# Patient Record
Sex: FEMALE | Race: WHITE | Employment: OTHER | URBAN - NONMETROPOLITAN AREA
[De-identification: names, ages, dates, MRNs, and addresses within clinical notes are randomized per-mention and may not be internally consistent; named-entity substitution may affect disease eponyms.]

---

## 2017-02-15 ENCOUNTER — OFFICE VISIT (OUTPATIENT)
Dept: CARDIOLOGY | Age: 77
End: 2017-02-15

## 2017-02-15 VITALS
WEIGHT: 173.25 LBS | HEART RATE: 80 BPM | DIASTOLIC BLOOD PRESSURE: 78 MMHG | HEIGHT: 61 IN | SYSTOLIC BLOOD PRESSURE: 150 MMHG | BODY MASS INDEX: 32.71 KG/M2

## 2017-02-15 DIAGNOSIS — R07.2 PRECORDIAL PAIN: ICD-10-CM

## 2017-02-15 DIAGNOSIS — E78.5 DYSLIPIDEMIA: ICD-10-CM

## 2017-02-15 DIAGNOSIS — Z87.891 HISTORY OF TOBACCO ABUSE: ICD-10-CM

## 2017-02-15 DIAGNOSIS — R06.02 SOB (SHORTNESS OF BREATH): ICD-10-CM

## 2017-02-15 DIAGNOSIS — E66.09 NON MORBID OBESITY DUE TO EXCESS CALORIES: ICD-10-CM

## 2017-02-15 PROCEDURE — 1090F PRES/ABSN URINE INCON ASSESS: CPT | Performed by: INTERNAL MEDICINE

## 2017-02-15 PROCEDURE — 99204 OFFICE O/P NEW MOD 45 MIN: CPT | Performed by: INTERNAL MEDICINE

## 2017-02-15 PROCEDURE — G8484 FLU IMMUNIZE NO ADMIN: HCPCS | Performed by: INTERNAL MEDICINE

## 2017-02-15 PROCEDURE — 4040F PNEUMOC VAC/ADMIN/RCVD: CPT | Performed by: INTERNAL MEDICINE

## 2017-02-15 PROCEDURE — G8427 DOCREV CUR MEDS BY ELIG CLIN: HCPCS | Performed by: INTERNAL MEDICINE

## 2017-02-15 PROCEDURE — 1123F ACP DISCUSS/DSCN MKR DOCD: CPT | Performed by: INTERNAL MEDICINE

## 2017-02-15 PROCEDURE — G8400 PT W/DXA NO RESULTS DOC: HCPCS | Performed by: INTERNAL MEDICINE

## 2017-02-15 PROCEDURE — G8419 CALC BMI OUT NRM PARAM NOF/U: HCPCS | Performed by: INTERNAL MEDICINE

## 2017-02-15 PROCEDURE — 1036F TOBACCO NON-USER: CPT | Performed by: INTERNAL MEDICINE

## 2017-02-15 RX ORDER — TRIAMCINOLONE ACETONIDE 1 MG/G
CREAM TOPICAL 2 TIMES DAILY
COMMUNITY
End: 2017-02-21

## 2017-02-15 RX ORDER — LISINOPRIL 20 MG/1
20 TABLET ORAL DAILY
COMMUNITY
End: 2021-05-05

## 2017-02-15 RX ORDER — PREDNISONE 20 MG/1
20 TABLET ORAL DAILY
COMMUNITY
End: 2017-02-21 | Stop reason: ALTCHOICE

## 2017-02-15 ASSESSMENT — ENCOUNTER SYMPTOMS
GASTROINTESTINAL NEGATIVE: 1
EYES NEGATIVE: 1
CHEST TIGHTNESS: 1
SHORTNESS OF BREATH: 1

## 2018-06-19 ENCOUNTER — HOSPITAL ENCOUNTER (OUTPATIENT)
Age: 78
Discharge: HOME | End: 2018-06-19
Payer: MEDICARE

## 2018-06-19 ENCOUNTER — HOSPITAL ENCOUNTER (OUTPATIENT)
Dept: GENERAL RADIOLOGY | Age: 78
Discharge: HOME | End: 2018-06-19
Payer: MEDICARE

## 2018-06-19 DIAGNOSIS — R55 SYNCOPE, UNSPECIFIED SYNCOPE TYPE: ICD-10-CM

## 2018-06-19 DIAGNOSIS — R06.02 SOB (SHORTNESS OF BREATH): ICD-10-CM

## 2018-06-19 LAB
ALBUMIN SERPL-MCNC: 4.2 G/DL (ref 3.5–5.1)
ALP BLD-CCNC: 92 U/L (ref 38–126)
ALT SERPL-CCNC: 27 U/L (ref 11–66)
ANION GAP SERPL CALCULATED.3IONS-SCNC: 15 MEQ/L (ref 8–16)
AST SERPL-CCNC: 30 U/L (ref 5–40)
BASOPHILS # BLD: 0.5 %
BASOPHILS ABSOLUTE: 0 THOU/MM3 (ref 0–0.1)
BILIRUB SERPL-MCNC: 0.5 MG/DL (ref 0.3–1.2)
BUN BLDV-MCNC: 17 MG/DL (ref 7–22)
CALCIUM SERPL-MCNC: 10.2 MG/DL (ref 8.5–10.5)
CHLORIDE BLD-SCNC: 102 MEQ/L (ref 98–111)
CO2: 23 MEQ/L (ref 23–33)
CREAT SERPL-MCNC: 0.7 MG/DL (ref 0.4–1.2)
EKG ATRIAL RATE: 83 BPM
EKG P AXIS: 39 DEGREES
EKG P-R INTERVAL: 146 MS
EKG Q-T INTERVAL: 406 MS
EKG QRS DURATION: 70 MS
EKG QTC CALCULATION (BAZETT): 477 MS
EKG R AXIS: 25 DEGREES
EKG T AXIS: 8 DEGREES
EKG VENTRICULAR RATE: 83 BPM
EOSINOPHIL # BLD: 1.4 %
EOSINOPHILS ABSOLUTE: 0.1 THOU/MM3 (ref 0–0.4)
GFR SERPL CREATININE-BSD FRML MDRD: 81 ML/MIN/1.73M2
GLUCOSE BLD-MCNC: 96 MG/DL (ref 70–108)
HCT VFR BLD CALC: 42.9 % (ref 37–47)
HEMOGLOBIN: 14.1 GM/DL (ref 12–16)
LYMPHOCYTES # BLD: 20.1 %
LYMPHOCYTES ABSOLUTE: 1.6 THOU/MM3 (ref 1–4.8)
MCH RBC QN AUTO: 29.5 PG (ref 27–31)
MCHC RBC AUTO-ENTMCNC: 33 GM/DL (ref 33–37)
MCV RBC AUTO: 89.5 FL (ref 81–99)
MONOCYTES # BLD: 5.8 %
MONOCYTES ABSOLUTE: 0.5 THOU/MM3 (ref 0.4–1.3)
NUCLEATED RED BLOOD CELLS: 0 /100 WBC
PDW BLD-RTO: 14.3 % (ref 11.5–14.5)
PLATELET # BLD: 355 THOU/MM3 (ref 130–400)
PMV BLD AUTO: 8.4 FL (ref 7.4–10.4)
POTASSIUM SERPL-SCNC: 4.4 MEQ/L (ref 3.5–5.2)
RBC # BLD: 4.79 MILL/MM3 (ref 4.2–5.4)
SEG NEUTROPHILS: 72.2 %
SEGMENTED NEUTROPHILS ABSOLUTE COUNT: 5.8 THOU/MM3 (ref 1.8–7.7)
SODIUM BLD-SCNC: 140 MEQ/L (ref 135–145)
TOTAL PROTEIN: 8.2 G/DL (ref 6.1–8)
TROPONIN T: < 0.01 NG/ML
WBC # BLD: 8.1 THOU/MM3 (ref 4.8–10.8)

## 2018-06-19 PROCEDURE — 93010 ELECTROCARDIOGRAM REPORT: CPT | Performed by: INTERNAL MEDICINE

## 2018-06-19 PROCEDURE — 80053 COMPREHEN METABOLIC PANEL: CPT

## 2018-06-19 PROCEDURE — 71046 X-RAY EXAM CHEST 2 VIEWS: CPT

## 2018-06-19 PROCEDURE — 36415 COLL VENOUS BLD VENIPUNCTURE: CPT

## 2018-06-19 PROCEDURE — 93005 ELECTROCARDIOGRAM TRACING: CPT | Performed by: NURSE PRACTITIONER

## 2018-06-19 PROCEDURE — 84484 ASSAY OF TROPONIN QUANT: CPT

## 2018-06-19 PROCEDURE — 85025 COMPLETE CBC W/AUTO DIFF WBC: CPT

## 2018-06-20 ENCOUNTER — HOSPITAL ENCOUNTER (EMERGENCY)
Age: 78
Discharge: HOME | End: 2018-06-20
Attending: FAMILY MEDICINE
Payer: MEDICARE

## 2018-06-20 ENCOUNTER — APPOINTMENT (OUTPATIENT)
Dept: GENERAL RADIOLOGY | Age: 78
End: 2018-06-20
Payer: MEDICARE

## 2018-06-20 ENCOUNTER — APPOINTMENT (OUTPATIENT)
Dept: CT IMAGING | Age: 78
End: 2018-06-20
Payer: MEDICARE

## 2018-06-20 VITALS
OXYGEN SATURATION: 95 % | BODY MASS INDEX: 31.28 KG/M2 | RESPIRATION RATE: 22 BRPM | HEIGHT: 62 IN | TEMPERATURE: 98.3 F | WEIGHT: 170 LBS | SYSTOLIC BLOOD PRESSURE: 133 MMHG | HEART RATE: 80 BPM | DIASTOLIC BLOOD PRESSURE: 73 MMHG

## 2018-06-20 DIAGNOSIS — R42 DIZZINESS: Primary | ICD-10-CM

## 2018-06-20 DIAGNOSIS — J44.1 COPD WITH ACUTE EXACERBATION (HCC): ICD-10-CM

## 2018-06-20 LAB
ALBUMIN SERPL-MCNC: 4.1 G/DL (ref 3.5–5.1)
ALP BLD-CCNC: 87 U/L (ref 38–126)
ALT SERPL-CCNC: 25 U/L (ref 11–66)
ANION GAP SERPL CALCULATED.3IONS-SCNC: 15 MEQ/L (ref 8–16)
APTT: 32.2 SECONDS (ref 22–38)
AST SERPL-CCNC: 25 U/L (ref 5–40)
BASOPHILS # BLD: 0.7 %
BASOPHILS ABSOLUTE: 0 THOU/MM3 (ref 0–0.1)
BILIRUB SERPL-MCNC: 0.4 MG/DL (ref 0.3–1.2)
BILIRUBIN URINE: NEGATIVE
BLOOD, URINE: NEGATIVE
BUN BLDV-MCNC: 23 MG/DL (ref 7–22)
CALCIUM SERPL-MCNC: 10 MG/DL (ref 8.5–10.5)
CHARACTER, URINE: CLEAR
CHLORIDE BLD-SCNC: 103 MEQ/L (ref 98–111)
CO2: 22 MEQ/L (ref 23–33)
COLOR: YELLOW
CREAT SERPL-MCNC: 0.9 MG/DL (ref 0.4–1.2)
EKG ATRIAL RATE: 71 BPM
EKG P AXIS: 39 DEGREES
EKG P-R INTERVAL: 136 MS
EKG Q-T INTERVAL: 400 MS
EKG QRS DURATION: 84 MS
EKG QTC CALCULATION (BAZETT): 434 MS
EKG R AXIS: 26 DEGREES
EKG T AXIS: 27 DEGREES
EKG VENTRICULAR RATE: 71 BPM
EOSINOPHIL # BLD: 2.7 %
EOSINOPHILS ABSOLUTE: 0.2 THOU/MM3 (ref 0–0.4)
GFR SERPL CREATININE-BSD FRML MDRD: 61 ML/MIN/1.73M2
GLUCOSE BLD-MCNC: 88 MG/DL (ref 70–108)
GLUCOSE URINE: NEGATIVE MG/DL
HCT VFR BLD CALC: 41.5 % (ref 37–47)
HEMOGLOBIN: 13.9 GM/DL (ref 12–16)
INR BLD: 1 (ref 0.85–1.13)
KETONES, URINE: NEGATIVE
LACTIC ACID: 1.3 MMOL/L (ref 0.5–2.2)
LEUKOCYTE ESTERASE, URINE: NEGATIVE
LIPASE: 40.2 U/L (ref 5.6–51.3)
LYMPHOCYTES # BLD: 34.4 %
LYMPHOCYTES ABSOLUTE: 2.3 THOU/MM3 (ref 1–4.8)
MCH RBC QN AUTO: 30 PG (ref 27–31)
MCHC RBC AUTO-ENTMCNC: 33.5 GM/DL (ref 33–37)
MCV RBC AUTO: 89.6 FL (ref 81–99)
MONOCYTES # BLD: 9.4 %
MONOCYTES ABSOLUTE: 0.6 THOU/MM3 (ref 0.4–1.3)
NITRITE, URINE: NEGATIVE
NUCLEATED RED BLOOD CELLS: 0 /100 WBC
OSMOLALITY CALCULATION: 282.5 MOSMOL/KG (ref 275–300)
PDW BLD-RTO: 14 % (ref 11.5–14.5)
PH UA: 6
PLATELET # BLD: 355 THOU/MM3 (ref 130–400)
PMV BLD AUTO: 8.2 FL (ref 7.4–10.4)
POTASSIUM REFLEX MAGNESIUM: 4 MEQ/L (ref 3.5–5.2)
PRO-BNP: 174.5 PG/ML (ref 0–1800)
PROTEIN UA: NEGATIVE
RBC # BLD: 4.63 MILL/MM3 (ref 4.2–5.4)
SEG NEUTROPHILS: 52.8 %
SEGMENTED NEUTROPHILS ABSOLUTE COUNT: 3.6 THOU/MM3 (ref 1.8–7.7)
SODIUM BLD-SCNC: 140 MEQ/L (ref 135–145)
SPECIFIC GRAVITY, URINE: 1.01 (ref 1–1.03)
TOTAL PROTEIN: 7.8 G/DL (ref 6.1–8)
TROPONIN T: < 0.01 NG/ML
UROBILINOGEN, URINE: 0.2 EU/DL
WBC # BLD: 6.8 THOU/MM3 (ref 4.8–10.8)

## 2018-06-20 PROCEDURE — 83690 ASSAY OF LIPASE: CPT

## 2018-06-20 PROCEDURE — 83880 ASSAY OF NATRIURETIC PEPTIDE: CPT

## 2018-06-20 PROCEDURE — 93005 ELECTROCARDIOGRAM TRACING: CPT | Performed by: FAMILY MEDICINE

## 2018-06-20 PROCEDURE — 96365 THER/PROPH/DIAG IV INF INIT: CPT

## 2018-06-20 PROCEDURE — 96375 TX/PRO/DX INJ NEW DRUG ADDON: CPT

## 2018-06-20 PROCEDURE — 70450 CT HEAD/BRAIN W/O DYE: CPT

## 2018-06-20 PROCEDURE — 36415 COLL VENOUS BLD VENIPUNCTURE: CPT

## 2018-06-20 PROCEDURE — 94640 AIRWAY INHALATION TREATMENT: CPT

## 2018-06-20 PROCEDURE — 84484 ASSAY OF TROPONIN QUANT: CPT

## 2018-06-20 PROCEDURE — 6370000000 HC RX 637 (ALT 250 FOR IP): Performed by: FAMILY MEDICINE

## 2018-06-20 PROCEDURE — 71046 X-RAY EXAM CHEST 2 VIEWS: CPT

## 2018-06-20 PROCEDURE — 6360000002 HC RX W HCPCS: Performed by: FAMILY MEDICINE

## 2018-06-20 PROCEDURE — 93010 ELECTROCARDIOGRAM REPORT: CPT | Performed by: INTERNAL MEDICINE

## 2018-06-20 PROCEDURE — 85610 PROTHROMBIN TIME: CPT

## 2018-06-20 PROCEDURE — 80053 COMPREHEN METABOLIC PANEL: CPT

## 2018-06-20 PROCEDURE — 85025 COMPLETE CBC W/AUTO DIFF WBC: CPT

## 2018-06-20 PROCEDURE — 83605 ASSAY OF LACTIC ACID: CPT

## 2018-06-20 PROCEDURE — 2580000003 HC RX 258: Performed by: FAMILY MEDICINE

## 2018-06-20 PROCEDURE — 87040 BLOOD CULTURE FOR BACTERIA: CPT

## 2018-06-20 PROCEDURE — 96367 TX/PROPH/DG ADDL SEQ IV INF: CPT

## 2018-06-20 PROCEDURE — 99285 EMERGENCY DEPT VISIT HI MDM: CPT

## 2018-06-20 PROCEDURE — 85730 THROMBOPLASTIN TIME PARTIAL: CPT

## 2018-06-20 PROCEDURE — 81003 URINALYSIS AUTO W/O SCOPE: CPT

## 2018-06-20 RX ORDER — AZITHROMYCIN 250 MG/1
500 TABLET, FILM COATED ORAL DAILY
Status: ON HOLD | COMMUNITY
End: 2020-09-18

## 2018-06-20 RX ORDER — PREDNISONE 10 MG/1
TABLET ORAL
Qty: 20 TABLET | Refills: 0 | Status: SHIPPED | OUTPATIENT
Start: 2018-06-20 | End: 2018-06-30

## 2018-06-20 RX ORDER — MECLIZINE HYDROCHLORIDE 25 MG/1
25 TABLET ORAL 3 TIMES DAILY PRN
Qty: 30 TABLET | Refills: 0 | Status: SHIPPED | OUTPATIENT
Start: 2018-06-20 | End: 2018-06-30

## 2018-06-20 RX ORDER — 0.9 % SODIUM CHLORIDE 0.9 %
30 INTRAVENOUS SOLUTION INTRAVENOUS ONCE
Status: COMPLETED | OUTPATIENT
Start: 2018-06-20 | End: 2018-06-20

## 2018-06-20 RX ORDER — IPRATROPIUM BROMIDE AND ALBUTEROL SULFATE 2.5; .5 MG/3ML; MG/3ML
2 SOLUTION RESPIRATORY (INHALATION) ONCE
Status: COMPLETED | OUTPATIENT
Start: 2018-06-20 | End: 2018-06-20

## 2018-06-20 RX ORDER — METHYLPREDNISOLONE SODIUM SUCCINATE 125 MG/2ML
125 INJECTION, POWDER, LYOPHILIZED, FOR SOLUTION INTRAMUSCULAR; INTRAVENOUS ONCE
Status: COMPLETED | OUTPATIENT
Start: 2018-06-20 | End: 2018-06-20

## 2018-06-20 RX ADMIN — IPRATROPIUM BROMIDE AND ALBUTEROL SULFATE 2 AMPULE: .5; 3 SOLUTION RESPIRATORY (INHALATION) at 18:59

## 2018-06-20 RX ADMIN — SODIUM CHLORIDE 2313 ML: 9 INJECTION, SOLUTION INTRAVENOUS at 19:46

## 2018-06-20 RX ADMIN — METHYLPREDNISOLONE SODIUM SUCCINATE 125 MG: 125 INJECTION, POWDER, FOR SOLUTION INTRAMUSCULAR; INTRAVENOUS at 19:47

## 2018-06-20 RX ADMIN — CEFTRIAXONE SODIUM 1 G: 1 INJECTION, POWDER, FOR SOLUTION INTRAMUSCULAR; INTRAVENOUS at 19:59

## 2018-06-20 RX ADMIN — AZITHROMYCIN MONOHYDRATE 500 MG: 500 INJECTION, POWDER, LYOPHILIZED, FOR SOLUTION INTRAVENOUS at 20:37

## 2018-06-20 ASSESSMENT — ENCOUNTER SYMPTOMS
ABDOMINAL PAIN: 0
SHORTNESS OF BREATH: 0
CHEST TIGHTNESS: 0
GASTROINTESTINAL NEGATIVE: 1
FACIAL SWELLING: 0
SINUS PAIN: 0
SINUS PRESSURE: 0
ALLERGIC/IMMUNOLOGIC NEGATIVE: 1
EYES NEGATIVE: 1
SORE THROAT: 0
VOMITING: 0
WHEEZING: 1
CONSTIPATION: 0
VOICE CHANGE: 0
NAUSEA: 0
COUGH: 1
RHINORRHEA: 0
TROUBLE SWALLOWING: 0

## 2018-06-21 ENCOUNTER — NURSE TRIAGE (OUTPATIENT)
Dept: ADMINISTRATIVE | Age: 78
End: 2018-06-21

## 2018-06-26 LAB
BLOOD CULTURE, ROUTINE: NORMAL
BLOOD CULTURE, ROUTINE: NORMAL

## 2019-11-06 ENCOUNTER — HOSPITAL ENCOUNTER (OUTPATIENT)
Age: 79
Setting detail: SPECIMEN
Discharge: HOME | End: 2019-11-06
Payer: MEDICARE

## 2019-11-08 LAB
CULTURE: ABNORMAL
DIRECT EXAM: ABNORMAL
DIRECT EXAM: ABNORMAL
Lab: ABNORMAL
SPECIMEN DESCRIPTION: ABNORMAL

## 2020-07-27 ENCOUNTER — HOSPITAL ENCOUNTER (OUTPATIENT)
Age: 80
Setting detail: SPECIMEN
Discharge: HOME | End: 2020-07-27
Payer: MEDICARE

## 2020-07-27 LAB
ABSOLUTE EOS #: 0.37 K/UL (ref 0–0.44)
ABSOLUTE IMMATURE GRANULOCYTE: 0.03 K/UL (ref 0–0.3)
ABSOLUTE LYMPH #: 1.74 K/UL (ref 1.1–3.7)
ABSOLUTE MONO #: 0.64 K/UL (ref 0.1–1.2)
ALBUMIN SERPL-MCNC: 3.9 G/DL (ref 3.5–5.2)
ALBUMIN/GLOBULIN RATIO: 1 (ref 1–2.5)
ALP BLD-CCNC: 84 U/L (ref 35–104)
ALT SERPL-CCNC: 10 U/L (ref 5–33)
ANION GAP SERPL CALCULATED.3IONS-SCNC: 16 MMOL/L (ref 9–17)
AST SERPL-CCNC: 17 U/L
BASOPHILS # BLD: 1 % (ref 0–2)
BASOPHILS ABSOLUTE: 0.06 K/UL (ref 0–0.2)
BILIRUB SERPL-MCNC: 0.46 MG/DL (ref 0.3–1.2)
BUN BLDV-MCNC: 26 MG/DL (ref 8–23)
BUN/CREAT BLD: ABNORMAL (ref 9–20)
C-PEPTIDE: 10.3 NG/ML (ref 1.1–4.4)
CALCIUM SERPL-MCNC: 8.9 MG/DL (ref 8.6–10.4)
CHLORIDE BLD-SCNC: 104 MMOL/L (ref 98–107)
CO2: 19 MMOL/L (ref 20–31)
CREAT SERPL-MCNC: 1.31 MG/DL (ref 0.5–0.9)
DIFFERENTIAL TYPE: ABNORMAL
EOSINOPHILS RELATIVE PERCENT: 5 % (ref 1–4)
GFR AFRICAN AMERICAN: 47 ML/MIN
GFR NON-AFRICAN AMERICAN: 39 ML/MIN
GFR SERPL CREATININE-BSD FRML MDRD: ABNORMAL ML/MIN/{1.73_M2}
GFR SERPL CREATININE-BSD FRML MDRD: ABNORMAL ML/MIN/{1.73_M2}
GLUCOSE BLD-MCNC: 125 MG/DL (ref 70–99)
HCT VFR BLD CALC: 40.9 % (ref 36.3–47.1)
HEMOGLOBIN: 12.5 G/DL (ref 11.9–15.1)
IMMATURE GRANULOCYTES: 0 %
LYMPHOCYTES # BLD: 22 % (ref 24–43)
MCH RBC QN AUTO: 28.5 PG (ref 25.2–33.5)
MCHC RBC AUTO-ENTMCNC: 30.6 G/DL (ref 28.4–34.8)
MCV RBC AUTO: 93.2 FL (ref 82.6–102.9)
MONOCYTES # BLD: 8 % (ref 3–12)
NRBC AUTOMATED: 0 PER 100 WBC
PDW BLD-RTO: 14.8 % (ref 11.8–14.4)
PLATELET # BLD: 403 K/UL (ref 138–453)
PLATELET ESTIMATE: ABNORMAL
PMV BLD AUTO: 9.9 FL (ref 8.1–13.5)
POTASSIUM SERPL-SCNC: 5.2 MMOL/L (ref 3.7–5.3)
RBC # BLD: 4.39 M/UL (ref 3.95–5.11)
RBC # BLD: ABNORMAL 10*6/UL
SEDIMENTATION RATE, ERYTHROCYTE: 33 MM (ref 0–30)
SEG NEUTROPHILS: 64 % (ref 36–65)
SEGMENTED NEUTROPHILS ABSOLUTE COUNT: 4.94 K/UL (ref 1.5–8.1)
SODIUM BLD-SCNC: 139 MMOL/L (ref 135–144)
TOTAL PROTEIN: 7.7 G/DL (ref 6.4–8.3)
WBC # BLD: 7.8 K/UL (ref 3.5–11.3)
WBC # BLD: ABNORMAL 10*3/UL

## 2020-08-30 ENCOUNTER — APPOINTMENT (OUTPATIENT)
Dept: CT IMAGING | Age: 80
End: 2020-08-30
Payer: MEDICARE

## 2020-08-30 ENCOUNTER — HOSPITAL ENCOUNTER (EMERGENCY)
Age: 80
Discharge: HOME | End: 2020-08-31
Attending: EMERGENCY MEDICINE
Payer: MEDICARE

## 2020-08-30 LAB
ALBUMIN SERPL-MCNC: 4.1 G/DL (ref 3.5–5.1)
ALP BLD-CCNC: 92 U/L (ref 38–126)
ALT SERPL-CCNC: 11 U/L (ref 11–66)
ANION GAP SERPL CALCULATED.3IONS-SCNC: 12 MEQ/L (ref 8–16)
APTT: 35.6 SECONDS (ref 22–38)
AST SERPL-CCNC: 16 U/L (ref 5–40)
BASOPHILS # BLD: 0.8 %
BASOPHILS ABSOLUTE: 0.1 THOU/MM3 (ref 0–0.1)
BILIRUB SERPL-MCNC: 0.5 MG/DL (ref 0.3–1.2)
BUN BLDV-MCNC: 22 MG/DL (ref 7–22)
CALCIUM SERPL-MCNC: 9.4 MG/DL (ref 8.5–10.5)
CHLORIDE BLD-SCNC: 107 MEQ/L (ref 98–111)
CO2: 19 MEQ/L (ref 23–33)
CREAT SERPL-MCNC: 1.2 MG/DL (ref 0.4–1.2)
EOSINOPHIL # BLD: 5.5 %
EOSINOPHILS ABSOLUTE: 0.3 THOU/MM3 (ref 0–0.4)
ERYTHROCYTE [DISTWIDTH] IN BLOOD BY AUTOMATED COUNT: 15.4 % (ref 11.5–14.5)
ERYTHROCYTE [DISTWIDTH] IN BLOOD BY AUTOMATED COUNT: 52.7 FL (ref 35–45)
GFR SERPL CREATININE-BSD FRML MDRD: 43 ML/MIN/1.73M2
GLUCOSE BLD-MCNC: 91 MG/DL (ref 70–108)
HCT VFR BLD CALC: 38.6 % (ref 37–47)
HEMOGLOBIN: 12.2 GM/DL (ref 12–16)
IMMATURE GRANS (ABS): 0.02 THOU/MM3 (ref 0–0.07)
IMMATURE GRANULOCYTES: 0.3 %
INR BLD: 1.02 (ref 0.85–1.13)
LIPASE: 25.5 U/L (ref 5.6–51.3)
LYMPHOCYTES # BLD: 26.5 %
LYMPHOCYTES ABSOLUTE: 1.7 THOU/MM3 (ref 1–4.8)
MCH RBC QN AUTO: 29.5 PG (ref 26–33)
MCHC RBC AUTO-ENTMCNC: 31.6 GM/DL (ref 32.2–35.5)
MCV RBC AUTO: 93.5 FL (ref 81–99)
MONOCYTES # BLD: 8.5 %
MONOCYTES ABSOLUTE: 0.5 THOU/MM3 (ref 0.4–1.3)
NUCLEATED RED BLOOD CELLS: 0 /100 WBC
OSMOLALITY CALCULATION: 278.6 MOSMOL/KG (ref 275–300)
PLATELET # BLD: 354 THOU/MM3 (ref 130–400)
PMV BLD AUTO: 9.4 FL (ref 9.4–12.4)
POTASSIUM REFLEX MAGNESIUM: 4.5 MEQ/L (ref 3.5–5.2)
RBC # BLD: 4.13 MILL/MM3 (ref 4.2–5.4)
SEG NEUTROPHILS: 58.4 %
SEGMENTED NEUTROPHILS ABSOLUTE COUNT: 3.7 THOU/MM3 (ref 1.8–7.7)
SODIUM BLD-SCNC: 138 MEQ/L (ref 135–145)
TOTAL PROTEIN: 7.8 G/DL (ref 6.1–8)
WBC # BLD: 6.3 THOU/MM3 (ref 4.8–10.8)

## 2020-08-30 PROCEDURE — 85730 THROMBOPLASTIN TIME PARTIAL: CPT

## 2020-08-30 PROCEDURE — 86850 RBC ANTIBODY SCREEN: CPT

## 2020-08-30 PROCEDURE — 85025 COMPLETE CBC W/AUTO DIFF WBC: CPT

## 2020-08-30 PROCEDURE — 36415 COLL VENOUS BLD VENIPUNCTURE: CPT

## 2020-08-30 PROCEDURE — 74177 CT ABD & PELVIS W/CONTRAST: CPT

## 2020-08-30 PROCEDURE — 80053 COMPREHEN METABOLIC PANEL: CPT

## 2020-08-30 PROCEDURE — 85610 PROTHROMBIN TIME: CPT

## 2020-08-30 PROCEDURE — 99284 EMERGENCY DEPT VISIT MOD MDM: CPT

## 2020-08-30 PROCEDURE — 86900 BLOOD TYPING SEROLOGIC ABO: CPT

## 2020-08-30 PROCEDURE — 86901 BLOOD TYPING SEROLOGIC RH(D): CPT

## 2020-08-30 PROCEDURE — 83690 ASSAY OF LIPASE: CPT

## 2020-08-30 PROCEDURE — 2500000003 HC RX 250 WO HCPCS: Performed by: EMERGENCY MEDICINE

## 2020-08-30 RX ORDER — TRANEXAMIC ACID 100 MG/ML
1000 INJECTION, SOLUTION INTRAVENOUS ONCE
Status: COMPLETED | OUTPATIENT
Start: 2020-08-30 | End: 2020-08-30

## 2020-08-30 RX ADMIN — TRANEXAMIC ACID 1000 MG: 100 INJECTION, SOLUTION INTRAVENOUS at 23:15

## 2020-08-30 ASSESSMENT — ENCOUNTER SYMPTOMS
VOMITING: 0
DIARRHEA: 1
RHINORRHEA: 0
SORE THROAT: 0
RESPIRATORY NEGATIVE: 1
ABDOMINAL DISTENTION: 1
NAUSEA: 0
ALLERGIC/IMMUNOLOGIC NEGATIVE: 1
EYES NEGATIVE: 1
CONSTIPATION: 0
SHORTNESS OF BREATH: 0
ABDOMINAL PAIN: 0
COUGH: 0
BLOOD IN STOOL: 0

## 2020-08-31 VITALS
DIASTOLIC BLOOD PRESSURE: 92 MMHG | TEMPERATURE: 97.6 F | SYSTOLIC BLOOD PRESSURE: 162 MMHG | HEART RATE: 85 BPM | RESPIRATION RATE: 18 BRPM | BODY MASS INDEX: 31.47 KG/M2 | WEIGHT: 171 LBS | HEIGHT: 62 IN | OXYGEN SATURATION: 95 %

## 2020-08-31 LAB
ABO: NORMAL
ANTIBODY SCREEN: NORMAL
RH FACTOR: NORMAL

## 2020-08-31 PROCEDURE — 6360000004 HC RX CONTRAST MEDICATION: Performed by: EMERGENCY MEDICINE

## 2020-08-31 PROCEDURE — 74177 CT ABD & PELVIS W/CONTRAST: CPT

## 2020-08-31 RX ADMIN — IOPAMIDOL 80 ML: 755 INJECTION, SOLUTION INTRAVENOUS at 00:04

## 2020-08-31 NOTE — ED NOTES
ED nurse-to-nurse bedside report    No chief complaint on file. LOC: alert and orientated to name, place, date  Vital signs   Vitals:    08/30/20 2022 08/30/20 2253   BP: (!) 144/92 137/85   Pulse: 77 74   Resp: 17 17   Temp: 97.6 °F (36.4 °C)    TempSrc: Oral    SpO2: 97% 98%   Weight: 171 lb (77.6 kg)    Height: 5' 2\" (1.575 m)       Pain:  0  Pain Interventions: none  Pain Goal: 0  Oxygen: NA    Current needs required none   Telemetry: NA  LDAs:   Peripheral IV 08/30/20 Left Forearm (Active)   Site Assessment Clean;Dry; Intact 08/30/20 2253   Line Status Normal saline locked 08/30/20 2253   Dressing Status Clean;Dry; Intact 08/30/20 2253     Continuous Infusions:   Mobility: Independent  [place] Fall Risk Score: Fall Risk 10/13/2014   2 or more falls in past year? no   Fall with injury in past year? no     Fall Interventions: call light in reach  Report given to:  Tato Garcia RN  08/30/20 0399

## 2020-08-31 NOTE — ED NOTES
Presents to ER with complaints of belly button bleeding. Pt states she has been gaining weight the last months. States when she got up this morning she noticed her pants were wet and realized it was blood. Pt denies taking blood thinners. States she has never had anything like this.      Melissa Wallace RN  08/30/20 2029

## 2020-08-31 NOTE — ED PROVIDER NOTES
**This is a Medical/PA/APRN Student Note and is charted for educational purposes. The non-physician staff attested note is not to be used for billing purposes, chart documentation or to guide patient care. Please see the supervising physician/PA/APRN modifications/attestation for treatment plan/chart documentation/suggestions. This note has been reviewed and feedback has been provided to the student. **      MEDICAL STUDENT NOTE    No chief complaint on file. History obtained from the patient and daughter. GAYE Wilder is a [de-identified] y.o. female, COPD, HTN, arthritis c/o bleeding belly button. Per pt and her daughter, Meliza Kapoor has noticed umbilical mass because it has been oozing/bleeding recently and is currently bleeding in the room; otherwise whe is unable to see it past her pannus. Her daughter notes that her belly has been getting steadily larger over the past year or so since her mother retired from [employer]. She reports a loose bowel movement this morning; hasn't passed gas since; she reports daily bowel movements. She denies n/v, blood in stool, urinary sx. She has not been having fevers or chills. She has no other complaints at this time. She has no history of abd surgery. Review of Systems   Constitutional: Positive for unexpected weight change. Negative for chills and fever. HENT: Negative. Eyes: Negative. Respiratory: Negative. Cardiovascular: Negative. Gastrointestinal: Positive for abdominal distention and diarrhea. Negative for abdominal pain, blood in stool, constipation, nausea and vomiting. Endocrine: Negative. Genitourinary: Negative. Musculoskeletal: Negative. Skin: Negative. Allergic/Immunologic: Negative. Neurological: Negative. Hematological: Negative. Psychiatric/Behavioral: Negative.             Past Medical History:   Diagnosis Date    Arthritis     Cancer of eye ([facility] [address]     Right    Dyslipidemia 2/15/2017    Fatigue     History of tobacco abuse 2/15/2017    Hyperlipidemia 10/13/2014    Hypertension     Non morbid obesity due to excess calories 2/15/2017    Pneumonia     Precordial pain 2/15/2017    Smoker     SOB (shortness of breath) 2/15/2017    Tobacco abuse           Past Surgical History:   Procedure Laterality Date    EYE SURGERY      Right eye cancer    HEMORRHOID SURGERY         No current facility-administered medications for this encounter. Current Outpatient Medications:     azithromycin (ZITHROMAX) 250 MG tablet, Take 500 mg by mouth daily, Disp: , Rfl:     albuterol-ipratropium (COMBIVENT RESPIMAT)  MCG/ACT AERS inhaler, Inhale 1 puff into the lungs every 6 hours, Disp: 1 Inhaler, Rfl: 0    lisinopril (PRINIVIL;ZESTRIL) 20 MG tablet, Take 20 mg by mouth daily, Disp: , Rfl:     albuterol sulfate HFA (PROVENTIL HFA) 108 (90 BASE) MCG/ACT inhaler, Inhale 2 puffs into the lungs every 6 hours as needed for Wheezing, Disp: 1 Inhaler, Rfl: 0    butalbital-acetaminophen-caffeine (FIORICET) -40 MG per tablet, Take 1 tablet by mouth every 4 hours as needed for Headaches, Disp: 8 tablet, Rfl: 0    meloxicam (MOBIC) 15 MG tablet, TAKE 1 TABLET BY MOUTH EVERY DAY AS NEEDED FOR PAIN, Disp: 30 tablet, Rfl: 0    atorvastatin (LIPITOR) 20 MG tablet, TAKE 1 TABLET BY MOUTH ONE TIME A DAY , Disp: 30 tablet, Rfl: 4    amLODIPine (NORVASC) 5 MG tablet, TAKE 1 TABLET BY MOUTH DAILY, Disp: 30 tablet, Rfl: 5    Social History     Social History Narrative    Not on file     Social History     Tobacco Use    Smoking status: Former Smoker     Packs/day: 1.00     Last attempt to quit: 2012     Years since quittin.6    Smokeless tobacco: Never Used    Tobacco comment: tryin to cut down   Substance Use Topics    Alcohol use: No     Comment: quit    Drug use: No        Allergies   Allergen Reactions    Aspirin Other (See Comments)     Just plain aspirin.        Family History   Problem Relation Age of Onset  Cancer Mother     Cancer Father     Cancer Brother     Heart Failure Sister     Cancer Daughter            Previous records reviewed: no relevant history in chart. Vitals:    08/30/20 2253   BP: 137/85   Pulse: 74   Resp: 17   Temp:    SpO2: 98%     Vital signs and nursing assessment reviewed and abnormal from htn. Pulsoximetry is normal per my interpretation. Physical Exam  Constitutional:       General: She is not in acute distress. Appearance: Normal appearance. She is obese. HENT:      Head: Normocephalic and atraumatic. Mouth/Throat:      Mouth: Mucous membranes are moist.      Pharynx: Oropharynx is clear. Eyes:      General: No scleral icterus. Extraocular Movements: Extraocular movements intact. Conjunctiva/sclera: Conjunctivae normal.      Pupils: Pupils are equal, round, and reactive to light. Neck:      Musculoskeletal: Normal range of motion. Cardiovascular:      Rate and Rhythm: Normal rate and regular rhythm. Pulses: Normal pulses. Heart sounds: Normal heart sounds. No murmur. No gallop. Comments: Distant heart sounds, increased AP diameter of chest wall  Pulmonary:      Effort: Pulmonary effort is normal. No respiratory distress. Breath sounds: Wheezing (bilaterally in all lung fields) present. No rales. Abdominal:      General: There is distension. Tenderness: There is no abdominal tenderness. There is no guarding or rebound. Hernia: A hernia (~3cm in diameter umbilical hernia; superficial vessels are bleeding from punctate lesions of skin breakdown. Hernia is mostly reducible but will not remain in the abdominal cavity without pressure. Hernia is nontender, no warmth, no erythema) is present. Musculoskeletal:      Right lower leg: No edema. Left lower leg: No edema. Skin:     General: Skin is warm and dry. Capillary Refill: Capillary refill takes 2 to 3 seconds. Coloration: Skin is not jaundiced. Neurological:      General: No focal deficit present. Mental Status: She is alert and oriented to person, place, and time. Mental status is at baseline. Psychiatric:         Mood and Affect: Mood normal.         Thought Content: Thought content normal.             MDM  Initial Assessment:   Zeferino Rosas is a [de-identified] y/o F with hx COPD and no hx of abdominal surgeries presenting w/ superficial, low volume bleeding from a temporarily reducible umbilical hernia without signs of incarceration; no erythema, non-tender, not hot, pt is non-toxic. Pt also with likely intertrigo of inferior abdominal wall. Ddx:  Reducible Umbilical hernia, may or may not contain bowel   -C/f incarceration, strangulation highly unlikely at this point  Intertrigo (strep vs candida)     Plan: Plan for basic labs with CT IV contrast for evaluation of hernia. Educated pt regarding keeping the hernia sac dry and clean in order to prevent infection and further skin breakdown. Will attempt reduction prior to discharge and offer abd binder. Will offer topical antifungals for pannus intertrigo. Case and management plan discussed with Dr. Scout Nelson. Labs Reviewed   CBC WITH AUTO DIFFERENTIAL   COMPREHENSIVE METABOLIC PANEL W/ REFLEX TO MG FOR LOW K   LIPASE   PROTIME-INR   APTT   URINE RT REFLEX TO CULTURE   TYPE AND SCREEN       Medications - No data to display    CT ABDOMEN PELVIS W IV CONTRAST Additional Contrast? None    (Results Pending)       Final diagnoses:   None       New Prescriptions    No medications on file           Condition: condition: good    Disposition: Discharge to home    Electronically signed by Ryan Patiño on 8/30/2020 at 10:53 PM       **This is a Medical/PA/APRN Student Note and is charted for educational purposes. The non-physician staff attested note is not to be used for billing purposes, chart documentation or to guide patient care.  Please see the supervising physician/PA/APRN modifications/attestation for treatment plan/chart documentation/suggestions. This note has been reviewed and feedback has been provided to the student.  Elijah Monreal [facility]  09/09/20 1038

## 2020-08-31 NOTE — ED NOTES
Pt medicated per MAR. Pt resting on cot with daughter at bedside. Respirations easy and non-labored. No distress noted. Call light in reach.      Jose Britt RN  08/30/20 3990

## 2020-08-31 NOTE — ED PROVIDER NOTES
[facility]  EMERGENCY DEPARTMENT ENCOUNTER          CHIEF COMPLAINT     No chief complaint on file. Nurses Notes reviewed and I agree except as noted in the HPI. HISTORY OF PRESENT ILLNESS    Tal Christian is a [de-identified] y.o. female who presents to the emergency department for bleeding from her bellybutton. Patient has had some bleeding yesterday. Family applied cold compresses and this was able to stop bleeding. This began again today therefore they came to the emergency department. Patient states she has no abdominal pain. She has some ongoing chronic diarrhea and had a bowel movement yesterday. No fever, chills, nausea, vomiting. No black or bloody stools. No bleeding from the gums or frequent nosebleeds. She does have a history of a chronic umbilical hernia which she states other than the superficial bleeding is at its baseline. She also incidentally reports that over the last year she has gained weight especially in the abdominal area. She denies any use of alcohol. No other complaints or concerns. REVIEW OF SYSTEMS     Review of Systems   Constitutional: Negative for diaphoresis and fever. HENT: Negative for congestion, rhinorrhea and sore throat. Eyes: Negative for visual disturbance. Respiratory: Negative for cough and shortness of breath. Cardiovascular: Negative for chest pain, palpitations and leg swelling. Gastrointestinal: Positive for abdominal distention and diarrhea. Negative for abdominal pain, blood in stool, constipation, nausea and vomiting. Both of which are chronic   Endocrine: Negative for polyuria. Genitourinary: Negative for difficulty urinating and dysuria. Musculoskeletal: Negative for joint swelling. Skin: Negative for rash. Neurological: Negative for seizures, syncope, facial asymmetry, speech difficulty, weakness and headaches. Hematological: Negative for adenopathy. Does not bruise/bleed easily.    Psychiatric/Behavioral: Negative for confusion. All other systems reviewed and are negative. PAST MEDICAL HISTORY    has a past medical history of Arthritis, Cancer of eye ([address] Dyslipidemia, Fatigue, History of tobacco abuse, Hyperlipidemia, Hypertension, Non morbid obesity due to excess calories, Pneumonia, Precordial pain, Smoker, SOB (shortness of breath), and Tobacco abuse. SURGICAL HISTORY      has a past surgical history that includes eye surgery and Hemorrhoid surgery. CURRENT MEDICATIONS       Discharge Medication List as of 2020  1:08 AM      CONTINUE these medications which have NOT CHANGED    Details   azithromycin (ZITHROMAX) 250 MG tablet Take 500 mg by mouth dailyHistorical Med      albuterol-ipratropium (COMBIVENT RESPIMAT)  MCG/ACT AERS inhaler Inhale 1 puff into the lungs every 6 hours, Disp-1 Inhaler, R-0Print      lisinopril (PRINIVIL;ZESTRIL) 20 MG tablet Take 20 mg by mouth daily      albuterol sulfate HFA (PROVENTIL HFA) 108 (90 BASE) MCG/ACT inhaler Inhale 2 puffs into the lungs every 6 hours as needed for Wheezing, Disp-1 Inhaler, R-0      butalbital-acetaminophen-caffeine (FIORICET) -40 MG per tablet Take 1 tablet by mouth every 4 hours as needed for Headaches, Disp-8 tablet, R-0      meloxicam (MOBIC) 15 MG tablet TAKE 1 TABLET BY MOUTH EVERY DAY AS NEEDED FOR PAIN, Disp-30 tablet, R-0      atorvastatin (LIPITOR) 20 MG tablet TAKE 1 TABLET BY MOUTH ONE TIME A DAY , Disp-30 tablet, R-4      amLODIPine (NORVASC) 5 MG tablet TAKE 1 TABLET BY MOUTH DAILY, Disp-30 tablet, R-5             ALLERGIES     is allergic to aspirin. FAMILY HISTORY     She indicated that her mother is . She indicated that her father is . She indicated that the status of her sister is unknown. She indicated that her brother is .  She indicated that the status of her daughter is unknown.   family history includes Cancer in her brother, daughter, father, and mother; Heart Failure in her sister. SOCIAL HISTORY      reports that she quit smoking about 8 years ago. She smoked 1.00 pack per day. She has never used smokeless tobacco. She reports that she does not drink alcohol or use drugs. PHYSICAL EXAM     INITIAL VITALS:  height is 5' 2\" (1.575 m) and weight is 171 lb (77.6 kg). Her oral temperature is 97.6 °F (36.4 °C). Her blood pressure is 162/92 (abnormal) and her pulse is 85. Her respiration is 18 and oxygen saturation is 95%. CONSTITUTIONAL: [Awake, alert, non toxic, well developed, well nourished, no acute distress]  HEAD: [Normocephalic, atraumatic]  EYES: [Pupils equal, round & reactive to light, extraocular movements intact, no nystagmus, clear conjunctiva, non-icteric sclera]  ENT: [External ear canal clear without evidence of cerumen impaction or foreign body, TM's clear without erythema or bulging. Nares patent without drainage, septum appears midline. Moist mucus membranes, oropharynx clear without exudate, erythema, or mass. Uvula midline]  NECK: [Nontender and supple. No meningismus, no appreciated lymphadenopathy. Intact full range of motion. C-spine midline without vertebral tenderness. Trachea midline.]  CHEST: [Inspection normal, no lesions, equal rise. No crepitus or tenderness upon palpation.]  CARDIOVASCULAR: [Regular rate, rhythm, normal S1 and S2. No appreciated murmurs, rubs, or gallops. No pulse deficits appreciated. Intact distal perfusion. JVD not appreciated.]  PULMONARY: [Respiratory distress absent. Respiratory effort normal. Breath sounds clear to auscultation without rhonchi, rales, or wheezing. No accessory muscle use. No stridor]  ABDOMEN: [Inspection reveals a 3 cm umbilical hernia which is soft, nontender and reducible. Surface of the umbilical hernia has a few areas on the right aspect which appeared to be somewhat friable, no active bleeding at the time of my evaluation. Otherwise inspection is normal, without surgical scars.  Soft, non-tender, non-distended, with normoactive bowel sounds. No palpable masses, rebound, or guarding]  BACK: [Intact ROM. No midline vertebral tenderness, step off, or crepitus. No CVA tenderness.]  MUSCULOSKELETAL: [Extremities nontender to palpation. No gross deformity or evidence of external trauma. Intact range of motion. Sensation intact. No clubbing, cyanosis, or edema.]  SKIN: [Warm, dry. No jaundice, rash, urticaria, or petechiae]  NEUROLOGIC: [Alert and oriented x 3, GCS 15, normal mentation for age. Moves all four extremities. No gross sensory deficit. Cerebellar function grossly normal.]  PSYCHIATRIC: [Normal mood and affect, thought process is clear and linear]     DIFFERENTIAL DIAGNOSIS:   Bleeding due to friable epidermis, ?trauma to area, less likely oomphalitis, cellulitis, and others    DIAGNOSTIC RESULTS       RADIOLOGY: non-plain film images(s) such as CT,Ultrasound and MRI are read by the radiologist.    CT ABDOMEN PELVIS W IV CONTRAST Additional Contrast? None   Final Result      1. There is a fat-containing umbilical hernia. There are a few tiny vessels seen within the hernia sac. 2. Colonic diverticulosis without evidence of acute diverticulitis. 3. Hepatic steatosis. 4. Nonspecific low-density lesion in the right lobe of the liver. This could represent a cyst or hemangioma. If clinically warranted, further evaluation with IV contrast enhanced hepatic mass protocol CT or MRI would be beneficial for further    characterization on a nonemergent basis. **This report has been created using voice recognition software. It may contain minor errors which are inherent in voice recognition technology. **      Final report electronically signed by Dr Jenelle Bui on 8/31/2020 12:52 AM        [] Visualized and interpreted by me   [x] Radiologist's Wet Read Report Reviewed   [] Discussed withRadiologist.    LABS:   Labs Reviewed   CBC WITH AUTO DIFFERENTIAL - Abnormal; Notable for the following components:       Result Value    RBC 4.13 (*)     MCHC 31.6 (*)     RDW-CV 15.4 (*)     RDW-SD 52.7 (*)     All other components within normal limits   COMPREHENSIVE METABOLIC PANEL W/ REFLEX TO MG FOR LOW K - Abnormal; Notable for the following components:    CO2 19 (*)     All other components within normal limits   GLOMERULAR FILTRATION RATE, ESTIMATED - Abnormal; Notable for the following components:    Est, Glom Filt Rate 43 (*)     All other components within normal limits   LIPASE   PROTIME-INR   APTT   ANION GAP   OSMOLALITY   URINE RT REFLEX TO CULTURE   TYPE AND SCREEN       EMERGENCY DEPARTMENT COURSE:   Vitals:    Vitals:    08/30/20 2022 08/30/20 2253 08/30/20 2337 08/31/20 0100   BP: (!) 144/92 137/85 (!) 144/78 (!) 162/92   Pulse: 77 74 75 85   Resp: 17 17 17 18   Temp: 97.6 °F (36.4 °C)      TempSrc: Oral      SpO2: 97% 98% 98% 95%   Weight: 171 lb (77.6 kg)      Height: 5' 2\" (1.575 m)        The results of pertinent diagnostic studies and exam findings were discussed. The patients provisional diagnosis and plan of care were discussed with the patient and present family. The patient and/or present family expressed understanding of the diagnosis and plan. The nurse was instructed to provide written instructions and appropriate follow-up information. The patient understands their need and responsibility to obtain additional follow-up as instructed. The patient is comfortable with the plan and discharge. The risks of medications administered and prescribed were discussed with the patient and family present. Bleeding area was cleaned and TXA applied, no further bleeding. Patient and daughter counseled regarding wound care/management and close follow up with PCP in 1-2 days. CRITICAL CARE:   none    CONSULTS:  none    PROCEDURES:  None    FINAL IMPRESSION      1. Umbilical bleeding    2.  History of umbilical hernia          DISPOSITION/PLAN   discharge    PATIENT REFERRED TO:  DR. Ousmane Kent [facility] [address] [address] [facility]    Schedule an appointment as soon as possible for a visit         DISCHARGE MEDICATIONS:  Discharge Medication List as of 8/31/2020  1:08 AM          (Please note that portions of this note were completed with a voice recognition program.  Efforts were made to edit the dictations but occasionally words are mis-transcribed.)    Provider:  I personally performed the services described in the documentation, reviewed and edited the documentation which was dictated, and it accurately records my words and actions.     Meme Vicente MD 8/30/20 1:27 AM                Meme Vicente MD  08/31/20 8636

## 2020-09-15 ENCOUNTER — OFFICE VISIT (OUTPATIENT)
Dept: SURGERY | Age: 80
End: 2020-09-15
Payer: MEDICARE

## 2020-09-15 ENCOUNTER — HOSPITAL ENCOUNTER (OUTPATIENT)
Age: 80
Discharge: HOME | DRG: 353 | End: 2020-09-15
Payer: MEDICARE

## 2020-09-15 VITALS
TEMPERATURE: 97.7 F | WEIGHT: 165.7 LBS | BODY MASS INDEX: 30.49 KG/M2 | OXYGEN SATURATION: 98 % | SYSTOLIC BLOOD PRESSURE: 130 MMHG | DIASTOLIC BLOOD PRESSURE: 81 MMHG | HEART RATE: 79 BPM | RESPIRATION RATE: 17 BRPM | HEIGHT: 62 IN

## 2020-09-15 LAB
EKG ATRIAL RATE: 71 BPM
EKG P AXIS: 56 DEGREES
EKG P-R INTERVAL: 128 MS
EKG Q-T INTERVAL: 404 MS
EKG QRS DURATION: 66 MS
EKG QTC CALCULATION (BAZETT): 439 MS
EKG R AXIS: 21 DEGREES
EKG T AXIS: 22 DEGREES
EKG VENTRICULAR RATE: 71 BPM

## 2020-09-15 PROCEDURE — 93005 ELECTROCARDIOGRAM TRACING: CPT

## 2020-09-15 PROCEDURE — U0002 COVID-19 LAB TEST NON-CDC: HCPCS

## 2020-09-15 PROCEDURE — 99203 OFFICE O/P NEW LOW 30 MIN: CPT | Performed by: SURGERY

## 2020-09-15 PROCEDURE — 93010 ELECTROCARDIOGRAM REPORT: CPT | Performed by: NUCLEAR MEDICINE

## 2020-09-15 NOTE — PATIENT INSTRUCTIONS
Patient Education        Umbilical Hernia Repair: Before Your Surgery  What is an umbilical hernia repair? An umbilical hernia repair is surgery to fix a hernia. A hernia is a bulge under the skin in your belly. An umbilical hernia is near the belly button (navel). It's a weak spot in your belly muscles that allows tissues or a piece of your intestines to poke through your muscles. It can cause pain. You may notice the pain most when you lift something heavy. To fix the hernia, the doctor will do one of two kinds of surgery. In open surgery, the doctor makes one cut (incision) near the hernia. In laparoscopic surgery, the doctor makes several small cuts and uses a thin, lighted scope and small tools. Then the doctor pushes the bulge back in place, if needed. Often the doctor patches the weak spot with a piece of mesh material.  Open surgery leaves a single scar. Laparoscopic surgery leaves several smaller scars. The scars fade with time. You will probably need to take 2 to 3 days off from work. But if your job requires heavy lifting or other physical work, you may need to take 4 to 6 weeks off. Follow-up care is a key part of your treatment and safety. Be sure to make and go to all appointments, and call your doctor if you are having problems. It's also a good idea to know your test results and keep a list of the medicines you take. How do you prepare for surgery? Surgery can be stressful. This information will help you understand what you can expect. And it will help you safely prepare for surgery. Preparing for surgery  · Be sure you have someone to take you home. Anesthesia and pain medicine will make it unsafe for you to drive or get home on your own. · Understand exactly what surgery is planned, along with the risks, benefits, and other options. · Tell your doctor ALL the medicines, vitamins, supplements, and herbal remedies you take. Some may increase the risk of problems during your surgery. Your doctor will tell you if you should stop taking any of them before the surgery and how soon to do it. · If you take aspirin or some other blood thinner, ask your doctor if you should stop taking it before your surgery. Make sure that you understand exactly what your doctor wants you to do. These medicines increase the risk of bleeding. · Make sure your doctor and the hospital have a copy of your advance directive. If you don't have one, you may want to prepare one. It lets others know your health care wishes. It's a good thing to have before any type of surgery or procedure. What happens on the day of surgery? · Follow the instructions exactly about when to stop eating and drinking. If you don't, your surgery may be canceled. If your doctor told you to take your medicines on the day of surgery, take them with only a sip of water. · Follow your doctor's instructions about when to bathe or shower before your surgery. Do not apply lotions, perfumes, deodorants, or nail polish. · Do not shave the surgical site yourself. · Take off all jewelry and piercings. And take out contact lenses, if you wear them. At the hospital or surgery center    · Bring a picture ID. · The area for surgery is often marked to make sure there are no errors. · You will be kept comfortable and safe by your anesthesia provider. You will be asleep during the surgery. · The surgery will take about 1 to 2 hours. When should you call your doctor? · You have questions or concerns. · You don't understand how to prepare for your surgery. · You become ill before the surgery (such as fever, flu, or a cold). · You need to reschedule or have changed your mind about having the surgery. Current as of: August 12, 2019               Content Version: 12.5  © 2077-0746 [employer]. Care instructions adapted under license by [facility] [place] ([facility]).  If you have questions about a medical condition or this instruction, always ask your healthcare professional. [address] any warranty or liability for your use of this information.

## 2020-09-17 LAB
PERFORMING LAB: NORMAL
REPORT: NORMAL
SARS-COV-2: NOT DETECTED

## 2020-09-17 NOTE — PROGRESS NOTES
Patient contacted regarding COVID-19 screen. Following questions asked: In the last month, have you been in contact with someone who was confirmed or suspected to have Coronavirus/COVID-19:  Patient stated NO    Pt was informed only 1  visitor allowed. Please bring masks. Bobby you or family members have any of the following symptoms:  Cough-no   Muscle pain-no   Shortness of breath-no   Fever-no   Weakness-no  Severe headache-no   Sore throat-no   Respiratory symptoms-no    Have you traveled internationally in the last month?  No     Have you been to the emergency room recently-no

## 2020-09-18 ENCOUNTER — ANESTHESIA EVENT (OUTPATIENT)
Dept: OPERATING ROOM | Age: 80
DRG: 353 | End: 2020-09-18
Payer: MEDICARE

## 2020-09-18 ENCOUNTER — HOSPITAL ENCOUNTER (INPATIENT)
Age: 80
LOS: 5 days | Discharge: HOME HEALTH | DRG: 353 | End: 2020-09-23
Attending: SURGERY | Admitting: INTERNAL MEDICINE
Payer: MEDICARE

## 2020-09-18 ENCOUNTER — ANESTHESIA (OUTPATIENT)
Dept: OPERATING ROOM | Age: 80
DRG: 353 | End: 2020-09-18
Payer: MEDICARE

## 2020-09-18 VITALS — OXYGEN SATURATION: 100 % | DIASTOLIC BLOOD PRESSURE: 70 MMHG | SYSTOLIC BLOOD PRESSURE: 149 MMHG | TEMPERATURE: 95.9 F

## 2020-09-18 PROBLEM — R09.02: Status: ACTIVE | Noted: 2020-09-18

## 2020-09-18 PROBLEM — K42.9 UMBILICAL HERNIA WITHOUT OBSTRUCTION AND WITHOUT GANGRENE: Status: ACTIVE | Noted: 2020-09-18

## 2020-09-18 LAB — POTASSIUM SERPL-SCNC: 4.5 MEQ/L (ref 3.5–5.2)

## 2020-09-18 PROCEDURE — 8E0W4CZ ROBOTIC ASSISTED PROCEDURE OF TRUNK REGION, PERCUTANEOUS ENDOSCOPIC APPROACH: ICD-10-PCS | Performed by: SURGERY

## 2020-09-18 PROCEDURE — 6370000000 HC RX 637 (ALT 250 FOR IP): Performed by: PHYSICIAN ASSISTANT

## 2020-09-18 PROCEDURE — 2709999900 HC NON-CHARGEABLE SUPPLY: Performed by: SURGERY

## 2020-09-18 PROCEDURE — 3700000000 HC ANESTHESIA ATTENDED CARE: Performed by: SURGERY

## 2020-09-18 PROCEDURE — 3600000019 HC SURGERY ROBOT ADDTL 15MIN: Performed by: SURGERY

## 2020-09-18 PROCEDURE — 6370000000 HC RX 637 (ALT 250 FOR IP): Performed by: SURGERY

## 2020-09-18 PROCEDURE — 2500000003 HC RX 250 WO HCPCS: Performed by: SURGERY

## 2020-09-18 PROCEDURE — 3600000009 HC SURGERY ROBOT BASE: Performed by: SURGERY

## 2020-09-18 PROCEDURE — 6360000002 HC RX W HCPCS: Performed by: ANESTHESIOLOGY

## 2020-09-18 PROCEDURE — 36415 COLL VENOUS BLD VENIPUNCTURE: CPT

## 2020-09-18 PROCEDURE — 94761 N-INVAS EAR/PLS OXIMETRY MLT: CPT

## 2020-09-18 PROCEDURE — 3700000001 HC ADD 15 MINUTES (ANESTHESIA): Performed by: SURGERY

## 2020-09-18 PROCEDURE — S2900 ROBOTIC SURGICAL SYSTEM: HCPCS | Performed by: SURGERY

## 2020-09-18 PROCEDURE — 6370000000 HC RX 637 (ALT 250 FOR IP): Performed by: ANESTHESIOLOGY

## 2020-09-18 PROCEDURE — 94640 AIRWAY INHALATION TREATMENT: CPT

## 2020-09-18 PROCEDURE — 6360000002 HC RX W HCPCS: Performed by: NURSE ANESTHETIST, CERTIFIED REGISTERED

## 2020-09-18 PROCEDURE — 7100000011 HC PHASE II RECOVERY - ADDTL 15 MIN: Performed by: SURGERY

## 2020-09-18 PROCEDURE — 49653 PR LAP, VENTRAL HERNIA REPAIR,INCARCERATED: CPT | Performed by: SURGERY

## 2020-09-18 PROCEDURE — 99223 1ST HOSP IP/OBS HIGH 75: CPT | Performed by: INTERNAL MEDICINE

## 2020-09-18 PROCEDURE — 6360000002 HC RX W HCPCS: Performed by: SURGERY

## 2020-09-18 PROCEDURE — 2500000003 HC RX 250 WO HCPCS: Performed by: PHYSICIAN ASSISTANT

## 2020-09-18 PROCEDURE — 7100000001 HC PACU RECOVERY - ADDTL 15 MIN: Performed by: SURGERY

## 2020-09-18 PROCEDURE — 2700000000 HC OXYGEN THERAPY PER DAY

## 2020-09-18 PROCEDURE — 2500000003 HC RX 250 WO HCPCS: Performed by: NURSE ANESTHETIST, CERTIFIED REGISTERED

## 2020-09-18 PROCEDURE — 2580000003 HC RX 258: Performed by: SURGERY

## 2020-09-18 PROCEDURE — 7100000000 HC PACU RECOVERY - FIRST 15 MIN: Performed by: SURGERY

## 2020-09-18 PROCEDURE — C1781 MESH (IMPLANTABLE): HCPCS | Performed by: SURGERY

## 2020-09-18 PROCEDURE — 1200000003 HC TELEMETRY R&B

## 2020-09-18 PROCEDURE — 3600000008 HC SURGERY OHS BASE: Performed by: SURGERY

## 2020-09-18 PROCEDURE — 2580000003 HC RX 258: Performed by: PHYSICIAN ASSISTANT

## 2020-09-18 PROCEDURE — 84132 ASSAY OF SERUM POTASSIUM: CPT

## 2020-09-18 PROCEDURE — 3600000018 HC SURGERY OHS ADDTL 15MIN: Performed by: SURGERY

## 2020-09-18 PROCEDURE — 7100000010 HC PHASE II RECOVERY - FIRST 15 MIN: Performed by: SURGERY

## 2020-09-18 PROCEDURE — 0WUF4JZ SUPPLEMENT ABDOMINAL WALL WITH SYNTHETIC SUBSTITUTE, PERCUTANEOUS ENDOSCOPIC APPROACH: ICD-10-PCS | Performed by: SURGERY

## 2020-09-18 DEVICE — MESH HERN DIA4.5IN CIR W/ ECHO PS POS SYS VENTRALIGHT ST: Type: IMPLANTABLE DEVICE | Site: UMBILICAL | Status: FUNCTIONAL

## 2020-09-18 RX ORDER — ACETAMINOPHEN 325 MG/1
650 TABLET ORAL EVERY 4 HOURS PRN
Status: DISCONTINUED | OUTPATIENT
Start: 2020-09-18 | End: 2020-09-23 | Stop reason: HOSPADM

## 2020-09-18 RX ORDER — POTASSIUM CHLORIDE 7.45 MG/ML
10 INJECTION INTRAVENOUS PRN
Status: DISCONTINUED | OUTPATIENT
Start: 2020-09-18 | End: 2020-09-23 | Stop reason: HOSPADM

## 2020-09-18 RX ORDER — SODIUM CHLORIDE 9 MG/ML
INJECTION, SOLUTION INTRAVENOUS CONTINUOUS
Status: CANCELLED | OUTPATIENT
Start: 2020-09-18

## 2020-09-18 RX ORDER — DEXAMETHASONE SODIUM PHOSPHATE 4 MG/ML
INJECTION, SOLUTION INTRA-ARTICULAR; INTRALESIONAL; INTRAMUSCULAR; INTRAVENOUS; SOFT TISSUE PRN
Status: DISCONTINUED | OUTPATIENT
Start: 2020-09-18 | End: 2020-09-18 | Stop reason: SDUPTHER

## 2020-09-18 RX ORDER — LIDOCAINE HYDROCHLORIDE 10 MG/ML
INJECTION, SOLUTION INFILTRATION; PERINEURAL PRN
Status: DISCONTINUED | OUTPATIENT
Start: 2020-09-18 | End: 2020-09-18 | Stop reason: SDUPTHER

## 2020-09-18 RX ORDER — SODIUM CHLORIDE 0.9 % (FLUSH) 0.9 %
10 SYRINGE (ML) INJECTION PRN
Status: DISCONTINUED | OUTPATIENT
Start: 2020-09-18 | End: 2020-09-23 | Stop reason: HOSPADM

## 2020-09-18 RX ORDER — MORPHINE SULFATE 2 MG/ML
4 INJECTION, SOLUTION INTRAMUSCULAR; INTRAVENOUS
Status: DISCONTINUED | OUTPATIENT
Start: 2020-09-18 | End: 2020-09-19

## 2020-09-18 RX ORDER — HYDROCODONE BITARTRATE AND ACETAMINOPHEN 5; 325 MG/1; MG/1
TABLET ORAL
Status: DISPENSED
Start: 2020-09-18 | End: 2020-09-19

## 2020-09-18 RX ORDER — AMLODIPINE BESYLATE 5 MG/1
5 TABLET ORAL DAILY
Status: DISCONTINUED | OUTPATIENT
Start: 2020-09-19 | End: 2020-09-23 | Stop reason: HOSPADM

## 2020-09-18 RX ORDER — OXYCODONE HYDROCHLORIDE 5 MG/1
5 TABLET ORAL EVERY 4 HOURS PRN
Status: CANCELLED | OUTPATIENT
Start: 2020-09-18

## 2020-09-18 RX ORDER — OXYCODONE HYDROCHLORIDE 5 MG/1
TABLET ORAL
Status: DISPENSED
Start: 2020-09-18 | End: 2020-09-19

## 2020-09-18 RX ORDER — HYDROCODONE BITARTRATE AND ACETAMINOPHEN 5; 325 MG/1; MG/1
1 TABLET ORAL EVERY 4 HOURS PRN
Qty: 18 TABLET | Refills: 0 | Status: SHIPPED | OUTPATIENT
Start: 2020-09-18 | End: 2020-09-21

## 2020-09-18 RX ORDER — SODIUM CHLORIDE 9 MG/ML
INJECTION, SOLUTION INTRAVENOUS CONTINUOUS
Status: DISCONTINUED | OUTPATIENT
Start: 2020-09-18 | End: 2020-09-18

## 2020-09-18 RX ORDER — CEFAZOLIN SODIUM 1 G/50ML
1 INJECTION, SOLUTION INTRAVENOUS
Status: COMPLETED | OUTPATIENT
Start: 2020-09-18 | End: 2020-09-18

## 2020-09-18 RX ORDER — OXYCODONE HYDROCHLORIDE 5 MG/1
10 TABLET ORAL EVERY 4 HOURS PRN
Status: CANCELLED | OUTPATIENT
Start: 2020-09-18

## 2020-09-18 RX ORDER — ATORVASTATIN CALCIUM 20 MG/1
20 TABLET, FILM COATED ORAL DAILY
Status: CANCELLED | OUTPATIENT
Start: 2020-09-19

## 2020-09-18 RX ORDER — ALBUTEROL SULFATE 90 UG/1
2 AEROSOL, METERED RESPIRATORY (INHALATION) EVERY 6 HOURS PRN
Status: DISCONTINUED | OUTPATIENT
Start: 2020-09-18 | End: 2020-09-23 | Stop reason: HOSPADM

## 2020-09-18 RX ORDER — EPHEDRINE SULFATE/0.9% NACL/PF 50 MG/5 ML
SYRINGE (ML) INTRAVENOUS PRN
Status: DISCONTINUED | OUTPATIENT
Start: 2020-09-18 | End: 2020-09-18 | Stop reason: SDUPTHER

## 2020-09-18 RX ORDER — MORPHINE SULFATE 2 MG/ML
2 INJECTION, SOLUTION INTRAMUSCULAR; INTRAVENOUS
Status: CANCELLED | OUTPATIENT
Start: 2020-09-18

## 2020-09-18 RX ORDER — IPRATROPIUM BROMIDE AND ALBUTEROL SULFATE 2.5; .5 MG/3ML; MG/3ML
1 SOLUTION RESPIRATORY (INHALATION)
Status: DISCONTINUED | OUTPATIENT
Start: 2020-09-19 | End: 2020-09-19

## 2020-09-18 RX ORDER — BUTALBITAL, ACETAMINOPHEN AND CAFFEINE 50; 325; 40 MG/1; MG/1; MG/1
1 TABLET ORAL EVERY 4 HOURS PRN
Status: CANCELLED | OUTPATIENT
Start: 2020-09-18

## 2020-09-18 RX ORDER — OXYCODONE HYDROCHLORIDE 5 MG/1
5 TABLET ORAL EVERY 4 HOURS PRN
Status: DISCONTINUED | OUTPATIENT
Start: 2020-09-18 | End: 2020-09-19

## 2020-09-18 RX ORDER — MORPHINE SULFATE 2 MG/ML
4 INJECTION, SOLUTION INTRAMUSCULAR; INTRAVENOUS
Status: CANCELLED | OUTPATIENT
Start: 2020-09-18

## 2020-09-18 RX ORDER — HYDROCODONE BITARTRATE AND ACETAMINOPHEN 5; 325 MG/1; MG/1
1 TABLET ORAL ONCE
Status: COMPLETED | OUTPATIENT
Start: 2020-09-18 | End: 2020-09-18

## 2020-09-18 RX ORDER — ATORVASTATIN CALCIUM 20 MG/1
20 TABLET, FILM COATED ORAL DAILY
Status: DISCONTINUED | OUTPATIENT
Start: 2020-09-18 | End: 2020-09-23 | Stop reason: HOSPADM

## 2020-09-18 RX ORDER — SODIUM CHLORIDE 9 MG/ML
INJECTION, SOLUTION INTRAVENOUS CONTINUOUS
Status: DISCONTINUED | OUTPATIENT
Start: 2020-09-18 | End: 2020-09-21

## 2020-09-18 RX ORDER — LABETALOL 20 MG/4 ML (5 MG/ML) INTRAVENOUS SYRINGE
5 EVERY 5 MIN PRN
Status: DISCONTINUED | OUTPATIENT
Start: 2020-09-18 | End: 2020-09-18 | Stop reason: HOSPADM

## 2020-09-18 RX ORDER — IPRATROPIUM BROMIDE AND ALBUTEROL SULFATE 2.5; .5 MG/3ML; MG/3ML
1 SOLUTION RESPIRATORY (INHALATION) ONCE
Status: COMPLETED | OUTPATIENT
Start: 2020-09-18 | End: 2020-09-18

## 2020-09-18 RX ORDER — MORPHINE SULFATE 2 MG/ML
2 INJECTION, SOLUTION INTRAMUSCULAR; INTRAVENOUS
Status: DISCONTINUED | OUTPATIENT
Start: 2020-09-18 | End: 2020-09-19

## 2020-09-18 RX ORDER — ONDANSETRON 2 MG/ML
INJECTION INTRAMUSCULAR; INTRAVENOUS PRN
Status: DISCONTINUED | OUTPATIENT
Start: 2020-09-18 | End: 2020-09-18 | Stop reason: SDUPTHER

## 2020-09-18 RX ORDER — LISINOPRIL 20 MG/1
20 TABLET ORAL DAILY
Status: DISCONTINUED | OUTPATIENT
Start: 2020-09-19 | End: 2020-09-23 | Stop reason: HOSPADM

## 2020-09-18 RX ORDER — ONDANSETRON 2 MG/ML
4 INJECTION INTRAMUSCULAR; INTRAVENOUS EVERY 6 HOURS PRN
Status: DISCONTINUED | OUTPATIENT
Start: 2020-09-18 | End: 2020-09-23 | Stop reason: HOSPADM

## 2020-09-18 RX ORDER — ROCURONIUM BROMIDE 10 MG/ML
INJECTION, SOLUTION INTRAVENOUS PRN
Status: DISCONTINUED | OUTPATIENT
Start: 2020-09-18 | End: 2020-09-18 | Stop reason: SDUPTHER

## 2020-09-18 RX ORDER — MORPHINE SULFATE 2 MG/ML
2 INJECTION, SOLUTION INTRAMUSCULAR; INTRAVENOUS EVERY 5 MIN PRN
Status: DISCONTINUED | OUTPATIENT
Start: 2020-09-18 | End: 2020-09-18 | Stop reason: HOSPADM

## 2020-09-18 RX ORDER — OXYCODONE HYDROCHLORIDE 5 MG/1
10 TABLET ORAL EVERY 4 HOURS PRN
Status: DISCONTINUED | OUTPATIENT
Start: 2020-09-18 | End: 2020-09-19

## 2020-09-18 RX ORDER — HYDRALAZINE HYDROCHLORIDE 20 MG/ML
5 INJECTION INTRAMUSCULAR; INTRAVENOUS EVERY 10 MIN PRN
Status: DISCONTINUED | OUTPATIENT
Start: 2020-09-18 | End: 2020-09-18 | Stop reason: HOSPADM

## 2020-09-18 RX ORDER — ONDANSETRON 2 MG/ML
4 INJECTION INTRAMUSCULAR; INTRAVENOUS
Status: DISCONTINUED | OUTPATIENT
Start: 2020-09-18 | End: 2020-09-18 | Stop reason: HOSPADM

## 2020-09-18 RX ORDER — AMLODIPINE BESYLATE 5 MG/1
5 TABLET ORAL DAILY
Status: CANCELLED | OUTPATIENT
Start: 2020-09-19

## 2020-09-18 RX ORDER — PROPOFOL 10 MG/ML
INJECTION, EMULSION INTRAVENOUS PRN
Status: DISCONTINUED | OUTPATIENT
Start: 2020-09-18 | End: 2020-09-18 | Stop reason: SDUPTHER

## 2020-09-18 RX ORDER — POTASSIUM CHLORIDE 20 MEQ/1
40 TABLET, EXTENDED RELEASE ORAL PRN
Status: DISCONTINUED | OUTPATIENT
Start: 2020-09-18 | End: 2020-09-23 | Stop reason: HOSPADM

## 2020-09-18 RX ORDER — ACETAMINOPHEN 325 MG/1
650 TABLET ORAL EVERY 4 HOURS PRN
Status: CANCELLED | OUTPATIENT
Start: 2020-09-18

## 2020-09-18 RX ORDER — MEPERIDINE HYDROCHLORIDE 25 MG/ML
12.5 INJECTION INTRAMUSCULAR; INTRAVENOUS; SUBCUTANEOUS EVERY 5 MIN PRN
Status: DISCONTINUED | OUTPATIENT
Start: 2020-09-18 | End: 2020-09-18 | Stop reason: HOSPADM

## 2020-09-18 RX ORDER — FENTANYL CITRATE 50 UG/ML
50 INJECTION, SOLUTION INTRAMUSCULAR; INTRAVENOUS EVERY 5 MIN PRN
Status: DISCONTINUED | OUTPATIENT
Start: 2020-09-18 | End: 2020-09-18 | Stop reason: HOSPADM

## 2020-09-18 RX ORDER — BUPIVACAINE HYDROCHLORIDE 5 MG/ML
INJECTION, SOLUTION PERINEURAL PRN
Status: DISCONTINUED | OUTPATIENT
Start: 2020-09-18 | End: 2020-09-18 | Stop reason: ALTCHOICE

## 2020-09-18 RX ORDER — ONDANSETRON 2 MG/ML
4 INJECTION INTRAMUSCULAR; INTRAVENOUS EVERY 6 HOURS PRN
Status: CANCELLED | OUTPATIENT
Start: 2020-09-18

## 2020-09-18 RX ORDER — SODIUM CHLORIDE 0.9 % (FLUSH) 0.9 %
10 SYRINGE (ML) INJECTION EVERY 12 HOURS SCHEDULED
Status: DISCONTINUED | OUTPATIENT
Start: 2020-09-18 | End: 2020-09-23 | Stop reason: HOSPADM

## 2020-09-18 RX ORDER — LISINOPRIL 20 MG/1
20 TABLET ORAL DAILY
Status: CANCELLED | OUTPATIENT
Start: 2020-09-19

## 2020-09-18 RX ORDER — FENTANYL CITRATE 50 UG/ML
INJECTION, SOLUTION INTRAMUSCULAR; INTRAVENOUS PRN
Status: DISCONTINUED | OUTPATIENT
Start: 2020-09-18 | End: 2020-09-18 | Stop reason: SDUPTHER

## 2020-09-18 RX ORDER — DIPHENHYDRAMINE HYDROCHLORIDE 50 MG/ML
12.5 INJECTION INTRAMUSCULAR; INTRAVENOUS
Status: DISCONTINUED | OUTPATIENT
Start: 2020-09-18 | End: 2020-09-18 | Stop reason: HOSPADM

## 2020-09-18 RX ORDER — BUTALBITAL, ACETAMINOPHEN AND CAFFEINE 50; 325; 40 MG/1; MG/1; MG/1
1 TABLET ORAL EVERY 4 HOURS PRN
Status: DISCONTINUED | OUTPATIENT
Start: 2020-09-18 | End: 2020-09-23 | Stop reason: HOSPADM

## 2020-09-18 RX ADMIN — FENTANYL CITRATE 100 MCG: 50 INJECTION, SOLUTION INTRAMUSCULAR; INTRAVENOUS at 11:15

## 2020-09-18 RX ADMIN — HYDROCODONE BITARTRATE AND ACETAMINOPHEN 1 TABLET: 5; 325 TABLET ORAL at 13:00

## 2020-09-18 RX ADMIN — SUGAMMADEX 200 MG: 100 INJECTION, SOLUTION INTRAVENOUS at 12:00

## 2020-09-18 RX ADMIN — Medication 10 MG: at 11:03

## 2020-09-18 RX ADMIN — SODIUM CHLORIDE: 9 INJECTION, SOLUTION INTRAVENOUS at 19:49

## 2020-09-18 RX ADMIN — ONDANSETRON HYDROCHLORIDE 4 MG: 4 INJECTION, SOLUTION INTRAMUSCULAR; INTRAVENOUS at 11:03

## 2020-09-18 RX ADMIN — CEFAZOLIN SODIUM 1 G: 1 INJECTION, SOLUTION INTRAVENOUS at 10:58

## 2020-09-18 RX ADMIN — Medication 10 MG: at 11:27

## 2020-09-18 RX ADMIN — ROCURONIUM BROMIDE 50 MG: 10 INJECTION INTRAVENOUS at 10:51

## 2020-09-18 RX ADMIN — FAMOTIDINE 20 MG: 10 INJECTION INTRAVENOUS at 19:49

## 2020-09-18 RX ADMIN — Medication 10 MG: at 11:55

## 2020-09-18 RX ADMIN — MORPHINE SULFATE 2 MG: 2 INJECTION, SOLUTION INTRAMUSCULAR; INTRAVENOUS at 12:20

## 2020-09-18 RX ADMIN — IPRATROPIUM BROMIDE AND ALBUTEROL SULFATE 1 AMPULE: .5; 3 SOLUTION RESPIRATORY (INHALATION) at 09:37

## 2020-09-18 RX ADMIN — Medication 10 MG: at 11:05

## 2020-09-18 RX ADMIN — MORPHINE SULFATE 2 MG: 2 INJECTION, SOLUTION INTRAMUSCULAR; INTRAVENOUS at 12:15

## 2020-09-18 RX ADMIN — OXYCODONE 10 MG: 5 TABLET ORAL at 18:01

## 2020-09-18 RX ADMIN — DEXAMETHASONE SODIUM PHOSPHATE 8 MG: 4 INJECTION, SOLUTION INTRAMUSCULAR; INTRAVENOUS at 11:03

## 2020-09-18 RX ADMIN — Medication 10 MG: at 11:30

## 2020-09-18 RX ADMIN — PROPOFOL 150 MG: 10 INJECTION, EMULSION INTRAVENOUS at 10:51

## 2020-09-18 RX ADMIN — SODIUM CHLORIDE: 9 INJECTION, SOLUTION INTRAVENOUS at 09:12

## 2020-09-18 RX ADMIN — ROCURONIUM BROMIDE 20 MG: 10 INJECTION INTRAVENOUS at 11:12

## 2020-09-18 RX ADMIN — FENTANYL CITRATE 100 MCG: 50 INJECTION, SOLUTION INTRAMUSCULAR; INTRAVENOUS at 10:51

## 2020-09-18 RX ADMIN — LIDOCAINE HYDROCHLORIDE 50 MG: 10 INJECTION, SOLUTION INFILTRATION; PERINEURAL at 10:51

## 2020-09-18 RX ADMIN — OXYCODONE 10 MG: 5 TABLET ORAL at 22:24

## 2020-09-18 ASSESSMENT — PULMONARY FUNCTION TESTS
PIF_VALUE: 19
PIF_VALUE: 27
PIF_VALUE: 24
PIF_VALUE: 27
PIF_VALUE: 27
PIF_VALUE: 0
PIF_VALUE: 27
PIF_VALUE: 19
PIF_VALUE: 27
PIF_VALUE: 27
PIF_VALUE: 18
PIF_VALUE: 18
PIF_VALUE: 27
PIF_VALUE: 19
PIF_VALUE: 18
PIF_VALUE: 16
PIF_VALUE: 0
PIF_VALUE: 26
PIF_VALUE: 5
PIF_VALUE: 27
PIF_VALUE: 27
PIF_VALUE: 24
PIF_VALUE: 24
PIF_VALUE: 16
PIF_VALUE: 25
PIF_VALUE: 27
PIF_VALUE: 17
PIF_VALUE: 2
PIF_VALUE: 26
PIF_VALUE: 27
PIF_VALUE: 18
PIF_VALUE: 27
PIF_VALUE: 22
PIF_VALUE: 28
PIF_VALUE: 17
PIF_VALUE: 1
PIF_VALUE: 14
PIF_VALUE: 20
PIF_VALUE: 24
PIF_VALUE: 24
PIF_VALUE: 17
PIF_VALUE: 18
PIF_VALUE: 0
PIF_VALUE: 23
PIF_VALUE: 27
PIF_VALUE: 24
PIF_VALUE: 19
PIF_VALUE: 24
PIF_VALUE: 19
PIF_VALUE: 26
PIF_VALUE: 19
PIF_VALUE: 18
PIF_VALUE: 1
PIF_VALUE: 18
PIF_VALUE: 27
PIF_VALUE: 21
PIF_VALUE: 26
PIF_VALUE: 26
PIF_VALUE: 19
PIF_VALUE: 28
PIF_VALUE: 23
PIF_VALUE: 17
PIF_VALUE: 27
PIF_VALUE: 2
PIF_VALUE: 17
PIF_VALUE: 2
PIF_VALUE: 24
PIF_VALUE: 16
PIF_VALUE: 3
PIF_VALUE: 0
PIF_VALUE: 27
PIF_VALUE: 16
PIF_VALUE: 28
PIF_VALUE: 27
PIF_VALUE: 28
PIF_VALUE: 16
PIF_VALUE: 19
PIF_VALUE: 19
PIF_VALUE: 27
PIF_VALUE: 2
PIF_VALUE: 26
PIF_VALUE: 27
PIF_VALUE: 23
PIF_VALUE: 19
PIF_VALUE: 17
PIF_VALUE: 18

## 2020-09-18 ASSESSMENT — PAIN SCALES - GENERAL
PAINLEVEL_OUTOF10: 5
PAINLEVEL_OUTOF10: 7
PAINLEVEL_OUTOF10: 9
PAINLEVEL_OUTOF10: 8
PAINLEVEL_OUTOF10: 4
PAINLEVEL_OUTOF10: 6
PAINLEVEL_OUTOF10: 10
PAINLEVEL_OUTOF10: 8
PAINLEVEL_OUTOF10: 4
PAINLEVEL_OUTOF10: 10
PAINLEVEL_OUTOF10: 5
PAINLEVEL_OUTOF10: 6

## 2020-09-18 ASSESSMENT — ENCOUNTER SYMPTOMS
NAUSEA: 0
BACK PAIN: 0
SORE THROAT: 0
CONSTIPATION: 0
COUGH: 0
SHORTNESS OF BREATH: 0
CHEST TIGHTNESS: 0
BLOOD IN STOOL: 0
ABDOMINAL PAIN: 1
TROUBLE SWALLOWING: 0
DIARRHEA: 0
CHEST TIGHTNESS: 0
BACK PAIN: 0
ABDOMINAL PAIN: 1
VOMITING: 0
SORE THROAT: 0
SHORTNESS OF BREATH: 1
COUGH: 0
DIARRHEA: 0
SHORTNESS OF BREATH: 0
VOMITING: 0
BLOOD IN STOOL: 0
NAUSEA: 0
TROUBLE SWALLOWING: 0
CONSTIPATION: 0

## 2020-09-18 ASSESSMENT — PAIN DESCRIPTION - FREQUENCY
FREQUENCY: CONTINUOUS

## 2020-09-18 ASSESSMENT — PAIN DESCRIPTION - PAIN TYPE
TYPE: SURGICAL PAIN
TYPE: ACUTE PAIN;SURGICAL PAIN
TYPE: SURGICAL PAIN

## 2020-09-18 ASSESSMENT — PAIN DESCRIPTION - LOCATION
LOCATION: ABDOMEN

## 2020-09-18 ASSESSMENT — PAIN DESCRIPTION - DESCRIPTORS
DESCRIPTORS: DISCOMFORT

## 2020-09-18 ASSESSMENT — PAIN DESCRIPTION - PROGRESSION: CLINICAL_PROGRESSION: NOT CHANGED

## 2020-09-18 ASSESSMENT — PAIN DESCRIPTION - ONSET
ONSET: ON-GOING

## 2020-09-18 ASSESSMENT — PAIN - FUNCTIONAL ASSESSMENT: PAIN_FUNCTIONAL_ASSESSMENT: PREVENTS OR INTERFERES SOME ACTIVE ACTIVITIES AND ADLS

## 2020-09-18 ASSESSMENT — PAIN DESCRIPTION - ORIENTATION
ORIENTATION: MID

## 2020-09-18 NOTE — PROGRESS NOTES
Took over care of patient. 1400 gave report to [facility] [place].   1430 resume care

## 2020-09-18 NOTE — PROGRESS NOTES
Patients oxygen remains at 92% on 4L nasal cannula. Patient educated again on coughing and deep breathing to clear lungs and improve oxygen level. Patients daughter at bedside to encourage her. Will continue to monitor.

## 2020-09-18 NOTE — H&P
Update History & Physical    The patient's History and Physical of September 15, 2020 was reviewed with the patient and I examined the patient. There was no change. The surgical site was confirmed by the patient and me. Plan: The risks, benefits, expected outcome, and alternative to the recommended procedure have been discussed with the patient. Patient understands and wants to proceed with the procedure. Electronically signed by Sina Castellanos MD on 9/18/2020 at 9:27 Brandy Rosales MD  General Surgery Clinic H&P    Pt Name: Ludivina Ponce  MRN: [id number]  YOB: 1940  Date of evaluation: 9/15/2020  Primary Care Physician: ERNESTO Hooks - CNP  Patient evaluated at the request of [address] [place] [address]  Reason for evaluation: hernia  IMPRESSIONS:       ICD-10-CM    1. Umbilical hernia without obstruction and without gangrene  K42.9 REPAIR HERNIA UMBILICAL   2. Pre-op testing  Z01.818 EKG 12 lead     COVID-19 Ambulatory   1.   does not have any pertinent problems on file. PLANS:   1. Due to the fact there is skin breakdown and attenuation with bleeding is a more urgent repair. We will plan for robotic hernia repair possible open. Risk benefits and alternatives were explained. Discussed with patient as she does have recannulated veins these will need to be clipped. Risk of seroma bleeding injury to bowel were discussed. All risks benefits alternatives of surgery were discussed and she wants to proceed    SUBJECTIVE:   CC:bulge    History of Chief Complaint:    Alejandro Mayes is a [de-identified] y. o.female who recently presented to the emergency department with bleeding from an umbilical bulge. Patient states she is not sure how long she has had the ball as it is mildly tender it is gotten worse in the last couple weeks. In the emergency department she was noted to have some oozing from skin breakdown with a large hernia.   CT abdomen pelvis was performed demonstrating a umbilical hernia containing fat and some small vessels. Patient denies any nausea vomiting change in bowel habits. Patient denies any history of cirrhosis. Past Medical History  Past Medical History:   Diagnosis Date    Arthritis     Cancer of eye ([address]     Right    Dyslipidemia 2/15/2017    Fatigue     History of tobacco abuse 2/15/2017    Hyperlipidemia 10/13/2014    Hypertension     Non morbid obesity due to excess calories 2/15/2017    Pneumonia     Precordial pain 2/15/2017    Smoker     SOB (shortness of breath) 2/15/2017    Tobacco abuse        Past Surgical History  Past Surgical History:   Procedure Laterality Date    EYE SURGERY      Right eye cancer    HEMORRHOID SURGERY         Medications  No current facility-administered medications on file prior to encounter. Current Outpatient Medications on File Prior to Encounter   Medication Sig Dispense Refill    albuterol-ipratropium (COMBIVENT RESPIMAT)  MCG/ACT AERS inhaler Inhale 1 puff into the lungs every 6 hours 1 Inhaler 0    lisinopril (PRINIVIL;ZESTRIL) 20 MG tablet Take 20 mg by mouth daily      albuterol sulfate HFA (PROVENTIL HFA) 108 (90 BASE) MCG/ACT inhaler Inhale 2 puffs into the lungs every 6 hours as needed for Wheezing 1 Inhaler 0    meloxicam (MOBIC) 15 MG tablet TAKE 1 TABLET BY MOUTH EVERY DAY AS NEEDED FOR PAIN 30 tablet 0    atorvastatin (LIPITOR) 20 MG tablet TAKE 1 TABLET BY MOUTH ONE TIME A DAY  30 tablet 4    amLODIPine (NORVASC) 5 MG tablet TAKE 1 TABLET BY MOUTH DAILY 30 tablet 5    butalbital-acetaminophen-caffeine (FIORICET) -40 MG per tablet Take 1 tablet by mouth every 4 hours as needed for Headaches 8 tablet 0       Allergies  Patient has no known allergies.     Family History      Problem Relation Age of Onset    Cancer Mother     Cancer Father     Cancer Brother     Heart Failure Sister     Cancer Daughter    Family history is noncontributory to current presentation    Social History  Social History Socioeconomic History    Marital status:      Spouse name: Not on file    Number of children: 3    Years of education: 10    Highest education level: Not on file   Occupational History    Occupation: [employer]     Employer: OTHER   Social Needs    Financial resource strain: Not on file    Food insecurity     Worry: Not on file     Inability: Not on file   [employer] needs     Medical: Not on file     Non-medical: Not on file   Tobacco Use    Smoking status: Former Smoker     Packs/day: 1.00     Last attempt to quit: 2012     Years since quittin.6    Smokeless tobacco: Never Used    Tobacco comment: tryin to cut down   Substance and Sexual Activity    Alcohol use: No     Comment: quit    Drug use: No    Sexual activity: Not Currently   Lifestyle    Physical activity     Days per week: Not on file     Minutes per session: Not on file    Stress: Not on file   Relationships    Social connections     Talks on phone: Not on file     Gets together: Not on file     Attends Church service: Not on file     Active member of club or organization: Not on file     Attends meetings of clubs or organizations: Not on file     Relationship status: Not on file    Intimate partner violence     Fear of current or ex partner: Not on file     Emotionally abused: Not on file     Physically abused: Not on file     Forced sexual activity: Not on file   Other Topics Concern    Not on file   Social History Narrative    Not on file        Review of Systems:  Review of Systems   Constitutional: Negative for appetite change, fatigue and fever. HENT: Negative for ear pain, sore throat and trouble swallowing. Respiratory: Negative for cough, chest tightness and shortness of breath. Cardiovascular: Negative for chest pain, palpitations and leg swelling. Gastrointestinal: Positive for abdominal pain. Negative for blood in stool, constipation, diarrhea, nausea and vomiting.    Endocrine: Negative for cold intolerance. Genitourinary: Negative for difficulty urinating and urgency. Musculoskeletal: Negative for back pain and joint swelling. Skin: Positive for wound. Negative for rash. Allergic/Immunologic: Negative for immunocompromised state. Neurological: Negative for seizures and headaches. Psychiatric/Behavioral: Negative for hallucinations and suicidal ideas. The patient is not nervous/anxious. OBJECTIVE:   CURRENT VITALS:  height is 5' 2\" (1.575 m) and weight is 168 lb 3.2 oz (76.3 kg). Her temporal temperature is 97.3 °F (36.3 °C). Her blood pressure is 143/72 (abnormal) and her pulse is 81. Her respiration is 16 and oxygen saturation is 95%. Body mass index is 30.76 kg/m². Physical Exam  Vitals signs reviewed. Constitutional:       Appearance: She is well-developed. HENT:      Head: Normocephalic and atraumatic. Nose: Congestion present. Eyes:      General: No scleral icterus. Pupils: Pupils are equal, round, and reactive to light. Neck:      Thyroid: No thyromegaly. Trachea: No tracheal deviation. Cardiovascular:      Rate and Rhythm: Normal rate. Pulses: Normal pulses. Pulmonary:      Effort: Pulmonary effort is normal.   Abdominal:      Palpations: Abdomen is soft. Tenderness: There is no abdominal tenderness. There is no guarding. Hernia: A hernia (Purple partially reducible tender hernia with attenuated skin, currently there is a healed scab over it. It is nonreducible) is present. Musculoskeletal: Normal range of motion. General: No deformity. Skin:     General: Skin is warm. Findings: No rash. Neurological:      General: No focal deficit present. Mental Status: She is alert and oriented to person, place, and time.    Psychiatric:         Mood and Affect: Mood normal.         Speech: Speech normal.         Behavior: Behavior normal.         LABS:     Recent Labs     09/18/20  0836   K 4.5     RADIOLOGY:   I have personally reviewed the following films:  No orders to display       Thank you for the interesting evaluation. Further recommendations to follow.     Electronically signed by Melony Hercules MD on 9/18/2020 at 9:27 AM

## 2020-09-18 NOTE — PROGRESS NOTES
Pt returned to [facility] room 6. Vitals and assessment as charted. 0.9 infusing, @950ml to count from PACU. Pt has crackers and water. Family at the bedside. Pt and family verbalized understanding of discharge criteria and call light use. Call light in reach.

## 2020-09-18 NOTE — ANESTHESIA POSTPROCEDURE EVALUATION
Department of Anesthesiology  Postprocedure Note    Patient: Boubacar Treadwell  MRN: [id number]  YOB: 1940  Date of evaluation: 9/18/2020  Time:  1:05 PM     Procedure Summary     Date:  09/18/20 Room / Location:  [address]    Anesthesia Start:  5371 Anesthesia Stop:  4694    Procedure:  ROBOTIC UMBILICAL HERNIA REPAIR WITH MESH (N/A Abdomen) Diagnosis:  (UMBILICAL HERNIA)    Surgeon:  Chuck Ramírez MD Responsible Provider:  Rome Newman MD    Anesthesia Type:  general ASA Status:  4          Anesthesia Type: general    Marialuisa Phase I: Marialuisa Score: 9    Marialuisa Phase II:      Last vitals: Reviewed and per EMR flowsheets. Anesthesia Post Evaluation   [address]  POST-ANESTHESIA NOTE       Name:  Boubacar Treadwell                                         Age:  [de-identified] y.o.   MRN:  [id number]      Last Vitals:  /82   Pulse 87   Temp 97.3 °F (36.3 °C)   Resp 16   Ht 5' 2\" (1.575 m)   Wt 168 lb 3.2 oz (76.3 kg)   SpO2 93%   BMI 30.76 kg/m²   Patient Vitals for the past 4 hrs:   BP Temp Temp src Pulse Resp SpO2   09/18/20 1250 136/82 97.3 °F (36.3 °C) -- 87 16 93 %   09/18/20 1239 123/63 -- -- 80 19 96 %   09/18/20 1235 (!) 117/52 -- -- 82 23 96 %   09/18/20 1230 (!) 116/57 -- -- 88 21 95 %   09/18/20 1225 (!) 123/56 -- -- 82 23 95 %   09/18/20 1220 (!) 149/95 -- -- 84 20 92 %   09/18/20 1215 (!) 144/99 97.3 °F (36.3 °C) Temporal 85 16 100 %       Level of Consciousness:  Awake    Respiratory:  Stable    Oxygen Saturation:  Stable    Cardiovascular:  Stable    Hydration:  Adequate    PONV:  Stable    Post-op Pain:  Adequate analgesia    Post-op Assessment:  No apparent anesthetic complications    Additional Follow-Up / Treatment / Comment:  None    Rome Newman MD  September 18, 2020   1:05 PM

## 2020-09-18 NOTE — ANESTHESIA PRE PROCEDURE
Department of Anesthesiology  Preprocedure Note       Name:  Juju Spivey   Age:  [de-identified] y.o.  :  1940                                          MRN:  [id number]         Date:  2020      Surgeon: Bruno Marie): Corrinne Alder, MD    Procedure: Procedure(s):  ROBOTIC UMBILICAL HERNIA REPAIR WITH MESH, POSS OPEN    Medications prior to admission:   Prior to Admission medications    Medication Sig Start Date End Date Taking?  Authorizing Provider   albuterol-ipratropium (COMBIVENT RESPIMAT)  MCG/ACT AERS inhaler Inhale 1 puff into the lungs every 6 hours 18  Yes Carlton Lewis MD   lisinopril (PRINIVIL;ZESTRIL) 20 MG tablet Take 20 mg by mouth daily   Yes Historical Provider, MD   albuterol sulfate HFA (PROVENTIL HFA) 108 (90 BASE) MCG/ACT inhaler Inhale 2 puffs into the lungs every 6 hours as needed for Wheezing 17  Yes Chaz Chatman PA-C   meloxicam (MOBIC) 15 MG tablet TAKE 1 TABLET BY MOUTH EVERY DAY AS NEEDED FOR PAIN 16  Yes Fletcher Pollack MD   atorvastatin (LIPITOR) 20 MG tablet TAKE 1 TABLET BY MOUTH ONE TIME A DAY  16  Yes Fletcher Pollack MD   amLODIPine (NORVASC) 5 MG tablet TAKE 1 TABLET BY MOUTH DAILY 16  Yes Fletcher Pollack MD   butalbital-acetaminophen-caffeine (FIORICET) -90 MG per tablet Take 1 tablet by mouth every 4 hours as needed for Headaches 17   Chaz Chatman PA-C       Current medications:    Current Facility-Administered Medications   Medication Dose Route Frequency Provider Last Rate Last Dose    0.9 % sodium chloride infusion   Intravenous Continuous Corrinne Alder,  mL/hr at 20 0912      ceFAZolin (ANCEF) 1 g in dextrose 5 % 50 mL IVPB (premix)  1 g Intravenous 30 Quiana Nguyen MD           Allergies:  No Known Allergies    Problem List:    Patient Active Problem List   Diagnosis Code    Arthritis M19.90    HTN (hypertension) I10    COPD (chronic obstructive pulmonary kg)     Body mass index is 30.76 kg/m². CBC:   Lab Results   Component Value Date    WBC 6.3 08/30/2020    RBC 4.13 08/30/2020    HGB 12.2 08/30/2020    HCT 38.6 08/30/2020    MCV 93.5 08/30/2020    RDW 14.8 07/27/2020     08/30/2020       CMP:   Lab Results   Component Value Date     08/30/2020    K 4.5 09/18/2020    K 4.5 08/30/2020     08/30/2020    CO2 19 08/30/2020    BUN 22 08/30/2020    CREATININE 1.2 08/30/2020    GFRAA 47 07/27/2020    LABGLOM 43 08/30/2020    GLUCOSE 91 08/30/2020    GLUCOSE 93 05/22/2012    PROT 7.8 08/30/2020    CALCIUM 9.4 08/30/2020    BILITOT 0.5 08/30/2020    ALKPHOS 92 08/30/2020    AST 16 08/30/2020    ALT 11 08/30/2020       POC Tests: No results for input(s): POCGLU, POCNA, POCK, POCCL, POCBUN, POCHEMO, POCHCT in the last 72 hours. Coags:   Lab Results   Component Value Date    INR 1.02 08/30/2020    APTT 35.6 08/30/2020       HCG (If Applicable): No results found for: PREGTESTUR, PREGSERUM, HCG, HCGQUANT     ABGs: No results found for: PHART, PO2ART, EUM7UGZ, WIC6GNP, BEART, Q3HQDUAS     Type & Screen (If Applicable):  Lab Results   Component Value Date    LABRH POS 08/31/2020       Drug/Infectious Status (If Applicable):  No results found for: HIV, HEPCAB    COVID-19 Screening (If Applicable):   Lab Results   Component Value Date    COVID19 NOT DETECTED 09/15/2020         Anesthesia Evaluation    Airway: Mallampati: II       Mouth opening: > = 3 FB Dental:          Pulmonary:   (+) COPD:  shortness of breath:                             Cardiovascular:    (+) hypertension:,       ECG reviewed                        Neuro/Psych:      (-) CVA           GI/Hepatic/Renal:             Endo/Other:                     Abdominal:           Vascular:                                        Anesthesia Plan      general     ASA 4       Induction: intravenous. Anesthetic plan and risks discussed with patient. Plan discussed with CRNA.                   Octavio Berry Joelle Bain MD   9/18/2020

## 2020-09-18 NOTE — PROGRESS NOTES
1213: pt arrived to pacu. Pt moaning in pain laying on her side. Abdominal binder in place. Incisions x4 cdi. Ice applied.  Pt repositioned   1215: 2 mg of morphine given   1220: 2 mg of morphine given   1235: pt resting easy no needs expressed

## 2020-09-18 NOTE — PROGRESS NOTES
With walking pt pulse ox drop down to 85 % on room air. Put oxygen back on patient once back in room.

## 2020-09-18 NOTE — PROGRESS NOTES
Cherylene Bo, MD  [facility] H&P    Pt Name: Jose Cruz  MRN: [id number]  YOB: 1940  Date of evaluation: 9/18/2020  Primary Care Physician: ERNESTO De Anda Ra - CNP  Patient evaluated at the request of [address] [place] [address]  Reason for evaluation: hernia  IMPRESSIONS:       ICD-10-CM    1. Umbilical hernia without obstruction and without gangrene  K42.9 REPAIR HERNIA UMBILICAL   2. Pre-op testing  Z01.818 EKG 12 lead     COVID-19 Ambulatory   1.   2. does not have any pertinent problems on file. PLANS:   1. Due to the fact there is skin breakdown and attenuation with bleeding is a more urgent repair. We will plan for robotic hernia repair possible open. Risk benefits and alternatives were explained. Discussed with patient as she does have recannulated veins these will need to be clipped. Risk of seroma bleeding injury to bowel were discussed. All risks benefits alternatives of surgery were discussed and she wants to proceed    SUBJECTIVE:   CC:bulge    History of Chief Complaint:    Rosy Dutta is a [de-identified] y. o.female who recently presented to the emergency department with bleeding from an umbilical bulge. Patient states she is not sure how long she has had the ball as it is mildly tender it is gotten worse in the last couple weeks. In the emergency department she was noted to have some oozing from skin breakdown with a large hernia. CT abdomen pelvis was performed demonstrating a umbilical hernia containing fat and some small vessels. Patient denies any nausea vomiting change in bowel habits. Patient denies any history of cirrhosis.     Past Medical History  Past Medical History:   Diagnosis Date    Arthritis     Cancer of eye ([address]     Right    Dyslipidemia 2/15/2017    Fatigue     History of tobacco abuse 2/15/2017    Hyperlipidemia 10/13/2014    Hypertension     Non morbid obesity due to excess calories 2/15/2017    Pneumonia     Precordial pain 2/15/2017    Smoker     SOB (shortness of breath) 2/15/2017    Tobacco abuse        Past Surgical History  Past Surgical History:   Procedure Laterality Date    EYE SURGERY      Right eye cancer    HEMORRHOID SURGERY         Medications  No current facility-administered medications on file prior to visit. Current Outpatient Medications on File Prior to Visit   Medication Sig Dispense Refill    albuterol-ipratropium (COMBIVENT RESPIMAT)  MCG/ACT AERS inhaler Inhale 1 puff into the lungs every 6 hours 1 Inhaler 0    lisinopril (PRINIVIL;ZESTRIL) 20 MG tablet Take 20 mg by mouth daily      albuterol sulfate HFA (PROVENTIL HFA) 108 (90 BASE) MCG/ACT inhaler Inhale 2 puffs into the lungs every 6 hours as needed for Wheezing 1 Inhaler 0    butalbital-acetaminophen-caffeine (FIORICET) -40 MG per tablet Take 1 tablet by mouth every 4 hours as needed for Headaches 8 tablet 0    meloxicam (MOBIC) 15 MG tablet TAKE 1 TABLET BY MOUTH EVERY DAY AS NEEDED FOR PAIN 30 tablet 0    atorvastatin (LIPITOR) 20 MG tablet TAKE 1 TABLET BY MOUTH ONE TIME A DAY  30 tablet 4    amLODIPine (NORVASC) 5 MG tablet TAKE 1 TABLET BY MOUTH DAILY 30 tablet 5       Allergies  Patient has no known allergies.     Family History      Problem Relation Age of Onset    Cancer Mother     Cancer Father     Cancer Brother     Heart Failure Sister     Cancer Daughter    Family history is noncontributory to current presentation    Social History  Social History     Socioeconomic History    Marital status:      Spouse name: Not on file    Number of children: 3    Years of education: 8    Highest education level: Not on file   Occupational History    Occupation: [employer]     Employer: OTHER   Social Needs    Financial resource strain: Not on file    Food insecurity     Worry: Not on file     Inability: Not on file    Transportation needs     Medical: Not on file     Non-medical: Not on file   Tobacco Use    Smoking status: Former Smoker Packs/day: 1.00     Last attempt to quit: 2012     Years since quittin.6    Smokeless tobacco: Never Used    Tobacco comment: tryin to cut down   Substance and Sexual Activity    Alcohol use: No     Comment: quit    Drug use: No    Sexual activity: Not Currently   Lifestyle    Physical activity     Days per week: Not on file     Minutes per session: Not on file    Stress: Not on file   Relationships    Social connections     Talks on phone: Not on file     Gets together: Not on file     Attends Judaism service: Not on file     Active member of club or organization: Not on file     Attends meetings of clubs or organizations: Not on file     Relationship status: Not on file    Intimate partner violence     Fear of current or ex partner: Not on file     Emotionally abused: Not on file     Physically abused: Not on file     Forced sexual activity: Not on file   Other Topics Concern    Not on file   Social History Narrative    Not on file        Review of Systems:  Review of Systems   Constitutional: Negative for appetite change, fatigue and fever. HENT: Negative for ear pain, sore throat and trouble swallowing. Respiratory: Negative for cough, chest tightness and shortness of breath. Cardiovascular: Negative for chest pain, palpitations and leg swelling. Gastrointestinal: Positive for abdominal pain. Negative for blood in stool, constipation, diarrhea, nausea and vomiting. Endocrine: Negative for cold intolerance. Genitourinary: Negative for difficulty urinating and urgency. Musculoskeletal: Negative for back pain and joint swelling. Skin: Positive for wound. Negative for rash. Allergic/Immunologic: Negative for immunocompromised state. Neurological: Negative for seizures and headaches. Psychiatric/Behavioral: Negative for hallucinations and suicidal ideas. The patient is not nervous/anxious.         OBJECTIVE:   CURRENT VITALS:  height is 5' 2\" (1.575 m) and weight is 165 lb 11.2 oz (75.2 kg). Her tympanic temperature is 97.7 °F (36.5 °C). Her blood pressure is 130/81 and her pulse is 79. Her respiration is 17 and oxygen saturation is 98%. Body mass index is 30.31 kg/m². Physical Exam  Vitals signs reviewed. Constitutional:       Appearance: She is well-developed. HENT:      Head: Normocephalic and atraumatic. Nose: Congestion present. Eyes:      General: No scleral icterus. Pupils: Pupils are equal, round, and reactive to light. Neck:      Thyroid: No thyromegaly. Trachea: No tracheal deviation. Cardiovascular:      Rate and Rhythm: Normal rate. Pulses: Normal pulses. Pulmonary:      Effort: Pulmonary effort is normal.   Abdominal:      Palpations: Abdomen is soft. Tenderness: There is no abdominal tenderness. There is no guarding. Hernia: A hernia (Purple partially reducible tender hernia with attenuated skin, currently there is a healed scab over it. It is nonreducible) is present. Musculoskeletal: Normal range of motion. General: No deformity. Skin:     General: Skin is warm. Findings: No rash. Neurological:      General: No focal deficit present. Mental Status: She is alert and oriented to person, place, and time. Psychiatric:         Mood and Affect: Mood normal.         Speech: Speech normal.         Behavior: Behavior normal.         LABS:     Recent Labs     09/18/20  0836   K 4.5     RADIOLOGY:   I have personally reviewed the following films:  No orders to display       Thank you for the interesting evaluation. Further recommendations to follow.     Electronically signed by Mily Cespedes MD on 9/18/2020 at 9:23 AM

## 2020-09-18 NOTE — PROGRESS NOTES
Pt admitted to  [facility] via [address] [place] [address] Street. from  SURGERY. Complaints: ABD. SURGERY. IV normal saline infusing into the hand right, condition patent and no redness at a rate of 100 mls/ hour with about 300 mls in the bag still. IV site free of s/s of infection or infiltration. Vital signs obtained. Assessment and data collection initiated. Two nurse skin assessment performed by Jimbo Marquez RN and Fang Longoria RN. Oriented to room. Policies and procedures for  explained. All questions answered with no further questions at this time. Fall prevention and safety brochure discussed with patient. Bed alarm on. Call light in reach. Oriented to room. Sarai Hay RN 9/18/2020 7:40 PM     Explained patients right to have family, representative or physician notified of their admission. Patient has Declined for physician to be notified. Patient has Declined for family/representative to be notified.

## 2020-09-18 NOTE — PROGRESS NOTES
Patient to OR without any personal belongings such as cell phone, jewelry, hearing aids, dentures, etc

## 2020-09-18 NOTE — PROGRESS NOTES
Called Dr Jay Davila about patient oxygen leve he said pt needs to be admit call dr Pastora Mackay. Called Dr Elliott Riddle update on patient poor oxygen level.   received order for admit

## 2020-09-18 NOTE — CONSULTS
Hospitalist Consult Note        Patient:  Denver Ports  YOB: 1940  Date of Service: 9/18/2020  MRN: [id number]   Acct:  [de-identified]   Primary Care Physician: ERNESTO Reardon CNP    Chief Complaint: Umbilical hernia  Reason for consult - respiratory failure    Date of Service: Pt seen/examined in consultation on 9/18/2020     History Of Present Illness:      Zach Grace [id number] y.o. female who we are asked to see/evaluate by Timmy Dwyer MD for medical management of postop pulmonary insufficiency  This is a 55-year-old lady with past medical history as described low who is here for an elective surgery for incarcerated umbilical hernia hernia repair. Status post surgery on 9/18 by Dr. Jose Craig, surgery was uneventful however postextubation patient was requiring oxygen to keep saturations above 90%. Pulse ox kept dropping back to 85% on room air  We were consulted for further care    Assessment and Plan:-  1. Acute postoperative pulmonary insufficiency-wean oxygen as able, incentive spirometry. 2. History of COPD-currently not in exacerbation however is hypoxic secondary to above-we will add DuoNeb as needed, restart home inhalers no need for steroids. 3. Ex-smoker-quit 11 years ago  4. History of hyperlipidemia-on statin-continue  5. History of benign essential hypertension-on Norvasc and lisinopril-continue  6.  History of migraines-on Fioricet-continue      Past Medical History:        Diagnosis Date    Arthritis     Cancer of eye ([address]     Right    Dyslipidemia 2/15/2017    Fatigue     History of tobacco abuse 2/15/2017    Hyperlipidemia 10/13/2014    Hypertension     Non morbid obesity due to excess calories 2/15/2017    Pneumonia     Precordial pain 2/15/2017    Smoker     SOB (shortness of breath) 2/15/2017    Tobacco abuse        Past Surgical History:        Procedure Laterality Date    EYE SURGERY      Right eye cancer   [address] Medications:   No current facility-administered medications on file prior to encounter. Current Outpatient Medications on File Prior to Encounter   Medication Sig Dispense Refill    albuterol-ipratropium (COMBIVENT RESPIMAT)  MCG/ACT AERS inhaler Inhale 1 puff into the lungs every 6 hours 1 Inhaler 0    lisinopril (PRINIVIL;ZESTRIL) 20 MG tablet Take 20 mg by mouth daily      albuterol sulfate HFA (PROVENTIL HFA) 108 (90 BASE) MCG/ACT inhaler Inhale 2 puffs into the lungs every 6 hours as needed for Wheezing 1 Inhaler 0    meloxicam (MOBIC) 15 MG tablet TAKE 1 TABLET BY MOUTH EVERY DAY AS NEEDED FOR PAIN 30 tablet 0    atorvastatin (LIPITOR) 20 MG tablet TAKE 1 TABLET BY MOUTH ONE TIME A DAY  30 tablet 4    amLODIPine (NORVASC) 5 MG tablet TAKE 1 TABLET BY MOUTH DAILY 30 tablet 5    butalbital-acetaminophen-caffeine (FIORICET) -40 MG per tablet Take 1 tablet by mouth every 4 hours as needed for Headaches 8 tablet 0       Allergies:    Patient has no known allergies. Social History:    reports that she quit smoking about 8 years ago. She smoked 1.00 pack per day. She has never used smokeless tobacco. She reports that she does not drink alcohol or use drugs. Family History:       Problem Relation Age of Onset    Cancer Mother     Cancer Father     Cancer Brother     Heart Failure Sister     Cancer Daughter        Diet:  DIET GENERAL;    Review of systems:   Pertinent positives as noted in the HPI. All other systems reviewed and negative. PHYSICAL EXAM:  /64   Pulse 101   Temp 97.3 °F (36.3 °C)   Resp 16   Ht 5' 2\" (1.575 m)   Wt 168 lb 3.2 oz (76.3 kg)   SpO2 91%   BMI 30.76 kg/m²   General appearance: No apparent distress, appears stated age and cooperative. HEENT: Normal cephalic, atraumatic without obvious deformity. Pupils equal, round, and reactive to light. Extra ocular muscles intact. Conjunctivae/corneas clear. Neck: Supple, with full range of motion.  No jugular venous distention. Trachea midline. Respiratory:  Normal respiratory effort. Clear to auscultation, bilaterally without Rales/Wheezes/Rhonchi. Cardiovascular: Regular rate and rhythm with normal S1/S2 without murmurs, rubs or gallops. Abdomen: Soft, post surgery abdomen, diminished bowel sounds. Musculoskeletal:  No clubbing, cyanosis or edema bilaterally. Skin: Skin color, texture, turgor normal.  No rashes or lesions. Neurologic:  Neurovascularly intact without any focal sensory/motor deficits. Cranial nerves: II-XII intact, grossly non-focal.  Psychiatric: Alert and oriented, thought content appropriate, normal insight  Capillary Refill: Brisk,< 3 seconds   Peripheral Pulses: +2 palpable, equal bilaterally     Labs:   No results for input(s): WBC, HGB, HCT, PLT in the last 72 hours. Recent Labs     09/18/20  0836   K 4.5     No results for input(s): AST, ALT, BILIDIR, BILITOT, ALKPHOS in the last 72 hours. No results for input(s): INR in the last 72 hours. No results for input(s): [place] in the last 72 hours. Urinalysis:    Lab Results   Component Value Date    NITRU NEGATIVE 06/20/2018    BLOODU NEGATIVE 06/20/2018    GLUCOSEU NEGATIVE 06/20/2018       Radiology:   No orders to display     No results found.       Susan Hammond    Electronically signed by Carlos Delaney MD on 9/18/2020 at 4:27 PM

## 2020-09-18 NOTE — PROGRESS NOTES
Pt is being transport up to [address] via transport team. leaving same day surgery area.

## 2020-09-18 NOTE — OP NOTE
Operative Note      NAME: Víctor Meadows   MRN: [id number]  : 1940  PROCEDURE DATE: 2020     Surgeon: Jacqueline Calderón) and Role:     * Edward Keith MD - Primary    Assistants: none    Staff: Circulator: Pawan Myles RN; Misti Bautista RN  Relief Circulator: Francy Husain RN  Scrub Person First: Brittneyglen Cee  Scrub Person Second: Alaynavielka Arvizu    Pre-op Diagnosis: incarcerated umbilical hernia    Post-op Diagnosis: same     Procedure Performed: Procedure(s):  ROBOTIC UMBILICAL HERNIA REPAIR WITH MESH (N/A)    Anesthesia Type: General    Indications: This is an 19-year-old female who was seen evaluate my surgical clinic with a symptomatic chronically incarcerated umbilical hernia that was starting developed skin erosion. The risk benefits and alternatives were explained elected to proceed      Findings: 3 cm fascial defect with chronically incarcerated greater omentum    Procedure details: He was seen in the preoperative holding room where informed consent was obtained. Is taken the operating placed napping table spine position. After adequate anesthesia formed bowels performed is prepped and draped normal sterile fashion. Local anesthetic was instilled at Martinez's point skin incision was made. Veress needle was inserted there was good return air good fluid drop. And peritoneum was achieved. A 12 mm robotic trocar was used to enter the abdominal cavity dislocation there were no unexpected active findings after the scope was inserted. 2 more 8 mm trochars were placed on the left lateral abdominal wall. The robotic system was docked. The greater omentum was seen tented up incarcerated into the fascial defect. A seat was taken at the console. Instruments were inserted under direct visualization. The greater omentum was reduced in a hand overhand fashion. Cautery was used to free adhesions. The fascial defect was then closed with a running strata fix suture.   When reaching the umbilical defect the underside of the skin was grasped to decrease the dead space. Closed nicely. The patient did have this caudally and cephalad some rectus diastases this was also plicated. The mesh given good coverage was then inserted to the abdomen. It was an echo system. Stab incision was made in the umbilicus avoiding the previous excoriated skin. The needle was advanced under direct visualization. The catheter was grasped and pulled out the abdomen. It with the system was deployed. The mesh laid nicely there was good coverage. It was then secured to the abdominal wall using V lock sutures in a circumferential fashion. There was a small amount of bleeding on the right side. This was oversewn and all bleeding stopped. The echo system was liberated from the mesh. It was removed the abdomen buttock system was undocked. Trochars were removed. Pneumoperitoneum was evacuated. The 12-minute meter fascia was closed with 0 Vicryl suture in a figure-of-eight fashion. All skin goals were closed with interrupted subdermal sutures. Surgical glue was applied. A compression dressing was then placed at her umbilicus. All sponge and needle counts were correct. She was woken anesthesia and transported the PACU in stable condition    Complications: none    Specimens:   * No specimens in log *     Implant:  Implant Name Type Inv.  Item Serial No.  Lot No. LRB No. Used Action   MESH VENTRALIGHT ST W/ECHO PS CIR 4.5IN Mesh MESH VENTRALIGHT ST W/ECHO PS CIR 4.5IN  CR [employer] [id number] N/A 1 Implanted          Estimated Blood Loss:  20 ml                     Condition: good

## 2020-09-19 ENCOUNTER — APPOINTMENT (OUTPATIENT)
Dept: GENERAL RADIOLOGY | Age: 80
DRG: 353 | End: 2020-09-19
Attending: SURGERY
Payer: MEDICARE

## 2020-09-19 LAB
ALLEN TEST: POSITIVE
ANION GAP SERPL CALCULATED.3IONS-SCNC: 14 MEQ/L (ref 8–16)
BASE EXCESS (CALCULATED): -6.5 MMOL/L (ref -2.5–2.5)
BASOPHILS # BLD: 0.1 %
BASOPHILS ABSOLUTE: 0 THOU/MM3 (ref 0–0.1)
BUN BLDV-MCNC: 21 MG/DL (ref 7–22)
CALCIUM SERPL-MCNC: 9.2 MG/DL (ref 8.5–10.5)
CHLORIDE BLD-SCNC: 109 MEQ/L (ref 98–111)
CO2: 17 MEQ/L (ref 23–33)
COLLECTED BY:: ABNORMAL
CREAT SERPL-MCNC: 1.2 MG/DL (ref 0.4–1.2)
DEVICE: ABNORMAL
EOSINOPHIL # BLD: 0 %
EOSINOPHILS ABSOLUTE: 0 THOU/MM3 (ref 0–0.4)
ERYTHROCYTE [DISTWIDTH] IN BLOOD BY AUTOMATED COUNT: 15.3 % (ref 11.5–14.5)
ERYTHROCYTE [DISTWIDTH] IN BLOOD BY AUTOMATED COUNT: 54.6 FL (ref 35–45)
GFR SERPL CREATININE-BSD FRML MDRD: 43 ML/MIN/1.73M2
GLUCOSE BLD-MCNC: 112 MG/DL (ref 70–108)
HCO3: 18 MMOL/L (ref 23–28)
HCT VFR BLD CALC: 35.9 % (ref 37–47)
HEMOGLOBIN: 10.7 GM/DL (ref 12–16)
IFIO2: 2
IMMATURE GRANS (ABS): 0.14 THOU/MM3 (ref 0–0.07)
IMMATURE GRANULOCYTES: 1 %
LYMPHOCYTES # BLD: 5.1 %
LYMPHOCYTES ABSOLUTE: 0.7 THOU/MM3 (ref 1–4.8)
MCH RBC QN AUTO: 29 PG (ref 26–33)
MCHC RBC AUTO-ENTMCNC: 29.8 GM/DL (ref 32.2–35.5)
MCV RBC AUTO: 97.3 FL (ref 81–99)
MONOCYTES # BLD: 5.4 %
MONOCYTES ABSOLUTE: 0.7 THOU/MM3 (ref 0.4–1.3)
NUCLEATED RED BLOOD CELLS: 0 /100 WBC
O2 SATURATION: 91 %
PCO2: 32 MMHG (ref 35–45)
PH BLOOD GAS: 7.36 (ref 7.35–7.45)
PLATELET # BLD: 348 THOU/MM3 (ref 130–400)
PMV BLD AUTO: 9.7 FL (ref 9.4–12.4)
PO2: 63 MMHG (ref 71–104)
POTASSIUM REFLEX MAGNESIUM: 5.1 MEQ/L (ref 3.5–5.2)
PRO-BNP: 1690 PG/ML (ref 0–1800)
RBC # BLD: 3.69 MILL/MM3 (ref 4.2–5.4)
SEG NEUTROPHILS: 88.4 %
SEGMENTED NEUTROPHILS ABSOLUTE COUNT: 12.1 THOU/MM3 (ref 1.8–7.7)
SODIUM BLD-SCNC: 140 MEQ/L (ref 135–145)
SOURCE, BLOOD GAS: ABNORMAL
WBC # BLD: 13.7 THOU/MM3 (ref 4.8–10.8)

## 2020-09-19 PROCEDURE — 83880 ASSAY OF NATRIURETIC PEPTIDE: CPT

## 2020-09-19 PROCEDURE — 6360000002 HC RX W HCPCS: Performed by: SURGERY

## 2020-09-19 PROCEDURE — 80048 BASIC METABOLIC PNL TOTAL CA: CPT

## 2020-09-19 PROCEDURE — 82803 BLOOD GASES ANY COMBINATION: CPT

## 2020-09-19 PROCEDURE — 2700000000 HC OXYGEN THERAPY PER DAY

## 2020-09-19 PROCEDURE — 94761 N-INVAS EAR/PLS OXIMETRY MLT: CPT

## 2020-09-19 PROCEDURE — 6370000000 HC RX 637 (ALT 250 FOR IP): Performed by: INTERNAL MEDICINE

## 2020-09-19 PROCEDURE — 85025 COMPLETE CBC W/AUTO DIFF WBC: CPT

## 2020-09-19 PROCEDURE — 6370000000 HC RX 637 (ALT 250 FOR IP): Performed by: PHYSICIAN ASSISTANT

## 2020-09-19 PROCEDURE — 6360000002 HC RX W HCPCS: Performed by: INTERNAL MEDICINE

## 2020-09-19 PROCEDURE — 2500000003 HC RX 250 WO HCPCS: Performed by: PHYSICIAN ASSISTANT

## 2020-09-19 PROCEDURE — 36415 COLL VENOUS BLD VENIPUNCTURE: CPT

## 2020-09-19 PROCEDURE — 1200000003 HC TELEMETRY R&B

## 2020-09-19 PROCEDURE — 36600 WITHDRAWAL OF ARTERIAL BLOOD: CPT

## 2020-09-19 PROCEDURE — 6360000002 HC RX W HCPCS: Performed by: PHYSICIAN ASSISTANT

## 2020-09-19 PROCEDURE — 99233 SBSQ HOSP IP/OBS HIGH 50: CPT | Performed by: INTERNAL MEDICINE

## 2020-09-19 PROCEDURE — 71045 X-RAY EXAM CHEST 1 VIEW: CPT

## 2020-09-19 PROCEDURE — 94640 AIRWAY INHALATION TREATMENT: CPT

## 2020-09-19 PROCEDURE — 99024 POSTOP FOLLOW-UP VISIT: CPT | Performed by: SURGERY

## 2020-09-19 RX ORDER — BUDESONIDE AND FORMOTEROL FUMARATE DIHYDRATE 160; 4.5 UG/1; UG/1
2 AEROSOL RESPIRATORY (INHALATION) 2 TIMES DAILY
Status: DISCONTINUED | OUTPATIENT
Start: 2020-09-19 | End: 2020-09-23 | Stop reason: HOSPADM

## 2020-09-19 RX ORDER — PREDNISONE 20 MG/1
40 TABLET ORAL DAILY
Status: COMPLETED | OUTPATIENT
Start: 2020-09-19 | End: 2020-09-23

## 2020-09-19 RX ORDER — ALBUTEROL SULFATE 2.5 MG/3ML
2.5 SOLUTION RESPIRATORY (INHALATION)
Status: DISCONTINUED | OUTPATIENT
Start: 2020-09-19 | End: 2020-09-23 | Stop reason: HOSPADM

## 2020-09-19 RX ADMIN — ONDANSETRON 4 MG: 2 INJECTION INTRAMUSCULAR; INTRAVENOUS at 10:22

## 2020-09-19 RX ADMIN — MORPHINE SULFATE 2 MG: 2 INJECTION, SOLUTION INTRAMUSCULAR; INTRAVENOUS at 06:31

## 2020-09-19 RX ADMIN — OXYCODONE 5 MG: 5 TABLET ORAL at 15:32

## 2020-09-19 RX ADMIN — PREDNISONE 40 MG: 20 TABLET ORAL at 14:56

## 2020-09-19 RX ADMIN — AMLODIPINE BESYLATE 5 MG: 5 TABLET ORAL at 10:08

## 2020-09-19 RX ADMIN — IPRATROPIUM BROMIDE AND ALBUTEROL SULFATE 1 AMPULE: .5; 3 SOLUTION RESPIRATORY (INHALATION) at 12:53

## 2020-09-19 RX ADMIN — MORPHINE SULFATE 2 MG: 2 INJECTION, SOLUTION INTRAMUSCULAR; INTRAVENOUS at 09:58

## 2020-09-19 RX ADMIN — ATORVASTATIN CALCIUM 20 MG: 20 TABLET, FILM COATED ORAL at 20:56

## 2020-09-19 RX ADMIN — OXYCODONE 10 MG: 5 TABLET ORAL at 04:30

## 2020-09-19 RX ADMIN — BUDESONIDE AND FORMOTEROL FUMARATE DIHYDRATE 2 PUFF: 160; 4.5 AEROSOL RESPIRATORY (INHALATION) at 17:55

## 2020-09-19 RX ADMIN — IPRATROPIUM BROMIDE AND ALBUTEROL SULFATE 1 AMPULE: .5; 3 SOLUTION RESPIRATORY (INHALATION) at 08:13

## 2020-09-19 RX ADMIN — OXYCODONE 10 MG: 5 TABLET ORAL at 11:07

## 2020-09-19 RX ADMIN — FAMOTIDINE 20 MG: 10 INJECTION INTRAVENOUS at 09:58

## 2020-09-19 RX ADMIN — ALBUTEROL SULFATE 2.5 MG: 2.5 SOLUTION RESPIRATORY (INHALATION) at 17:46

## 2020-09-19 RX ADMIN — ALBUTEROL SULFATE 2 PUFF: 90 AEROSOL, METERED RESPIRATORY (INHALATION) at 21:01

## 2020-09-19 RX ADMIN — ENOXAPARIN SODIUM 40 MG: 40 INJECTION SUBCUTANEOUS at 09:58

## 2020-09-19 RX ADMIN — LISINOPRIL 20 MG: 20 TABLET ORAL at 10:08

## 2020-09-19 ASSESSMENT — PAIN DESCRIPTION - PROGRESSION
CLINICAL_PROGRESSION: GRADUALLY IMPROVING
CLINICAL_PROGRESSION: NOT CHANGED
CLINICAL_PROGRESSION: GRADUALLY IMPROVING

## 2020-09-19 ASSESSMENT — PAIN DESCRIPTION - DESCRIPTORS
DESCRIPTORS: DISCOMFORT;SORE
DESCRIPTORS: DISCOMFORT

## 2020-09-19 ASSESSMENT — PAIN DESCRIPTION - ONSET
ONSET: ON-GOING
ONSET: ON-GOING

## 2020-09-19 ASSESSMENT — PAIN DESCRIPTION - FREQUENCY
FREQUENCY: CONTINUOUS
FREQUENCY: CONTINUOUS

## 2020-09-19 ASSESSMENT — PAIN - FUNCTIONAL ASSESSMENT
PAIN_FUNCTIONAL_ASSESSMENT: PREVENTS OR INTERFERES SOME ACTIVE ACTIVITIES AND ADLS
PAIN_FUNCTIONAL_ASSESSMENT: ACTIVITIES ARE NOT PREVENTED

## 2020-09-19 ASSESSMENT — PAIN SCALES - GENERAL
PAINLEVEL_OUTOF10: 5
PAINLEVEL_OUTOF10: 6
PAINLEVEL_OUTOF10: 7
PAINLEVEL_OUTOF10: 3
PAINLEVEL_OUTOF10: 3
PAINLEVEL_OUTOF10: 7
PAINLEVEL_OUTOF10: 6

## 2020-09-19 ASSESSMENT — PAIN DESCRIPTION - PAIN TYPE
TYPE: SURGICAL PAIN
TYPE: SURGICAL PAIN

## 2020-09-19 ASSESSMENT — PAIN DESCRIPTION - ORIENTATION
ORIENTATION: MID
ORIENTATION: MID

## 2020-09-19 ASSESSMENT — PAIN DESCRIPTION - LOCATION
LOCATION: ABDOMEN
LOCATION: ABDOMEN

## 2020-09-19 NOTE — PLAN OF CARE
Problem: Impaired respiratory status  Goal: Clear lung sounds  Description: Clear lung sounds  Outcome: Ongoing   Continue therapy to improve lung sounds.

## 2020-09-19 NOTE — FLOWSHEET NOTE
[facility] PROGRESS NOTE      Patient: Hua Virk  Room #: 4F-89/018-S            YOB: 1940  Age: [de-identified] y.o. Gender: female            Admit Date & Time: 9/18/2020  7:55 AM    Assessment:  Pt was laying in bed. Pt was hard of hearing and had difficulty understanding what the  said. Pt's son called part way through encounter. After the phone call, pt stated several times that she could not believe it was Saturday, that she really did not remember sleeping, and it felt like Friday.  explored pt's wishes in regards to AD forms. Pt is not interested at this time. Interventions:   provided a listening presence and explored pt's wishes in regards to AD's.  spoke with pt's nurse about pt's confusion in regards to not realizing it was Saturday. Outcomes:  Pt was appreciative of the encounter. Plan:  1. Follow up with pt at next encounter in regards to her AD wishes. 2.  Provide spiritual care and support.     Electronically signed by Candy Monaco, on 9/19/2020 at 2:25 PM.  [facility]  [phone]       09/19/20 1410   Encounter Summary   Services provided to: Patient   Referral/Consult From: Rounding   Support System Children   Continue Visiting Yes  (9/19)   Complexity of Encounter Low   Length of Encounter 15 minutes   Advance Care Planning Yes   Routine   Type Initial   Assessment Approachable   Intervention Active listening;[place]   Outcome Engaged in conversation

## 2020-09-19 NOTE — PROGRESS NOTES
Hospitalist progress note        Patient:  Mily Guerrero  YOB: 1940  Date of Service: 9/19/2020  MRN: [id number]   Acct:  [de-identified]   Primary Care Physician: ERNESTO Garcia CNP    Chief Complaint: Umbilical hernia  Reason for consult - respiratory failure    Date of Service: Pt seen/examined in consultation on 9/19/2020     Hospital course:      [place] [age] y.o. female who we are asked to see/evaluate by Mari Lanier MD for medical management of postop pulmonary insufficiency  This is a 80-year-old lady with past medical history as described low who is here for an elective surgery for incarcerated umbilical hernia hernia repair. Status post surgery on 9/18 by Dr. Ximena Thomas, surgery was uneventful however postextubation patient was requiring oxygen to keep saturations above 90%. Pulse ox kept dropping back to 85% on room air  We were consulted for further care    Subjective: Patient with poor air movement requiring 2 L nasal cannula. She does not endorse much, however, family is in the room and states that that is not unusual for her. She does appear to be somewhat more short of breath when she is at her baseline and typically she does not wear oxygen at home. They also endorse worsening functional capacity lately. She does have a history of diastolic dysfunction. No fevers or chills no chest pain    Assessment and Plan:-  1. Acute postoperative pulmonary insufficiency-with poor air movement, pursed lip breathing, expiratory wheezing. Suspect that there is a component of a COPD exacerbation going on. Interestingly, her partial pressure of CO2 is not elevated and is actually low bit low. She has a little to evidence of metabolic acidosis going on as well. Will begin treatment with p.o. steroids, nebulizers, Symbicort, Spiriva. She also has evidence of decreased functional capacity per family and a history of diastolic dysfunction.   proBNP is mildly elevated, however, exam does not appear to be severely volume overloaded. We will recheck an echocardiogram as it has been 3 years. 2. Ex-smoker-quit 11 years ago  3. History of hyperlipidemia-on statin-continue  4. History of benign essential hypertension-on Norvasc and lisinopril-continue  5. History of migraines-on Fioricet-continue      Past Medical History:        Diagnosis Date    Arthritis     Cancer of eye ([address]     Right    Dyslipidemia 2/15/2017    Fatigue     History of tobacco abuse 2/15/2017    Hyperlipidemia 10/13/2014    Hypertension     Non morbid obesity due to excess calories 2/15/2017    Pneumonia     Precordial pain 2/15/2017    Smoker     SOB (shortness of breath) 2/15/2017    Tobacco abuse        Past Surgical History:        Procedure Laterality Date    EYE SURGERY      Right eye cancer    HEMORRHOID SURGERY         Home Medications:   No current facility-administered medications on file prior to encounter. Current Outpatient Medications on File Prior to Encounter   Medication Sig Dispense Refill    albuterol-ipratropium (COMBIVENT RESPIMAT)  MCG/ACT AERS inhaler Inhale 1 puff into the lungs every 6 hours 1 Inhaler 0    lisinopril (PRINIVIL;ZESTRIL) 20 MG tablet Take 20 mg by mouth daily      albuterol sulfate HFA (PROVENTIL HFA) 108 (90 BASE) MCG/ACT inhaler Inhale 2 puffs into the lungs every 6 hours as needed for Wheezing 1 Inhaler 0    meloxicam (MOBIC) 15 MG tablet TAKE 1 TABLET BY MOUTH EVERY DAY AS NEEDED FOR PAIN 30 tablet 0    atorvastatin (LIPITOR) 20 MG tablet TAKE 1 TABLET BY MOUTH ONE TIME A DAY  30 tablet 4    amLODIPine (NORVASC) 5 MG tablet TAKE 1 TABLET BY MOUTH DAILY 30 tablet 5    butalbital-acetaminophen-caffeine (FIORICET) -40 MG per tablet Take 1 tablet by mouth every 4 hours as needed for Headaches 8 tablet 0       Allergies:    Patient has no known allergies. Social History:    reports that she quit smoking about 8 years ago.  She smoked 1.00 pack per day. She has never used smokeless tobacco. She reports that she does not drink alcohol or use drugs. Family History:       Problem Relation Age of Onset    Cancer Mother     Cancer Father     Cancer Brother     Heart Failure Sister     Cancer Daughter        Diet:  DIET GENERAL;    Review of systems:   Pertinent positives as noted in the HPI. All other systems reviewed and negative. PHYSICAL EXAM:  /69   Pulse 73   Temp 97.5 °F (36.4 °C) (Oral)   Resp 20   Ht 5' 2\" (1.575 m)   Wt 168 lb 3.2 oz (76.3 kg)   SpO2 90%   BMI 30.76 kg/m²   General appearance: No apparent distress, appears stated age and cooperative. HEENT: Normal cephalic, atraumatic without obvious deformity. Pupils equal, round, and reactive to light. Extra ocular muscles intact. Conjunctivae/corneas clear. Neck: Supple, with full range of motion. No jugular venous distention. Trachea midline. Respiratory: Diffuse expiratory wheezes with poor air movement  Cardiovascular: Regular rate and rhythm with normal S1/S2 without murmurs, rubs or gallops. Abdomen: Soft, post surgery abdomen, diminished bowel sounds. Musculoskeletal:  No clubbing, cyanosis or edema bilaterally. Skin: Skin color, texture, turgor normal.  No rashes or lesions. Neurologic:  Neurovascularly intact without any focal sensory/motor deficits. Cranial nerves: II-XII intact, grossly non-focal.  Psychiatric: Alert and oriented, thought content appropriate, normal insight  Capillary Refill: Brisk,< 3 seconds   Peripheral Pulses: +2 palpable, equal bilaterally     Labs:   Recent Labs     09/19/20  0747   WBC 13.7*   HGB 10.7*   HCT 35.9*        Recent Labs     09/18/20  0836 09/19/20  0747   NA  --  140   K 4.5 5.1   CL  --  109   CO2  --  17*   BUN  --  21   CREATININE  --  1.2   CALCIUM  --  9.2     No results for input(s): AST, ALT, BILIDIR, BILITOT, ALKPHOS in the last 72 hours. No results for input(s): INR in the last 72 hours.   No results for input(s): [employer] in the last 72 hours. Urinalysis:    Lab Results   Component Value Date    NITRU NEGATIVE 06/20/2018    BLOODU NEGATIVE 06/20/2018    GLUCOSEU NEGATIVE 06/20/2018       Radiology:   XR CHEST PORTABLE   Final Result   No gross infiltrate. PA and lateral radiographs could be performed if indicated clinically. **This report has been created using voice recognition software. It may contain minor errors which are inherent in voice recognition technology. **      Final report electronically signed by Dr. Bertram Louie on 9/19/2020 9:12 AM        No results found.       [address] CONSULT    Electronically signed by Shaji Marcano MD on 9/19/2020 at 4:11 PM

## 2020-09-19 NOTE — PLAN OF CARE
Problem: Pain:  Goal: Pain level will decrease  Description: Pain level will decrease  Outcome: Ongoing  Note: Patient states a pain level of 7-5/10. Patient states a pain level goal of 3/10. PRN Oxycodone given. Non pharmacological intervention of rest/reposition implemented, patient satisfied. Problem: Cardiovascular  Goal: No DVT, peripheral vascular complications  Outcome: Ongoing  Note: Patient shows no s/s of DVT. VSS. Patient denies calf tenderness. SCDs in place throughout shift. Problem: Respiratory  Goal: Supplemental O2 requirements decreased  Outcome: Ongoing  Note: Patient was on 3L of oxygen upon admission. Patient has not been able to be weaned off supplemental O2 completely. Continuous pulse ox in place. Problem: GI  Goal: No bowel complications  Outcome: Ongoing  Note: Patient has not had a bowel movement noted during shift. Patient encouraged to ambulate. Bowel sounds active x4. Problem: Skin Integrity/Risk  Goal: No skin breakdown during hospitalization  Outcome: Ongoing  Note: Patient shows no s/s of new skin breakdown noted during shift. Patient encouraged to turn/reposition every two hours throughout shift. Patient turns/repositions self throughout shift. Patient ambulates in room occasionally throughout shift. Problem: Discharge Planning  Intervention: Discharge to appropriate level of care  Note: Patient is from home with children. Patient plans to return home with children at discharge. Care plan reviewed with patient and patient's daughter. Patient and patient's daughter verbalize understanding of the plan of care and contribute to goal setting.

## 2020-09-19 NOTE — PROGRESS NOTES
[facility] - Dr. Santo De La Vega  Postoperative Progress Note    Pt Name: Niecy Lombardi Record Number: [id number]  Date of Birth 1940   Today's Date: 9/19/2020    ASSESSMENT   1. POD # 1 status post robotic umbilical hernia repair with mesh  2.  has a past medical history of Arthritis, Cancer of eye ([address] Dyslipidemia, Fatigue, History of tobacco abuse, Hyperlipidemia, Hypertension, Non morbid obesity due to excess calories, Pneumonia, Precordial pain, Smoker, SOB (shortness of breath), and Tobacco abuse. PLAN   1. Advance diet as tolerated  2. Pain control  3. Bladder scan patient and straight cath if greater than 200 mL  4. Ambulate  5. Wean off of supplemental oxygen  6. DVT prophylaxis  7. Home medications  8. Home later today if able to wean off of supplemental oxygen and urinating okay. 9. Follow-up in office as directed  SUBJECTIVE   Stable overnight. Chart reviewed. Updated by nursing staff. Still on supplemental oxygen at 2 L. Having issues with urination. Patient feels she cannot completely empty. Abdominal pain controlled. Ambulating to the bathroom. No lightheadedness or dizziness. No chest pain or shortness of breath. No significant nausea or vomiting.   CURRENT MEDICATIONS   Scheduled Meds:   sodium chloride flush  10 mL Intravenous 2 times per day    famotidine (PEPCID) injection  20 mg Intravenous Daily    amLODIPine  5 mg Oral Daily    atorvastatin  20 mg Oral Daily    lisinopril  20 mg Oral Daily    ipratropium-albuterol  1 ampule Inhalation Q4H WA     Continuous Infusions:   sodium chloride 50 mL/hr at 09/18/20 1949     PRN Meds:.sodium chloride flush, potassium chloride **OR** potassium alternative oral replacement **OR** potassium chloride, acetaminophen, oxyCODONE **OR** oxyCODONE, morphine **OR** morphine, ondansetron, butalbital-acetaminophen-caffeine, albuterol sulfate HFA  OBJECTIVE   CURRENT VITALS:  height is 5' 2\" (1.575 Carole Ferguson MD on 9/19/2020 at 7:59 AM

## 2020-09-20 ENCOUNTER — APPOINTMENT (OUTPATIENT)
Dept: GENERAL RADIOLOGY | Age: 80
DRG: 353 | End: 2020-09-20
Attending: SURGERY
Payer: MEDICARE

## 2020-09-20 LAB
ANION GAP SERPL CALCULATED.3IONS-SCNC: 11 MEQ/L (ref 8–16)
BUN BLDV-MCNC: 22 MG/DL (ref 7–22)
CALCIUM SERPL-MCNC: 9.2 MG/DL (ref 8.5–10.5)
CHLORIDE BLD-SCNC: 105 MEQ/L (ref 98–111)
CO2: 19 MEQ/L (ref 23–33)
CREAT SERPL-MCNC: 1.1 MG/DL (ref 0.4–1.2)
ERYTHROCYTE [DISTWIDTH] IN BLOOD BY AUTOMATED COUNT: 15.6 % (ref 11.5–14.5)
ERYTHROCYTE [DISTWIDTH] IN BLOOD BY AUTOMATED COUNT: 55.3 FL (ref 35–45)
GFR SERPL CREATININE-BSD FRML MDRD: 48 ML/MIN/1.73M2
GLUCOSE BLD-MCNC: 102 MG/DL (ref 70–108)
HCT VFR BLD CALC: 34 % (ref 37–47)
HEMOGLOBIN: 10.3 GM/DL (ref 12–16)
MCH RBC QN AUTO: 29.5 PG (ref 26–33)
MCHC RBC AUTO-ENTMCNC: 30.3 GM/DL (ref 32.2–35.5)
MCV RBC AUTO: 97.4 FL (ref 81–99)
PLATELET # BLD: 314 THOU/MM3 (ref 130–400)
PMV BLD AUTO: 9.9 FL (ref 9.4–12.4)
POTASSIUM SERPL-SCNC: 4.9 MEQ/L (ref 3.5–5.2)
RBC # BLD: 3.49 MILL/MM3 (ref 4.2–5.4)
SODIUM BLD-SCNC: 135 MEQ/L (ref 135–145)
WBC # BLD: 18.7 THOU/MM3 (ref 4.8–10.8)

## 2020-09-20 PROCEDURE — 6370000000 HC RX 637 (ALT 250 FOR IP): Performed by: INTERNAL MEDICINE

## 2020-09-20 PROCEDURE — 2700000000 HC OXYGEN THERAPY PER DAY

## 2020-09-20 PROCEDURE — 2500000003 HC RX 250 WO HCPCS: Performed by: PHYSICIAN ASSISTANT

## 2020-09-20 PROCEDURE — 71045 X-RAY EXAM CHEST 1 VIEW: CPT

## 2020-09-20 PROCEDURE — 94761 N-INVAS EAR/PLS OXIMETRY MLT: CPT

## 2020-09-20 PROCEDURE — 6360000002 HC RX W HCPCS: Performed by: SURGERY

## 2020-09-20 PROCEDURE — 85027 COMPLETE CBC AUTOMATED: CPT

## 2020-09-20 PROCEDURE — 36415 COLL VENOUS BLD VENIPUNCTURE: CPT

## 2020-09-20 PROCEDURE — 6360000002 HC RX W HCPCS: Performed by: INTERNAL MEDICINE

## 2020-09-20 PROCEDURE — 94640 AIRWAY INHALATION TREATMENT: CPT

## 2020-09-20 PROCEDURE — 6370000000 HC RX 637 (ALT 250 FOR IP): Performed by: PHYSICIAN ASSISTANT

## 2020-09-20 PROCEDURE — 80048 BASIC METABOLIC PNL TOTAL CA: CPT

## 2020-09-20 PROCEDURE — 1200000003 HC TELEMETRY R&B

## 2020-09-20 PROCEDURE — 99233 SBSQ HOSP IP/OBS HIGH 50: CPT | Performed by: INTERNAL MEDICINE

## 2020-09-20 PROCEDURE — 99024 POSTOP FOLLOW-UP VISIT: CPT | Performed by: SURGERY

## 2020-09-20 RX ADMIN — ALBUTEROL SULFATE 2.5 MG: 2.5 SOLUTION RESPIRATORY (INHALATION) at 16:22

## 2020-09-20 RX ADMIN — ALBUTEROL SULFATE 2.5 MG: 2.5 SOLUTION RESPIRATORY (INHALATION) at 07:43

## 2020-09-20 RX ADMIN — AMLODIPINE BESYLATE 5 MG: 5 TABLET ORAL at 09:32

## 2020-09-20 RX ADMIN — ENOXAPARIN SODIUM 40 MG: 40 INJECTION SUBCUTANEOUS at 09:32

## 2020-09-20 RX ADMIN — PREDNISONE 40 MG: 20 TABLET ORAL at 09:32

## 2020-09-20 RX ADMIN — BUDESONIDE AND FORMOTEROL FUMARATE DIHYDRATE 2 PUFF: 160; 4.5 AEROSOL RESPIRATORY (INHALATION) at 20:55

## 2020-09-20 RX ADMIN — ALBUTEROL SULFATE 2.5 MG: 2.5 SOLUTION RESPIRATORY (INHALATION) at 11:54

## 2020-09-20 RX ADMIN — FAMOTIDINE 20 MG: 10 INJECTION INTRAVENOUS at 09:32

## 2020-09-20 RX ADMIN — ATORVASTATIN CALCIUM 20 MG: 20 TABLET, FILM COATED ORAL at 20:06

## 2020-09-20 RX ADMIN — BUDESONIDE AND FORMOTEROL FUMARATE DIHYDRATE 2 PUFF: 160; 4.5 AEROSOL RESPIRATORY (INHALATION) at 07:43

## 2020-09-20 RX ADMIN — ACETAMINOPHEN 650 MG: 325 TABLET ORAL at 23:54

## 2020-09-20 RX ADMIN — ALBUTEROL SULFATE 2.5 MG: 2.5 SOLUTION RESPIRATORY (INHALATION) at 20:55

## 2020-09-20 RX ADMIN — TIOTROPIUM BROMIDE INHALATION SPRAY 2 PUFF: 3.12 SPRAY, METERED RESPIRATORY (INHALATION) at 07:43

## 2020-09-20 RX ADMIN — LISINOPRIL 20 MG: 20 TABLET ORAL at 09:32

## 2020-09-20 ASSESSMENT — PAIN DESCRIPTION - ORIENTATION: ORIENTATION: LOWER

## 2020-09-20 ASSESSMENT — PAIN DESCRIPTION - FREQUENCY: FREQUENCY: INTERMITTENT

## 2020-09-20 ASSESSMENT — PAIN DESCRIPTION - DESCRIPTORS: DESCRIPTORS: SORE;HEAVINESS

## 2020-09-20 ASSESSMENT — PAIN SCALES - GENERAL
PAINLEVEL_OUTOF10: 3
PAINLEVEL_OUTOF10: 0

## 2020-09-20 ASSESSMENT — PAIN - FUNCTIONAL ASSESSMENT: PAIN_FUNCTIONAL_ASSESSMENT: ACTIVITIES ARE NOT PREVENTED

## 2020-09-20 ASSESSMENT — PAIN DESCRIPTION - PAIN TYPE: TYPE: SURGICAL PAIN

## 2020-09-20 ASSESSMENT — PAIN DESCRIPTION - PROGRESSION: CLINICAL_PROGRESSION: GRADUALLY IMPROVING

## 2020-09-20 ASSESSMENT — PAIN DESCRIPTION - LOCATION: LOCATION: ABDOMEN

## 2020-09-20 ASSESSMENT — PAIN DESCRIPTION - ONSET: ONSET: ON-GOING

## 2020-09-20 NOTE — PLAN OF CARE
Problem: Pain:  Goal: Pain level will decrease  Description: Pain level will decrease  Outcome: Ongoing  Note: Patient states a pain level of 2/10. Patient states a pain level goal of 3/10. Non pharmacological intervention of rest/reposition implemented, patient satisfied. Problem: Cardiovascular  Goal: No DVT, peripheral vascular complications  Outcome: Ongoing  Note: Patient shows no s/s of DVT. VSS. Patient denies calf tenderness. Problem: Respiratory  Goal: Supplemental O2 requirements decreased  Outcome: Ongoing  Note: Patient had increased O2 requirements throughout the night. Continuous pulse ox in place. Problem: GI  Goal: No bowel complications  Outcome: Ongoing  Note: Patient has not had a bowel movement noted during shift. Patient encouraged to ambulate. Bowel sounds active x4. Problem: Skin Integrity/Risk  Goal: No skin breakdown during hospitalization  Outcome: Ongoing  Note: Patient shows no s/s of new skin breakdown noted during shift. Patient encouraged to turn/reposition every two hours throughout shift. Patient turns/repositions self throughout shift. Patient ambulates in room occasionally throughout shift. Problem: Discharge Planning  Intervention: Discharge to appropriate level of care  Note: Patient is from home with children. Patient plans to return home with children at discharge. Care plan reviewed with patient and patient's daughter. Patient and patient's daughter verbalize understanding of the plan of care and contribute to goal setting.

## 2020-09-20 NOTE — PROGRESS NOTES
Hospitalist progress note        Patient:  Hua Virk  YOB: 1940  Date of Service: 9/20/2020  MRN: [id number]   Acct:  [de-identified]   Primary Care Physician: ERNESTO Salazar CNP    Chief Complaint: Umbilical hernia  Reason for consult - respiratory failure    Date of Service: Pt seen/examined in consultation on 9/20/2020     Hospital course:      Olu Para y.o. female who we are asked to see/evaluate by Vincent Brown MD for medical management of postop pulmonary insufficiency  This is a 57-year-old lady with past medical history as described low who is here for an elective surgery for incarcerated umbilical hernia hernia repair. Status post surgery on 9/18 by Dr. Marie Trent, surgery was uneventful however postextubation patient was requiring oxygen to keep saturations above 90%. Pulse ox kept dropping back to 85% on room air  We were consulted for further care    Subjective: She appears more comfortable today.,  However, her oxygen demands have increased. She is now on 4 L nasal cannula. She does have a little bit less pursed lip breathing but still is having episodes of this as well. She is less diaphoretic today as well. Does have a dry cough with some productive sputum. Assessment and Plan:-  1. Acute postoperative pulmonary insufficiency-with poor air movement, pursed lip breathing, expiratory wheezing. Suspect that there is a component of a COPD exacerbation going on. Interestingly, her partial pressure of CO2 is not elevated and is actually low bit low. She has a little to evidence of metabolic acidosis going on as well. Will begin treatment with p.o. steroids, nebulizers, Symbicort, Spiriva. She also has evidence of decreased functional capacity per family and a history of diastolic dysfunction. proBNP is mildly elevated, however, exam does not appear to be severely volume overloaded.   We will recheck an echocardiogram as it has been 3 years.  2. Ex-smoker-quit 11 years ago  3. History of hyperlipidemia-on statin-continue  4. History of benign essential hypertension-on Norvasc and lisinopril-continue  5. History of migraines-on Fioricet-continue  6. Leukocytosis: This is likely due to steroids. Past Medical History:        Diagnosis Date    Arthritis     Cancer of eye ([address]     Right    Dyslipidemia 2/15/2017    Fatigue     History of tobacco abuse 2/15/2017    Hyperlipidemia 10/13/2014    Hypertension     Non morbid obesity due to excess calories 2/15/2017    Pneumonia     Precordial pain 2/15/2017    Smoker     SOB (shortness of breath) 2/15/2017    Tobacco abuse        Past Surgical History:        Procedure Laterality Date    EYE SURGERY      Right eye cancer    HEMORRHOID SURGERY         Home Medications:   No current facility-administered medications on file prior to encounter. Current Outpatient Medications on File Prior to Encounter   Medication Sig Dispense Refill    albuterol-ipratropium (COMBIVENT RESPIMAT)  MCG/ACT AERS inhaler Inhale 1 puff into the lungs every 6 hours 1 Inhaler 0    lisinopril (PRINIVIL;ZESTRIL) 20 MG tablet Take 20 mg by mouth daily      albuterol sulfate HFA (PROVENTIL HFA) 108 (90 BASE) MCG/ACT inhaler Inhale 2 puffs into the lungs every 6 hours as needed for Wheezing 1 Inhaler 0    meloxicam (MOBIC) 15 MG tablet TAKE 1 TABLET BY MOUTH EVERY DAY AS NEEDED FOR PAIN 30 tablet 0    atorvastatin (LIPITOR) 20 MG tablet TAKE 1 TABLET BY MOUTH ONE TIME A DAY  30 tablet 4    amLODIPine (NORVASC) 5 MG tablet TAKE 1 TABLET BY MOUTH DAILY 30 tablet 5    butalbital-acetaminophen-caffeine (FIORICET) -40 MG per tablet Take 1 tablet by mouth every 4 hours as needed for Headaches 8 tablet 0       Allergies:    Patient has no known allergies. Social History:    reports that she quit smoking about 8 years ago. She smoked 1.00 pack per day.  She has never used smokeless tobacco. She reports that she does not drink alcohol or use drugs. Family History:       Problem Relation Age of Onset    Cancer Mother     Cancer Father     Cancer Brother     Heart Failure Sister     Cancer Daughter        Diet:  DIET GENERAL;    Review of systems:   Pertinent positives as noted in the HPI. All other systems reviewed and negative. PHYSICAL EXAM:  /61   Pulse 90   Temp 98.1 °F (36.7 °C) (Oral)   Resp 22   Ht 5' 2.01\" (1.575 m)   Wt 176 lb 3.2 oz (79.9 kg)   SpO2 91%   BMI 32.22 kg/m²   General appearance: No apparent distress, appears stated age and cooperative. Appears more comfortable from the day prior  HEENT: Normal cephalic, atraumatic without obvious deformity. Pupils equal, round, and reactive to light. Extra ocular muscles intact. Conjunctivae/corneas clear. Neck: Supple, with full range of motion. No jugular venous distention. Trachea midline. Respiratory: Diffuse expiratory wheezes with poor air movement  Cardiovascular: Regular rate and rhythm with normal S1/S2 without murmurs, rubs or gallops. Abdomen: Soft, post surgery abdomen, diminished bowel sounds. Musculoskeletal:  No clubbing, cyanosis or edema bilaterally. Skin: Skin color, texture, turgor normal.  No rashes or lesions. Neurologic:  Neurovascularly intact without any focal sensory/motor deficits.  Cranial nerves: II-XII intact, grossly non-focal.  Psychiatric: Alert and oriented, thought content appropriate, normal insight  Capillary Refill: Brisk,< 3 seconds   Peripheral Pulses: +2 palpable, equal bilaterally     Labs:   Recent Labs     09/19/20  0747 09/20/20  0854   WBC 13.7* 18.7*   HGB 10.7* 10.3*   HCT 35.9* 34.0*    314     Recent Labs     09/18/20  0836 09/19/20  0747 09/20/20  0854   NA  --  140 135   K 4.5 5.1 4.9   CL  --  109 105   CO2  --  17* 19*   BUN  --  21 22   CREATININE  --  1.2 1.1   CALCIUM  --  9.2 9.2     No results for input(s): AST, ALT, BILIDIR, BILITOT, ALKPHOS in the last 72 hours. No results for input(s): INR in the last 72 hours. No results for input(s): Medardo Loyd in the last 72 hours. Urinalysis:    Lab Results   Component Value Date    NITRU NEGATIVE 06/20/2018    BLOODU NEGATIVE 06/20/2018    GLUCOSEU NEGATIVE 06/20/2018       Radiology:   XR CHEST PORTABLE   Final Result   No gross infiltrate. PA and lateral radiographs could be performed if indicated clinically. **This report has been created using voice recognition software. It may contain minor errors which are inherent in voice recognition technology. **      Final report electronically signed by Dr. Jacquelin Huntley on 9/19/2020 9:12 AM        No results found.       [address] CONSULT    Electronically signed by Jos Alford MD on 9/20/2020 at 4:53 PM

## 2020-09-20 NOTE — PROGRESS NOTES
Patient became very agitated, pulling at continuous pulse ox, nasal cannula, and telemetry. This RN tried to educate patient on the importance of being on all these devices, patient no receptive. At this time, patient agrees to leave nasal cannula and pulse ox on, but refusing telemetry and SCDs. Will continue to reinforce education.

## 2020-09-20 NOTE — PROGRESS NOTES
[facility] - Dr. Carrasquillo Grandchild   Covering for Dr. Yuliana Chavez  Postoperative Progress Note    Pt Name: Daniel Pascal Record Number: [id number]  Date of Birth 1940   Today's Date: 9/20/2020    ASSESSMENT   1. POD # 2 status post robotic umbilical hernia repair with mesh  2. Resp insufficiency slowly improving, now on 4L NC   has a past medical history of Arthritis, Cancer of eye ([address] Dyslipidemia, Fatigue, History of tobacco abuse, Hyperlipidemia, Hypertension, Non morbid obesity due to excess calories, Pneumonia, Precordial pain, Smoker, SOB (shortness of breath), and Tobacco abuse. PLAN   1. Advance diet as tolerated  2. Pain control  3. Ambulate  4. Wean off of supplemental oxygen as able  5. DVT prophylaxis  6. Home medications  SUBJECTIVE   Stable overnight. Chart reviewed. Updated by nursing staff. Still on supplemental oxygen at 4 L. Abdominal pain controlled. Ambulating to the bathroom. No lightheadedness or dizziness. No chest pain or shortness of breath. No significant nausea or vomiting. CURRENT MEDICATIONS   Scheduled Meds:   enoxaparin  40 mg Subcutaneous Daily    predniSONE  40 mg Oral Daily    budesonide-formoterol  2 puff Inhalation BID    tiotropium  2 puff Inhalation Daily    albuterol  2.5 mg Nebulization Q4H WA    sodium chloride flush  10 mL Intravenous 2 times per day    famotidine (PEPCID) injection  20 mg Intravenous Daily    amLODIPine  5 mg Oral Daily    atorvastatin  20 mg Oral Daily    lisinopril  20 mg Oral Daily     Continuous Infusions:   sodium chloride 50 mL/hr at 09/18/20 1949     PRN Meds:.sodium chloride flush, potassium chloride **OR** potassium alternative oral replacement **OR** potassium chloride, acetaminophen, ondansetron, butalbital-acetaminophen-caffeine, albuterol sulfate HFA  OBJECTIVE   CURRENT VITALS:  height is 5' 2\" (1.575 m) and weight is 168 lb 3.2 oz (76.3 kg).  Her oral temperature is 97.8 °F (36.6 °C). Her blood pressure is 128/67 and her pulse is 77. Her respiration is 20 and oxygen saturation is 90%. Temperature Range (24h):Temp: 97.8 °F (36.6 °C) Temp  Av.8 °F (36.6 °C)  Min: 97.5 °F (36.4 °C)  Max: 98.1 °F (36.7 °C)  BP Range (48J): Systolic (99CFK), GHU:385 , Min:100 , SARTHAK:150     Diastolic (67YZJ), MWZ:89, Min:58, Max:69    Pulse Range (24h): Pulse  Av.8  Min: 73  Max: 102  Respiration Range (24h): Resp  Av  Min: 20  Max: 20  Current Pulse Ox (24h):  SpO2: 90 %  Pulse Ox Range (24h):  SpO2  Av.2 %  Min: 85 %  Max: 96 %  Oxygen Amount and Delivery: O2 Flow Rate (L/min): 4 L/min  Incentive Spirometry Tx: Respiratory Effort: Dyspnea with Exertion Treatment Tolerance: Fair        GENERAL: alert, no distress  LUNGS: clear to ausculation, without wheezes, rales or rhonci  HEART: normal rate and regular rhythm  ABDOMEN: non-distended, soft, incisional tenderness, bowel sounds present in all 4 quadrants and no guarding or peritoneal signs  INCISION: healing well, no significant drainage, no significant erythema  EXTERMITY: no cyanosis, clubbing or edema    In: 671 [P.O.:50; I.V.:621]  Out: 950 [Urine:950]  Date 20 - 20 2356   Shift 2534-0339 0741-9097 3373-9223 24 Hour Total   INTAKE   P.O.(mL/kg/hr) 50(0.1)   50   I. V.(mL/kg) 621(8.1)   621(8.1)   Shift Total(mL/kg) 671(8.8)   671(8.8)   OUTPUT   Urine(mL/kg/hr) 650(1.1) 300  950   Emesis/NG output(mL/kg)  0(0)  0(0)   Other(mL/kg)  0(0)  0(0)   Stool(mL/kg)  0(0)  0(0)   Blood(mL/kg)  0(0)  0(0)   Shift Total(mL/kg) 650(8.5) 300(3.9)  950(12.5)   Weight (kg) 76.3 76.3 76.3 76.3     LABS     Recent Labs     20  0836 20  0747 20  0854   WBC  --  13.7* 18.7*   HGB  --  10.7* 10.3*   HCT  --  35.9* 34.0*   PLT  --  348 314   NA  --  140 135   K 4.5 5.1 4.9   CL  --  109 105   CO2  --  17* 19*   BUN  --  21 22   CREATININE  --  1.2 1.1   CALCIUM  --  9.2 9.2      No results for input(s): PTT, INR in the last 72 hours. Invalid input(s): PT  No results for input(s): AST, ALT, BILITOT, BILIDIR, AMYLASE, LIPASE, LDH, LACTA in the last 72 hours. No results for input(s): TROPONINT in the last 72 hours.     RADIOLOGY    none    Electronically signed by Dulce Maria Saldana DO on 9/20/2020 at 10:48 AM

## 2020-09-20 NOTE — PROGRESS NOTES
This RN spoke with patient's daughter, Maty Mcdaniel, regarding her concerns with her mothers increase in confusion. Daughter states that she would like for her mother to be discontinued on the narcotic pain medication. Hospitalist was messaged in regards to getting the narcotic pain medication discontinued due to an increase in confusion with the patient, hospitalist states they are okay with it as long as surgery was. Surgery was contacted and they were agreeable to discontinuing said pain medication.

## 2020-09-21 LAB
ANION GAP SERPL CALCULATED.3IONS-SCNC: 12 MEQ/L (ref 8–16)
BUN BLDV-MCNC: 20 MG/DL (ref 7–22)
CALCIUM SERPL-MCNC: 8.6 MG/DL (ref 8.5–10.5)
CHLORIDE BLD-SCNC: 111 MEQ/L (ref 98–111)
CO2: 21 MEQ/L (ref 23–33)
CREAT SERPL-MCNC: 0.8 MG/DL (ref 0.4–1.2)
ERYTHROCYTE [DISTWIDTH] IN BLOOD BY AUTOMATED COUNT: 15.5 % (ref 11.5–14.5)
ERYTHROCYTE [DISTWIDTH] IN BLOOD BY AUTOMATED COUNT: 55.4 FL (ref 35–45)
GFR SERPL CREATININE-BSD FRML MDRD: 69 ML/MIN/1.73M2
GLUCOSE BLD-MCNC: 94 MG/DL (ref 70–108)
HCT VFR BLD CALC: 31.4 % (ref 37–47)
HEMOGLOBIN: 9.6 GM/DL (ref 12–16)
LV EF: 60 %
LVEF MODALITY: NORMAL
MCH RBC QN AUTO: 29.6 PG (ref 26–33)
MCHC RBC AUTO-ENTMCNC: 30.6 GM/DL (ref 32.2–35.5)
MCV RBC AUTO: 96.9 FL (ref 81–99)
PLATELET # BLD: 275 THOU/MM3 (ref 130–400)
PMV BLD AUTO: 9.9 FL (ref 9.4–12.4)
POTASSIUM SERPL-SCNC: 4.4 MEQ/L (ref 3.5–5.2)
PROCALCITONIN: 0.19 NG/ML (ref 0.01–0.09)
RBC # BLD: 3.24 MILL/MM3 (ref 4.2–5.4)
SODIUM BLD-SCNC: 144 MEQ/L (ref 135–145)
WBC # BLD: 10.1 THOU/MM3 (ref 4.8–10.8)

## 2020-09-21 PROCEDURE — 99024 POSTOP FOLLOW-UP VISIT: CPT | Performed by: SURGERY

## 2020-09-21 PROCEDURE — 6370000000 HC RX 637 (ALT 250 FOR IP): Performed by: INTERNAL MEDICINE

## 2020-09-21 PROCEDURE — 6360000002 HC RX W HCPCS: Performed by: INTERNAL MEDICINE

## 2020-09-21 PROCEDURE — 6370000000 HC RX 637 (ALT 250 FOR IP): Performed by: PHYSICIAN ASSISTANT

## 2020-09-21 PROCEDURE — 85027 COMPLETE CBC AUTOMATED: CPT

## 2020-09-21 PROCEDURE — 6360000002 HC RX W HCPCS: Performed by: SURGERY

## 2020-09-21 PROCEDURE — 97163 PT EVAL HIGH COMPLEX 45 MIN: CPT

## 2020-09-21 PROCEDURE — 97535 SELF CARE MNGMENT TRAINING: CPT

## 2020-09-21 PROCEDURE — 93306 TTE W/DOPPLER COMPLETE: CPT

## 2020-09-21 PROCEDURE — 2580000003 HC RX 258: Performed by: PHYSICIAN ASSISTANT

## 2020-09-21 PROCEDURE — 99233 SBSQ HOSP IP/OBS HIGH 50: CPT | Performed by: INTERNAL MEDICINE

## 2020-09-21 PROCEDURE — 1200000003 HC TELEMETRY R&B

## 2020-09-21 PROCEDURE — 97530 THERAPEUTIC ACTIVITIES: CPT

## 2020-09-21 PROCEDURE — 94640 AIRWAY INHALATION TREATMENT: CPT

## 2020-09-21 PROCEDURE — 36415 COLL VENOUS BLD VENIPUNCTURE: CPT

## 2020-09-21 PROCEDURE — 97166 OT EVAL MOD COMPLEX 45 MIN: CPT

## 2020-09-21 PROCEDURE — 80048 BASIC METABOLIC PNL TOTAL CA: CPT

## 2020-09-21 PROCEDURE — 84145 PROCALCITONIN (PCT): CPT

## 2020-09-21 PROCEDURE — 99223 1ST HOSP IP/OBS HIGH 75: CPT | Performed by: INTERNAL MEDICINE

## 2020-09-21 PROCEDURE — 2500000003 HC RX 250 WO HCPCS: Performed by: PHYSICIAN ASSISTANT

## 2020-09-21 PROCEDURE — 94761 N-INVAS EAR/PLS OXIMETRY MLT: CPT

## 2020-09-21 PROCEDURE — 2700000000 HC OXYGEN THERAPY PER DAY

## 2020-09-21 PROCEDURE — 97110 THERAPEUTIC EXERCISES: CPT

## 2020-09-21 RX ORDER — FUROSEMIDE 10 MG/ML
40 INJECTION INTRAMUSCULAR; INTRAVENOUS 2 TIMES DAILY
Status: DISPENSED | OUTPATIENT
Start: 2020-09-21 | End: 2020-09-23

## 2020-09-21 RX ORDER — IPRATROPIUM BROMIDE AND ALBUTEROL SULFATE 2.5; .5 MG/3ML; MG/3ML
1 SOLUTION RESPIRATORY (INHALATION)
Status: CANCELLED | OUTPATIENT
Start: 2020-09-21

## 2020-09-21 RX ORDER — BUDESONIDE 0.5 MG/2ML
0.5 INHALANT ORAL 2 TIMES DAILY
Status: CANCELLED | OUTPATIENT
Start: 2020-09-21

## 2020-09-21 RX ADMIN — LISINOPRIL 20 MG: 20 TABLET ORAL at 08:47

## 2020-09-21 RX ADMIN — PREDNISONE 40 MG: 20 TABLET ORAL at 08:47

## 2020-09-21 RX ADMIN — Medication 10 ML: at 19:44

## 2020-09-21 RX ADMIN — TIOTROPIUM BROMIDE INHALATION SPRAY 2 PUFF: 3.12 SPRAY, METERED RESPIRATORY (INHALATION) at 09:06

## 2020-09-21 RX ADMIN — FAMOTIDINE 20 MG: 10 INJECTION INTRAVENOUS at 08:47

## 2020-09-21 RX ADMIN — ALBUTEROL SULFATE 2.5 MG: 2.5 SOLUTION RESPIRATORY (INHALATION) at 09:04

## 2020-09-21 RX ADMIN — ENOXAPARIN SODIUM 40 MG: 40 INJECTION SUBCUTANEOUS at 08:47

## 2020-09-21 RX ADMIN — AMLODIPINE BESYLATE 5 MG: 5 TABLET ORAL at 08:47

## 2020-09-21 RX ADMIN — FUROSEMIDE 40 MG: 10 INJECTION, SOLUTION INTRAMUSCULAR; INTRAVENOUS at 19:44

## 2020-09-21 RX ADMIN — ALBUTEROL SULFATE 2.5 MG: 2.5 SOLUTION RESPIRATORY (INHALATION) at 12:45

## 2020-09-21 RX ADMIN — BUDESONIDE AND FORMOTEROL FUMARATE DIHYDRATE 2 PUFF: 160; 4.5 AEROSOL RESPIRATORY (INHALATION) at 22:38

## 2020-09-21 RX ADMIN — ATORVASTATIN CALCIUM 20 MG: 20 TABLET, FILM COATED ORAL at 19:44

## 2020-09-21 RX ADMIN — ALBUTEROL SULFATE 2.5 MG: 2.5 SOLUTION RESPIRATORY (INHALATION) at 22:38

## 2020-09-21 RX ADMIN — BUDESONIDE AND FORMOTEROL FUMARATE DIHYDRATE 2 PUFF: 160; 4.5 AEROSOL RESPIRATORY (INHALATION) at 09:04

## 2020-09-21 RX ADMIN — ALBUTEROL SULFATE 2.5 MG: 2.5 SOLUTION RESPIRATORY (INHALATION) at 17:29

## 2020-09-21 ASSESSMENT — PAIN SCALES - GENERAL
PAINLEVEL_OUTOF10: 2
PAINLEVEL_OUTOF10: 0

## 2020-09-21 NOTE — CONSULTS
General/Constitutional: No recent loss of weight or appetite changes. No fever or chills. HENT: Negative. Eyes: Negative. Upper respiratory tract: (+) nasal congestion   Lower respiratory tract/ lungs: NP cough or sputum production. No hemoptysis. Cardiovascular: No palpitations, chest pain or edema. Gastrointestinal: No nausea or vomiting. (+) umbilical pain   Neurological: No focal neurological weakness. Extremities: No tenderness. Musculoskeletal: no complaints  Genitourinary: No complaints. Hematological: Negative. Denies easy buising  Skin: No itching. Meds    Current Medications    enoxaparin  40 mg Subcutaneous Daily    predniSONE  40 mg Oral Daily    budesonide-formoterol  2 puff Inhalation BID    tiotropium  2 puff Inhalation Daily    albuterol  2.5 mg Nebulization Q4H WA    sodium chloride flush  10 mL Intravenous 2 times per day    famotidine (PEPCID) injection  20 mg Intravenous Daily    amLODIPine  5 mg Oral Daily    atorvastatin  20 mg Oral Daily    lisinopril  20 mg Oral Daily     sodium chloride flush, potassium chloride **OR** potassium alternative oral replacement **OR** potassium chloride, acetaminophen, ondansetron, butalbital-acetaminophen-caffeine, albuterol sulfate HFA  IV Drips/Infusions    Vitals    Vitals    height is 5' 2.01\" (1.575 m) and weight is 176 lb 3.2 oz (79.9 kg). Her oral temperature is 97.9 °F (36.6 °C). Her blood pressure is 143/73 (abnormal) and her pulse is 82. Her respiration is 20 and oxygen saturation is 91%.      O2 Flow Rate (L/min): 3 L/min(found at 3L)  I/O    Intake/Output Summary (Last 24 hours) at 9/21/2020 1407  Last data filed at 9/21/2020 0329  Gross per 24 hour   Intake 357.73 ml   Output 500 ml   Net -142.27 ml     Patient Vitals for the past 96 hrs (Last 3 readings):   Weight   09/20/20 1154 176 lb 3.2 oz (79.9 kg)   09/18/20 0815 168 lb 3.2 oz (76.3 kg)     Exam   Constitutional: Patient appears moderately obese BMI 32. Stable on 4LPM via NC. C/o SOB with activity and at rest and umbilical pain   Head: Normocephalic and atraumatic. Eyes: Conjunctivae are normal. Pupils are equal, round, and reactive to light. No scleral icterus. Neck: Neck supple. No JVD or tracheal deviation present. Cardiovascular: Regular rate, regular rhythm, S1 and S2 with no murmur. No peripheral edema  Pulmonary/Chest: Normal effort with diffuse bilateral wheezing with slight crackles bilaterally throughout. No stridor. No respiratory distress. (+) NP cough  Abdominal: Soft. Bowel sounds audible. No distension or tenderness to palp. Bruising around umbilicus   Musculoskeletal: Moves all extremities; no clubbing or cyanosis  Lymphadenopathy:  No cervical adenopathy. Neurological: Patient is alert and oriented to person, place, and time. Skin: Skin is warm and dry. Labs   ABG  Lab Results   Component Value Date    PH 7.36 09/19/2020    PO2 63 09/19/2020    PCO2 32 09/19/2020    HCO3 18 09/19/2020    O2SAT 91 09/19/2020     Lab Results   Component Value Date    IFIO2 2 09/19/2020     CBC  Recent Labs     09/19/20  0747 09/20/20  0854 09/21/20  0719   WBC 13.7* 18.7* 10.1   RBC 3.69* 3.49* 3.24*   HGB 10.7* 10.3* 9.6*   HCT 35.9* 34.0* 31.4*   MCV 97.3 97.4 96.9   MCH 29.0 29.5 29.6   MCHC 29.8* 30.3* 30.6*    314 275   MPV 9.7 9.9 9.9      BMP  Recent Labs     09/19/20  0747 09/20/20  0854 09/21/20  0719    135 144   K 5.1 4.9 4.4    105 111   CO2 17* 19* 21*   BUN 21 22 20   CREATININE 1.2 1.1 0.8   GLUCOSE 112* 102 94   CALCIUM 9.2 9.2 8.6     LFT  No results for input(s): AST, ALT, ALB, BILITOT, ALKPHOS, LIPASE in the last 72 hours. Invalid input(s):   AMYLASE  TROP  Lab Results   Component Value Date    TROPONINT < 0.010 06/20/2018    TROPONINT < 0.010 06/19/2018    TROPONINT < 0.010 02/02/2017     BNP  Lab Results   Component Value Date    PROBNP 1690.0 09/19/2020    PROBNP 174.5 06/20/2018     D-Dimer  No results found for: DDIMER  Lactic Acid  No results for input(s): LACTA in the last 72 hours. INR  No results for input(s): INR, PROTIME in the last 72 hours. PTT  No results for input(s): APTT in the last 72 hours. Glucose  No results for input(s): POCGLU in the last 72 hours. UA No results for input(s): SPECGRAV, PHUR, COLORU, CLARITYU, MUCUS, PROTEINU, BLOODU, RBCUA, WBCUA, BACTERIA, NITRU, GLUCOSEU, BILIRUBINUR, UROBILINOGEN, KETUA, LABCAST, LABCASTTY, AMORPHOS in the last 72 hours. Invalid input(s): CRYSTALS. PFTs   NA  Echo    9/21/20- pending read    02/21/2017   ECHOCARDIOGRAM COMPLETE 2D W DOPPLER W COLOR. Conclusions      Summary   Normal left ventricle size and systolic function. Ejection fraction was   estimated at 60%. There were no regional left ventricular wall motion   abnormalities and wall thickness was within normal limits. E/A flow reversal noted. Suggestive of diastolic dysfunction. Left atrial size was normal.   The aortic valve was trileaflet with normal thickness and cuspal   separation. Mild aortic regurgitation is noted. Mild annular calcification. Mild mitral regurgitation is present. Trivial tricuspid regurgitation visualized. Pulmonary systolic pressure At the upper normal limit. and is estimated at   30-35 mmHg. No evidence of any pericardial effusion. Signature      ----------------------------------------------------------------   Electronically signed by Christopher Paul MD (Interpreting   physician) on 02/21/2017 at 02:24 PM      Cultures    Procalcitonin  Lab Results   Component Value Date    PROCAL 0.19 09/21/2020      None pending   Radiology    CXR  9/20/2020   XR CHEST PORTABLE   1. Mild patchy airspace disease at the right lung base may represent mild atelectasis or pneumonia. Aeration of the right lung base appears slightly worsened from the prior examination. 9/19/2020   XR CHEST PORTABLE   No gross infiltrate.  PA and lateral radiographs could be performed if indicated clinically. Assessment   Acute on chronic hypoxic respiratory failure  Acute post operative pulmonary insufficiency s/p robotic umbilical hernia repair with mesh  Suspected COPD with exacerbation- no PFTs  Suspected diastolic heart failure  HLD  Benign essential HTN- on Norvasc and Lisinopril  Migraines  Former smoker  Recommendations   -Monitor spO2 to keep > 90%  -Titrate O2 to keep > 90%  -Home O2 evaluation prior to DC home  -Recommend PFT outpatient   -Recommend LDCT outpatient - quit smoking 11 years ago   -Lasix 40 mg IV BID x 4 doses total  -BMP in AM  -CXR 2-view in AM   -Strict I&O  -Daily weights  -Metaneb  -Continue Albuterol neb every 4 hours w/a  -Continue Symbicort and Spiriva  -Prednisone 40 mg PO daily x 5 days   -ECHO pending - elevated ProBNP ? Diastolic dysfunction  -swallow evaluation r/o any aspiration issues   -Encouraged IS/Acapella use   -DVT prophylaxis: Lovenox/SCDs  -GI prophylaxis: Pepcid     Thank you for the consult and allowing us to participate in the care of your patient. Case discussed with nurse and patient/family. Questions and concerns addressed. Meds and Orders reviewed. Follow up at [facility] with Tim Avery CNP in 2-3 months with PFT/CXR prior to office visit    Electronically signed by   Harmony Rey on 9/21/2020 at 2:07 PM     Addendum by Dr. Shila Cat MD:  I have seen and examined the patient independently. Face to face evaluation and examination was performed. The above evaluation and note has been reviewed. Labs and radiographs were reviewed. I Have discussed with Dr.Brittany Sexton- Medical student about this patient in detail. The above assessment and plan has been reviewed. Please see my modifications mentioned below. My modifications:  She used to work at [employer] in the past.  Chronic tobacco smoker for 30years with 2PPD. She quit smoking 11years back.       Sleeping habits:  Time to go to bed: 8:30            PM  Time to wake up: 8:00        AM    Sleep History:  Pt with history of:  Morning headache:No   Dryness of mouth in the morning:No  Hx of snoring:Yes  Witnessed apneas:No  Excessive day time sleepiness:No. See below for Nedrow score  Hypnogogic Hallucinations:NO  Hypnopompic Hallucinations:NO  Symptoms suggestive of Restless leg syndrome:NO  History of Seizures:NO  Sleep Walking:NO  Sleep Talking:NO  Sleep paralysis: NO  Cataplexy: NO      Nedrow Sleepiness Score:   Sitting and readin  Watching TV:0  Sitting inactive in a public place:0  Being a passenger in a motor vehicle for an hour or more:0  Lying down in the afternoon:0  Sitting and talking to someone:0  Sitting quietly after lunch (no alcohol):0  Stopped for a few minutes in traffic while drivin  Total Score:0    Mallampati airway Class:IV  Neck Circumference:16.5 Inches    Positive fluid balance of 2.5Litters. Plan:  Gentle diuresis. Bed side spirometry. Continue inhalers. Labs as above.     Leanna Loaiza MD 2020 6:35 PM

## 2020-09-21 NOTE — PLAN OF CARE
Problem: Impaired respiratory status  Goal: Clear lung sounds  Description: Clear lung sounds  9/21/2020 0907 by Mark Cooper P  Outcome: Ongoing  Note: Albuterol, Spiriva, and Symbicort given to improve aeration and decrease wheezing.

## 2020-09-21 NOTE — PLAN OF CARE
Problem: Pain:  Goal: Pain level will decrease  Description: Pain level will decrease  9/21/2020 1033 by Dana Fenton RN  Outcome: Ongoing  Note: Denies pain at this time. Will continue to monitor. Problem: Cardiovascular  Goal: No DVT, peripheral vascular complications  3/26/5367 1033 by Dana Fenton RN  Outcome: Ongoing  Note: No signs of DVT at this time. Lovenox given as ordered. Problem: Respiratory  Goal: No pulmonary complications  8/38/2150 1033 by Dana Fenton RN  Outcome: Ongoing  Note: Patient using pursed lip breathing at times. Dyspnea with exertion. Problem: Respiratory  Goal: O2 Sat > 90%  9/21/2020 1033 by Dana Fenton RN  Outcome: Ongoing  Note: Requiring 4L of oxygen at this time. O2 sat at 94% this morning. Problem: GI  Goal: No bowel complications  5/40/6921 1033 by Dana Fenton RN  Outcome: Ongoing  Note: No bowel movement yet this shift. Will continue to monitor. Problem: Skin Integrity/Risk  Goal: No skin breakdown during hospitalization  9/21/2020 1033 by Dana Fenton RN  Outcome: Ongoing  Note: Skin assessment completed. Patient turns every 2 hours and as needed. No skin breakdown this shift. Problem: Falls - Risk of:  Goal: Will remain free from falls  Description: Will remain free from falls  9/21/2020 1033 by Dana Fenton RN  Outcome: Ongoing  Note: Bed in lowest position. Call light within reach. Educated patient to use call light for assistance. Bed and chair alarm in use. Care plan reviewed with patient. Patient verbalize understanding of the plan of care and contribute to goal setting.

## 2020-09-21 NOTE — PROGRESS NOTES
Minh Medrano 60  INPATIENT OCCUPATIONAL THERAPY  [facility] ONC MED 5K  EVALUATION    Time:   Time In: 3756  Time Out: 1030  Timed Code Treatment Minutes: 25 Minutes  Minutes: 40          Date: 2020  Patient Name: Bobby Anderson,   Gender: female      MRN: [id number]  : 1940  ([de-identified] y.o.)  Referring Practitioner: Dr. Nesha Kang MD  Diagnosis: Oxygen Deficit  Additional Pertinent Hx: Pt with past medical history as described above who is here for an elective surgery for incarcerated umbilical hernia hernia repair. Status post surgery on  by Dr. Ramses Perez, surgery was uneventful however postextubation patient was requiring oxygen to keep saturations above 90%. Pulse ox kept dropping back to 85% on room air. Restrictions/Precautions:  Restrictions/Precautions: General Precautions    Subjective  Chart Reviewed: Yes, Orders, History and Physical, Other (comment)  Patient assessed for rehabilitation services?: Yes    Subjective: Pleasant and cooperative  Comments: RN approved session. Pt only reported having soreness in her shoulders from how she had slept. She says that it was feeling better at this time.     Pain:  Pain Assessment  Patient Currently in Pain: Denies    Social/Functional History:  Lives With: Daughter  Type of Home: House  Home Layout: Multi-level  Home Access: Stairs to enter with rails  Entrance Stairs - Number of Steps: 7 with 1 rail  Entrance Stairs - Rails: (1 rail)  Home Equipment: Rolling walker, Cane   Bathroom Shower/Tub: Walk-in shower, Shower chair with back  Bathroom Toilet: Standard  Bathroom Accessibility: Accessible    Receives Help From: Family  ADL Assistance: Independent  Homemaking Assistance: Independent  Homemaking Responsibilities: Yes  Ambulation Assistance: Independent  Transfer Assistance: Independent    Active : Yes  Occupation: Retired  Leisure & Hobbies: Reading, watching game shows  Additional Comments: Pt was using a rolling walker at her self care using B hands. Activity Tolerance: Patient limited by fatigue  She had increased SOB with mobility in her room. She needed cues for pursed lip breathing. She reported feeling congested in her upper airway. Pt was laying in bed at the end of the session. Pt was using 4L of O2. Assessment:  Assessment: Patient would benefit from continued skilled OT services to address above deficits. She presents with oxygent deficit. She had abdominal surgery for an incarcerated umbilical hernia. Pt had poor oxygenation since her surgery. She was ambulating with a rolling walker prior to admission. She did her own self care and some of the housekeeping. She drove a car to the store. Pt demonstrates walking to/from the bathroom with SBA using the rolling walker. She did have help with donning her slippers but she could cross her legs also to don some pullups. She was SOB with ambulating and bed mobility. She was using 4L of O2. Pt did not use O2 while at home. Performance deficits / Impairments: Decreased functional mobility , Decreased ADL status, Decreased endurance, Decreased high-level IADLs  Prognosis: Good  REQUIRES OT FOLLOW UP: Yes  Decision Making: Low Complexity    Treatment Initiated: Treatment and education initiated within context of evaluation. Evaluation time included review of current medical information, gathering information related to past medical, social and functional history, completion of standardized testing, formal and informal observation of tasks, assessment of data and development of plan of care and goals. Treatment time included skilled education and facilitation of tasks to increase safety and independence with ADL's for improved functional independence and quality of life. Discussed protecting her incision by not bending forward for her self care. She also read a handout for energy conservation.   She stated that she has been taking things more slowly for some time already. She also has a balance of relaxing activities and others which challenge her body's strength or endurance more. Encouraged pt to plan activities and the day/week routine with energy conservation in mind. Pt stated she understood. Discharge Recommendations:  Continue to assess pending progress, Home with assist PRN    Patient Education:  OT Education: OT Role, Plan of Care, Energy Conservation    Equipment Recommendations:  Equipment Needed: Yes  Other: Reacher    Plan:  Times per week: 3-5x  Current Treatment Recommendations: Endurance Training, Functional Mobility Training, Self-Care / ADL, Patient/Caregiver Education & Training  Plan Comment: Pt would benefit from continued skilled OT services when medically stable and discharged from Acute. Home with family assistance recommended. Specific instructions for Next Treatment: Functional mobility; ADLs and standing tolerance; lower body ADLs with following incision precaution; Upper body ROM exercises    Goals:  Patient goals : \"Be able to return home with my family and be as active as I can. \" pt states. Short term goals  Time Frame for Short term goals: By discharge  Short term goal 1: Pt will demonstrate simple standing ADL for over 3 minute duration with SBA to increase her endurance for ease of bathing or dressing. Short term goal 2: Pt will complete BUE ROM/light resistance exercises while following pursed lip breathing technique to increase her endurance for ease of doing light homemaking. Short term goal 3: Pt will complete lower body ADLs while crossing her legs and following incision protection with Supervision and cues as needed to increase safety with self care. Short term goal 4: Pt will demonstrate fuctional mobility walking to/from the bathroom or in the kitchenette with supervision while following safe walker placement and handling of her O2 line to increase her independence with self care routine.   See long-term goal time

## 2020-09-21 NOTE — CARE COORDINATION
20, 8:42 AM EDT  DISCHARGE PLANNING EVALUATION:    Lacy Jones       Admitted from: PACU 2020/ [address] day: 3   Location: -[facility] Reason for admit: Oxygen deficit [D22.39]  Umbilical hernia without obstruction and without gangrene [K42.9] Status: IP  Admit order signed?: yes  PMH:  has a past medical history of Arthritis, Cancer of eye ([address] Dyslipidemia, Fatigue, History of tobacco abuse, Hyperlipidemia, Hypertension, Non morbid obesity due to excess calories, Pneumonia, Precordial pain, Smoker, SOB (shortness of breath), and Tobacco abuse. Procedure:    ROBOTIC UMBILICAL HERNIA REPAIR WITH MESH by Dr Ibrahim Limb    Pertinent abnormal Imagin/20 CXR  Mild patchy airspace disease at the right lung base may represent mild atelectasis or pneumonia. Aeration of the right lung base appears slightly worsened from the prior examination. Medications:  Scheduled Meds:   enoxaparin  40 mg Subcutaneous Daily    predniSONE  40 mg Oral Daily    budesonide-formoterol  2 puff Inhalation BID    tiotropium  2 puff Inhalation Daily    albuterol  2.5 mg Nebulization Q4H WA    sodium chloride flush  10 mL Intravenous 2 times per day    famotidine (PEPCID) injection  20 mg Intravenous Daily    amLODIPine  5 mg Oral Daily    atorvastatin  20 mg Oral Daily    lisinopril  20 mg Oral Daily     Continuous Infusions:   sodium chloride 50 mL/hr at 20 194      Pertinent Info/Orders/Treatment Plan:  Admitted post procedure due to unable to wean oxygen off    POD #3, telemetry, hospitalist consulted, orthostatic B/P, oxygen at 4L/min with sats 92%, dressing care, pain/nausea control, IVF, SCDs, po steroids, med nebs, Symbicort, Spiriva, ISPepcid IV, med nebs. Diet: DIET GENERAL;   Smoking status:  reports that she quit smoking about 8 years ago. She smoked 1.00 pack per day.  She has never used smokeless tobacco.   PCP: ERNESTO Figueroa - CNP  Readmission 30 days or less: no  Readmission

## 2020-09-21 NOTE — PLAN OF CARE
Respiratory  Goal: Pneumonia vaccine at discharge as needed  Outcome: Ongoing     Problem: GI  Goal: No bowel complications  Outcome: Ongoing  Note: Patient has active bowel sounds. Patient has loose bowel movements this shift. Problem: GI  Goal: Bowel movement at least every other day  Outcome: Ongoing  Note: Patient has had two bowel movements this shift. Problem: Skin Integrity/Risk  Goal: No skin breakdown during hospitalization  Outcome: Ongoing  Note: Patient turns self and ambulates frequently. Skin assessments every four hours. Problem: Skin Integrity/Risk  Goal: Wound healing  Outcome: Ongoing  Note: Patient's incision sites look clean, dry, and intact. Problem: Falls - Risk of:  Goal: Will remain free from falls  Description: Will remain free from falls  Outcome: Ongoing  Note: Patient remained free from falls this shift. Bed is in low position with alarm on and siderails up x2. Patient ambulates with one assist and a walker. Education given on use of call light and patient voiced understanding. Patient uses call light appropriately. Call light and beside table within reach. Arm band and falling star in place. Problem: Falls - Risk of:  Goal: Absence of physical injury  Description: Absence of physical injury  Outcome: Ongoing  Note: Patient remains free from falls. No new skin breakdown noted this shift. Problem: Cardiovascular  Goal: Anticoagulate/Hct stable  Note: Results for Chela Cadet (MRN [id number]) as of 9/20/2020 23:24   Ref. Range 9/20/2020 08:54   Hemoglobin Quant Latest Ref Range: 12.0 - 16.0 gm/dl 10.3 (L)   Hematocrit Latest Ref Range: 37.0 - 47.0 % 34.0 (L)      Plan of Care discussed with patient. They verbalized understanding.

## 2020-09-21 NOTE — PROGRESS NOTES
[address] [address]  INPATIENT PHYSICAL THERAPY  EVALUATION  [facility] ONC MED 5K - 5K-23/023-A    Time In: 5108  Time Out: 0820  Timed Code Treatment Minutes: 24 Minutes  Minutes: 43          Date: 2020  Patient Name: Diallo Rick,  Gender:  female        MRN: [id number]  : 1940  ([de-identified] y.o.)      Referring Practitioner: Ann Sherwood MD  Diagnosis: oxygen deficit  Additional Pertinent Hx: Per Ann Sherwood MD progress  note: Gui Pena y.o. female who we are asked to see/evaluate by Irma Ellsworth MD for medical management of postop pulmonary insufficiency. This is a 61-year-old lady with past medical history as described low who is here for an elective surgery for incarcerated umbilical hernia hernia repair. Status post surgery on  by Dr. Laquita Jimenez, surgery was uneventful however postextubation patient was requiring oxygen to keep saturations above 90%. Pulse ox kept dropping back to 85% on room air. Restrictions/Precautions:  Restrictions/Precautions: General Precautions    Subjective:  Chart Reviewed: Yes  Patient assessed for rehabilitation services?: Yes  Family / Caregiver Present: No  Other (Comment): pt very [place]  Subjective: Pt sleeping upon PT arrival. Easy to arouse, but [place]. Pt was agreeable to physical therapy evaluation. She states she \"always has someone at home\" to help her at her daughter's house where she will be staying, but then describes one of the daughters as being a cardiac nurse at [employer] and the other daughter is a . Unsure if she will have 24/7 care at home. She notes she has been having some bilateral shoulder pain, which she attributes to lying in bed for a long time.     General:  Follows Commands: Within Functional Limits    Vision: Impaired  Vision Exceptions: Wears glasses for reading    Hearing: Exceptions to [facility]  Hearing Exceptions: Hard of hearing/hearing concerns         Pain: Pt notes pain present in abdomen    Social/Functional History:    Lives With: Daughter  Type of Home: House  Home Layout: Multi-level  Home Access: Stairs to enter with rails  Entrance Stairs - Number of Steps: 7 with 1 rail  Entrance Stairs - Rails: (1 rail)  Home Equipment: Standard walker, [address] Help From: Family  ADL Assistance: Independent  Homemaking Assistance: Independent  Ambulation Assistance: Independent(per pt report, unsure if this is accurate)  Transfer Assistance: Independent    Active : Yes          OBJECTIVE:  Pt on 3L O2 via nasal cannula at rest, increased to 4L during ambulation (in accord with order) to maintain adequate oxygenation    Range of Motion:  Bilateral Lower Extremity: [facility] for all transfers    Strength:  Bilateral Lower Extremity: WFL  Bilateral Upper Extremity:  decreased functional UE strength. Pt required assistance to push up from bed during supine to sit transfer and sit to stand    Balance:  Static Sitting Balance:  Supervision  Dynamic Sitting Balance: Stand By Assistance  Static Standing Balance: Contact Guard Assistance  Dynamic Standing Balance: Minimal Assistance and use of 2 wheeled walker    Bed Mobility:  Rolling to Left: Minimal Assistance   Supine to Sit: Moderate Assistance, X 1, with head of bed raised, with increased time for completion, pt pushed some with L UE, but required assistance; min assist to move legs off bed    Transfers:  Sit to Stand: Minimal Assistance, X 1, cues for hand placement, x2 (one from bed, one from toilet). Supervision for toilet hygiene   Stand to [address] X 1, x2 (one to toilet, one to chair)   Pt encouraged to breathe in through nose, keeping mouth closed when possible to allow for adequate oxygenation from nasal cannula. Encouraged deep breathing. Ambulation:  Minimal Assistance, X 1, with cues for safety, safety cues for IV and O2 management; pt on 4L O2 during ambulation.  Nursing placed portable heart monitor before ambulation. Distance: ~30' with bathroom break in the middle (pt urinated)  Surface: Level Tile  Device:2 wheeled walker  Gait Deviations: Forward Flexed Posture, Decreased Step Length Bilaterally, Decreased Gait Speed and Wide Base of Support    Exercise:  Explanation and demonstration of HEP:   Ankle pumps, Glut sets, Seated marches and Long arc quads. Exercises were completed for increased independence with functional mobility. Exercises written on board and reviewed with patient. Pt did not perform them during session, as she was fatigued from walk. Please assess she is performing correctly at next encounter. Functional Outcome Measures: Completed  AM-PAC Inpatient Mobility without Stair Climbing Raw Score : 13  AM-PAC Inpatient without Stair Climbing T-Scale Score : 38.96    ASSESSMENT:  Activity Tolerance:  Patient tolerance of  treatment: fair. Treatment Initiated: Exercises explained and demonstrated, written on board. Education during transfers for safe technique. Assessment: Body structures, Functions, Activity limitations: Decreased functional mobility , Decreased strength, Decreased endurance, Increased pain  Assessment: Pt exhibits poor endurance, decreased strength, and increased dependence with transfers. Pt requires skilled physical therapy services at this time to reach goals to return to OF. She gets short of breath easily with activity, and requires cueing to breathe deeply through her nose to improve oxygenation. Prognosis: Good    REQUIRES PT FOLLOW UP: Yes    Discharge Recommendations:  Discharge Recommendations: Continue to assess pending progress   At this time, pt is not safe to return home unless she has 24 hour care. She is a max of moderate assist, generally min assist/CGA depending on activity.  Pt states she will have care at home 24/7, but unsure if this is accurate due to nature of her daughters' jobs, and she states it would be her daughters who are primarily with her. If she is unable to have 24 hour care at home, ECF would be a good option, as she has a good prognosis for returning to PLOF with continued physical therapy. Patient Education:  PT Education: Goals, Transfer Training, PT Role, Plan of Care, Home Exercise Program    Equipment Recommendations:  Equipment Needed: No    Plan:  Times per week: 5x GM  Times per day: Daily  Current Treatment Recommendations: Strengthening, Transfer Training, Endurance Training, Home Exercise Program, Functional Mobility Training, Stair training, Safety Education & Training    Goals:  Patient goals : Return home  Short term goals  Time Frame for Short term goals: by hospital discharge  Short term goal 1: Pt will ambulate 48' with least restrictive device and modified independence. Short term goal 2: Pt will perform supine to seated transfer with independence. Short term goal 3: Pt will maintain at least 90% oxygen saturation with HEP with minimal rest breaks. Following session, patient left safely in chair with chair alarm, tray table and call light in reach.

## 2020-09-21 NOTE — PLAN OF CARE
Problem: Impaired respiratory status  Goal: Clear lung sounds  Description: Clear lung sounds  9/20/2020 2058 by Candis Noel RCP  Outcome: Ongoing  Note: Treatment will be continued as ordered to improve aeration.

## 2020-09-21 NOTE — FLOWSHEET NOTE
09/21/20 1125   Encounter Summary   Services provided to: Patient   Referral/Consult From: Urge   Support System Children   Continue Visiting Yes  (9/21)   Complexity of Encounter Low   Length of Encounter 15 minutes   Spiritual Assessment Completed Yes   Advance Care Planning Yes   Routine   Type Follow up   Assessment Approachable   Intervention Nurtured hope   Outcome Comfort   Advance Directives (For Healthcare)   Healthcare Directive No, patient does not have an advance directive for healthcare treatment   Assessment: In my encounter with the [de-identified] yr old patient, while rounding  the unit I provided spiritual care to patient through conversation, I also came to assess the patients spiritual needs present. I wanted to verify if the pt had an Advance Directive. Interventions:  I provided prayer, emotional support and words of comfort. Outcomes: The patient was encouraged and didnt share any further spiritual needs at this time. The pt remains optimistic and hopeful. The pt shared that they were appreciative for the support. The pt does not have a Advance Directive on file at this time. Plan:  1. Chaplains will follow-up at a later time for assessment of any spiritual care needs present. 2.   The Chaplains will also see if the pt is interested in Advance Care planning.

## 2020-09-21 NOTE — PROGRESS NOTES
Hospitalist progress note        Patient:  Ave Mccann  YOB: 1940  Date of Service: 9/21/2020  MRN: [id number]   Acct:  [de-identified]   Primary Care Physician: ERNESTO Shaikh CNP    Chief Complaint: Umbilical hernia    Date of Service: Pt seen/examined in consultation on 9/21/2020     Hospital course:      Verner Stains y.o. female who we are asked to see/evaluate by Carlyle Hayden MD for medical management of postop pulmonary insufficiency  This is a 80-year-old lady with past medical history as described low who is here for an elective surgery for incarcerated umbilical hernia hernia repair. Status post surgery on 9/18 by Dr. Alyx Erickson, surgery was uneventful however postextubation patient was requiring oxygen to keep saturations above 90%. Pulse ox kept dropping back to 85% on room air  We were consulted for further care  Surgical issues stable. We will take over as primary. Subjective: Appears essentially unchanged from yesterday. Still on 4 L nasal cannula. She does have more productive cough now as well. Procalcitonin is negative some atelectasis on chest x-ray. She denies any fevers or chills but does feel rundown. Assessment and Plan:-  1. Acute hypoxic respiratory failure suspect likely chronic component as well-with poor air movement, pursed lip breathing, expiratory wheezing. Suspect that there is a component of a COPD exacerbation going on. Interestingly, her partial pressure of CO2 is not elevated and is actually low bit low. She has a little to evidence of metabolic acidosis going on as well. Will begin treatment with p.o. steroids, nebulizers, Symbicort, Spiriva. She also has evidence of decreased functional capacity per family and a history of diastolic dysfunction. proBNP is mildly elevated, however, exam does not appear to be severely volume overloaded. We will recheck an echocardiogram as it has been 3 years.   Patient does not appear to be significantly improving she does have more of a cough now. Consulted pulmonology and will trial some diuresis. Echo is pending  2. Ex-smoker-quit 11 years ago  3. History of hyperlipidemia-on statin-continue  4. History of benign essential hypertension-on Norvasc and lisinopril-continue  5. History of migraines-on Fioricet-continue  6. Leukocytosis: This is likely due to steroids. Past Medical History:        Diagnosis Date    Arthritis     Cancer of eye ([address]     Right    Dyslipidemia 2/15/2017    Fatigue     History of tobacco abuse 2/15/2017    Hyperlipidemia 10/13/2014    Hypertension     Non morbid obesity due to excess calories 2/15/2017    Pneumonia     Precordial pain 2/15/2017    Smoker     SOB (shortness of breath) 2/15/2017    Tobacco abuse        Past Surgical History:        Procedure Laterality Date    EYE SURGERY      Right eye cancer    HEMORRHOID SURGERY      HERNIA REPAIR N/A 8/26/3305    ROBOTIC UMBILICAL HERNIA REPAIR WITH MESH performed by Te Jackson MD at [address] [place] [address] Medications:   No current facility-administered medications on file prior to encounter.       Current Outpatient Medications on File Prior to Encounter   Medication Sig Dispense Refill    albuterol-ipratropium (COMBIVENT RESPIMAT)  MCG/ACT AERS inhaler Inhale 1 puff into the lungs every 6 hours 1 Inhaler 0    lisinopril (PRINIVIL;ZESTRIL) 20 MG tablet Take 20 mg by mouth daily      albuterol sulfate HFA (PROVENTIL HFA) 108 (90 BASE) MCG/ACT inhaler Inhale 2 puffs into the lungs every 6 hours as needed for Wheezing 1 Inhaler 0    meloxicam (MOBIC) 15 MG tablet TAKE 1 TABLET BY MOUTH EVERY DAY AS NEEDED FOR PAIN 30 tablet 0    atorvastatin (LIPITOR) 20 MG tablet TAKE 1 TABLET BY MOUTH ONE TIME A DAY  30 tablet 4    amLODIPine (NORVASC) 5 MG tablet TAKE 1 TABLET BY MOUTH DAILY 30 tablet 5    butalbital-acetaminophen-caffeine (FIORICET) -40 MG per tablet Take 1 tablet by mouth 09/21/20  0719   WBC 13.7* 18.7* 10.1   HGB 10.7* 10.3* 9.6*   HCT 35.9* 34.0* 31.4*    314 275     Recent Labs     09/19/20  0747 09/20/20  0854 09/21/20  0719    135 144   K 5.1 4.9 4.4    105 111   CO2 17* 19* 21*   BUN 21 22 20   CREATININE 1.2 1.1 0.8   CALCIUM 9.2 9.2 8.6     No results for input(s): AST, ALT, BILIDIR, BILITOT, ALKPHOS in the last 72 hours. No results for input(s): INR in the last 72 hours. No results for input(s): Valentino Bun in the last 72 hours. Urinalysis:    Lab Results   Component Value Date    NITRU NEGATIVE 06/20/2018    BLOODU NEGATIVE 06/20/2018    GLUCOSEU NEGATIVE 06/20/2018       Radiology:   XR CHEST PORTABLE   Final Result   1. Mild patchy airspace disease at the right lung base may represent mild atelectasis or pneumonia. Aeration of the right lung base appears slightly worsened from the prior examination. **This report has been created using voice recognition software. It may contain minor errors which are inherent in voice recognition technology. **      Final report electronically signed by Dr. Enedina Whitlock on 9/20/2020 8:26 PM      XR CHEST PORTABLE   Final Result   No gross infiltrate. PA and lateral radiographs could be performed if indicated clinically. **This report has been created using voice recognition software. It may contain minor errors which are inherent in voice recognition technology. **      Final report electronically signed by Dr. Benny Trotter on 9/19/2020 9:12 AM        No results found.       Electronically signed by Alfa Elizalde MD on 9/21/2020 at 4:20 PM

## 2020-09-21 NOTE — PROGRESS NOTES
saturation is 92%. Temperature Range (24h):Temp: 97.9 °F (36.6 °C) Temp  Av.8 °F (36.6 °C)  Min: 97.4 °F (36.3 °C)  Max: 98.1 °F (36.7 °C)  BP Range (38M): Systolic (04DSC), XF , Min:119 , JNX:605     Diastolic (50WFM), TRL:14, Min:61, Max:96    Pulse Range (24h): Pulse  Av.6  Min: 82  Max: 105  Respiration Range (24h): Resp  Av  Min: 20  Max: 22  Current Pulse Ox (24h):  SpO2: 92 %  Pulse Ox Range (24h):  SpO2  Av.8 %  Min: 90 %  Max: 95 %  Oxygen Amount and Delivery: O2 Flow Rate (L/min): 4 L/min  Incentive Spirometry Tx: Respiratory Effort: Dyspnea with Exertion Treatment Tolerance: Fair        GENERAL: alert, no distress  LUNGS: slight increased work of breathing. HEART: normal rate and regular rhythm  ABDOMEN: non-distended, soft, incisional tenderness, bruising infraumbilically and at trocar sites. INCISION: healing well, no significant drainage, no significant erythema  EXTERMITY: no cyanosis, clubbing or edema    In: 357.7 [I.V.:357.7]  Out: 500 [Urine:500]  Date 20 0000 - 20 2359   Shift 3411-4450 3948-1942 1006-1311 24 Hour Total   INTAKE   Shift Total(mL/kg)       OUTPUT   Urine(mL/kg/hr) 300(0.5)   300   Shift Total(mL/kg) 300(3.8)   300(3.8)   Weight (kg) 79.9 79.9 79.9 79.9     LABS     Recent Labs     20  0747 20  0854 20  0719   WBC 13.7* 18.7* 10.1   HGB 10.7* 10.3* 9.6*   HCT 35.9* 34.0* 31.4*    314 275    135 144   K 5.1 4.9 4.4    105 111   CO2 17* 19* 21*   BUN  20   CREATININE 1.2 1.1 0.8   CALCIUM 9.2 9.2 8.6      No results for input(s): PTT, INR in the last 72 hours. Invalid input(s): PT  No results for input(s): AST, ALT, BILITOT, BILIDIR, AMYLASE, LIPASE, LDH, LACTA in the last 72 hours. No results for input(s): TROPONINT in the last 72 hours.     RADIOLOGY    none    Electronically signed by Lester Kaur MD on 2020 at 11:21 AM

## 2020-09-22 ENCOUNTER — APPOINTMENT (OUTPATIENT)
Dept: GENERAL RADIOLOGY | Age: 80
DRG: 353 | End: 2020-09-22
Attending: SURGERY
Payer: MEDICARE

## 2020-09-22 LAB
ANION GAP SERPL CALCULATED.3IONS-SCNC: 12 MEQ/L (ref 8–16)
BUN BLDV-MCNC: 17 MG/DL (ref 7–22)
CALCIUM SERPL-MCNC: 8.9 MG/DL (ref 8.5–10.5)
CHLORIDE BLD-SCNC: 104 MEQ/L (ref 98–111)
CO2: 25 MEQ/L (ref 23–33)
CREAT SERPL-MCNC: 0.8 MG/DL (ref 0.4–1.2)
ERYTHROCYTE [DISTWIDTH] IN BLOOD BY AUTOMATED COUNT: 15.4 % (ref 11.5–14.5)
ERYTHROCYTE [DISTWIDTH] IN BLOOD BY AUTOMATED COUNT: 52.6 FL (ref 35–45)
GFR SERPL CREATININE-BSD FRML MDRD: 69 ML/MIN/1.73M2
GLUCOSE BLD-MCNC: 87 MG/DL (ref 70–108)
HCT VFR BLD CALC: 32 % (ref 37–47)
HEMOGLOBIN: 10.1 GM/DL (ref 12–16)
MCH RBC QN AUTO: 29.4 PG (ref 26–33)
MCHC RBC AUTO-ENTMCNC: 31.6 GM/DL (ref 32.2–35.5)
MCV RBC AUTO: 93 FL (ref 81–99)
PLATELET # BLD: 309 THOU/MM3 (ref 130–400)
PMV BLD AUTO: 9.8 FL (ref 9.4–12.4)
POTASSIUM SERPL-SCNC: 3.5 MEQ/L (ref 3.5–5.2)
RBC # BLD: 3.44 MILL/MM3 (ref 4.2–5.4)
SODIUM BLD-SCNC: 141 MEQ/L (ref 135–145)
WBC # BLD: 9.2 THOU/MM3 (ref 4.8–10.8)

## 2020-09-22 PROCEDURE — 36415 COLL VENOUS BLD VENIPUNCTURE: CPT

## 2020-09-22 PROCEDURE — 85027 COMPLETE CBC AUTOMATED: CPT

## 2020-09-22 PROCEDURE — 94761 N-INVAS EAR/PLS OXIMETRY MLT: CPT

## 2020-09-22 PROCEDURE — 80048 BASIC METABOLIC PNL TOTAL CA: CPT

## 2020-09-22 PROCEDURE — 99232 SBSQ HOSP IP/OBS MODERATE 35: CPT | Performed by: INTERNAL MEDICINE

## 2020-09-22 PROCEDURE — 6360000002 HC RX W HCPCS: Performed by: SURGERY

## 2020-09-22 PROCEDURE — 71046 X-RAY EXAM CHEST 2 VIEWS: CPT

## 2020-09-22 PROCEDURE — 2580000003 HC RX 258: Performed by: PHYSICIAN ASSISTANT

## 2020-09-22 PROCEDURE — 94669 MECHANICAL CHEST WALL OSCILL: CPT

## 2020-09-22 PROCEDURE — 6370000000 HC RX 637 (ALT 250 FOR IP): Performed by: PHYSICIAN ASSISTANT

## 2020-09-22 PROCEDURE — 2500000003 HC RX 250 WO HCPCS: Performed by: PHYSICIAN ASSISTANT

## 2020-09-22 PROCEDURE — 6370000000 HC RX 637 (ALT 250 FOR IP): Performed by: INTERNAL MEDICINE

## 2020-09-22 PROCEDURE — 6360000002 HC RX W HCPCS: Performed by: INTERNAL MEDICINE

## 2020-09-22 PROCEDURE — 97530 THERAPEUTIC ACTIVITIES: CPT

## 2020-09-22 PROCEDURE — 1200000003 HC TELEMETRY R&B

## 2020-09-22 PROCEDURE — 94640 AIRWAY INHALATION TREATMENT: CPT

## 2020-09-22 PROCEDURE — 94010 BREATHING CAPACITY TEST: CPT

## 2020-09-22 PROCEDURE — 2700000000 HC OXYGEN THERAPY PER DAY

## 2020-09-22 RX ADMIN — ALBUTEROL SULFATE 2.5 MG: 2.5 SOLUTION RESPIRATORY (INHALATION) at 20:32

## 2020-09-22 RX ADMIN — ATORVASTATIN CALCIUM 20 MG: 20 TABLET, FILM COATED ORAL at 20:30

## 2020-09-22 RX ADMIN — ALBUTEROL SULFATE 2.5 MG: 2.5 SOLUTION RESPIRATORY (INHALATION) at 16:03

## 2020-09-22 RX ADMIN — Medication 10 ML: at 08:06

## 2020-09-22 RX ADMIN — Medication 10 ML: at 20:30

## 2020-09-22 RX ADMIN — PREDNISONE 40 MG: 20 TABLET ORAL at 08:05

## 2020-09-22 RX ADMIN — TIOTROPIUM BROMIDE INHALATION SPRAY 2 PUFF: 3.12 SPRAY, METERED RESPIRATORY (INHALATION) at 12:35

## 2020-09-22 RX ADMIN — BUDESONIDE AND FORMOTEROL FUMARATE DIHYDRATE 2 PUFF: 160; 4.5 AEROSOL RESPIRATORY (INHALATION) at 20:32

## 2020-09-22 RX ADMIN — ENOXAPARIN SODIUM 40 MG: 40 INJECTION SUBCUTANEOUS at 08:05

## 2020-09-22 RX ADMIN — AMLODIPINE BESYLATE 5 MG: 5 TABLET ORAL at 08:05

## 2020-09-22 RX ADMIN — FAMOTIDINE 20 MG: 10 INJECTION INTRAVENOUS at 08:05

## 2020-09-22 RX ADMIN — ACETAMINOPHEN 650 MG: 325 TABLET ORAL at 20:31

## 2020-09-22 RX ADMIN — ALBUTEROL SULFATE 2.5 MG: 2.5 SOLUTION RESPIRATORY (INHALATION) at 12:32

## 2020-09-22 RX ADMIN — LISINOPRIL 20 MG: 20 TABLET ORAL at 08:05

## 2020-09-22 RX ADMIN — FUROSEMIDE 40 MG: 10 INJECTION, SOLUTION INTRAMUSCULAR; INTRAVENOUS at 08:06

## 2020-09-22 RX ADMIN — FUROSEMIDE 40 MG: 10 INJECTION, SOLUTION INTRAMUSCULAR; INTRAVENOUS at 17:09

## 2020-09-22 ASSESSMENT — PAIN SCALES - GENERAL: PAINLEVEL_OUTOF10: 6

## 2020-09-22 NOTE — PLAN OF CARE
Problem: Pain:  Goal: Pain level will decrease  Description: Pain level will decrease  9/22/2020 0023 by Agus Muñoz RN  Outcome: Met This Shift  Note: No PRN pain medication needed this shift. Pt encouraged to splint with pillow when coughing to help control pain. 9/21/2020 1033 by Chanel Olivares RN  Outcome: Ongoing  Note: Denies pain at this time. Will continue to monitor. Goal: Control of acute pain  Description: Control of acute pain  Outcome: Met This Shift     Problem: Cardiovascular  Goal: No DVT, peripheral vascular complications  0/25/8418 1033 by Chanel Olivares RN  Outcome: Ongoing  Note: No signs of DVT at this time. Lovenox given as ordered. Goal: Hemodynamic stability  Outcome: Ongoing     Problem: Respiratory  Goal: No pulmonary complications  6/03/8921 0023 by Agus Muñoz RN  Outcome: Ongoing  9/21/2020 1033 by Chanel Olivares RN  Outcome: Ongoing  Note: Patient using pursed lip breathing at times. Dyspnea with exertion. Goal: O2 Sat > 90%  9/22/2020 0023 by Agus Muñoz RN  Outcome: Ongoing  9/21/2020 1033 by Chanel Olivares RN  Outcome: Ongoing  Note: Requiring 4L of oxygen at this time. O2 sat at 94% this morning. Problem: GI  Goal: No bowel complications  9/87/3173 0023 by Agus Muñoz RN  Outcome: Ongoing  Note: Small BM during shift.   9/21/2020 1033 by Chanel Olivares RN  Outcome: Ongoing  Note: No bowel movement yet this shift. Will continue to monitor. Problem: Skin Integrity/Risk  Goal: No skin breakdown during hospitalization  9/22/2020 0023 by Agus Muñoz RN  Outcome: Ongoing  Note: No new areas of concern noted. 9/21/2020 1033 by Chanel Olivares RN  Outcome: Ongoing  Note: Skin assessment completed. Patient turns every 2 hours and as needed. No skin breakdown this shift.         Problem: Impaired respiratory status  Goal: Clear lung sounds  Description: Clear lung sounds  9/21/2020 2241 by Naheed Chirinos [facility]  Outcome: Ongoing     Problem: Falls - Risk of:  Goal: Will remain free from falls  Description: Will remain free from falls  9/22/2020 0023 by Meredith Richardson RN  Outcome: Ongoing  Note: No falls this shift, non slip shoes on while out of bed and using walker. Bed alarm on while in bed. Call light in reach and pt re educated to use it before getting out of bed.   9/21/2020 1033 by Ursula Powell RN  Outcome: Ongoing  Note: Bed in lowest position. Call light within reach. Educated patient to use call light for assistance. Bed and chair alarm in use. Goal: Absence of physical injury  Description: Absence of physical injury  Outcome: Ongoing   Care plan reviewed with patient. Patient verbalize understanding of the plan of care and contribute to goal setting.

## 2020-09-22 NOTE — PLAN OF CARE
Problem: Impaired respiratory status  Goal: Clear lung sounds  Description: Clear lung sounds  9/21/2020 2241 by Raffaele Hsu RCP  Outcome: Ongoing   Breath sounds are diminished with expiratory wheezes at this time. Continue with treatments to help improve breath sounds.

## 2020-09-22 NOTE — CARE COORDINATION
9/22/20, 10:32 AM EDT    DISCHARGE ON GOING EVALUATION    [facility] day: 4  Location: 5-23/023-A Reason for admit: Oxygen deficit [S94.67]  Umbilical hernia without obstruction and without gangrene [K42.9]   Procedure:   2/88 robotic umbilical hernia repair with mesh  9/21 Echo: ef 60%  Treatment Plan of Care: Hospitalist, surgery, pulm following. Requiring 4L NC, sats low 90s. Nebs. Inhaler. IV pepcid. IV lasix. Prednisone. PT/OT. Barriers to Discharge: Await medical clearance. PCP: ERNESTO Lockhart CNP  Readmission Risk Score: 9%  Patient Goals/Plan/Treatment Preferences: Plans to return home with daughter. Home O2 eval needed at discharge, although patient currently declines home O2 or HH. CM will continue to follow and discuss importance of  home O2 if patient is not weaned.

## 2020-09-22 NOTE — PLAN OF CARE
Problem: Pain:  Goal: Pain level will decrease  Description: Pain level will decrease  9/22/2020 1016 by Kelli Garibay RN  Outcome: Ongoing  Note: Denies pain at this time. Will continue to monitor. Problem: Cardiovascular  Goal: No DVT, peripheral vascular complications  Outcome: Ongoing  Note: No symptoms of DVT at this time. Lovenox given as ordered. Problem: Respiratory  Goal: No pulmonary complications  8/84/1142 1016 by Kelli Garibay RN  Outcome: Ongoing  Note: Dyspnea with exertion. Problem: Respiratory  Goal: O2 Sat > 90%  9/22/2020 1016 by Kelli Garibay RN  Outcome: Ongoing  Note: Currently on 3L nasal cannula. O2 between 92-94%. Problem: GI  Goal: No bowel complications  2/36/7172 1016 by Kelli Garibay RN  Outcome: Ongoing     Problem: Skin Integrity/Risk  Goal: No skin breakdown during hospitalization  9/22/2020 1016 by Kelli Garibay RN  Outcome: Ongoing  Note: Patient agreeable to bathe later in the shift. Problem: Falls - Risk of:  Goal: Will remain free from falls  Description: Will remain free from falls  9/22/2020 1016 by Kelli Garibay RN  Outcome: Ongoing  Note: Bed in lowest position. Call light within reach. Educated patient to use call light for assistance. Bed and chair alarm in place. Care plan reviewed with patient. Patient verbalize understanding of the plan of care and contribute to goal setting.

## 2020-09-22 NOTE — PROGRESS NOTES
ml   Net -2165 ml     Patient Vitals for the past 96 hrs (Last 3 readings):   Weight   09/20/20 1154 176 lb 3.2 oz (79.9 kg)     Exam    Physical Exam  Constitutional: Patient appears moderately obese BMI 32.22. Stable on 3LPM via NC. Head: Normocephalic and atraumatic. Eyes: Conjunctivae are normal. Pupils are equal, round, and reactive to light. No scleral icterus. Neck: Neck supple. No JVD or tracheal deviation present. Cardiovascular: Regular rate, regular rhythm, S1 and S2 with no murmur. No peripheral edema  Pulmonary/Chest: Normal effort with slight wheezing in right lung field. Left lung sound clear to ascultation. No stridor. No respiratory distress. +NP cough   Abdominal: Soft. Bowel sounds audible. No distension or tenderness to palp +bruising around umbilicus and down to pelvis  Musculoskeletal: Moves all extremities; no cyanosis/clubbing, no edema  Lymphadenopathy:  No cervical adenopathy. Neurological: Patient is alert and oriented to person, place, and time. Skin: Skin is warm and dry.     Meds       furosemide  40 mg Intravenous BID    enoxaparin  40 mg Subcutaneous Daily    predniSONE  40 mg Oral Daily    budesonide-formoterol  2 puff Inhalation BID    tiotropium  2 puff Inhalation Daily    albuterol  2.5 mg Nebulization Q4H WA    sodium chloride flush  10 mL Intravenous 2 times per day    famotidine (PEPCID) injection  20 mg Intravenous Daily    amLODIPine  5 mg Oral Daily    atorvastatin  20 mg Oral Daily    lisinopril  20 mg Oral Daily       sodium chloride flush, potassium chloride **OR** potassium alternative oral replacement **OR** potassium chloride, acetaminophen, ondansetron, butalbital-acetaminophen-caffeine, albuterol sulfate HFA  Labs   ABG  Lab Results   Component Value Date    PH 7.36 09/19/2020    PO2 63 09/19/2020    PCO2 32 09/19/2020    HCO3 18 09/19/2020    O2SAT 91 09/19/2020     Lab Results   Component Value Date    IFIO2 2 09/19/2020     CBC  Recent Labs 09/20/20  0854 09/21/20  0719 09/22/20  0658   WBC 18.7* 10.1 9.2   RBC 3.49* 3.24* 3.44*   HGB 10.3* 9.6* 10.1*   HCT 34.0* 31.4* 32.0*   MCV 97.4 96.9 93.0   MCH 29.5 29.6 29.4   MCHC 30.3* 30.6* 31.6*    275 309   MPV 9.9 9.9 9.8      BMP  Recent Labs     09/20/20  0854 09/21/20  0719 09/22/20  0658    144 141   K 4.9 4.4 3.5    111 104   CO2 19* 21* 25   BUN 22 20 17   CREATININE 1.1 0.8 0.8   GLUCOSE 102 94 87   CALCIUM 9.2 8.6 8.9     LFT  No results for input(s): AST, ALT, ALB, BILITOT, ALKPHOS, LIPASE in the last 72 hours. Invalid input(s): AMYLASE  TROP  Lab Results   Component Value Date    TROPONINT < 0.010 06/20/2018    TROPONINT < 0.010 06/19/2018    TROPONINT < 0.010 02/02/2017     BNP  Lab Results   Component Value Date    PROBNP 1690.0 09/19/2020    PROBNP 174.5 06/20/2018     D-Dimer  No results found for: DDIMER  Lactic Acid  No results for input(s): LACTA in the last 72 hours. INR  No results for input(s): INR, PROTIME in the last 72 hours. PTT  No results for input(s): APTT in the last 72 hours. Glucose  No results for input(s): POCGLU in the last 72 hours. UA No results for input(s): SPECGRAV, PHUR, COLORU, CLARITYU, MUCUS, PROTEINU, BLOODU, RBCUA, WBCUA, BACTERIA, NITRU, GLUCOSEU, BILIRUBINUR, UROBILINOGEN, KETUA, LABCAST, LABCASTTY, AMORPHOS in the last 72 hours. Invalid input(s): CRYSTALS. EKG   12 Lead EKG 9/15/20    Sinus rhythm with occasional and consecutive Premature ventricular complexes and Premature atrial complexes  Nonspecific ST and T wave abnormality  Abnormal ECG  When compared with ECG of 20-JUN-2018 19:14,  Premature ventricular complexes are now Present  Nonspecific T wave abnormality is worse in Anterolateral leads  Confirmed by Jesse Mclaughlin ([id number]) on 9/15/2020 8:26:54 PM    Echo   9/21/20  ECHOCARDIOGRAM COMPLETE 2D W DOPPLER W COLOR. Summary   Technically difficult examination. Ejection fraction is visually estimated at 60%. Overall left ventricular function is normal.      Signature      ----------------------------------------------------------------   Electronically signed by Jia Melendez MD (Interpreting   physician) on 09/21/2020 at 08:53 PM    02/21/2017   ECHOCARDIOGRAM COMPLETE 2D W DOPPLER W COLOR.     Conclusions      Summary   Normal left ventricle size and systolic function. Ejection fraction was   estimated at 60%. There were no regional left ventricular wall motion   abnormalities and wall thickness was within normal limits.  E/A flow reversal noted. Suggestive of diastolic dysfunction.   Left atrial size was normal.   The aortic valve was trileaflet with normal thickness and cuspal   separation.   Mild aortic regurgitation is noted.   Mild annular calcification.   Mild mitral regurgitation is present.   Trivial tricuspid regurgitation visualized.   Pulmonary systolic pressure At the upper normal limit. and is estimated at   30-35 mmHg.   No evidence of any pericardial effusion.      Signature      ----------------------------------------------------------------   Electronically signed by Elijah Quintero MD (Interpreting   [id number]) on 02/21/2017 at 02:24 PM     PFTs           Cultures    Procalcitonin   Lab Results   Component Value Date    PROCAL 0.19 09/21/2020     None pending  Radiology    CXR   9/22/2020  XR CHEST (2 VW)  Cardiomegaly with pulmonary vascular congestion. There are also opacities at the bilateral lung bases which may represent infiltrates or atelectasis. Overall appearance of the chest is similar to the previous exam.   9/20/2020  XR CHEST PORTABLE   Mild patchy airspace disease at the right lung base may represent mild atelectasis or pneumonia. Aeration of the right lung base appears slightly worsened from the prior examination.          (See actual reports for details)  [address] Problems    Diagnosis Date Noted    Oxygen deficit [R09.02] 09/18/2020  Umbilical hernia without obstruction and without gangrene [K42.9] 09/18/2020     Assessment  Assessment and Plan   Acute on chronic hypoxic respiratory failure  Acute post operative pulmonary insufficiency s/p robotic umbilical hernia repair with mesh  Suspected COPD with exacerbation- no PFTs  Suspected diastolic heart failure  HLD  Benign essential HTN- on Norvasc and Lisinopril  Migraines  Former smoker 80 PYH   Full code  Recommendations  Assessment and Plan   -Monitor spO2 to keep > 90%  -Titrate O2 to keep > 90%  -Home O2 evaluation prior to DC home  -Recommend PFT outpatient   -Recommend LDCT outpatient - pt quit smoking 11 years ago   -Strict I&O  -Daily weights  -Metaneb  -Continue Albuterol neb every 4 hours w/a  -Continue Symbicort and Spiriva  -Prednisone 40 mg PO daily x 1 more day  -swallow evaluation r/o any aspiration issues   -Encouraged IS/Acapella use   -DVT prophylaxis: Lovenox/SCDs  -GI prophylaxis: Pepcid   -Bedside spirometry reviewed, moderate obstruction, will obtain full PFTs in 3 months    Case discussed with nurse and patient/family. Questions and concerns addressed. Meds and Orders reviewed. Follow up at [facility] with Kathy Decker CNP in 2-3 months with PFT/CXR prior to office visit    Electronically signed by   Chin Nunez on 9/22/2020 at 11:02 AM    Addendum by Dr. Zainab Rosas MD:  I have seen and examined the patient independently. Face to face evaluation and examination was performed. The above evaluation and note has been reviewed. Labs and radiographs were reviewed. I Have discussed with Dr.Brittany Sexton- Medical student about this patient in detail. The above assessment and plan has been reviewed. Please see my modifications mentioned below. My modifications:  Bed side spirometry- moderate COPD. Feels better. Follow up as above.     Senait Heranndez MD 9/22/2020 6:47 PM

## 2020-09-22 NOTE — PROGRESS NOTES
[address] [address]  INPATIENT PHYSICAL THERAPY  DAILY NOTE  [facility] 5K - 5K-23/023-A    Time In: 8938  Time Out:   Timed Code Treatment Minutes: 16 Minutes  Minutes: 17          Date: 2020  Patient Name: Diallo Rick,  Gender:  female        MRN: [id number]  : 1940  ([de-identified] y.o.)     Referring Practitioner: Ann Sherwood MD  Diagnosis: oxygen deficit  Additional Pertinent Hx: Per Ann Sherwood MD progress  note: Gu[age]ena y.o. female who we are asked to see/evaluate by Irma Ellsworth MD for medical management of postop pulmonary insufficiency. This is a 70-year-old lady with past medical history as described low who is here for an elective surgery for incarcerated umbilical hernia hernia repair. Status post surgery on  by Dr. Laquita Jimenez, surgery was uneventful however postextubation patient was requiring oxygen to keep saturations above 90%. Pulse ox kept dropping back to 85% on room air. Prior Level of Function:  Lives With: Daughter  Type of Home: House  Home Layout: Multi-level  Home Access: Stairs to enter with rails  Entrance Stairs - Number of Steps: 7 with 1 rail  Entrance Stairs - Rails: (1 rail)  Home Equipment: Rolling walker, Cane   Bathroom Shower/Tub: Walk-in shower, Shower chair with back  Bathroom Toilet: Standard  Bathroom Accessibility: Accessible    Receives Help From: Family  ADL Assistance: [address] [place] [address] Avenue: Independent  Homemaking Responsibilities: Yes  Ambulation Assistance: Independent  Transfer Assistance: Independent  Active : Yes  Additional Comments: Pt was using a rolling walker at home and when going out. She uses a grocery cart while at the store. Restrictions/Precautions:  Restrictions/Precautions: General Precautions    SUBJECTIVE: Pt and nurse agreeable to therapy. Pt seated in chair upon PT arrival. Pt notes she has had to use the restroom frequently due to taking her diuretic.      PAIN: Pain in abdomen    OBJECTIVE:    Pt on 3LO2 via nasal cannula throughout session    Bed Mobility:  Not Tested    Transfers:  Sit to Stand: Stand By Assistance  Stand to Sit:Stand By Assistance  Sit to/from stand performed 3X  Ambulation:  CGA to stand by assist  Distance: 50'  Surface: Level Tile  Device:2 wheeled walker  Gait Deviations:  Decreased Step Length Bilaterally and Wide Base of Support    Balance:  Static Sitting Balance:  Independent  Dynamic Sitting Balance: Supervision  Static Standing Balance: Stand By Assistance  Dynamic Standing Balance: CGA     Exercise:  Patient was guided in 1 set(s) 10 reps of exercise to both lower extremities. Standing marches and Standing hamstring curls. Exercises were completed for increased independence with functional mobility. Functional Outcome Measures: Completed  AM-PAC Inpatient Mobility without Stair Climbing Raw Score : 13  AM-PAC Inpatient without Stair Climbing T-Scale Score : 38.96    ASSESSMENT:  Assessment: Patient progressing toward established goals. Activity Tolerance:  Patient tolerance of  treatment: good. Pt's tolerance is improving. Of note, she had 99% O2 saturation (on 3LO2) following standing marches. Pt still has heavy breathing with increased respiration rate, however, she does not require a long rest break. Equipment Recommendations:Equipment Needed: No  Discharge Recommendations: At this time pt is progressing appropriately towards goals. ECF with PT vs home with home health, pending pt progress.      Plan: Times per week: 5x GM  Times per day: Daily  Current Treatment Recommendations: Strengthening, Transfer Training, Endurance Training, Home Exercise Program, Functional Mobility Training, Stair training, Safety Education & Training    Patient Education  Patient Education: Plan of Care, Home Exercise Program, Transfers, Reviewed Prior Education, Use of Gait [place], Verbal Exercise Instruction,  - Patient Verbalized Understanding, - Patient Requires Continued Education    Goals:  Patient goals : Return home  Short term goals  Time Frame for Short term goals: by hospital discharge  Short term goal 1: Ambulate > 50ft with use of LRAD and mod I for safe in home mobility  Short term goal 2: sit <> sit with I for safe transfers  Short term goal 3: Pt will maintain at least 90% oxygen saturation with HEP with minimal rest breaks. Short term goal 4: supine <> sit with contact guard for ease of getting in/out of bed  Long term goals  Time Frame for Long term goals : N/A secondary to short ELOS    Following session, patient left in safe position up in chair with all fall risk precautions in place including call light and chair alarm.

## 2020-09-23 VITALS
TEMPERATURE: 98.2 F | SYSTOLIC BLOOD PRESSURE: 144 MMHG | HEART RATE: 92 BPM | BODY MASS INDEX: 29.4 KG/M2 | DIASTOLIC BLOOD PRESSURE: 74 MMHG | RESPIRATION RATE: 20 BRPM | WEIGHT: 159.8 LBS | OXYGEN SATURATION: 95 % | HEIGHT: 62 IN

## 2020-09-23 LAB
ANION GAP SERPL CALCULATED.3IONS-SCNC: 12 MEQ/L (ref 8–16)
BUN BLDV-MCNC: 21 MG/DL (ref 7–22)
CALCIUM SERPL-MCNC: 8.9 MG/DL (ref 8.5–10.5)
CHLORIDE BLD-SCNC: 99 MEQ/L (ref 98–111)
CO2: 30 MEQ/L (ref 23–33)
CREAT SERPL-MCNC: 1.1 MG/DL (ref 0.4–1.2)
ERYTHROCYTE [DISTWIDTH] IN BLOOD BY AUTOMATED COUNT: 14.8 % (ref 11.5–14.5)
ERYTHROCYTE [DISTWIDTH] IN BLOOD BY AUTOMATED COUNT: 50.3 FL (ref 35–45)
GFR SERPL CREATININE-BSD FRML MDRD: 48 ML/MIN/1.73M2
GLUCOSE BLD-MCNC: 86 MG/DL (ref 70–108)
HCT VFR BLD CALC: 35.3 % (ref 37–47)
HEMOGLOBIN: 11.1 GM/DL (ref 12–16)
MCH RBC QN AUTO: 29 PG (ref 26–33)
MCHC RBC AUTO-ENTMCNC: 31.4 GM/DL (ref 32.2–35.5)
MCV RBC AUTO: 92.2 FL (ref 81–99)
PLATELET # BLD: 320 THOU/MM3 (ref 130–400)
PMV BLD AUTO: 9.7 FL (ref 9.4–12.4)
POTASSIUM SERPL-SCNC: 3.6 MEQ/L (ref 3.5–5.2)
RBC # BLD: 3.83 MILL/MM3 (ref 4.2–5.4)
SODIUM BLD-SCNC: 141 MEQ/L (ref 135–145)
WBC # BLD: 7.9 THOU/MM3 (ref 4.8–10.8)

## 2020-09-23 PROCEDURE — 6360000002 HC RX W HCPCS: Performed by: SURGERY

## 2020-09-23 PROCEDURE — 6370000000 HC RX 637 (ALT 250 FOR IP): Performed by: PHYSICIAN ASSISTANT

## 2020-09-23 PROCEDURE — 2500000003 HC RX 250 WO HCPCS: Performed by: PHYSICIAN ASSISTANT

## 2020-09-23 PROCEDURE — 97530 THERAPEUTIC ACTIVITIES: CPT

## 2020-09-23 PROCEDURE — 80048 BASIC METABOLIC PNL TOTAL CA: CPT

## 2020-09-23 PROCEDURE — 94640 AIRWAY INHALATION TREATMENT: CPT

## 2020-09-23 PROCEDURE — 6370000000 HC RX 637 (ALT 250 FOR IP): Performed by: INTERNAL MEDICINE

## 2020-09-23 PROCEDURE — 6360000002 HC RX W HCPCS: Performed by: INTERNAL MEDICINE

## 2020-09-23 PROCEDURE — 94760 N-INVAS EAR/PLS OXIMETRY 1: CPT

## 2020-09-23 PROCEDURE — 2700000000 HC OXYGEN THERAPY PER DAY

## 2020-09-23 PROCEDURE — 94669 MECHANICAL CHEST WALL OSCILL: CPT

## 2020-09-23 PROCEDURE — 36415 COLL VENOUS BLD VENIPUNCTURE: CPT

## 2020-09-23 PROCEDURE — 2580000003 HC RX 258: Performed by: PHYSICIAN ASSISTANT

## 2020-09-23 PROCEDURE — 99239 HOSP IP/OBS DSCHRG MGMT >30: CPT | Performed by: INTERNAL MEDICINE

## 2020-09-23 PROCEDURE — 85027 COMPLETE CBC AUTOMATED: CPT

## 2020-09-23 PROCEDURE — 99232 SBSQ HOSP IP/OBS MODERATE 35: CPT | Performed by: INTERNAL MEDICINE

## 2020-09-23 RX ORDER — FAMOTIDINE 20 MG/1
20 TABLET, FILM COATED ORAL DAILY
Status: DISCONTINUED | OUTPATIENT
Start: 2020-09-24 | End: 2020-09-23 | Stop reason: HOSPADM

## 2020-09-23 RX ORDER — UMECLIDINIUM BROMIDE AND VILANTEROL TRIFENATATE 62.5; 25 UG/1; UG/1
1 POWDER RESPIRATORY (INHALATION) DAILY
Qty: 1 EACH | Refills: 11 | Status: SHIPPED | OUTPATIENT
Start: 2020-09-23 | End: 2021-12-20

## 2020-09-23 RX ADMIN — PREDNISONE 40 MG: 20 TABLET ORAL at 08:17

## 2020-09-23 RX ADMIN — BUDESONIDE AND FORMOTEROL FUMARATE DIHYDRATE 2 PUFF: 160; 4.5 AEROSOL RESPIRATORY (INHALATION) at 08:48

## 2020-09-23 RX ADMIN — LISINOPRIL 20 MG: 20 TABLET ORAL at 08:17

## 2020-09-23 RX ADMIN — Medication 10 ML: at 08:17

## 2020-09-23 RX ADMIN — ALBUTEROL SULFATE 2.5 MG: 2.5 SOLUTION RESPIRATORY (INHALATION) at 08:44

## 2020-09-23 RX ADMIN — TIOTROPIUM BROMIDE INHALATION SPRAY 2 PUFF: 3.12 SPRAY, METERED RESPIRATORY (INHALATION) at 08:48

## 2020-09-23 RX ADMIN — ENOXAPARIN SODIUM 40 MG: 40 INJECTION SUBCUTANEOUS at 08:29

## 2020-09-23 RX ADMIN — FAMOTIDINE 20 MG: 10 INJECTION INTRAVENOUS at 08:17

## 2020-09-23 RX ADMIN — AMLODIPINE BESYLATE 5 MG: 5 TABLET ORAL at 08:17

## 2020-09-23 ASSESSMENT — PAIN SCALES - GENERAL
PAINLEVEL_OUTOF10: 0
PAINLEVEL_OUTOF10: 4
PAINLEVEL_OUTOF10: 0
PAINLEVEL_OUTOF10: 4

## 2020-09-23 ASSESSMENT — PAIN DESCRIPTION - LOCATION
LOCATION: ABDOMEN
LOCATION: ABDOMEN

## 2020-09-23 NOTE — PLAN OF CARE
Problem: Impaired respiratory status  Goal: Clear lung sounds  Description: Clear lung sounds  Outcome: Ongoing     Patient has expiratory wheezes throughout at this time, will continue treatments as ordered to improve lung aeration.

## 2020-09-23 NOTE — PROGRESS NOTES
[facility]    Patient - Yuliana Aguayo,  Age - [de-identified] y.o.    - 1940      Room Number - -23/023-A   Consulting - Missouri Osler, MD Primary Care Physician - ERNESTO Armas - CNP   MRN -  [id number]   [facility]  Date of Admission -  2020  7:55 AM  Hospital Day - [facility] Problems    Diagnosis Date Noted    Oxygen deficit [R09.02]     Umbilical hernia without obstruction and without gangrene [K42.9] 2020     Chief Complaint   Acute on chronic respiratory failure  HPI   Bryan Krueger [id number] y.o. female who we are asked to see/evaluate Ana Luisa Hernandez MD for medical management of postop pulmonary insufficiency  This is a 58-year-old lady with past medical history as described low who is here for an elective surgery for incarcerated umbilical hernia hernia repair. Status post surgery on  by Dr. Timmy Neal, surgery was uneventful however postextubation patient was requiring oxygen to keep saturations above 90%.  Pulse ox kept dropping back to 85% on room air     Pulmonary services were consulted for acute on chronic hypoxic respiratory failure likely 2/2 COPD no PFTs on file. Former smoker quit in  with a 2PPD and [address]. No history of being seen in our office in the past.   History of Combivent and Albuterol use  NP cough noted. (patient on ACE)   Pro-BNP 1690    Past 24 hours, ROS   -Stable on 3LPM NC, states she feels better today  -Admits SOB on exertion but denies SOB at rest today  -Admits to NP cough she states is worse than yesterday  -Denies fever/chills, nausea/vomiting, chest pain    All other systems reviewed  Objective    Vitals    height is 5' 2.01\" (1.575 m) and weight is 159 lb 12.8 oz (72.5 kg). Her oral temperature is 97.4 °F (36.3 °C). Her blood pressure is 136/77 and her pulse is 76. Her respiration is 18 and oxygen saturation is 96%. sulfate HFA  Labs   ABG  Lab Results   Component Value Date    PH 7.36 09/19/2020    PO2 63 09/19/2020    PCO2 32 09/19/2020    HCO3 18 09/19/2020    O2SAT 91 09/19/2020     Lab Results   Component Value Date    IFIO2 2 09/19/2020     CBC  Recent Labs     09/21/20  0719 09/22/20  0658 09/23/20  0731   WBC 10.1 9.2 7.9   RBC 3.24* 3.44* 3.83*   HGB 9.6* 10.1* 11.1*   HCT 31.4* 32.0* 35.3*   MCV 96.9 93.0 92.2   MCH 29.6 29.4 29.0   MCHC 30.6* 31.6* 31.4*    309 320   MPV 9.9 9.8 9.7      BMP  Recent Labs     09/21/20  0719 09/22/20  0658 09/23/20  0731    141 141   K 4.4 3.5 3.6    104 99   CO2 21* 25 30   BUN 20 17 21   CREATININE 0.8 0.8 1.1   GLUCOSE 94 87 86   CALCIUM 8.6 8.9 8.9     LFT  No results for input(s): AST, ALT, ALB, BILITOT, ALKPHOS, LIPASE in the last 72 hours. Invalid input(s): AMYLASE  TROP  Lab Results   Component Value Date    TROPONINT < 0.010 06/20/2018    TROPONINT < 0.010 06/19/2018    TROPONINT < 0.010 02/02/2017     BNP  Lab Results   Component Value Date    PROBNP 1690.0 09/19/2020    PROBNP 174.5 06/20/2018     D-Dimer  No results found for: DDIMER  Lactic Acid  No results for input(s): LACTA in the last 72 hours. INR  No results for input(s): INR, PROTIME in the last 72 hours. PTT  No results for input(s): APTT in the last 72 hours. Glucose  No results for input(s): POCGLU in the last 72 hours. UA No results for input(s): SPECGRAV, PHUR, COLORU, CLARITYU, MUCUS, PROTEINU, BLOODU, RBCUA, WBCUA, BACTERIA, NITRU, GLUCOSEU, BILIRUBINUR, UROBILINOGEN, KETUA, LABCAST, LABCASTTY, AMORPHOS in the last 72 hours. Invalid input(s): CRYSTALS.   EKG   12 Lead EKG 9/15/20    Sinus rhythm with occasional and consecutive Premature ventricular complexes and Premature atrial complexes  Nonspecific ST and T wave abnormality  Abnormal ECG  When compared with ECG of 20-JUN-2018 19:14,  Premature ventricular complexes are now Present  Nonspecific T wave abnormality is worse in Anterolateral leads  Confirmed by Lupe Cunha ([id number]) on 9/15/2020 8:26:54 PM    Echo   9/21/20  ECHOCARDIOGRAM COMPLETE 2D W DOPPLER W COLOR. Summary   Technically difficult examination. Ejection fraction is visually estimated at 60%. Overall left ventricular function is normal.      Signature      ----------------------------------------------------------------   Electronically signed by Mary Erazo MD (Interpreting   physician) on 09/21/2020 at 08:53 PM    02/21/2017   ECHOCARDIOGRAM COMPLETE 2D W DOPPLER W COLOR.     Conclusions      Summary   Normal left ventricle size and systolic function. Ejection fraction was   estimated at 60%. There were no regional left ventricular wall motion   abnormalities and wall thickness was within normal limits.  E/A flow reversal noted. Suggestive of diastolic dysfunction.   Left atrial size was normal.   The aortic valve was trileaflet with normal thickness and cuspal   separation.   Mild aortic regurgitation is noted.   Mild annular calcification.   Mild mitral regurgitation is present.   Trivial tricuspid regurgitation visualized.   Pulmonary systolic pressure At the upper normal limit. and is estimated at   30-35 mmHg.   No evidence of any pericardial effusion.      Signature      ----------------------------------------------------------------   Electronically signed by Genoveva Rubalcava MD (Interpreting   [id number]) on 02/21/2017 at 02:24 PM     PFTs         Cultures    Procalcitonin   Lab Results   Component Value Date    PROCAL 0.19 09/21/2020     None pending  Radiology    CXR   9/22/2020  XR CHEST (2 VW)  Cardiomegaly with pulmonary vascular congestion. There are also opacities at the bilateral lung bases which may represent infiltrates or atelectasis. Overall appearance of the chest is similar to the previous exam.   9/20/2020  XR CHEST PORTABLE   Mild patchy airspace disease at the right lung base may represent mild atelectasis or pneumonia. Aeration of the right lung base appears slightly worsened from the prior examination. (See actual reports for details)  [address] Problems    Diagnosis Date Noted    Oxygen deficit [R09.02] 67/21/1910    Umbilical hernia without obstruction and without gangrene [K42.9] 09/18/2020     Assessment  Assessment and Plan   Acute on chronic hypoxic respiratory failure  Acute post operative pulmonary insufficiency s/p robotic umbilical hernia repair with mesh  Moderate COPD per bedside spirometry  Suspected diastolic heart failure  HLD  Benign essential HTN- on Norvasc and Lisinopril  Migraines  Former smoker 102 PYH   Full code  Recommendations  Assessment and Plan   -Monitor spO2 to keep > 90%  -Titrate O2 to keep > 90%  -Strict I&O  -Daily weights  -Metaneb  -Continue Albuterol neb every 4 hours w/a  -Continue Symbicort and Spiriva  -Encouraged IS/Acapella use   -DVT prophylaxis: Lovenox/SCDs  -GI prophylaxis: Pepcid   -Bedside spirometry reviewed, moderate obstruction, will obtain full PFTs in 3 months    -Discontinue Symbicort and Spiriva on DC  -Start Anoro on DC  -Continue Albuterol PRN on DC  -Home O2 evaluation before DC  -Follow up at [facility] [address] for Pulmonary Medicine with Alexus Pedroza CNP in 2-3 months with PFT/CXR prior to office visit  -Recommend LDCT outpatient - pt quit smoking 11 years ago     Case discussed with nurse and patient/family. Questions and concerns addressed. Meds and Orders reviewed. Addendum by Dr. Jenna Daniels MD:  I have seen and examined the patient independently. Face to face evaluation and examination was performed. The above evaluation and note has been reviewed. Labs and radiographs were reviewed. I Have discussed with Dr.Brittany Sexton- Medical student about this patient in detail. The above assessment and plan has been reviewed. Please see my modifications mentioned below. My modifications:  She is awake.   Feels better. Improving shortness of breath. Follow up as above. Serum creatinine is with in normal limits. Completed Lasix therapy. Ave Mccann educated about my impression and plan. She verbalizes understanding.       Ave Mack MD 9/23/2020 1:20 PM

## 2020-09-23 NOTE — PROGRESS NOTES
A home oxygen evaluation has been completed. [x]Patient is an inpatient. It is expected that the patient will be discharged within the next 48 hours. Qualified provider to write order for home prescription if patient qualifies. Social service/care managers will arrange for home oxygen. If patient is active, arrange for Home Medical supplier to assess for Oxygen Conserving Device per pulse oximetry. []Patient is an outpatient. Results will be faxed to the ordering provider. Qualified provider to write order for home prescription if patient qualifies and arranges for home oxygen. Patient was placed on room air for 30 minutes. SpO2 was 88-89 % on room air at rest. Patients SpO2 was below 89% and qualified for home oxygen. Oxygen was applied at 1 lpm via nasal cannula to maintain a SpO2 between 90-92% while at rest. Actual SpO2 was 91 %. Patient can ambulate for exercise flow rate. Patients was ambulated, SpO2 was 90% on 1 lpm to maintain SpO2 between 90-92% while exercising. Note: For any SpO2 at 21% see policy and procedure for possible qualifications.

## 2020-09-23 NOTE — CARE COORDINATION
9/23/20, 11:42 AM EDT    DISCHARGE ON GOING EVALUATION    [facility] day: 5  Location: 5-23/023-A Reason for admit: Oxygen deficit [B20.62]  Umbilical hernia without obstruction and without gangrene [K42.9]   Procedure:   0/30 robotic umbilical hernia repair with mesh  9/21 Echo: ef 60%  Treatment Plan of Care:Pulmonology following. Prednisone completed, Symbicort, Proventil, and Spiriva, prn albuterol, Tylenol, Fioricet, and Zofran, Potassium replacement protocol, general diet, daily weights, oxygen, incentive spirometry, daily weights, SCD's, telemetry, up with assistance, wean oxygen. Barriers to Discharge: medical stability. PCP: ERNESTO Shaikh CNP  Readmission Risk Score: 8%  Patient Goals/Plan/Treatment Preferences: Augustina Ballesteros is from home residing with her son, ALVARO. At discharge she is going to her daughter's home [facility]). She is in agreement for [facility] at discharge and has list of agencies. SS to follow-up with her.

## 2020-09-23 NOTE — CARE COORDINATION
1554 pm- Secure message sent to Dr Nimisha Escobar re: home oxygen order and [facility] order. Patient /family requested [facility]; updated Doris Novak.    1616 pm- updated by Artie MODI that daughter Joann Ellis left message re: patient needs more home DME. Left a message on Rosaura phone [phone] to return call to this CM.    1629 pm-Rosaura called this CM back; she requested a lift chair. Patient has medicare and instructed most insurance plans do not cover chair. She has BSC, walker, and sister is working on getting a shower chair. She declines other needs.

## 2020-09-23 NOTE — PROGRESS NOTES
\[facility] [place] [facility]\"" FOR [facility] SURGERY  INPATIENT PHYSICAL THERAPY  DAILY NOTE  [facility] ON[facility] MED 5K - 5K-23/023-A  Time In: 4739  Time Out: 1021  Timed Code Treatment Minutes: 25 Minutes  Minutes: 25          Date: 2020  Patient Name: Ave Mccann,  Gender:  female        MRN: [id number]  : 1940  ([de-identified] y.o.)     Referring Practitioner: Bruno Barry MD  Diagnosis: oxygen deficit  Additional Pertinent Hx: Per Bruno Barry MD progress  note: Verner Stains y.o. female who we are asked to see/evaluate by Nubia Velez MD for medical management of postop pulmonary insufficiency. This is a 60-year-old lady with past medical history as described low who is here for an elective surgery for incarcerated umbilical hernia hernia repair. Status post surgery on  by Dr. Alyx Erickson, surgery was uneventful however postextubation patient was requiring oxygen to keep saturations above 90%. Pulse ox kept dropping back to 85% on room air. Prior Level of Function:  Lives With: Daughter  Type of Home: House  Home Layout: Multi-level  Home Access: Stairs to enter with rails  Entrance Stairs - Number of Steps: 7 with 1 rail  Entrance Stairs - Rails: (1 rail)  Home Equipment: Rolling walker, Cane   Bathroom Shower/Tub: Walk-in shower, Shower chair with back  Bathroom Toilet: Standard  Bathroom Accessibility: Accessible    Receives Help From: Family  ADL Assistance: [address] [place] [address] Avenue: Independent  Homemaking Responsibilities: Yes  Ambulation Assistance: Independent  Transfer Assistance: Independent  Active : Yes  Additional Comments: Pt was using a rolling walker at home and when going out. She uses a grocery cart while at the store. Restrictions/Precautions:  Restrictions/Precautions: General Precautions    SUBJECTIVE: Pt and nurse agreeable to PT treatment. Pt's daughter present in room and she is asking about home health services for pt.      PAIN: abdominal pain from incision    OBJECTIVE:    Oxygen: pt on 3L O2 at rest and with activity. Upon return from stairs and walking, pt in mid 80s for oxygen sat. With ~15 seconds of deep breathing, pt returned to mid 90s. Bed Mobility:  Rolling to Right: Modified Independent     Transfers:  Sit to Stand x 3: Supervision   Stand to Sit x 3:Supervision    Ambulation:  Stand By Assistance  Distance: 50'  Surface: Level Tile  Device:2 wheeled walker  Gait Deviations: Forward Flexed Posture, Decreased Step Length Bilaterally, Decreased Gait Speed and Wide Base of Support  Stairs:  -pt ascended 7 steps with CGA and use of R handrail  -pt descended 7 steps with CGA and use of L handrail     Balance:  Static Sitting Balance:  Supervision  Dynamic Sitting Balance: Supervision  Static Standing Balance: Stand By Assistance  Dynamic Standing Balance: Stand By Assistance    Functional Outcome Measures: Completed  AM-PAC Inpatient Mobility without Stair Climbing Raw Score : 16  AM-PAC Inpatient without Stair Climbing T-Scale Score : 45.54    ASSESSMENT:  Assessment: Patient progressing toward established goals. Pt is progressing well at this time. She still requires supervision/stand by assist as she appears unsteady, but she is generally safe and doing well with activity. Her O2 sats still decrease with activity, but they are returning to mid 90s quicker than previously, indicating improved endurance. Activity Tolerance:  Patient tolerance of  treatment: fair. See assessment     Equipment Recommendations:Equipment Needed: No  Discharge Recommendations: Per discussion with daughter, pt will always have someone present with her at her daughter's home. If this is reliable, pt should be safe to discharge home. However, she still has endurance deficits and would benefit from continued physical therapy to address these, thus improving her functional mobility.  Daughter who she will be staying with (who is an ICU nurse) expressed interest in home

## 2020-09-23 NOTE — DISCHARGE SUMMARY
Hospital Medicine Discharge Summary      Patient Identification:   Leighann Mckinley   : 1940  MRN: [id number]   Account: [de-identified]      Patient's PCP: ERNESTO Grayson CNP    Admit Date: 2020     Discharge Date:   20    Admitting Physician: Sandra Apple MD     Discharge Physician: Frank Castillo MD     Discharge Diagnoses: Acute post operative pulmonary insufficiency s/p robotic umbilical hernia repair with mesh, Acute COPD and Diastolic CHF Exacerbation     Active Hospital Problems    Diagnosis Date Noted    Oxygen deficit [R09.02]     Umbilical hernia without obstruction and without gangrene [K42.9] 2020       The patient was seen and examined on day of discharge and this discharge summary is in conjunction with any daily progress note from day of discharge. Hospital Course:   Leighann Mckinley is a [de-identified] y.o. female admitted to [address] [place] [address] on 2020 for elective surgery for incarcerated umbilical hernia hernia repair. Surgery was uneventful however postextubation patient was requiring oxygen to keep saturations above 90%. Pulse ox kept dropping back to 85% on room air. Pt has known COPD and CHF. Pulmonary consulted, pt initially requiring up to 5L NC. Started on Prednisone, breathing treatments, IV lasix. Pt weaned to 1L NC at rest and 1L NC with exertion. Completed prednisone and lasix therapy. Start Anoro on discharge, recommend LDCT outpatient, and outpatient Pulmonary Medicine follow-up 2-3 months with PFT/CXR prior to office visit. Patient was evaluated today for the diagnosis of COPD, CHF. I entered a DME order for home oxygen because the diagnosis and testing requires the patient to have supplemental oxygen. Condition will improve or be benefited by oxygen use. The patient is  able to perform good mobility in a home setting and therefore does require the use of a portable oxygen system.   The need for this equipment was discussed with the patient and she understands and is in agreement. Exam:     Vitals:  Vitals:    09/23/20 0412 09/23/20 0730 09/23/20 0844 09/23/20 1230   BP: 134/74 136/77  (!) 144/74   Pulse: 66 76  92   Resp: 18 18 20   Temp: 98 °F (36.7 °C) 97.4 °F (36.3 °C)  98.2 °F (36.8 °C)   TempSrc: Oral Oral  Oral   SpO2: 96%  96% 95%   Weight: 159 lb 12.8 oz (72.5 kg)      Height:         Weight: Weight: 159 lb 12.8 oz (72.5 kg)     24 hour intake/output:    Intake/Output Summary (Last 24 hours) at 9/23/2020 1559  Last data filed at 9/23/2020 0817  Gross per 24 hour   Intake 1238.9 ml   Output 1450 ml   Net -211.1 ml         Labs: For convenience and continuity at follow-up the following most recent labs are provided:      CBC:    Lab Results   Component Value Date    WBC 7.9 09/23/2020    HGB 11.1 09/23/2020    HCT 35.3 09/23/2020     09/23/2020       Renal:    Lab Results   Component Value Date     09/23/2020    K 3.6 09/23/2020    K 5.1 09/19/2020    CL 99 09/23/2020    CO2 30 09/23/2020    BUN 21 09/23/2020    CREATININE 1.1 09/23/2020    CALCIUM 8.9 09/23/2020         Significant Diagnostic Studies    Radiology:   XR CHEST (2 VW)   Final Result   Cardiomegaly with pulmonary vascular congestion. There are also opacities at the bilateral lung bases which may represent infiltrates or atelectasis. Overall appearance of the chest is similar to the previous exam.               **This report has been created using voice recognition software. It may contain minor errors which are inherent in voice recognition technology. **      Final report electronically signed by Dr Pablo Connell on 9/22/2020 8:32 AM      XR CHEST PORTABLE   Final Result   1. Mild patchy airspace disease at the right lung base may represent mild atelectasis or pneumonia. Aeration of the right lung base appears slightly worsened from the prior examination. **This report has been created using voice recognition software.   It may contain minor errors which are inherent in voice recognition technology. **      Final report electronically signed by Dr. Terri Osorio on 2020 8:26 PM      XR CHEST PORTABLE   Final Result   No gross infiltrate. PA and lateral radiographs could be performed if indicated clinically. **This report has been created using voice recognition software. It may contain minor errors which are inherent in voice recognition technology. **      Final report electronically signed by Dr. Dionne Zacarias on 2020 9:12 AM             Consults:     IP CONSULT TO HOSPITALIST  IP CONSULT TO HOSPITALIST  IP CONSULT TO PULMONOLOGY  IP CONSULT TO SOCIAL WORK  IP CONSULT TO HOME CARE NEEDS    Disposition:    [x] Home       [] TCU       [] Rehab       [] Psych       [] SNF       [] [place]       [] Other-    Condition at Discharge: Stable    Code Status:  Full Code     Patient Instructions:     Activity: activity as tolerated  Diet: DIET GENERAL;      Follow-up visits:   ERNESTO Benitez - KARLENE  [address]  [phone]               Discharge Medications:      Ernestine Damian   [place] Medication Instructions XA    Printed on:20 6109   Medication Information                      albuterol sulfate HFA (PROVENTIL HFA) 108 (90 BASE) MCG/ACT inhaler  Inhale 2 puffs into the lungs every 6 hours as needed for Wheezing             amLODIPine (NORVASC) 5 MG tablet  TAKE 1 TABLET BY MOUTH DAILY             atorvastatin (LIPITOR) 20 MG tablet  TAKE 1 TABLET BY MOUTH ONE TIME A DAY              butalbital-acetaminophen-caffeine (FIORICET) -40 MG per tablet  Take 1 tablet by mouth every 4 hours as needed for Headaches             lisinopril (PRINIVIL;ZESTRIL) 20 MG tablet  Take 20 mg by mouth daily             meloxicam (MOBIC) 15 MG tablet  TAKE 1 TABLET BY MOUTH EVERY DAY AS NEEDED FOR PAIN             umeclidinium-vilanterol (ANORO ELLIPTA) 62.5-25 MCG/INH AEPB inhaler  Inhale 1 puff into the lungs daily                 Time Spent on discharge is more than 30 minutes in the examination, evaluation, counseling and review of medications and discharge plan. Signed: Thank you ERNESTO Maldonado CNP for the opportunity to be involved in this patient's care.     Electronically signed by Alycia Guerra MD on 9/23/2020 at 3:59 PM

## 2020-09-23 NOTE — PROGRESS NOTES
Discharge instructions given to patient with all questions answered at this time. Patient being discharged back to private residence. Patient will be transported via private vehicle. Chart contents placed in yellow bin.

## 2020-09-23 NOTE — PROGRESS NOTES
Hospitalist progress note        Patient:  Diallo Rick  YOB: 1940  Date of Service: 9/22/2020  MRN: [id number]   Acct:  [de-identified]   Primary Care Physician: ERNESTO Recio - CNP    Chief Complaint: Umbilical hernia    Date of Service: Pt seen/examined in consultation on 9/22/2020     Hospital course:      Gui Pena y.o. female who we are asked to see/evaluate by José Bingham MD for medical management of postop pulmonary insufficiency  This is a 70-year-old lady with past medical history as described low who is here for an elective surgery for incarcerated umbilical hernia hernia repair. Status post surgery on 9/18 by Dr. Laquita Jimenez, surgery was uneventful however postextubation patient was requiring oxygen to keep saturations above 90%. Pulse ox kept dropping back to 85% on room air  We were consulted for further care  Surgical issues stable. We will take over as primary. Subjective: Appears essentially unchanged from yesterday. Still on 4 L nasal cannula. She does have more productive cough now as well. Procalcitonin is negative some atelectasis on chest x-ray. She denies any fevers or chills but does feel rundown. Assessment and Plan:-  1. Acute hypoxic respiratory failure suspect likely chronic component as well-with poor air movement, pursed lip breathing, expiratory wheezing. Suspect that there is a component of a COPD exacerbation going on. Interestingly, her partial pressure of CO2 is not elevated and is actually low bit low. She has a little to evidence of metabolic acidosis going on as well. Will begin treatment with p.o. steroids, nebulizers, Symbicort, Spiriva. She also has evidence of decreased functional capacity per family and a history of diastolic dysfunction. proBNP is mildly elevated, however, exam does not appear to be severely volume overloaded. We will recheck an echocardiogram as it has been 3 years.   Patient does not appear to be significantly improving she does have more of a cough now. Consulted pulmonology and will trial some diuresis. Echo is pending, update, echo appears unremarkable. Patient appears to be improving with diuresis and possibly steroid  2. Ex-smoker-quit 11 years ago  3. History of hyperlipidemia-on statin-continue  4. History of benign essential hypertension-on Norvasc and lisinopril-continue  5. History of migraines-on Fioricet-continue  6. Leukocytosis: This is likely due to steroids. Past Medical History:        Diagnosis Date    Arthritis     Cancer of eye ([address]     Right    Dyslipidemia 2/15/2017    Fatigue     History of tobacco abuse 2/15/2017    Hyperlipidemia 10/13/2014    Hypertension     Non morbid obesity due to excess calories 2/15/2017    Pneumonia     Precordial pain 2/15/2017    Smoker     SOB (shortness of breath) 2/15/2017    Tobacco abuse        Past Surgical History:        Procedure Laterality Date    EYE SURGERY      Right eye cancer    HEMORRHOID SURGERY      HERNIA REPAIR N/A 0/75/1018    ROBOTIC UMBILICAL HERNIA REPAIR WITH MESH performed by Luisa Ford MD at [address] [place] [address] Medications:   No current facility-administered medications on file prior to encounter.       Current Outpatient Medications on File Prior to Encounter   Medication Sig Dispense Refill    albuterol-ipratropium (COMBIVENT RESPIMAT)  MCG/ACT AERS inhaler Inhale 1 puff into the lungs every 6 hours 1 Inhaler 0    lisinopril (PRINIVIL;ZESTRIL) 20 MG tablet Take 20 mg by mouth daily      albuterol sulfate HFA (PROVENTIL HFA) 108 (90 BASE) MCG/ACT inhaler Inhale 2 puffs into the lungs every 6 hours as needed for Wheezing 1 Inhaler 0    meloxicam (MOBIC) 15 MG tablet TAKE 1 TABLET BY MOUTH EVERY DAY AS NEEDED FOR PAIN 30 tablet 0    atorvastatin (LIPITOR) 20 MG tablet TAKE 1 TABLET BY MOUTH ONE TIME A DAY  30 tablet 4    amLODIPine (NORVASC) 5 MG tablet TAKE 1 TABLET BY MOUTH DAILY 30 tablet 5    butalbital-acetaminophen-caffeine (FIORICET) -40 MG per tablet Take 1 tablet by mouth every 4 hours as needed for Headaches 8 tablet 0       Allergies:    Patient has no known allergies. Social History:    reports that she quit smoking about 8 years ago. She started smoking about 57 years ago. She has a 102.00 pack-year smoking history. She has never used smokeless tobacco. She reports that she does not drink alcohol or use drugs. Family History:       Problem Relation Age of Onset    Cancer Mother     Cancer Father     Cancer Brother     Heart Failure Sister     Cancer Daughter        Diet:  DIET GENERAL;    Review of systems:   Pertinent positives as noted in the HPI. All other systems reviewed and negative. PHYSICAL EXAM:  /72   Pulse 86   Temp 97.5 °F (36.4 °C) (Oral)   Resp 22   Ht 5' 2.01\" (1.575 m)   Wt 176 lb 3.2 oz (79.9 kg)   SpO2 93%   BMI 32.22 kg/m²   General appearance: No apparent distress, appears stated age and cooperative. Appears more comfortable from the day prior  HEENT: Normal cephalic, atraumatic without obvious deformity. Pupils equal, round, and reactive to light. Extra ocular muscles intact. Conjunctivae/corneas clear. Neck: Supple, with full range of motion. No jugular venous distention. Trachea midline. Respiratory: Diffuse expiratory wheezes with poor air movement  Cardiovascular: Regular rate and rhythm with normal S1/S2 without murmurs, rubs or gallops. Abdomen: Soft, post surgery abdomen some bruising but otherwise no erythema. , diminished bowel sounds. Musculoskeletal:  No clubbing, cyanosis or edema bilaterally. Skin: Skin color, texture, turgor normal.  No rashes or lesions. Neurologic:  Neurovascularly intact without any focal sensory/motor deficits.  Cranial nerves: II-XII intact, grossly non-focal.  Psychiatric: Alert and oriented, thought content appropriate, normal insight  Capillary Refill: Brisk,< 3 seconds   Peripheral Pulses: +2 palpable, equal bilaterally     Labs:   Recent Labs     09/20/20  0854 09/21/20  0719 09/22/20  0658   WBC 18.7* 10.1 9.2   HGB 10.3* 9.6* 10.1*   HCT 34.0* 31.4* 32.0*    275 309     Recent Labs     09/20/20  0854 09/21/20  0719 09/22/20  0658    144 141   K 4.9 4.4 3.5    111 104   CO2 19* 21* 25   BUN 22 20 17   CREATININE 1.1 0.8 0.8   CALCIUM 9.2 8.6 8.9     No results for input(s): AST, ALT, BILIDIR, BILITOT, ALKPHOS in the last 72 hours. No results for input(s): INR in the last 72 hours. No results for input(s): Nanette Ke in the last 72 hours. Urinalysis:    Lab Results   Component Value Date    NITRU NEGATIVE 06/20/2018    BLOODU NEGATIVE 06/20/2018    GLUCOSEU NEGATIVE 06/20/2018       Radiology:   XR CHEST (2 VW)   Final Result   Cardiomegaly with pulmonary vascular congestion. There are also opacities at the bilateral lung bases which may represent infiltrates or atelectasis. Overall appearance of the chest is similar to the previous exam.               **This report has been created using voice recognition software. It may contain minor errors which are inherent in voice recognition technology. **      Final report electronically signed by Dr Thu England on 9/22/2020 8:32 AM      XR CHEST PORTABLE   Final Result   1. Mild patchy airspace disease at the right lung base may represent mild atelectasis or pneumonia. Aeration of the right lung base appears slightly worsened from the prior examination. **This report has been created using voice recognition software. It may contain minor errors which are inherent in voice recognition technology. **      Final report electronically signed by Dr. Regan Siemens on 9/20/2020 8:26 PM      XR CHEST PORTABLE   Final Result   No gross infiltrate. PA and lateral radiographs could be performed if indicated clinically. **This report has been created using voice recognition software.  It may contain minor errors which are inherent in voice recognition technology. **      Final report electronically signed by Dr. Bryan Mixon on 9/19/2020 9:12 AM        No results found.       Electronically signed by Tyler Kaur MD on 9/22/2020 at 11:28 PM

## 2020-09-23 NOTE — CARE COORDINATION
DISCHARGE/PLANNING EVALUATION  9/23/20, 11:08 AM EDT    Reason for Referral: [facility] per daughter's request  Mental Status: alert and oriented  Decision Making: patient is able to participate in decision making    Family/Social/Home Environment: Spoke with patient and daughter Joann Ellis (ICU RN at [facility]) re: home situation and discharge needs. Patient was living with her son prior to admission-daughters have decided that patient would do better at justo Alexandra's home and will be going there when she is discharged. Nasrin resides in a tri-level with 6-7 stairs to lower level and to upper level. Patient states that she had not had issues in the past with using stairs. She will have her own bedroom-bathroom that she will be using for personal care has a tub/shower combination-patient does not anticipate issues with getting and and out and states that she has been independent with her personal care. Nasrin is employed however, her spouse is in and out throughout the day and would be available if any needs arise. Avtarjose Caleb states that she will be checking on patient and will be available if needed on her days off. Patient had been driving prior to admission-patient and daughter understand homebound status for home health follow up-daughters will transport for any follow up appointments. List provided for review. Patient and daughter would like nursing, PT/OT and aide if needed. Current Services including food security, transportation and housekeeping: No issues identified  Current Equipment: home nebulizer-may need 02  Payment Source: Medicare only  Concerns or Barriers to Discharge: none indicated  Post acute provider list with quality measures, geographic area and applicable managed care information provided.   Questions regarding selection process answered: list provided    Teach Back Method used with patient and daughter regarding care plan  Patient and daughter verbalize understanding of the plan of care and contribute to goal setting. Patient goals, treatment preferences and discharge plan: home with daughter Theresa Hall and new home health referral. Sticky note left for physician for order-will need to follow up with family re: agency of choice.      Electronically signed by ALEC Bragg on 9/23/2020 at 11:08 AM

## 2020-09-23 NOTE — PLAN OF CARE
Problem: Respiratory  Goal: No pulmonary complications  Outcome: Ongoing     Problem: Respiratory  Goal: O2 Sat > 90%  Outcome: Ongoing     Problem: Respiratory  Goal: Supplemental O2 requirements decreased  Outcome: Ongoing     Problem: Impaired respiratory status  Goal: Clear lung sounds  Description: Clear lung sounds  9/23/2020 0849 by Thelma Wright RCP  Outcome: Ongoing

## 2020-09-28 ENCOUNTER — TELEPHONE (OUTPATIENT)
Dept: PULMONOLOGY | Age: 80
End: 2020-09-28

## 2020-09-29 ENCOUNTER — OFFICE VISIT (OUTPATIENT)
Dept: SURGERY | Age: 80
End: 2020-09-29

## 2020-09-29 VITALS
HEART RATE: 91 BPM | DIASTOLIC BLOOD PRESSURE: 75 MMHG | BODY MASS INDEX: 29.26 KG/M2 | SYSTOLIC BLOOD PRESSURE: 130 MMHG | HEIGHT: 62 IN | RESPIRATION RATE: 18 BRPM | TEMPERATURE: 97.1 F | WEIGHT: 159 LBS | OXYGEN SATURATION: 98 %

## 2020-09-29 PROCEDURE — 99024 POSTOP FOLLOW-UP VISIT: CPT | Performed by: SURGERY

## 2020-09-29 RX ORDER — ALBUTEROL SULFATE 2.5 MG/3ML
2.5 SOLUTION RESPIRATORY (INHALATION) EVERY 6 HOURS PRN
Qty: 1 PACKAGE | Refills: 11 | Status: SHIPPED | OUTPATIENT
Start: 2020-09-29 | End: 2021-12-20

## 2020-10-02 NOTE — PROGRESS NOTES
[facility] Post op Note    Pt Name: Vanessa Husain  Medical Record Number: [id number]  Date of Birth 1940   Today's Date: 10/2/2020    ASSESSMENT       ICD-10-CM    1. Umbilical hernia without obstruction and without gangrene  K42.9    2. Postop check  Z09         PLAN   1. Overall she is doing well she making a good recovery continue to wean oxygen at home. Reminded patient no heavy lifting for the next 6 weeks. She can see me back as needed. Kt Tristan is doing well she is still on a supplemental oxygen that she is needed since surgery. She is very happy with her surgical outcome stating she now has an umbilicus. CURRENT MEDICATIONS     Current Outpatient Medications on File Prior to Visit   Medication Sig Dispense Refill    umeclidinium-vilanterol (ANORO ELLIPTA) 62.5-25 MCG/INH AEPB inhaler Inhale 1 puff into the lungs daily 1 each 11    lisinopril (PRINIVIL;ZESTRIL) 20 MG tablet Take 20 mg by mouth daily      albuterol sulfate HFA (PROVENTIL HFA) 108 (90 BASE) MCG/ACT inhaler Inhale 2 puffs into the lungs every 6 hours as needed for Wheezing 1 Inhaler 0    butalbital-acetaminophen-caffeine (FIORICET) -40 MG per tablet Take 1 tablet by mouth every 4 hours as needed for Headaches 8 tablet 0    meloxicam (MOBIC) 15 MG tablet TAKE 1 TABLET BY MOUTH EVERY DAY AS NEEDED FOR PAIN 30 tablet 0    atorvastatin (LIPITOR) 20 MG tablet TAKE 1 TABLET BY MOUTH ONE TIME A DAY  30 tablet 4    amLODIPine (NORVASC) 5 MG tablet TAKE 1 TABLET BY MOUTH DAILY 30 tablet 5    albuterol (PROVENTIL) (2.5 MG/3ML) 0.083% nebulizer solution Take 3 mLs by nebulization every 6 hours as needed for Wheezing or Shortness of Breath 1 Package 11     No current facility-administered medications on file prior to visit. OBJECTIVE   CURRENT VITALS:  height is 5' 2\" (1.575 m) and weight is 159 lb (72.1 kg).  Her tympanic temperature is 97.1 °F (36.2 °C). Her blood pressure is 130/75 and her pulse is 91. Her respiration is 18 and oxygen saturation is 98%.      Alert oriented and appropriate  Unlabored respiration  NAD , interactive  Abdomen is soft and appropriately tender        LABS and Pathology   na    RADIOLOGY   na    Electronically signed by Elder Alexander MD on 10/2/2020 at 9:02 AM

## 2020-10-06 ENCOUNTER — TELEPHONE (OUTPATIENT)
Dept: PULMONOLOGY | Age: 80
End: 2020-10-06

## 2020-10-07 ENCOUNTER — OFFICE VISIT (OUTPATIENT)
Dept: PULMONOLOGY | Age: 80
End: 2020-10-07
Payer: MEDICARE

## 2020-10-07 VITALS
HEART RATE: 90 BPM | TEMPERATURE: 98.2 F | WEIGHT: 166 LBS | BODY MASS INDEX: 30.36 KG/M2 | OXYGEN SATURATION: 98 % | DIASTOLIC BLOOD PRESSURE: 70 MMHG | SYSTOLIC BLOOD PRESSURE: 128 MMHG

## 2020-10-07 PROCEDURE — 1123F ACP DISCUSS/DSCN MKR DOCD: CPT | Performed by: NURSE PRACTITIONER

## 2020-10-07 PROCEDURE — G8417 CALC BMI ABV UP PARAM F/U: HCPCS | Performed by: NURSE PRACTITIONER

## 2020-10-07 PROCEDURE — 1111F DSCHRG MED/CURRENT MED MERGE: CPT | Performed by: NURSE PRACTITIONER

## 2020-10-07 PROCEDURE — G8926 SPIRO NO PERF OR DOC: HCPCS | Performed by: NURSE PRACTITIONER

## 2020-10-07 PROCEDURE — 99214 OFFICE O/P EST MOD 30 MIN: CPT | Performed by: NURSE PRACTITIONER

## 2020-10-07 PROCEDURE — G8427 DOCREV CUR MEDS BY ELIG CLIN: HCPCS | Performed by: NURSE PRACTITIONER

## 2020-10-07 PROCEDURE — 3023F SPIROM DOC REV: CPT | Performed by: NURSE PRACTITIONER

## 2020-10-07 PROCEDURE — 1090F PRES/ABSN URINE INCON ASSESS: CPT | Performed by: NURSE PRACTITIONER

## 2020-10-07 PROCEDURE — 94618 PULMONARY STRESS TESTING: CPT | Performed by: NURSE PRACTITIONER

## 2020-10-07 PROCEDURE — 1036F TOBACCO NON-USER: CPT | Performed by: NURSE PRACTITIONER

## 2020-10-07 PROCEDURE — G8484 FLU IMMUNIZE NO ADMIN: HCPCS | Performed by: NURSE PRACTITIONER

## 2020-10-07 PROCEDURE — 4040F PNEUMOC VAC/ADMIN/RCVD: CPT | Performed by: NURSE PRACTITIONER

## 2020-10-07 PROCEDURE — G8400 PT W/DXA NO RESULTS DOC: HCPCS | Performed by: NURSE PRACTITIONER

## 2020-10-07 ASSESSMENT — ENCOUNTER SYMPTOMS
SHORTNESS OF BREATH: 1
ALLERGIC/IMMUNOLOGIC NEGATIVE: 1
GASTROINTESTINAL NEGATIVE: 1
COUGH: 1
EYES NEGATIVE: 1
WHEEZING: 0

## 2020-10-07 NOTE — PROGRESS NOTES
[facility]    Patient: Madeline Melendrez, [de-identified] y.o.   : 1940  10/7/2020    Pt of Dr. Geneva Noble   Patient presents with    Follow-up     2wk hosp f/u        HPI  Juve Spence is here for follow up for acute on chronic respiratory failure in which we were consulted for post operative hypoxia following umbilical hernia repair. Patient presents today for 6MWT- currently 1LPM continuous  Using Anoro and Albuterol PRN  Denies any increased SOB- only SOB is with heavy exertion   Intermittent cough  No recent fevers or chills  No known Covid exposures  Patient wants to get rid of home O2 today    Progress History:   Since last visit any new medical issues? No  New ER or hospital visits? No  Any new or changes in medicines? No  Using inhalers? Yes Anoro and albuterol Prn   Are they helpful?  Yes     Past Medical hx   PMH:  Past Medical History:   Diagnosis Date    Arthritis     Cancer of eye ([address]     Right    Dyslipidemia 2/15/2017    Fatigue     History of tobacco abuse 2/15/2017    Hyperlipidemia 10/13/2014    Hypertension     Non morbid obesity due to excess calories 2/15/2017    Pneumonia     Precordial pain 2/15/2017    Smoker     SOB (shortness of breath) 2/15/2017    Tobacco abuse      SURGICAL HISTORY:  Past Surgical History:   Procedure Laterality Date    EYE SURGERY      Right eye cancer    HEMORRHOID SURGERY      HERNIA REPAIR N/A     ROBOTIC UMBILICAL HERNIA REPAIR WITH MESH performed by Daniel Deutsch MD at [address] [place] [address] Road:  Social History     Tobacco Use    Smoking status: Former Smoker     Packs/day: 2.00     Years: 51.00     Pack years: 102.00     Start date: 1963     Last attempt to quit: 2012     Years since quittin.7    Smokeless tobacco: Never Used    Tobacco comment: tryin to cut down   Substance Use Topics    Alcohol use: No     Comment: quit    Drug use: No     ALLERGIES:No Known Allergies  FAMILY HISTORY:  Family History   Problem Relation Age of Onset    Cancer Mother     Cancer Father     Cancer Brother     Heart Failure Sister     Cancer Daughter      CURRENT MEDICATIONS:  Current Outpatient Medications   Medication Sig Dispense Refill    albuterol (PROVENTIL) (2.5 MG/3ML) 0.083% nebulizer solution Take 3 mLs by nebulization every 6 hours as needed for Wheezing or Shortness of Breath 1 Package 11    umeclidinium-vilanterol (ANORO ELLIPTA) 62.5-25 MCG/INH AEPB inhaler Inhale 1 puff into the lungs daily 1 each 11    lisinopril (PRINIVIL;ZESTRIL) 20 MG tablet Take 20 mg by mouth daily      albuterol sulfate HFA (PROVENTIL HFA) 108 (90 BASE) MCG/ACT inhaler Inhale 2 puffs into the lungs every 6 hours as needed for Wheezing 1 Inhaler 0    butalbital-acetaminophen-caffeine (FIORICET) -40 MG per tablet Take 1 tablet by mouth every 4 hours as needed for Headaches 8 tablet 0    meloxicam (MOBIC) 15 MG tablet TAKE 1 TABLET BY MOUTH EVERY DAY AS NEEDED FOR PAIN 30 tablet 0    atorvastatin (LIPITOR) 20 MG tablet TAKE 1 TABLET BY MOUTH ONE TIME A DAY  30 tablet 4    amLODIPine (NORVASC) 5 MG tablet TAKE 1 TABLET BY MOUTH DAILY 30 tablet 5     No current facility-administered medications for this visit. ROS   Review of Systems   Constitutional: Negative. Negative for chills and fever. HENT: Negative. Negative for congestion. Eyes: Negative. Respiratory: Positive for cough and shortness of breath. Negative for wheezing. Cardiovascular: Negative. Negative for chest pain and leg swelling. Gastrointestinal: Negative. Endocrine: Negative. Genitourinary: Negative. Musculoskeletal: Negative. Allergic/Immunologic: Negative. Neurological: Negative. Hematological: Negative. Psychiatric/Behavioral: Negative. Negative for sleep disturbance.         Physical exam   /70 (Site: Left Upper Arm, Position: Sitting, Cuff Size: Medium Adult) FINDINGS: There is cardiomegaly and pulmonary vascular congestion. Atherosclerotic changes are in the arch of the aorta. Some opacities are seen at the lung bases which may represent infiltrates or atelectasis. There is no significant pleural effusion or pneumothorax. Visualized portions of the upper abdomen are within normal limits. The osseous structures are intact. No acute fractures or suspicious osseous lesions. Cardiomegaly with pulmonary vascular congestion. There are also opacities at the bilateral lung bases which may represent infiltrates or atelectasis. Overall appearance of the chest is similar to the previous exam.          Six Minute Walk Test  David No 9/71/6755    Six minute walk test done in my office today by my medical assistant. Court's oxygen saturation at rest on room air was 96%. Her oxygen saturation dropped to 94% on room air with exertion. At the end of the test Court Hutchison's oxygen saturation remained at 94% on room air with exertion. Patient ambulated a total of 648    feet and tolerated the walk well with mild SOB and no events. Resting heart rate was  94  and 106    upon completion of the walk. Assessment      Diagnosis Orders   1. Chronic obstructive pulmonary disease, unspecified COPD type (HCC)  6 Minute Walk Test    Pulse oximetry, overnight    CT LUNG SCREENING    Full PFT Study With Bronchodilator   2. Personal history of tobacco use  CT LUNG SCREENING    Full PFT Study With Bronchodilator   3. Postoperative hypoxia      resolved   4. Hospital discharge follow-up           Plan   -6MWT done today no O2 needed at rest or with activity  -Nocturnal pulse oximetry to be done on room air- will DC O2 order after nocturnal testing completed.    -PFT to be done with RTC 3 months  -LDCT in 3 months  -Continue current inhaler therapy   -Advised to maintain pneumonia vaccine with PCP and to take flu vaccine this coming season.  -Advised patient to call office with any changes, questions, or concerns regarding respiratory status  -Encouraged to use IS daily    Will see Gamaliel West back in: 3 months PFT and LDCT prior     Low Dose CT (LDCT) Lung Screening criteria met              Age 55-77              Pack year smoking >30              Still smoking or less than 15 year since quit              No sign or symptoms of lung cancer              > 11 months since last LDCT      Risks and benefits of lung cancer screening with LDCT scans discussed:     Significance of positive screen - False-positive LDCT results often occur. 95% of all positive results do not lead to a diagnosis of cancer. Usually further imaging can resolve most false-positive results; however, some patients may require invasive procedures. Over diagnosis risk - 10% to 12% of screen-detected lung cancer cases are over diagnosed--that is, the cancer would not have been detected in the patient's lifetime without the screening. Need for follow up screens annually to continue lung cancer screening effectiveness      Risks associated with radiation from annual LDCT- Radiation exposure is about the same as for a mammogram, which is about 1/3 of the annual background radiation exposure from everyday life. Starting screening at age 54 is not likely to increase cancer risk from radiation exposure. Patients with comorbidities resulting in life expectancy of < 10 years, or that would preclude treatment of an abnormality identified on CT, should not be screened due to lack of benefit.      To obtain maximal benefit from this screening, smoking cessation and long-term abstinence from smoking is critical            Sheron Hoffmann CNP  10/7/2020

## 2020-10-12 ENCOUNTER — TELEPHONE (OUTPATIENT)
Dept: PULMONOLOGY | Age: 80
End: 2020-10-12

## 2020-11-05 ENCOUNTER — HOSPITAL ENCOUNTER (OUTPATIENT)
Age: 80
Setting detail: SPECIMEN
Discharge: HOME | End: 2020-11-05
Payer: MEDICARE

## 2020-11-05 LAB
ALBUMIN SERPL-MCNC: 3.8 G/DL (ref 3.5–5.2)
ALBUMIN/GLOBULIN RATIO: 1 (ref 1–2.5)
ALP BLD-CCNC: 99 U/L (ref 35–104)
ALT SERPL-CCNC: 8 U/L (ref 5–33)
ANION GAP SERPL CALCULATED.3IONS-SCNC: 17 MMOL/L (ref 9–17)
AST SERPL-CCNC: 15 U/L
BILIRUB SERPL-MCNC: 0.53 MG/DL (ref 0.3–1.2)
BUN BLDV-MCNC: 20 MG/DL (ref 8–23)
BUN/CREAT BLD: ABNORMAL (ref 9–20)
C-REACTIVE PROTEIN: 36.1 MG/L (ref 0–5)
CALCIUM SERPL-MCNC: 9.8 MG/DL (ref 8.6–10.4)
CHLORIDE BLD-SCNC: 101 MMOL/L (ref 98–107)
CO2: 20 MMOL/L (ref 20–31)
CREAT SERPL-MCNC: 1.11 MG/DL (ref 0.5–0.9)
GFR AFRICAN AMERICAN: 57 ML/MIN
GFR NON-AFRICAN AMERICAN: 47 ML/MIN
GFR SERPL CREATININE-BSD FRML MDRD: ABNORMAL ML/MIN/{1.73_M2}
GFR SERPL CREATININE-BSD FRML MDRD: ABNORMAL ML/MIN/{1.73_M2}
GLUCOSE BLD-MCNC: 106 MG/DL (ref 70–99)
HCT VFR BLD CALC: 42.7 % (ref 36.3–47.1)
HEMOGLOBIN: 13 G/DL (ref 11.9–15.1)
MCH RBC QN AUTO: 27 PG (ref 25.2–33.5)
MCHC RBC AUTO-ENTMCNC: 30.4 G/DL (ref 28.4–34.8)
MCV RBC AUTO: 88.8 FL (ref 82.6–102.9)
NRBC AUTOMATED: 0 PER 100 WBC
PDW BLD-RTO: 14.3 % (ref 11.8–14.4)
PLATELET # BLD: 648 K/UL (ref 138–453)
PMV BLD AUTO: 9.9 FL (ref 8.1–13.5)
POTASSIUM SERPL-SCNC: 4.3 MMOL/L (ref 3.7–5.3)
RBC # BLD: 4.81 M/UL (ref 3.95–5.11)
SEDIMENTATION RATE, ERYTHROCYTE: 37 MM (ref 0–30)
SODIUM BLD-SCNC: 138 MMOL/L (ref 135–144)
TOTAL PROTEIN: 7.7 G/DL (ref 6.4–8.3)
WBC # BLD: 12.8 K/UL (ref 3.5–11.3)

## 2020-11-06 LAB — CCP IGG ANTIBODIES: <1.5 U/ML

## 2020-11-10 ENCOUNTER — HOSPITAL ENCOUNTER (OUTPATIENT)
Dept: GENERAL RADIOLOGY | Age: 80
Discharge: HOME | End: 2020-11-10
Payer: MEDICARE

## 2020-11-10 ENCOUNTER — HOSPITAL ENCOUNTER (OUTPATIENT)
Age: 80
Discharge: HOME | End: 2020-11-10
Payer: MEDICARE

## 2020-11-10 PROCEDURE — 73562 X-RAY EXAM OF KNEE 3: CPT

## 2020-11-10 PROCEDURE — 73130 X-RAY EXAM OF HAND: CPT

## 2020-11-10 PROCEDURE — 73630 X-RAY EXAM OF FOOT: CPT

## 2020-12-14 ENCOUNTER — HOSPITAL ENCOUNTER (OUTPATIENT)
Age: 80
Setting detail: SPECIMEN
Discharge: HOME | End: 2020-12-14
Payer: MEDICARE

## 2020-12-14 LAB — RHEUMATOID FACTOR: 564 IU/ML

## 2020-12-15 LAB — ANTI-NUCLEAR ANTIBODY (ANA): NEGATIVE

## 2021-01-25 ENCOUNTER — TELEPHONE (OUTPATIENT)
Dept: FAMILY MEDICINE CLINIC | Age: 81
End: 2021-01-25

## 2021-01-28 ENCOUNTER — TELEPHONE (OUTPATIENT)
Dept: FAMILY MEDICINE CLINIC | Age: 81
End: 2021-01-28

## 2021-01-28 ENCOUNTER — OFFICE VISIT (OUTPATIENT)
Dept: FAMILY MEDICINE CLINIC | Age: 81
End: 2021-01-28
Payer: MEDICARE

## 2021-01-28 VITALS
DIASTOLIC BLOOD PRESSURE: 62 MMHG | SYSTOLIC BLOOD PRESSURE: 114 MMHG | HEART RATE: 86 BPM | OXYGEN SATURATION: 99 % | WEIGHT: 147 LBS | RESPIRATION RATE: 16 BRPM | BODY MASS INDEX: 29.64 KG/M2 | TEMPERATURE: 97.8 F | HEIGHT: 59 IN

## 2021-01-28 DIAGNOSIS — Z13.220 SCREENING CHOLESTEROL LEVEL: ICD-10-CM

## 2021-01-28 DIAGNOSIS — M05.741 RHEUMATOID ARTHRITIS INVOLVING BOTH HANDS WITH POSITIVE RHEUMATOID FACTOR (HCC): ICD-10-CM

## 2021-01-28 DIAGNOSIS — M05.742 RHEUMATOID ARTHRITIS INVOLVING BOTH HANDS WITH POSITIVE RHEUMATOID FACTOR (HCC): ICD-10-CM

## 2021-01-28 DIAGNOSIS — Z91.81 AT HIGH RISK FOR FALLS: ICD-10-CM

## 2021-01-28 DIAGNOSIS — M17.0 ARTHRITIS OF BOTH KNEES: ICD-10-CM

## 2021-01-28 DIAGNOSIS — D75.839 THROMBOCYTOSIS: ICD-10-CM

## 2021-01-28 DIAGNOSIS — M11.20 CHONDROCALCINOSIS: ICD-10-CM

## 2021-01-28 DIAGNOSIS — Z23 NEEDS FLU SHOT: ICD-10-CM

## 2021-01-28 PROCEDURE — G8484 FLU IMMUNIZE NO ADMIN: HCPCS | Performed by: NURSE PRACTITIONER

## 2021-01-28 PROCEDURE — 90471 IMMUNIZATION ADMIN: CPT | Performed by: NURSE PRACTITIONER

## 2021-01-28 PROCEDURE — 90694 VACC AIIV4 NO PRSRV 0.5ML IM: CPT | Performed by: NURSE PRACTITIONER

## 2021-01-28 PROCEDURE — G8417 CALC BMI ABV UP PARAM F/U: HCPCS | Performed by: NURSE PRACTITIONER

## 2021-01-28 PROCEDURE — 1090F PRES/ABSN URINE INCON ASSESS: CPT | Performed by: NURSE PRACTITIONER

## 2021-01-28 PROCEDURE — G8400 PT W/DXA NO RESULTS DOC: HCPCS | Performed by: NURSE PRACTITIONER

## 2021-01-28 PROCEDURE — 1123F ACP DISCUSS/DSCN MKR DOCD: CPT | Performed by: NURSE PRACTITIONER

## 2021-01-28 PROCEDURE — G8427 DOCREV CUR MEDS BY ELIG CLIN: HCPCS | Performed by: NURSE PRACTITIONER

## 2021-01-28 PROCEDURE — 99213 OFFICE O/P EST LOW 20 MIN: CPT | Performed by: NURSE PRACTITIONER

## 2021-01-28 PROCEDURE — 1036F TOBACCO NON-USER: CPT | Performed by: NURSE PRACTITIONER

## 2021-01-28 PROCEDURE — 90714 TD VACC NO PRESV 7 YRS+ IM: CPT | Performed by: NURSE PRACTITIONER

## 2021-01-28 PROCEDURE — G0008 ADMIN INFLUENZA VIRUS VAC: HCPCS | Performed by: NURSE PRACTITIONER

## 2021-01-28 PROCEDURE — 4040F PNEUMOC VAC/ADMIN/RCVD: CPT | Performed by: NURSE PRACTITIONER

## 2021-01-28 RX ORDER — PREDNISONE 20 MG/1
TABLET ORAL
Qty: 18 TABLET | Refills: 0 | Status: SHIPPED | OUTPATIENT
Start: 2021-01-28 | End: 2021-02-08

## 2021-01-28 ASSESSMENT — ENCOUNTER SYMPTOMS
CHEST TIGHTNESS: 0
WHEEZING: 0
SHORTNESS OF BREATH: 0
ABDOMINAL PAIN: 0
SINUS PAIN: 0
ABDOMINAL DISTENTION: 0
CHOKING: 0
COUGH: 1
RHINORRHEA: 0

## 2021-01-28 ASSESSMENT — PATIENT HEALTH QUESTIONNAIRE - PHQ9
SUM OF ALL RESPONSES TO PHQ QUESTIONS 1-9: 0
SUM OF ALL RESPONSES TO PHQ QUESTIONS 1-9: 0
1. LITTLE INTEREST OR PLEASURE IN DOING THINGS: 0
SUM OF ALL RESPONSES TO PHQ9 QUESTIONS 1 & 2: 0

## 2021-01-28 NOTE — PROGRESS NOTES
[address] [place] [address]  [address] K[facility] [address]  Dept: [phone]  Dept Fax: [phone]  Loc: [phone]    Jorge Bro is a [de-identified] y.o. Padmini Duenas presents today for her medical conditions/complaints as noted below. Asa Nieto c/o of Established New Doctor (former pt of [facility] ) and Joint Pain (all over joint pain, having troubles getting out of bed. Will be seeing Dr Becky Foy NP on 2/8/21, pt is in too much pain, mobic is not helping )      HPI:      Pt brought in by her daughter to establish care. Had been seen at [facility]. Daughter states her mother started living with her recently. She does have a tri-level and mother is unsteady to go up and down stairs so she will stay in her room during the day and has not been up and ambulating as much as normal and her legs and knees have started to become and weak and unstable and have been giving out on her. Daughter states her mother has widespread joint pain and deformity. States her fingers and sholder hurt has had multiple rounds of prednisone for pain control. Also on mobic for pain control but it is not helping. Pt had recent labs in November that were indicative of RA. Has appt with rheumatology in February. Pt is hard of hearing, denies a history of MI or PE, does take cholesterol medication. Pt has  A history of copd. COPD    HPI:    Current medication regimen - incruse and albuterol aerosol machine  Compliant with medications? yes    Limitations in function - physical activity   Does patient smoke? In the past    Chronic cough?: yes  Chest pain/Tightness?:  no  Shortness of breath?: no  Wheezing?   Yes off and on does have a loose congested cough     Last PFTs - she is not sure    Hospitalized and/or intubated in the past?: had pneumonia in past prior to stopping smoking   Number of times prescribed oral steroids in the past year - multiple Influenza vaccine up to date? Yes  Pneumococcal vaccine up to date? Yes    Pt also complains of bilateral knee pain, states mobic does not help denies knee swelling or erythema, states her legs are weak and they buckle under her. Uses a wheelchair for transportation not steady on feet. I did review her reent knee x-rays and they do show patella bone spurs chondrocalcinosis does have elevated platelet count will repeat labs, has low hgb will start MVI with iron. Denies fatigue. X-rays of knees also identify mild joint space narrowing bilaterally. I did review her hand x-ray which are positive for swan like deformity bilaterally, and erosive joints          Current Outpatient Medications   Medication Sig Dispense Refill    predniSONE (DELTASONE) 20 MG tablet 1 tab let po tid for 3 days 1 tab let po bid for 3 days 1 tablet po once day for 3 days 18 tablet 0    albuterol (PROVENTIL) (2.5 MG/3ML) 0.083% nebulizer solution Take 3 mLs by nebulization every 6 hours as needed for Wheezing or Shortness of Breath 1 Package 11    umeclidinium-vilanterol (ANORO ELLIPTA) 62.5-25 MCG/INH AEPB inhaler Inhale 1 puff into the lungs daily 1 each 11    lisinopril (PRINIVIL;ZESTRIL) 20 MG tablet Take 20 mg by mouth daily      albuterol sulfate HFA (PROVENTIL HFA) 108 (90 BASE) MCG/ACT inhaler Inhale 2 puffs into the lungs every 6 hours as needed for Wheezing 1 Inhaler 0    meloxicam (MOBIC) 15 MG tablet TAKE 1 TABLET BY MOUTH EVERY DAY AS NEEDED FOR PAIN 30 tablet 0    atorvastatin (LIPITOR) 20 MG tablet TAKE 1 TABLET BY MOUTH ONE TIME A DAY  30 tablet 4    amLODIPine (NORVASC) 5 MG tablet TAKE 1 TABLET BY MOUTH DAILY 30 tablet 5     No current facility-administered medications for this visit.            Past Medical History:   Diagnosis Date    Arthritis     Cancer of eye ([facility] [address]     Right    Dyslipidemia 2/15/2017    Fatigue     History of tobacco abuse 2/15/2017    Hyperlipidemia 10/13/2014    Hypertension  Non morbid obesity due to excess calories 2/15/2017    Pneumonia     Precordial pain 2/15/2017    Smoker     SOB (shortness of breath) 2/15/2017    Tobacco abuse       Past Surgical History:   Procedure Laterality Date    EYE SURGERY      Right eye cancer    HEMORRHOID SURGERY      HERNIA REPAIR N/A 9640    ROBOTIC UMBILICAL HERNIA REPAIR WITH MESH performed by Satnam Mariscal MD at [address] [place] [address] History   Problem Relation Age of Onset    Cancer Mother     Cancer Father     Cancer Brother     Heart Failure Sister     Cancer Daughter      Social History     Tobacco Use    Smoking status: Former Smoker     Packs/day: 2.00     Years: 51.00     Pack years: 102.00     Start date: 1963     Quit date: 2012     Years since quittin.0    Smokeless tobacco: Never Used    Tobacco comment: tryin to cut down   Substance Use Topics    Alcohol use: No     Comment: quit        No Known Allergies    Health Maintenance   Topic Date Due    COVID-19 Vaccine (1 of 2) 1956    DTaP/Tdap/Td vaccine (1 - Tdap) 1959    Shingles Vaccine (1 of 2) 1990    DEXA (modify frequency per FRAX score)  1995    Lipid screen  2016    Annual Wellness Visit (AWV)  2019    Potassium monitoring  2021    Creatinine monitoring  2021    Flu vaccine  Completed    Pneumococcal 65+ years Vaccine  Completed    Hepatitis A vaccine  Aged Out    Hepatitis B vaccine  Aged Out    Hib vaccine  Aged Out    Meningococcal (ACWY) vaccine  Aged Out       Subjective:      Review of Systems   Constitutional: Positive for fatigue. Negative for chills and fever. HENT: Positive for congestion (chest). Negative for postnasal drip, rhinorrhea and sinus pain. Respiratory: Positive for cough. Negative for choking, chest tightness, shortness of breath and wheezing. Cardiovascular: Negative. Gastrointestinal: Negative for abdominal distention and abdominal pain. Genitourinary: Negative for difficulty urinating and dysuria. Musculoskeletal: Positive for arthralgias, gait problem, joint swelling and myalgias. Negative for neck pain and neck stiffness. Skin: Negative. Neurological: Positive for weakness (legs). Negative for dizziness, facial asymmetry and headaches. Psychiatric/Behavioral: Negative for self-injury, sleep disturbance and suicidal ideas. Objective:      /62   Pulse 86   Temp 97.8 °F (36.6 °C) (Oral)   Resp 16   Ht 4' 11\" (1.499 m)   Wt 147 lb (66.7 kg)   SpO2 99%   BMI 29.69 kg/m²      Physical Exam  Vitals signs and nursing note reviewed. Constitutional:       Appearance: She is not ill-appearing. HENT:      Right Ear: Tympanic membrane, ear canal and external ear normal.      Left Ear: Tympanic membrane, ear canal and external ear normal.      Nose: Nose normal.      Mouth/Throat:      Mouth: Mucous membranes are moist.   Cardiovascular:      Rate and Rhythm: Normal rate and regular rhythm. Pulses: Normal pulses. Heart sounds: Normal heart sounds. No murmur. Pulmonary:      Effort: Pulmonary effort is normal. No respiratory distress. Breath sounds: Normal breath sounds. No wheezing. Abdominal:      General: Abdomen is flat. Bowel sounds are normal. There is no distension. Palpations: Abdomen is soft. Musculoskeletal:         General: Deformity present. No swelling or tenderness. Right lower leg: No edema. Left lower leg: No edema. Comments: Bilateral hands with swan like deformity of all fingers heberdens and simin nodules noted bilaterally . Unable to fully close either hand. Bilateral knee pain with palpation no edema no erythema     Pt did stand and her legs were wobbly and unsteady, she was not luis to take any steps.  Pt wheelchair bound Patient given educational materials -see patient instructions. Discussed use, benefit, and side effects of prescribedmedications. All patient questions answered. Pt voiced understanding. Reviewedhealth maintenance. Instructed to continue current medications, diet and exercise. Patient agreed with treatment plan. Follow up as directed. Electronicallysigned by ERNESTO Geiger CNP on 1/28/2021 at 2:58 PM        On the basis of positive falls risk screening, assessment and plan is as follows: home safety tips provided, referral to physical therapy provided for strength and balance training.

## 2021-01-28 NOTE — PROGRESS NOTES
Immunization(s) given during visit:    Immunizations Administered     Name Date Dose Route    Influenza, Quadv, adjuvanted, 65 yrs +, IM, PF (Fluad) 1/28/2021 0.5 mL Intramuscular    Site: Deltoid- Left    Lot: [id number]    NDC: 71924-017-55    Td (Adult), 2 Lf Tetanus Toxoid, Pf (Td, Absorbed) 1/28/2021 0.5 mL Intramuscular    Site: Deltoid- Left    Lot: [id number]    NDC: 40702-083-98          Most recent Vaccine Information Sheet dated FLU 8/15/19 TD 4/1/20 given to [address] [place] [address], \"Influenza - Inactivated\"  given to Jade Howe, or parent/legal guardian of  Jade Howe and verbalized understanding. Patient responses:    Have you ever had a reaction to a flu vaccine? No  Do you have an allergy to eggs, neomycin or polymixin? No  Do you have an allergy to Thimerosal, contact lens solution, or Merthiolate? No  Have you ever had Guillian [place] Syndrome? No  Do you have any current illness? No  Do you have a temperature above 100 degrees? No  Are you pregnant? No  If pregnant, permission obtained from physician? No  Do you have an active neurological disorder? No      Flu vaccine given per order. Please see immunization tab.

## 2021-01-28 NOTE — TELEPHONE ENCOUNTER
Pt scheduled for appt today 1/28/21 with Fran Barr at 2:00. Provider requested that we call pt to see if this time still works for pt. Pt states she will be coming to appt today.

## 2021-02-01 ENCOUNTER — TELEPHONE (OUTPATIENT)
Dept: FAMILY MEDICINE CLINIC | Age: 81
End: 2021-02-01

## 2021-02-08 ENCOUNTER — NURSE ONLY (OUTPATIENT)
Dept: LAB | Age: 81
End: 2021-02-08

## 2021-02-08 ENCOUNTER — OFFICE VISIT (OUTPATIENT)
Dept: RHEUMATOLOGY | Age: 81
End: 2021-02-08
Payer: MEDICARE

## 2021-02-08 VITALS
DIASTOLIC BLOOD PRESSURE: 72 MMHG | HEART RATE: 73 BPM | TEMPERATURE: 97.9 F | WEIGHT: 147.05 LBS | BODY MASS INDEX: 29.64 KG/M2 | HEIGHT: 59 IN | SYSTOLIC BLOOD PRESSURE: 130 MMHG | OXYGEN SATURATION: 97 %

## 2021-02-08 DIAGNOSIS — M05.9 SEROPOSITIVE RHEUMATOID ARTHRITIS (HCC): Primary | ICD-10-CM

## 2021-02-08 DIAGNOSIS — M05.9 SEROPOSITIVE RHEUMATOID ARTHRITIS (HCC): ICD-10-CM

## 2021-02-08 DIAGNOSIS — M15.9 OSTEOARTHRITIS OF MULTIPLE JOINTS, UNSPECIFIED OSTEOARTHRITIS TYPE: ICD-10-CM

## 2021-02-08 DIAGNOSIS — R53.83 OTHER FATIGUE: ICD-10-CM

## 2021-02-08 DIAGNOSIS — Z78.0 POSTMENOPAUSAL: ICD-10-CM

## 2021-02-08 DIAGNOSIS — R06.02 SOB (SHORTNESS OF BREATH): ICD-10-CM

## 2021-02-08 LAB
ALBUMIN SERPL-MCNC: 3.9 G/DL (ref 3.5–5.1)
ALP BLD-CCNC: 79 U/L (ref 38–126)
ALT SERPL-CCNC: 32 U/L (ref 11–66)
ANION GAP SERPL CALCULATED.3IONS-SCNC: 12 MEQ/L (ref 8–16)
AST SERPL-CCNC: 32 U/L (ref 5–40)
BASOPHILS # BLD: 0.2 %
BASOPHILS ABSOLUTE: 0 THOU/MM3 (ref 0–0.1)
BILIRUB SERPL-MCNC: 0.3 MG/DL (ref 0.3–1.2)
BUN BLDV-MCNC: 22 MG/DL (ref 7–22)
C-REACTIVE PROTEIN: < 0.3 MG/DL (ref 0–1)
CALCIUM SERPL-MCNC: 8.8 MG/DL (ref 8.5–10.5)
CHLORIDE BLD-SCNC: 102 MEQ/L (ref 98–111)
CO2: 25 MEQ/L (ref 23–33)
CREAT SERPL-MCNC: 0.8 MG/DL (ref 0.4–1.2)
EOSINOPHIL # BLD: 0.1 %
EOSINOPHILS ABSOLUTE: 0 THOU/MM3 (ref 0–0.4)
ERYTHROCYTE [DISTWIDTH] IN BLOOD BY AUTOMATED COUNT: 19.4 % (ref 11.5–14.5)
ERYTHROCYTE [DISTWIDTH] IN BLOOD BY AUTOMATED COUNT: 60.9 FL (ref 35–45)
GFR SERPL CREATININE-BSD FRML MDRD: 69 ML/MIN/1.73M2
GLUCOSE BLD-MCNC: 129 MG/DL (ref 70–108)
HAV IGM SER IA-ACNC: NEGATIVE
HCT VFR BLD CALC: 39.1 % (ref 37–47)
HEMOGLOBIN: 12.1 GM/DL (ref 12–16)
HEPATITIS B CORE IGM ANTIBODY: NEGATIVE
HEPATITIS B SURFACE ANTIGEN: NEGATIVE
HEPATITIS C ANTIBODY: ABNORMAL
IMMATURE GRANS (ABS): 0.15 THOU/MM3 (ref 0–0.07)
IMMATURE GRANULOCYTES: 1.1 %
LYMPHOCYTES # BLD: 10.5 %
LYMPHOCYTES ABSOLUTE: 1.4 THOU/MM3 (ref 1–4.8)
MCH RBC QN AUTO: 27 PG (ref 26–33)
MCHC RBC AUTO-ENTMCNC: 30.9 GM/DL (ref 32.2–35.5)
MCV RBC AUTO: 87.3 FL (ref 81–99)
MONOCYTES # BLD: 2.8 %
MONOCYTES ABSOLUTE: 0.4 THOU/MM3 (ref 0.4–1.3)
NUCLEATED RED BLOOD CELLS: 0 /100 WBC
PLATELET # BLD: 471 THOU/MM3 (ref 130–400)
PMV BLD AUTO: 10.6 FL (ref 9.4–12.4)
POTASSIUM SERPL-SCNC: 3.8 MEQ/L (ref 3.5–5.2)
RBC # BLD: 4.48 MILL/MM3 (ref 4.2–5.4)
SEDIMENTATION RATE, ERYTHROCYTE: 21 MM/HR (ref 0–20)
SEG NEUTROPHILS: 85.3 %
SEGMENTED NEUTROPHILS ABSOLUTE COUNT: 11.2 THOU/MM3 (ref 1.8–7.7)
SODIUM BLD-SCNC: 139 MEQ/L (ref 135–145)
TOTAL PROTEIN: 7.3 G/DL (ref 6.1–8)
WBC # BLD: 13.1 THOU/MM3 (ref 4.8–10.8)

## 2021-02-08 PROCEDURE — 1036F TOBACCO NON-USER: CPT | Performed by: NURSE PRACTITIONER

## 2021-02-08 PROCEDURE — 4040F PNEUMOC VAC/ADMIN/RCVD: CPT | Performed by: NURSE PRACTITIONER

## 2021-02-08 PROCEDURE — G8417 CALC BMI ABV UP PARAM F/U: HCPCS | Performed by: NURSE PRACTITIONER

## 2021-02-08 PROCEDURE — G8427 DOCREV CUR MEDS BY ELIG CLIN: HCPCS | Performed by: NURSE PRACTITIONER

## 2021-02-08 PROCEDURE — G8484 FLU IMMUNIZE NO ADMIN: HCPCS | Performed by: NURSE PRACTITIONER

## 2021-02-08 PROCEDURE — 1090F PRES/ABSN URINE INCON ASSESS: CPT | Performed by: NURSE PRACTITIONER

## 2021-02-08 PROCEDURE — G8400 PT W/DXA NO RESULTS DOC: HCPCS | Performed by: NURSE PRACTITIONER

## 2021-02-08 PROCEDURE — 99204 OFFICE O/P NEW MOD 45 MIN: CPT | Performed by: NURSE PRACTITIONER

## 2021-02-08 PROCEDURE — 1123F ACP DISCUSS/DSCN MKR DOCD: CPT | Performed by: NURSE PRACTITIONER

## 2021-02-08 RX ORDER — PREDNISONE 1 MG/1
5 TABLET ORAL DAILY
Qty: 30 TABLET | Refills: 1 | Status: ON HOLD | OUTPATIENT
Start: 2021-02-08 | End: 2021-03-30 | Stop reason: HOSPADM

## 2021-02-08 ASSESSMENT — ENCOUNTER SYMPTOMS
EYE PAIN: 0
SHORTNESS OF BREATH: 1
BACK PAIN: 0
CONSTIPATION: 0
DIARRHEA: 0
EYE ITCHING: 0
ABDOMINAL PAIN: 0
NAUSEA: 0
TROUBLE SWALLOWING: 0
COUGH: 1

## 2021-02-08 NOTE — PROGRESS NOTES
[facility]       Date Of Service: 2/8/2021  Provider: Cyn Packer CNP    Name: Michelle Villa   MRN: [id number]    Chief Complaint(s)      Chief Complaint   Patient presents with    New Patient     referral from [facility] for RA        History of Present illness (HPI)    Michelle Villa is B(T)74 y.o. female with a hx of HTN, HLD, tobacco abuse, COPD, cancer right eye referred by ERNESTO Muro -* for evaluation of rheumatoid arthritis. Symptoms started: About 3-4 years with bilateral hand and knee pain/ swelling. Progressed to widespread generalized pain and swelling in the past few months. PCP showed sed rate/ CRP elevation. +, negative JONATHAN, CCP    The patient reports pain affecting her fingers, wrists, shoulders, knees, ankles   Pain on a scale 0-10: 10/10  Type of pain: intermittent   Timing: n/a   Aggravating factors: knees: stairs, wt bearing, hands: increased use. Shoulders: reaching up or back   Alleviating factors: prednisone   Current therapy: n/a   Previous therapy: tylenol arthritis, ibuprofen (no relief), prednisone (sig relief), tramadol (mild relief), meloxicam (no relief)    Associated symptoms:  denies swelling/  redness/  warmth  + AM stiffness lasting 30-60 minutes  + gelling    + color changes of the hands with cold exposure    + 1 miscarriage at 2.5 months    -denies Photosenstivity, Rash, dry mouth/dry eyes, oral/nasal sores, Raynaud's, digital ulcerations, skin tightening, renal disease,foamy urination, hematuria, sz's, blood clots, AIHA,leukpenia/lymphopenia, thrombocytopenia, hair loss, serositis, arthritis.      - denies enthesitis, dactylitis, nail changes, hx of STD,  personal or family history of Psoriatic arthritis, psoriasis, ank spond,     +fmhx of lupus in sister    Cancer screening: ? unsure  Travel: n/a  Exposure(s): n/a    Review of Systems    Review of Systems   Constitutional: Negative for fatigue, fever and unexpected weight change. HENT: Positive for hearing loss. Negative for congestion and trouble swallowing. Eyes: Negative for pain and itching. Respiratory: Positive for cough and shortness of breath. Cardiovascular: Negative for chest pain and leg swelling. Gastrointestinal: Negative for abdominal pain, constipation, diarrhea and nausea. Endocrine: Negative for cold intolerance and heat intolerance. Genitourinary: Negative for difficulty urinating, frequency and urgency. Musculoskeletal: Positive for arthralgias and joint swelling. Negative for back pain. Skin: Negative for rash. Neurological: Negative for dizziness, weakness, numbness and headaches. Psychiatric/Behavioral: The patient is not nervous/anxious. PAST MEDICAL HISTORY     has a past medical history of Arthritis, Cancer of eye ([address] Dyslipidemia, Fatigue, History of tobacco abuse, Hyperlipidemia, Hypertension, Non morbid obesity due to excess calories, Pneumonia, Precordial pain, Smoker, SOB (shortness of breath), and Tobacco abuse. PAST SURGICAL HISTORY     has a past surgical history that includes eye surgery; Hemorrhoid surgery; and hernia repair (N/A, 9/18/2020). FAMILY HISTORY      Family History   Problem Relation Age of Onset    Cancer Mother     Cancer Father     Cancer Brother     Heart Failure Sister     Cancer Daughter        SOCIAL HISTORY     reports that she quit smoking about 9 years ago. She started smoking about 58 years ago. She has a 102.00 pack-year smoking history. She has never used smokeless tobacco. She reports that she does not drink alcohol or use drugs.       ALLERGIES   No Known Allergies    CURRENT MEDICATIONS      Current Outpatient Medications:     predniSONE (DELTASONE) 5 MG tablet, Take 1 tablet by mouth daily, Disp: 30 tablet, Rfl: 1    predniSONE (DELTASONE) 20 MG tablet, 1 tab let po tid for 3 days 1 tab let po bid for 3 days 1 tablet po once day for 3 days, Disp: 18 Musculoskeletal:  Strength 5/5 upper limbs, 4/5 bilateral HF     Upper extremities:    SHOULDERS + tender bilat, ROM intact ,   ELBOWS nontender, no swelling,   WRISTS nontender, mild fullness bilat,   HANDS/FINGERS   MCPs + bogginess left 2,3    PIPs + tender bilat 3rd, bogginess bilat 2-5     DIPs + nodules right 2, 5, radial deviation right 2nd    Lower extremities:  HIPS nontender  KNEES nontender, no swelling  ANKLES nontender, + mild warmth/swelling left   FEET : nontender, no swelling     Spine:   C-spine, T-spine & L-spine:  non tender,  ROM  normal ,  - shober, - manju, - Occiput to wall , SLR/Cross SLR.        LABS        CBC  Lab Results   Component Value Date    WBC 12.8 11/05/2020    RBC 4.81 11/05/2020    HGB 13.0 11/05/2020    HCT 42.7 11/05/2020    MCV 88.8 11/05/2020    MCH 27.0 11/05/2020    MCHC 30.4 11/05/2020    RDW 14.3 11/05/2020     11/05/2020       CMP  Lab Results   Component Value Date    CALCIUM 9.8 11/05/2020    LABALBU 3.8 11/05/2020    LABALBU 4.7 05/22/2012    PROT 7.7 11/05/2020     11/05/2020    K 4.3 11/05/2020    K 5.1 09/19/2020    CO2 20 11/05/2020     11/05/2020    BUN 20 11/05/2020    CREATININE 1.11 11/05/2020    ALKPHOS 99 11/05/2020    ALT 8 11/05/2020    AST 15 11/05/2020       HgBA1c: No components found for: HGBA1C    Lab Results   Component Value Date    TSH 1.242 05/22/2012     Lab Results   Component Value Date    VITD25 42 06/21/2017         No results found for: ANASCRN  No results found for: SSA  No results found for: SSB  No results found for: ANTI-SMITH  No results found for: DSDNAAB   No results found for: ANTIRNP  No results found for: C3, C4  No results found for: CCPAB  Lab Results   Component Value Date    .0 (H) 12/14/2020       No components found for: CANCASCRN, APANCASCRN  Lab Results   Component Value Date    SEDRATE 37 (H) 11/05/2020     Lab Results   Component Value Date    CRP 36.1 (H) 11/05/2020       RADIOLOGY:     CXR 6/20/2018     Impression   Chronic lung markings/opacity along the right base concerning for scarring/fibrotic change. Appearance is similar to prior study. Blunting of the right costophrenic angle. No significant interval change. ASSESSMENT/PLAN    Assessment   Plan   1. Seropositive rheumatoid arthritis (HCC)  -  + , negative CCP. ESR/sed rate elevation, duration > 6 weeks, erosions on xrays. Exam with synovitis. - prior tx: prednisone (sig relief), meloxicam (no relief)   - prior imaging reviewed   - records from PCP reviewed   - baseline lab testing as below   - discussed methotrexate or arava start if labs stable. - prednisone 5 mg daily  - Comprehensive Metabolic Panel; Future  - C-Reactive Protein; Future  - CBC Auto Differential; Future  - Sedimentation Rate; Future  - Hepatitis Panel, Acute; Future    2. Postmenopausal  - DEXA BONE DENSITY AXIAL SKELETON; Future    3. SOB (shortness of breath)  - hx of COPD. Prior CXR w/ opacities concerning for scarring/fibrotic changes. ? Related to #1 vs other   - appointment scheduled with pulmonology   - PFT's already ordered per pulm    4. Osteoarthritis of multiple joints, unspecified osteoarthritis type   - continue meloxicam 15 mg daily    5. Other fatigue  - CBC Auto Differential; Future  - Hepatitis Panel, Acute; Future      No follow-ups on file. Electronically signed by ERNESTO Ellsworth CNP on 2/8/2021 at 3:52 PM    New Prescriptions    PREDNISONE (DELTASONE) 5 MG TABLET    Take 1 tablet by mouth daily         The risks and benefits of my recommendations, as well as other treatment options, benefits and side effects were discussed with the patient today. Questions were answered. Thank you for allowing me to participate in the care of this patient. Please call if there are any questions.

## 2021-02-11 LAB
HCV QNT BY NAAT INTERPRETATION: DETECTED
HCV QNT BY NAAT IU/ML: ABNORMAL
HCV QNT BY NAAT LOG IU/ML: 6.66 LOG IU/ML

## 2021-02-12 DIAGNOSIS — B19.20 HEPATITIS C VIRUS INFECTION WITHOUT HEPATIC COMA, UNSPECIFIED CHRONICITY: Primary | ICD-10-CM

## 2021-02-12 DIAGNOSIS — Z51.81 MEDICATION MONITORING ENCOUNTER: Primary | ICD-10-CM

## 2021-02-12 DIAGNOSIS — M05.9 SEROPOSITIVE RHEUMATOID ARTHRITIS (HCC): ICD-10-CM

## 2021-02-16 DIAGNOSIS — M05.9 SEROPOSITIVE RHEUMATOID ARTHRITIS (HCC): ICD-10-CM

## 2021-02-16 DIAGNOSIS — Z51.81 MEDICATION MONITORING ENCOUNTER: Primary | ICD-10-CM

## 2021-02-16 RX ORDER — SULFASALAZINE 500 MG/1
TABLET ORAL
Qty: 120 TABLET | Refills: 0 | Status: SHIPPED | OUTPATIENT
Start: 2021-02-16 | End: 2021-04-22 | Stop reason: SDUPTHER

## 2021-02-22 ENCOUNTER — OFFICE VISIT (OUTPATIENT)
Dept: PULMONOLOGY | Age: 81
End: 2021-02-22
Payer: MEDICARE

## 2021-02-22 VITALS
BODY MASS INDEX: 31.93 KG/M2 | OXYGEN SATURATION: 95 % | TEMPERATURE: 97.8 F | SYSTOLIC BLOOD PRESSURE: 124 MMHG | HEIGHT: 57 IN | HEART RATE: 75 BPM | DIASTOLIC BLOOD PRESSURE: 78 MMHG | WEIGHT: 148 LBS

## 2021-02-22 DIAGNOSIS — Z87.891 PERSONAL HISTORY OF TOBACCO USE: ICD-10-CM

## 2021-02-22 DIAGNOSIS — M05.9 RHEUMATOID ARTHRITIS WITH POSITIVE RHEUMATOID FACTOR, INVOLVING UNSPECIFIED SITE (HCC): ICD-10-CM

## 2021-02-22 DIAGNOSIS — J44.9 CHRONIC OBSTRUCTIVE PULMONARY DISEASE, UNSPECIFIED COPD TYPE (HCC): Primary | ICD-10-CM

## 2021-02-22 PROCEDURE — G8926 SPIRO NO PERF OR DOC: HCPCS | Performed by: NURSE PRACTITIONER

## 2021-02-22 PROCEDURE — 3023F SPIROM DOC REV: CPT | Performed by: NURSE PRACTITIONER

## 2021-02-22 PROCEDURE — G8417 CALC BMI ABV UP PARAM F/U: HCPCS | Performed by: NURSE PRACTITIONER

## 2021-02-22 PROCEDURE — G8400 PT W/DXA NO RESULTS DOC: HCPCS | Performed by: NURSE PRACTITIONER

## 2021-02-22 PROCEDURE — 1123F ACP DISCUSS/DSCN MKR DOCD: CPT | Performed by: NURSE PRACTITIONER

## 2021-02-22 PROCEDURE — G8484 FLU IMMUNIZE NO ADMIN: HCPCS | Performed by: NURSE PRACTITIONER

## 2021-02-22 PROCEDURE — G8427 DOCREV CUR MEDS BY ELIG CLIN: HCPCS | Performed by: NURSE PRACTITIONER

## 2021-02-22 PROCEDURE — 99214 OFFICE O/P EST MOD 30 MIN: CPT | Performed by: NURSE PRACTITIONER

## 2021-02-22 PROCEDURE — 1090F PRES/ABSN URINE INCON ASSESS: CPT | Performed by: NURSE PRACTITIONER

## 2021-02-22 PROCEDURE — 1036F TOBACCO NON-USER: CPT | Performed by: NURSE PRACTITIONER

## 2021-02-22 PROCEDURE — 4040F PNEUMOC VAC/ADMIN/RCVD: CPT | Performed by: NURSE PRACTITIONER

## 2021-02-22 ASSESSMENT — ENCOUNTER SYMPTOMS
GASTROINTESTINAL NEGATIVE: 1
ALLERGIC/IMMUNOLOGIC NEGATIVE: 1
EYES NEGATIVE: 1
SHORTNESS OF BREATH: 1
WHEEZING: 0
COUGH: 0

## 2021-02-22 NOTE — PROGRESS NOTES
[facility]    Patient: Jerri Bui, 80 y.o.   : 1940      Subjective     Chief Complaint   Patient presents with    COPD        HPI  Vanessa Hanks is here for follow up for COPD unspecified. PFT never done- bedside kathrine done in 2020 while inpatient   No oxygen supplementation currently and has since returned her oxygen  Former smoker quit  2PPD and 102 PYH  Currently on Anoro and Albuterol PRN for SOB/wheezing but rarely needs rescue inhaler. Currently following with Dr. Jeanell Hamman office and recently started on Sulfasalazine (+) RA and Hepatitis C per patients daughter. SOB mainly with exertion- denies any SOB at rest  No cough, chest pain, fever or chills   No known Covid exposures     Progress History:   Since last visit any new medical issues? Yes patient now following with Dr. Jeanell Hamman for (+) RA- and new dx of Hepatitis C  New ER or hospital visits? No  Any new or changes in medicines? Yes   Using inhalers? Yes   Are they helpful?  Yes   Past Medical hx   PMH:  Past Medical History:   Diagnosis Date    Arthritis     Cancer of eye ([address]     Right    Dyslipidemia 2/15/2017    Fatigue     History of tobacco abuse 2/15/2017    Hyperlipidemia 10/13/2014    Hypertension     Non morbid obesity due to excess calories 2/15/2017    Pneumonia     Precordial pain 2/15/2017    Rheumatoid arteritis ([address]     Smoker     SOB (shortness of breath) 2/15/2017    Tobacco abuse      SURGICAL HISTORY:  Past Surgical History:   Procedure Laterality Date    EYE SURGERY      Right eye cancer    HEMORRHOID SURGERY      HERNIA REPAIR N/A     ROBOTIC UMBILICAL HERNIA REPAIR WITH MESH performed by Satnam Mariscal MD at [address] [place] [address] Road:  Social History     Tobacco Use    Smoking status: Former Smoker     Packs/day: 2.00     Years: 51.00     Pack years: 102.00     Start date: 1963     Quit date: 2012     Years since quittin.1    Smokeless tobacco: Never Used    Tobacco comment: tryin to cut down   Substance Use Topics    Alcohol use: No     Comment: quit    Drug use: No     ALLERGIES:No Known Allergies  FAMILY HISTORY:  Family History   Problem Relation Age of Onset    Cancer Mother     Cancer Father     Cancer Brother     Heart Failure Sister     Cancer Daughter      CURRENT MEDICATIONS:  Current Outpatient Medications   Medication Sig Dispense Refill    sulfaSALAzine (AZULFIDINE) 500 MG tablet Take 1 tablet twice daily for 7 days the increase to 2 tablets twice daily 120 tablet 0    predniSONE (DELTASONE) 5 MG tablet Take 1 tablet by mouth daily 30 tablet 1    albuterol (PROVENTIL) (2.5 MG/3ML) 0.083% nebulizer solution Take 3 mLs by nebulization every 6 hours as needed for Wheezing or Shortness of Breath 1 Package 11    umeclidinium-vilanterol (ANORO ELLIPTA) 62.5-25 MCG/INH AEPB inhaler Inhale 1 puff into the lungs daily 1 each 11    lisinopril (PRINIVIL;ZESTRIL) 20 MG tablet Take 20 mg by mouth daily      albuterol sulfate HFA (PROVENTIL HFA) 108 (90 BASE) MCG/ACT inhaler Inhale 2 puffs into the lungs every 6 hours as needed for Wheezing 1 Inhaler 0    meloxicam (MOBIC) 15 MG tablet TAKE 1 TABLET BY MOUTH EVERY DAY AS NEEDED FOR PAIN 30 tablet 0    atorvastatin (LIPITOR) 20 MG tablet TAKE 1 TABLET BY MOUTH ONE TIME A DAY  30 tablet 4    amLODIPine (NORVASC) 5 MG tablet TAKE 1 TABLET BY MOUTH DAILY 30 tablet 5     No current facility-administered medications for this visit. ROS   Review of Systems   Constitutional: Negative. Negative for chills and fever. HENT: Negative. Negative for congestion. Eyes: Negative. Respiratory: Positive for shortness of breath (with exertion). Negative for cough and wheezing. Cardiovascular: Negative. Negative for chest pain and leg swelling. Gastrointestinal: Negative. Endocrine: Negative. Genitourinary: Negative. Musculoskeletal: Positive for arthralgias. Allergic/Immunologic: Negative. Neurological: Negative. Hematological: Negative. Psychiatric/Behavioral: Negative. Negative for sleep disturbance. Physical exam   /78   Pulse 75   Temp 97.8 °F (36.6 °C)   Ht 4' 9\" (1.448 m)   Wt 148 lb (67.1 kg)   SpO2 95%   BMI 32.03 kg/m²    Wt Readings from Last 3 Encounters:   02/22/21 148 lb (67.1 kg)   02/08/21 147 lb 0.8 oz (66.7 kg)   01/28/21 147 lb (66.7 kg)       Physical Exam  Vitals signs and nursing note reviewed. Constitutional:       Appearance: She is well-developed. HENT:      Head: Normocephalic and atraumatic. Eyes:      Conjunctiva/sclera: Conjunctivae normal.      Pupils: Pupils are equal, round, and reactive to light. Neck:      Musculoskeletal: Normal range of motion and neck supple. Vascular: No JVD. Cardiovascular:      Rate and Rhythm: Normal rate and regular rhythm. Heart sounds: Normal heart sounds. No murmur. No friction rub. No gallop. Pulmonary:      Effort: Pulmonary effort is normal. No respiratory distress. Breath sounds: Examination of the right-lower field reveals decreased breath sounds. Examination of the left-lower field reveals decreased breath sounds. Decreased breath sounds present. No wheezing or rales. Abdominal:      General: Bowel sounds are normal.      Palpations: Abdomen is soft. Musculoskeletal: Normal range of motion. Skin:     General: Skin is warm and dry. Capillary Refill: Capillary refill takes less than 2 seconds. Neurological:      Mental Status: She is alert and oriented to person, place, and time. Psychiatric:         Behavior: Behavior normal.         Thought Content:  Thought content normal.         Judgment: Judgment normal.          results   Lung Nodule Screening     [x] Qualifies    [] Does not qualify   [] Declined    [] Completed  The USPSTF recommends annual screening for lung cancer with low-dose computed tomography (LDCT) in adults aged 54 to 80 years who have a 30 pack-year smoking history and currently smoke or have quit within the past 15 years. Screening should be discontinued once a person has not smoked for 15 years or develops a health problem that substantially limits life expectancy or the ability or willingness to have curative lung surgery. Bedside Spirometry done 9/2020         Rheumatoid Factor 564.0High   <14 IU/mL Final 12/14/2020 11:07 AM [address] Final 12/14/2020 11:07  [facility]       Assessment      Diagnosis Orders   1. Chronic obstructive pulmonary disease, unspecified COPD type ([facility]  Full PFT Study With Bronchodilator    COVID-19 Ambulatory    CT LUNG SCREEN [Initial/Annual]   2. Personal history of tobacco use  Full PFT Study With Bronchodilator    COVID-19 Ambulatory    CT LUNG SCREEN [Initial/Annual]   3. Rheumatoid arthritis with positive rheumatoid factor, involving unspecified site (HCC)      all joints          Plan   -Full PFT in 3 months   -Covid 19 prior to PFT  -LDCT in 3 months  -Continue current inhaler regimen   -Advised to maintain pneumonia vaccine with PCP and to take flu vaccine this coming season.  -Advised patient to call office with any changes, questions, or concerns regarding respiratory status  -Continue following with Dr. Olimpia Castro for RA treatment     Low Dose CT (LDCT) Lung Screening criteria met              Age 55-77              Pack year smoking >30              Still smoking or less than 15 year since quit              No sign or symptoms of lung cancer              > 11 months since last LDCT      Risks and benefits of lung cancer screening with LDCT scans discussed:     Significance of positive screen - False-positive LDCT results often occur. 95% of all positive results do not lead to a diagnosis of cancer. Usually further imaging can resolve most false-positive results; however, some patients may require invasive procedures. Over diagnosis risk - 10% to 12% of screen-detected lung cancer cases are over diagnosedthat is, the cancer would not have been detected in the patient's lifetime without the screening. Need for follow up screens annually to continue lung cancer screening effectiveness      Risks associated with radiation from annual LDCT- Radiation exposure is about the same as for a mammogram, which is about 1/3 of the annual background radiation exposure from everyday life. Starting screening at age 54 is not likely to increase cancer risk from radiation exposure. Patients with comorbidities resulting in life expectancy of < 10 years, or that would preclude treatment of an abnormality identified on CT, should not be screened due to lack of benefit.      To obtain maximal benefit from this screening, smoking cessation and long-term abstinence from smoking is critical       Will see Hannah Cortez back in: 3 months    Jerson Higginbotham, [facility]  2/22/2021

## 2021-03-01 ENCOUNTER — NURSE ONLY (OUTPATIENT)
Dept: LAB | Age: 81
End: 2021-03-01

## 2021-03-01 ENCOUNTER — HOSPITAL ENCOUNTER (OUTPATIENT)
Dept: ULTRASOUND IMAGING | Age: 81
Discharge: HOME | End: 2021-03-01
Payer: MEDICARE

## 2021-03-01 ENCOUNTER — TELEPHONE (OUTPATIENT)
Dept: FAMILY MEDICINE CLINIC | Age: 81
End: 2021-03-01

## 2021-03-01 DIAGNOSIS — M05.9 SEROPOSITIVE RHEUMATOID ARTHRITIS (HCC): ICD-10-CM

## 2021-03-01 DIAGNOSIS — D75.839 THROMBOCYTOSIS: ICD-10-CM

## 2021-03-01 DIAGNOSIS — Z51.81 MEDICATION MONITORING ENCOUNTER: ICD-10-CM

## 2021-03-01 DIAGNOSIS — B19.20 HEPATITIS C VIRUS INFECTION WITHOUT HEPATIC COMA, UNSPECIFIED CHRONICITY: ICD-10-CM

## 2021-03-01 DIAGNOSIS — Z13.220 SCREENING CHOLESTEROL LEVEL: ICD-10-CM

## 2021-03-01 LAB
ALBUMIN SERPL-MCNC: 3.8 G/DL (ref 3.5–5.1)
ALP BLD-CCNC: 93 U/L (ref 38–126)
ALT SERPL-CCNC: 15 U/L (ref 11–66)
ANION GAP SERPL CALCULATED.3IONS-SCNC: 13 MEQ/L (ref 8–16)
AST SERPL-CCNC: 25 U/L (ref 5–40)
BASOPHILS # BLD: 0.2 %
BASOPHILS ABSOLUTE: 0 THOU/MM3 (ref 0–0.1)
BILIRUB SERPL-MCNC: 0.3 MG/DL (ref 0.3–1.2)
BUN BLDV-MCNC: 11 MG/DL (ref 7–22)
C-REACTIVE PROTEIN: 2.62 MG/DL (ref 0–1)
CALCIUM SERPL-MCNC: 8.9 MG/DL (ref 8.5–10.5)
CHLORIDE BLD-SCNC: 104 MEQ/L (ref 98–111)
CHOLESTEROL, TOTAL: 177 MG/DL (ref 100–199)
CO2: 21 MEQ/L (ref 23–33)
CREAT SERPL-MCNC: 1 MG/DL (ref 0.4–1.2)
EOSINOPHIL # BLD: 0.2 %
EOSINOPHILS ABSOLUTE: 0 THOU/MM3 (ref 0–0.4)
ERYTHROCYTE [DISTWIDTH] IN BLOOD BY AUTOMATED COUNT: 17.7 % (ref 11.5–14.5)
ERYTHROCYTE [DISTWIDTH] IN BLOOD BY AUTOMATED COUNT: 58.7 FL (ref 35–45)
GFR SERPL CREATININE-BSD FRML MDRD: 53 ML/MIN/1.73M2
GLUCOSE BLD-MCNC: 92 MG/DL (ref 70–108)
HCT VFR BLD CALC: 41.6 % (ref 37–47)
HDLC SERPL-MCNC: 56 MG/DL
HEMOGLOBIN: 12.5 GM/DL (ref 12–16)
IMMATURE GRANS (ABS): 0.03 THOU/MM3 (ref 0–0.07)
IMMATURE GRANULOCYTES: 0.6 %
LDL CHOLESTEROL CALCULATED: 94 MG/DL
LYMPHOCYTES # BLD: 16.8 %
LYMPHOCYTES ABSOLUTE: 0.9 THOU/MM3 (ref 1–4.8)
MCH RBC QN AUTO: 27.2 PG (ref 26–33)
MCHC RBC AUTO-ENTMCNC: 30 GM/DL (ref 32.2–35.5)
MCV RBC AUTO: 90.4 FL (ref 81–99)
MONOCYTES # BLD: 10.8 %
MONOCYTES ABSOLUTE: 0.6 THOU/MM3 (ref 0.4–1.3)
NUCLEATED RED BLOOD CELLS: 0 /100 WBC
PLATELET # BLD: 275 THOU/MM3 (ref 130–400)
PMV BLD AUTO: 10.4 FL (ref 9.4–12.4)
POTASSIUM SERPL-SCNC: 3.4 MEQ/L (ref 3.5–5.2)
RBC # BLD: 4.6 MILL/MM3 (ref 4.2–5.4)
SEDIMENTATION RATE, ERYTHROCYTE: 43 MM/HR (ref 0–20)
SEG NEUTROPHILS: 71.4 %
SEGMENTED NEUTROPHILS ABSOLUTE COUNT: 3.7 THOU/MM3 (ref 1.8–7.7)
SODIUM BLD-SCNC: 138 MEQ/L (ref 135–145)
TOTAL PROTEIN: 7.3 G/DL (ref 6.1–8)
TRIGL SERPL-MCNC: 137 MG/DL (ref 0–199)
WBC # BLD: 5.2 THOU/MM3 (ref 4.8–10.8)

## 2021-03-01 PROCEDURE — 76705 ECHO EXAM OF ABDOMEN: CPT

## 2021-03-01 NOTE — TELEPHONE ENCOUNTER
----- Message from ERNESTO Mathis - CNP sent at 3/1/2021 12:07 PM EST -----  Let Christi Leahy know her labs are stable and within appropriate ranges.

## 2021-03-01 NOTE — TELEPHONE ENCOUNTER
Nasrin, pt's daughter informed. Pt's daughter wanted to let Hossein know that Pt had Hep C ordered by [facility] and it was positive. Pt had liver US this morning. She wanted to be sure Hossein was aware.

## 2021-03-01 NOTE — TELEPHONE ENCOUNTER
Noted. I will route this back to Jan as FYFRANK. Looks like she has already been referred to GI by [facility] and [facility].

## 2021-03-03 ENCOUNTER — TELEPHONE (OUTPATIENT)
Dept: RHEUMATOLOGY | Age: 81
End: 2021-03-03

## 2021-03-03 DIAGNOSIS — R93.2 ABNORMAL LIVER ULTRASOUND: Primary | ICD-10-CM

## 2021-03-03 DIAGNOSIS — Z86.19 HISTORY OF HEPATITIS C: ICD-10-CM

## 2021-03-03 NOTE — TELEPHONE ENCOUNTER
Phoned Dayday Garner again and reviewed scheduling process. She is wanting to schedule herself so that she can plan around her work. She request [employer] message with phone number.  Message sent

## 2021-03-03 NOTE — TELEPHONE ENCOUNTER
Called and spoke with patient's daughter, Jenna Billingsley, regarding liver ultrasound results and need for further imaging. Patient voiced understanding and all questions were answered. MRI abdomen with and without contrast ordered for further evaluation. PCP contacted for further management.

## 2021-03-04 ENCOUNTER — PATIENT MESSAGE (OUTPATIENT)
Dept: FAMILY MEDICINE CLINIC | Age: 81
End: 2021-03-04

## 2021-03-05 ENCOUNTER — OFFICE VISIT (OUTPATIENT)
Dept: FAMILY MEDICINE CLINIC | Age: 81
End: 2021-03-05
Payer: MEDICARE

## 2021-03-05 ENCOUNTER — NURSE ONLY (OUTPATIENT)
Dept: LAB | Age: 81
End: 2021-03-05

## 2021-03-05 VITALS
DIASTOLIC BLOOD PRESSURE: 54 MMHG | WEIGHT: 137 LBS | RESPIRATION RATE: 12 BRPM | SYSTOLIC BLOOD PRESSURE: 84 MMHG | HEART RATE: 89 BPM | HEIGHT: 59 IN | BODY MASS INDEX: 27.62 KG/M2 | OXYGEN SATURATION: 90 % | TEMPERATURE: 97.9 F

## 2021-03-05 DIAGNOSIS — B18.2 CHRONIC HEPATITIS C WITHOUT HEPATIC COMA (HCC): ICD-10-CM

## 2021-03-05 DIAGNOSIS — R11.0 NAUSEA: ICD-10-CM

## 2021-03-05 DIAGNOSIS — R53.1 WEAKNESS: ICD-10-CM

## 2021-03-05 DIAGNOSIS — K52.9 GASTROENTERITIS, ACUTE: Primary | ICD-10-CM

## 2021-03-05 DIAGNOSIS — R19.7 ACUTE DIARRHEA: ICD-10-CM

## 2021-03-05 DIAGNOSIS — R16.0 LIVER MASS: ICD-10-CM

## 2021-03-05 DIAGNOSIS — J44.9 CHRONIC OBSTRUCTIVE PULMONARY DISEASE, UNSPECIFIED COPD TYPE (HCC): ICD-10-CM

## 2021-03-05 LAB
ANION GAP SERPL CALCULATED.3IONS-SCNC: 17 MEQ/L (ref 8–16)
BASOPHILS # BLD: 0.3 %
BASOPHILS ABSOLUTE: 0 THOU/MM3 (ref 0–0.1)
BUN BLDV-MCNC: 11 MG/DL (ref 7–22)
CALCIUM SERPL-MCNC: 9.1 MG/DL (ref 8.5–10.5)
CHLORIDE BLD-SCNC: 100 MEQ/L (ref 98–111)
CO2: 18 MEQ/L (ref 23–33)
CREAT SERPL-MCNC: 0.9 MG/DL (ref 0.4–1.2)
EOSINOPHIL # BLD: 0.3 %
EOSINOPHILS ABSOLUTE: 0 THOU/MM3 (ref 0–0.4)
ERYTHROCYTE [DISTWIDTH] IN BLOOD BY AUTOMATED COUNT: 17.2 % (ref 11.5–14.5)
ERYTHROCYTE [DISTWIDTH] IN BLOOD BY AUTOMATED COUNT: 55.8 FL (ref 35–45)
GFR SERPL CREATININE-BSD FRML MDRD: 60 ML/MIN/1.73M2
GLUCOSE BLD-MCNC: 92 MG/DL (ref 70–108)
HCT VFR BLD CALC: 45.7 % (ref 37–47)
HEMOGLOBIN: 13.8 GM/DL (ref 12–16)
IMMATURE GRANS (ABS): 0.05 THOU/MM3 (ref 0–0.07)
IMMATURE GRANULOCYTES: 1.6 %
LYMPHOCYTES # BLD: 21.5 %
LYMPHOCYTES ABSOLUTE: 0.7 THOU/MM3 (ref 1–4.8)
MCH RBC QN AUTO: 26.5 PG (ref 26–33)
MCHC RBC AUTO-ENTMCNC: 30.2 GM/DL (ref 32.2–35.5)
MCV RBC AUTO: 87.7 FL (ref 81–99)
MONOCYTES # BLD: 13.1 %
MONOCYTES ABSOLUTE: 0.4 THOU/MM3 (ref 0.4–1.3)
NUCLEATED RED BLOOD CELLS: 0 /100 WBC
PLATELET # BLD: 298 THOU/MM3 (ref 130–400)
PMV BLD AUTO: 10.4 FL (ref 9.4–12.4)
POTASSIUM SERPL-SCNC: 4 MEQ/L (ref 3.5–5.2)
RBC # BLD: 5.21 MILL/MM3 (ref 4.2–5.4)
SEG NEUTROPHILS: 63.2 %
SEGMENTED NEUTROPHILS ABSOLUTE COUNT: 2 THOU/MM3 (ref 1.8–7.7)
SODIUM BLD-SCNC: 135 MEQ/L (ref 135–145)
WBC # BLD: 3.1 THOU/MM3 (ref 4.8–10.8)

## 2021-03-05 PROCEDURE — G8926 SPIRO NO PERF OR DOC: HCPCS | Performed by: FAMILY MEDICINE

## 2021-03-05 PROCEDURE — 3023F SPIROM DOC REV: CPT | Performed by: FAMILY MEDICINE

## 2021-03-05 PROCEDURE — G8427 DOCREV CUR MEDS BY ELIG CLIN: HCPCS | Performed by: FAMILY MEDICINE

## 2021-03-05 PROCEDURE — 1090F PRES/ABSN URINE INCON ASSESS: CPT | Performed by: FAMILY MEDICINE

## 2021-03-05 PROCEDURE — 1036F TOBACCO NON-USER: CPT | Performed by: FAMILY MEDICINE

## 2021-03-05 PROCEDURE — G8400 PT W/DXA NO RESULTS DOC: HCPCS | Performed by: FAMILY MEDICINE

## 2021-03-05 PROCEDURE — G8417 CALC BMI ABV UP PARAM F/U: HCPCS | Performed by: FAMILY MEDICINE

## 2021-03-05 PROCEDURE — 99214 OFFICE O/P EST MOD 30 MIN: CPT | Performed by: FAMILY MEDICINE

## 2021-03-05 PROCEDURE — G8484 FLU IMMUNIZE NO ADMIN: HCPCS | Performed by: FAMILY MEDICINE

## 2021-03-05 PROCEDURE — 1123F ACP DISCUSS/DSCN MKR DOCD: CPT | Performed by: FAMILY MEDICINE

## 2021-03-05 PROCEDURE — 4040F PNEUMOC VAC/ADMIN/RCVD: CPT | Performed by: FAMILY MEDICINE

## 2021-03-05 RX ORDER — ONDANSETRON 4 MG/1
4 TABLET, FILM COATED ORAL 3 TIMES DAILY PRN
Qty: 60 TABLET | Refills: 0 | Status: SHIPPED | OUTPATIENT
Start: 2021-03-05 | End: 2021-04-22

## 2021-03-05 RX ORDER — FAMOTIDINE 20 MG/1
20 TABLET, FILM COATED ORAL 2 TIMES DAILY
Qty: 28 TABLET | Refills: 0 | Status: ON HOLD | OUTPATIENT
Start: 2021-03-05 | End: 2021-03-30

## 2021-03-05 NOTE — PATIENT INSTRUCTIONS
Patient Education        Gastroenteritis: Care Instructions  Your Care Instructions     Gastroenteritis is an illness that may cause nausea, vomiting, and diarrhea. It is sometimes called \"stomach flu. \" It can be caused by bacteria or a virus. You will probably begin to feel better in 1 to 2 days. In the meantime, get plenty of rest and make sure you do not become dehydrated. Dehydration occurs when your body loses too much fluid. Follow-up care is a key part of your treatment and safety. Be sure to make and go to all appointments, and call your doctor if you are having problems. It's also a good idea to know your test results and keep a list of the medicines you take. How can you care for yourself at home? · If your doctor prescribed antibiotics, take them as directed. Do not stop taking them just because you feel better. You need to take the full course of antibiotics. · Drink plenty of fluids to prevent dehydration, enough so that your urine is light yellow or clear like water. Choose water and other caffeine-free clear liquids until you feel better. If you have kidney, heart, or liver disease and have to limit fluids, talk with your doctor before you increase your fluid intake. · Drink fluids slowly, in frequent, small amounts, because drinking too much too fast can cause vomiting. · Begin eating mild foods, such as dry toast, yogurt, applesauce, bananas, and rice. Avoid spicy, hot, or high-fat foods, and do not drink alcohol or caffeine for a day or two. Do not drink milk or eat ice cream until you are feeling better. How to prevent gastroenteritis  · Keep hot foods hot and cold foods cold. · Do not eat meats, dressings, salads, or other foods that have been kept at room temperature for more than 2 hours. · Use a thermometer to check your refrigerator. It should be between 34°F and 40°F.  · Defrost meats in the refrigerator or microwave, not on the kitchen counter.   · Keep your hands and your kitchen clean. Wash your hands, cutting boards, and countertops with hot soapy water frequently. · Cook meat until it is well done. · Do not eat raw eggs or uncooked sauces made with raw eggs. · Do not take chances. If food looks or tastes spoiled, throw it out. When should you call for help? Call 911 anytime you think you may need emergency care. For example, call if:    · You vomit blood or what looks like coffee grounds.     · You passed out (lost consciousness).     · You pass maroon or very bloody stools. Call your doctor now or seek immediate medical care if:    · You have severe belly pain.     · You have signs of needing more fluids. You have sunken eyes, a dry mouth, and pass only a little dark urine.     · You feel like you are going to faint.     · You have increased belly pain that does not go away in 1 to 2 days.     · You have new or increased nausea, or you are vomiting.     · You have a new or higher fever.     · Your stools are black and tarlike or have streaks of blood. Watch closely for changes in your health, and be sure to contact your doctor if:    · You are dizzy or lightheaded.     · You urinate less than usual, or your urine is dark yellow or brown.     · You do not feel better with each day that goes by. Where can you learn more? Go to [email or web address] and sign in to your [employer] account. Enter N142 in the [facility] box to learn more about \"Gastroenteritis: Care Instructions. \"     If you do not have an account, please click on the \"Sign Up Now\" link. Current as of: February 11, 2020               Content Version: 12.6  © 9879-9887 [employer]. Care instructions adapted under license by [facility] ([facility] If you have questions about a medical condition or this instruction, always ask your healthcare professional. [address] any warranty or liability for your use of this information.

## 2021-03-05 NOTE — TELEPHONE ENCOUNTER
From: Juanita Hall  To: ERNESTO Grajeda CNP  Sent: 3/4/2021 5:28 PM EST  Subject: Non-Urgent Medical Question    Hello, My mom [facility] received the Covid 19 shot on Monday March 1st. She has been feeling really lousy ever sense. She is complaining of Nausea. No appetite at all. I am getting a her to drink a little Gatorade but not much more. Do you have any recommendations? Thanks so much!

## 2021-03-05 NOTE — PROGRESS NOTES
Chief Complaint   Patient presents with    Nausea     since 3/1/21 she got home and has had nausea since. has not been eating/drinking since     Fatigue     feels weak, fatigue, and body aches started 3/1/21       History obtained from child and the patient. SUBJECTIVE:  Yany Cade is a 80 y.o. female that presents today for      -not feeling well:  Got covid vaccine 3/1  Since then has not felt well  Inc nausea  Some diarrhea  Dec appetite  Inc weakness, fatigue and wt loss  Also recently dx with HCV and liver mass, w/u is ongoing with that. Has consult with [facility] next wk  MRI abdomen already ordered    Yesterday spent all day in bed  Hasn't ate much in 24-36 hours  Some fluid intake, but not a lot  Has some lower abd cramping that is mild  Two loose stools  No fevers  No blood in stool  No falls    Breathing a little worse, has COPD.  Didn't take her inhalers  BP a bit low today, has not been taking her BP meds        Current Outpatient Medications   Medication Sig Dispense Refill    ondansetron (ZOFRAN) 4 MG tablet Take 1 tablet by mouth 3 times daily as needed for Nausea or Vomiting 60 tablet 0    famotidine (PEPCID) 20 MG tablet Take 1 tablet by mouth 2 times daily for 14 days 28 tablet 0    sulfaSALAzine (AZULFIDINE) 500 MG tablet Take 1 tablet twice daily for 7 days the increase to 2 tablets twice daily 120 tablet 0    predniSONE (DELTASONE) 5 MG tablet Take 1 tablet by mouth daily 30 tablet 1    albuterol (PROVENTIL) (2.5 MG/3ML) 0.083% nebulizer solution Take 3 mLs by nebulization every 6 hours as needed for Wheezing or Shortness of Breath 1 Package 11    umeclidinium-vilanterol (ANORO ELLIPTA) 62.5-25 MCG/INH AEPB inhaler Inhale 1 puff into the lungs daily 1 each 11    lisinopril (PRINIVIL;ZESTRIL) 20 MG tablet Take 20 mg by mouth daily      albuterol sulfate HFA (PROVENTIL HFA) 108 (90 BASE) MCG/ACT inhaler Inhale 2 puffs into the lungs every 6 hours as needed for Wheezing 1 Tobacco Use    Smoking status: Former Smoker     Packs/day: 2.00     Years: 51.00     Pack years: 102.00     Start date: 1963     Quit date: 2012     Years since quittin.1    Smokeless tobacco: Never Used    Tobacco comment: tryin to cut down   Substance Use Topics    Alcohol use: No     Comment: quit         Family History   Problem Relation Age of Onset    Cancer Mother     Cancer Father     Cancer Brother     Heart Failure Sister     Cancer Daughter          I have reviewed the patient's past medical history, past surgical history, allergies, medications, social and family history and I have made updates where appropriate. Review of Systems  Positive responses are highlighted in bold    Constitutional:  Fever, Chills, Night Sweats, Fatigue, Unexpected changes in weight  HENT:  Ear pain, Tinnitus, Nosebleeds, Trouble swallowing, Hearing loss, Sore throat  Cardiovascular:  Chest Pain, Palpitations, Orthopnea, Paroxysmal Nocturnal Dyspnea  Respiratory:  Cough, Wheezing, Shortness of breath, Chest tightness, Apnea  Gastrointestinal:  Nausea, Vomiting, Diarrhea, Constipation, Heartburn, Blood in stool  Genitourinary:  Difficulty or painful urination, Flank pain, Change in frequency, Urgency  Skin:  Color change, Rash, Itching, Wound  Musculoskeletal:  Joint pain, Back pain, Gait problems, Joint swelling, Myalgias  Neurological:  Dizziness, Headaches, Presyncope, Numbness, Seizures, Tremors  Endocrine:  Heat Intolerance, Cold Intolerance, Polydipsia, Polyphagia, Polyuria      PHYSICAL EXAM:  Vitals:    21 1144   BP: (!) 84/54   Pulse: 89   Resp: 12   Temp: 97.9 °F (36.6 °C)   TempSrc: Oral   SpO2: 90%   Weight: 137 lb (62.1 kg)   Height: 4' 11.25\" (1.505 m)     Body mass index is 27.44 kg/m².      Wt Readings from Last 3 Encounters:   21 137 lb (62.1 kg)   21 148 lb (67.1 kg)   21 147 lb 0.8 oz (66.7 kg)     VS Reviewed  General Appearance: A&O x 3, No acute INFORMATION: HCV by Quantitative NAAT   Normal range for this assay is \"Not Detected\". The quantitative range of this assay is 10 - 100,000,000 IU/mL   (1.0 - 8.0 log IU/mL). Lower limit of quantitation (LLoQ):   10 IU/mL (1.0 log IU/mL)   LLoQ values do not apply to diluted specimens. A result of \"Not Detected\" does not rule out the presence of   inhibitors in the patient specimen or hepatitis C virus RNA   concentrations below the level of detection of the test. Care   should be taken when interpreting any single viral load   determination. This test should not be used for blood donor screening, associated   re-entry protocols, or for screening Human Cell, Tissues and   Cellular Tissue-Based Products (HCT/P). Performed by [facility] , [id number] [facility] [phone]   www. Cherie Saldana MD - Lab. Director          Narrative   PROCEDURE: US LIVER       CLINICAL INFORMATION: Hepatitis C virus infection without hepatic coma, unspecified chronicity        COMPARISON: CT dated 8/31/2020.       TECHNIQUE:  Transverse and longitudinal ultrasound images were obtained through the liver region. Grayscale and color flow images were performed.         FINDINGS:        There is a heterogeneous hyperechoic lesion demonstrated within the right hepatic lobe measuring approximately 4 x 3.9 x 2.9 cm. This demonstrates central hypoechogenicity. There is vascularity adjacent to the periphery of this lesion. Recommend further    evaluation with multiphasic liver mass protocol CT or MR imaging with and without contrast.       There is an indeterminate 6 x 8 x 4 mm hypoechoic lesion within the pancreatic tail. Recommend continued follow-up.  Further characterization could be obtained with multiphasic CT or MR with and without contrast. This could possibly be cystic in nature.       The gallbladder appears within normal limits.       The hepatic vasculature demonstrates normal flow direction.     Liver - L= 14.3 cm   Gallbladder - 5.2 x 2.8 x 3.1 cm   Gallbladder Wall - 0.30 cm   Common Duct - 0.68 cm   Porter's Sign: NEG           Impression   1. There is a heterogeneous hyperechoic lesion demonstrated within the right hepatic lobe measuring approximately 4 x 3.9 x 2.9 cm. This demonstrates central hypoechogenicity. There is vascularity adjacent to the periphery of this lesion. Recommend    further evaluation with multiphasic liver mass protocol CT or MR imaging with and without contrast. Differential considerations include a possible neoplastic process such as hepatocellular carcinoma versus a complex hemangioma.       2. There is an indeterminate 6 x 8 x 4 mm hypoechoic lesion within the pancreatic tail. Recommend continued follow-up. Further characterization could be obtained with multiphasic CT or MR with and without contrast. This could possibly be cystic in    nature.       **This report has been created using voice recognition software.  It may contain minor errors which are inherent in voice recognition technology. **       Final report electronically signed by Dr. Lizzeth Cuevas on 3/1/2021 1:18 PM       ASSESSMENT & PLAN  1. Gastroenteritis, acute    Covid vaccine reaction vs VGE  BP's low but rest of VSS  Exam overall reassuring    Plan:  Start zofran and pepcid to help with nausea and stomach upset  Push oral fluids and advance diet as tolerated  Get cbc/bmp today  If sxs do not improve and/or worsen over the weekend, go to ER  Will f/u with family via phone Monday  Will call with labs once available  All questions answered    - ondansetron (ZOFRAN) 4 MG tablet; Take 1 tablet by mouth 3 times daily as needed for Nausea or Vomiting  Dispense: 60 tablet; Refill: 0  - famotidine (PEPCID) 20 MG tablet; Take 1 tablet by mouth 2 times daily for 14 days  Dispense: 28 tablet; Refill: 0    2. Acute diarrhea    As per # 1    3. Nausea    As per # 1    - ondansetron (ZOFRAN) 4 MG tablet;  Take 1 tablet by mouth 3 times daily as needed for Nausea or Vomiting  Dispense: 60 tablet; Refill: 0  - famotidine (PEPCID) 20 MG tablet; Take 1 tablet by mouth 2 times daily for 14 days  Dispense: 28 tablet; Refill: 0  - CBC Auto Differential; Future  - Basic Metabolic Panel; Future    4. Weakness    As per # 1    - CBC Auto Differential; Future  - Basic Metabolic Panel; Future    5. Chronic hepatitis C without hepatic coma ([facility]    New dx  W/u on going  All questions answered  F/u GI    6. Liver mass    As per # 5    7. Chronic obstructive pulmonary disease, unspecified COPD type ([facility]    Get back to taking inhalers today  Reviewed ER precautions, pt and daughter understand      DISPOSITION    Return if symptoms worsen or fail to improve. Montez Caraballo released without restrictions.       Electronically signed by Holger Benz DO on 3/5/2021 at 12:31 PM

## 2021-03-05 NOTE — TELEPHONE ENCOUNTER
Scheduled pt. Daughterbrandon notified.     Future Appointments   Date Time Provider Bhupendra Violetta   3/5/2021 11:40 [facility]   4/12/2021  2:20 PM ERNESTO Mcknight - CNP N SRPX Rheum [facility]   4/28/2021  4:00 PM Darline Henry APRN - [id number] [facility] [place]   6/7/2021 11:00 AM STR PULMONARY FUNCTION ROOM 1 [facility] PFT [facility]   6/7/2021 12:40 PM STR CT IMAGING RM1  [facility] STRZ OUT EXP STR Radiolog   6/7/2021  1:40 PM DO PORTER Castro SRPX Rheum [facility]   6/14/2021  9:30 AM ERNESTO Stauffer - CNP [facility] [address] [facility]

## 2021-03-08 ENCOUNTER — TELEPHONE (OUTPATIENT)
Dept: FAMILY MEDICINE CLINIC | Age: 81
End: 2021-03-08

## 2021-03-08 ENCOUNTER — APPOINTMENT (OUTPATIENT)
Dept: GENERAL RADIOLOGY | Age: 81
DRG: 871 | End: 2021-03-08
Payer: MEDICARE

## 2021-03-08 ENCOUNTER — HOSPITAL ENCOUNTER (INPATIENT)
Age: 81
LOS: 22 days | Discharge: HOME | DRG: 871 | End: 2021-03-30
Attending: EMERGENCY MEDICINE | Admitting: FAMILY MEDICINE
Payer: MEDICARE

## 2021-03-08 ENCOUNTER — NURSE TRIAGE (OUTPATIENT)
Dept: OTHER | Facility: CLINIC | Age: 81
End: 2021-03-08

## 2021-03-08 DIAGNOSIS — K52.9 GASTROENTERITIS, ACUTE: ICD-10-CM

## 2021-03-08 DIAGNOSIS — R19.7 ACUTE DIARRHEA: ICD-10-CM

## 2021-03-08 DIAGNOSIS — J12.82 PNEUMONIA DUE TO COVID-19 VIRUS: Primary | ICD-10-CM

## 2021-03-08 DIAGNOSIS — U07.1 PNEUMONIA DUE TO COVID-19 VIRUS: Primary | ICD-10-CM

## 2021-03-08 DIAGNOSIS — K52.9 GASTROENTERITIS, ACUTE: Primary | ICD-10-CM

## 2021-03-08 DIAGNOSIS — R11.0 NAUSEA: ICD-10-CM

## 2021-03-08 DIAGNOSIS — J96.01 ACUTE RESPIRATORY FAILURE WITH HYPOXIA (HCC): ICD-10-CM

## 2021-03-08 PROBLEM — J96.00 ACUTE RESPIRATORY FAILURE (HCC): Status: ACTIVE | Noted: 2021-03-08

## 2021-03-08 LAB
ALBUMIN SERPL-MCNC: 3.3 G/DL (ref 3.5–5.1)
ALP BLD-CCNC: 81 U/L (ref 38–126)
ALT SERPL-CCNC: 10 U/L (ref 11–66)
AMMONIA: 13 UMOL/L (ref 11–60)
ANION GAP SERPL CALCULATED.3IONS-SCNC: 19 MEQ/L (ref 8–16)
AST SERPL-CCNC: 28 U/L (ref 5–40)
BASE EXCESS MIXED: -3.6 MMOL/L (ref -2–3)
BASOPHILS # BLD: 0.3 %
BASOPHILS ABSOLUTE: 0 THOU/MM3 (ref 0–0.1)
BILIRUB SERPL-MCNC: 0.5 MG/DL (ref 0.3–1.2)
BUN BLDV-MCNC: 11 MG/DL (ref 7–22)
CALCIUM SERPL-MCNC: 8.9 MG/DL (ref 8.5–10.5)
CHLORIDE BLD-SCNC: 101 MEQ/L (ref 98–111)
CO2: 18 MEQ/L (ref 23–33)
COLLECTED BY:: ABNORMAL
CREAT SERPL-MCNC: 0.7 MG/DL (ref 0.4–1.2)
DEVICE: ABNORMAL
EKG ATRIAL RATE: 99 BPM
EKG P AXIS: -16 DEGREES
EKG P-R INTERVAL: 116 MS
EKG Q-T INTERVAL: 338 MS
EKG QRS DURATION: 70 MS
EKG QTC CALCULATION (BAZETT): 433 MS
EKG R AXIS: 7 DEGREES
EKG T AXIS: -57 DEGREES
EKG VENTRICULAR RATE: 99 BPM
EOSINOPHIL # BLD: 0 %
EOSINOPHILS ABSOLUTE: 0 THOU/MM3 (ref 0–0.4)
ERYTHROCYTE [DISTWIDTH] IN BLOOD BY AUTOMATED COUNT: 17 % (ref 11.5–14.5)
ERYTHROCYTE [DISTWIDTH] IN BLOOD BY AUTOMATED COUNT: 53.8 FL (ref 35–45)
FIO2, MIXED VENOUS: 6
FLU A ANTIGEN: NEGATIVE
FLU B ANTIGEN: NEGATIVE
GFR SERPL CREATININE-BSD FRML MDRD: 80 ML/MIN/1.73M2
GLUCOSE BLD-MCNC: 89 MG/DL (ref 70–108)
HCO3, MIXED: 20 MMOL/L (ref 23–28)
HCT VFR BLD CALC: 42.2 % (ref 37–47)
HEMOGLOBIN: 13.2 GM/DL (ref 12–16)
IMMATURE GRANS (ABS): 0.07 THOU/MM3 (ref 0–0.07)
IMMATURE GRANULOCYTES: 2 %
LACTIC ACID: 1.8 MMOL/L (ref 0.5–2.2)
LYMPHOCYTES # BLD: 18.6 %
LYMPHOCYTES ABSOLUTE: 0.7 THOU/MM3 (ref 1–4.8)
MCH RBC QN AUTO: 27 PG (ref 26–33)
MCHC RBC AUTO-ENTMCNC: 31.3 GM/DL (ref 32.2–35.5)
MCV RBC AUTO: 86.3 FL (ref 81–99)
MONOCYTES # BLD: 16 %
MONOCYTES ABSOLUTE: 0.6 THOU/MM3 (ref 0.4–1.3)
NUCLEATED RED BLOOD CELLS: 0 /100 WBC
O2 SAT, MIXED: 42 %
OSMOLALITY CALCULATION: 274.6 MOSMOL/KG (ref 275–300)
PCO2, MIXED VENOUS: 32 MMHG (ref 41–51)
PH, MIXED: 7.41 (ref 7.31–7.41)
PLATELET # BLD: 304 THOU/MM3 (ref 130–400)
PMV BLD AUTO: 10 FL (ref 9.4–12.4)
PO2 MIXED: 23 MMHG (ref 25–40)
POTASSIUM REFLEX MAGNESIUM: 3.7 MEQ/L (ref 3.5–5.2)
PRO-BNP: 1074 PG/ML (ref 0–1800)
RBC # BLD: 4.89 MILL/MM3 (ref 4.2–5.4)
SARS-COV-2, NAAT: DETECTED
SEG NEUTROPHILS: 63.1 %
SEGMENTED NEUTROPHILS ABSOLUTE COUNT: 2.2 THOU/MM3 (ref 1.8–7.7)
SODIUM BLD-SCNC: 138 MEQ/L (ref 135–145)
TOTAL PROTEIN: 7.4 G/DL (ref 6.1–8)
TROPONIN T: < 0.01 NG/ML
VANCOMYCIN RESISTANT ENTEROCOCCUS: NEGATIVE
WBC # BLD: 3.5 THOU/MM3 (ref 4.8–10.8)

## 2021-03-08 PROCEDURE — 82140 ASSAY OF AMMONIA: CPT

## 2021-03-08 PROCEDURE — 71045 X-RAY EXAM CHEST 1 VIEW: CPT

## 2021-03-08 PROCEDURE — 87804 INFLUENZA ASSAY W/OPTIC: CPT

## 2021-03-08 PROCEDURE — 87635 SARS-COV-2 COVID-19 AMP PRB: CPT

## 2021-03-08 PROCEDURE — 85025 COMPLETE CBC W/AUTO DIFF WBC: CPT

## 2021-03-08 PROCEDURE — 87500 VANOMYCIN DNA AMP PROBE: CPT

## 2021-03-08 PROCEDURE — 87081 CULTURE SCREEN ONLY: CPT

## 2021-03-08 PROCEDURE — 84484 ASSAY OF TROPONIN QUANT: CPT

## 2021-03-08 PROCEDURE — 36415 COLL VENOUS BLD VENIPUNCTURE: CPT

## 2021-03-08 PROCEDURE — 82803 BLOOD GASES ANY COMBINATION: CPT

## 2021-03-08 PROCEDURE — 2580000003 HC RX 258: Performed by: STUDENT IN AN ORGANIZED HEALTH CARE EDUCATION/TRAINING PROGRAM

## 2021-03-08 PROCEDURE — 87641 MR-STAPH DNA AMP PROBE: CPT

## 2021-03-08 PROCEDURE — 6370000000 HC RX 637 (ALT 250 FOR IP): Performed by: EMERGENCY MEDICINE

## 2021-03-08 PROCEDURE — 83605 ASSAY OF LACTIC ACID: CPT

## 2021-03-08 PROCEDURE — 80053 COMPREHEN METABOLIC PANEL: CPT

## 2021-03-08 PROCEDURE — 94760 N-INVAS EAR/PLS OXIMETRY 1: CPT

## 2021-03-08 PROCEDURE — 2580000003 HC RX 258: Performed by: PHYSICIAN ASSISTANT

## 2021-03-08 PROCEDURE — 2140000000 HC CCU INTERMEDIATE R&B

## 2021-03-08 PROCEDURE — 6370000000 HC RX 637 (ALT 250 FOR IP): Performed by: STUDENT IN AN ORGANIZED HEALTH CARE EDUCATION/TRAINING PROGRAM

## 2021-03-08 PROCEDURE — 94640 AIRWAY INHALATION TREATMENT: CPT

## 2021-03-08 PROCEDURE — 2700000000 HC OXYGEN THERAPY PER DAY

## 2021-03-08 PROCEDURE — 99285 EMERGENCY DEPT VISIT HI MDM: CPT

## 2021-03-08 PROCEDURE — 99223 1ST HOSP IP/OBS HIGH 75: CPT | Performed by: FAMILY MEDICINE

## 2021-03-08 PROCEDURE — 6360000002 HC RX W HCPCS: Performed by: STUDENT IN AN ORGANIZED HEALTH CARE EDUCATION/TRAINING PROGRAM

## 2021-03-08 PROCEDURE — 83880 ASSAY OF NATRIURETIC PEPTIDE: CPT

## 2021-03-08 PROCEDURE — 6360000002 HC RX W HCPCS: Performed by: EMERGENCY MEDICINE

## 2021-03-08 PROCEDURE — 93005 ELECTROCARDIOGRAM TRACING: CPT | Performed by: PHYSICIAN ASSISTANT

## 2021-03-08 RX ORDER — AMLODIPINE BESYLATE 5 MG/1
1 TABLET ORAL DAILY
Status: DISCONTINUED | OUTPATIENT
Start: 2021-03-08 | End: 2021-03-08

## 2021-03-08 RX ORDER — GUAIFENESIN/DEXTROMETHORPHAN 100-10MG/5
5 SYRUP ORAL EVERY 4 HOURS PRN
Status: DISCONTINUED | OUTPATIENT
Start: 2021-03-08 | End: 2021-03-30 | Stop reason: HOSPADM

## 2021-03-08 RX ORDER — ACETAMINOPHEN 325 MG/1
650 TABLET ORAL EVERY 6 HOURS PRN
Status: DISCONTINUED | OUTPATIENT
Start: 2021-03-08 | End: 2021-03-30 | Stop reason: HOSPADM

## 2021-03-08 RX ORDER — IPRATROPIUM BROMIDE AND ALBUTEROL SULFATE 2.5; .5 MG/3ML; MG/3ML
1 SOLUTION RESPIRATORY (INHALATION) ONCE
Status: COMPLETED | OUTPATIENT
Start: 2021-03-08 | End: 2021-03-08

## 2021-03-08 RX ORDER — IPRATROPIUM BROMIDE AND ALBUTEROL SULFATE 2.5; .5 MG/3ML; MG/3ML
1 SOLUTION RESPIRATORY (INHALATION) EVERY 4 HOURS PRN
Status: DISCONTINUED | OUTPATIENT
Start: 2021-03-08 | End: 2021-03-30 | Stop reason: HOSPADM

## 2021-03-08 RX ORDER — PROMETHAZINE HYDROCHLORIDE 25 MG/1
12.5 TABLET ORAL EVERY 6 HOURS PRN
Status: DISCONTINUED | OUTPATIENT
Start: 2021-03-08 | End: 2021-03-30 | Stop reason: HOSPADM

## 2021-03-08 RX ORDER — SODIUM CHLORIDE 9 MG/ML
INJECTION, SOLUTION INTRAVENOUS CONTINUOUS
Status: DISCONTINUED | OUTPATIENT
Start: 2021-03-08 | End: 2021-03-25

## 2021-03-08 RX ORDER — IPRATROPIUM BROMIDE AND ALBUTEROL SULFATE 2.5; .5 MG/3ML; MG/3ML
1 SOLUTION RESPIRATORY (INHALATION)
Status: DISCONTINUED | OUTPATIENT
Start: 2021-03-08 | End: 2021-03-08

## 2021-03-08 RX ORDER — VITAMIN B COMPLEX
6000 TABLET ORAL DAILY
Status: COMPLETED | OUTPATIENT
Start: 2021-03-08 | End: 2021-03-14

## 2021-03-08 RX ORDER — DEXAMETHASONE 4 MG/1
6 TABLET ORAL DAILY
Status: COMPLETED | OUTPATIENT
Start: 2021-03-09 | End: 2021-03-17

## 2021-03-08 RX ORDER — ACETAMINOPHEN 650 MG/1
650 SUPPOSITORY RECTAL EVERY 6 HOURS PRN
Status: DISCONTINUED | OUTPATIENT
Start: 2021-03-08 | End: 2021-03-30 | Stop reason: HOSPADM

## 2021-03-08 RX ORDER — POLYETHYLENE GLYCOL 3350 17 G/17G
17 POWDER, FOR SOLUTION ORAL DAILY PRN
Status: DISCONTINUED | OUTPATIENT
Start: 2021-03-08 | End: 2021-03-30 | Stop reason: HOSPADM

## 2021-03-08 RX ORDER — DEXAMETHASONE SODIUM PHOSPHATE 4 MG/ML
6 INJECTION, SOLUTION INTRA-ARTICULAR; INTRALESIONAL; INTRAMUSCULAR; INTRAVENOUS; SOFT TISSUE ONCE
Status: COMPLETED | OUTPATIENT
Start: 2021-03-08 | End: 2021-03-08

## 2021-03-08 RX ORDER — 0.9 % SODIUM CHLORIDE 0.9 %
1000 INTRAVENOUS SOLUTION INTRAVENOUS ONCE
Status: COMPLETED | OUTPATIENT
Start: 2021-03-08 | End: 2021-03-08

## 2021-03-08 RX ORDER — SODIUM CHLORIDE 0.9 % (FLUSH) 0.9 %
10 SYRINGE (ML) INJECTION EVERY 12 HOURS SCHEDULED
Status: DISCONTINUED | OUTPATIENT
Start: 2021-03-08 | End: 2021-03-30 | Stop reason: HOSPADM

## 2021-03-08 RX ORDER — DEXTROSE MONOHYDRATE 25 G/50ML
12.5 INJECTION, SOLUTION INTRAVENOUS PRN
Status: DISCONTINUED | OUTPATIENT
Start: 2021-03-08 | End: 2021-03-30 | Stop reason: HOSPADM

## 2021-03-08 RX ORDER — VITAMIN B COMPLEX
2000 TABLET ORAL DAILY
Status: DISCONTINUED | OUTPATIENT
Start: 2021-03-15 | End: 2021-03-30 | Stop reason: HOSPADM

## 2021-03-08 RX ORDER — FAMOTIDINE 20 MG/1
20 TABLET, FILM COATED ORAL 2 TIMES DAILY
Status: DISCONTINUED | OUTPATIENT
Start: 2021-03-08 | End: 2021-03-30 | Stop reason: HOSPADM

## 2021-03-08 RX ORDER — ATORVASTATIN CALCIUM 20 MG/1
20 TABLET, FILM COATED ORAL DAILY
Status: DISCONTINUED | OUTPATIENT
Start: 2021-03-08 | End: 2021-03-30 | Stop reason: HOSPADM

## 2021-03-08 RX ORDER — SODIUM CHLORIDE 0.9 % (FLUSH) 0.9 %
10 SYRINGE (ML) INJECTION PRN
Status: DISCONTINUED | OUTPATIENT
Start: 2021-03-08 | End: 2021-03-30 | Stop reason: HOSPADM

## 2021-03-08 RX ORDER — DEXTROSE MONOHYDRATE 50 MG/ML
100 INJECTION, SOLUTION INTRAVENOUS PRN
Status: DISCONTINUED | OUTPATIENT
Start: 2021-03-08 | End: 2021-03-30 | Stop reason: HOSPADM

## 2021-03-08 RX ORDER — AMLODIPINE BESYLATE 5 MG/1
5 TABLET ORAL DAILY
Status: DISCONTINUED | OUTPATIENT
Start: 2021-03-09 | End: 2021-03-30 | Stop reason: HOSPADM

## 2021-03-08 RX ORDER — ONDANSETRON 2 MG/ML
4 INJECTION INTRAMUSCULAR; INTRAVENOUS EVERY 6 HOURS PRN
Status: DISCONTINUED | OUTPATIENT
Start: 2021-03-08 | End: 2021-03-30 | Stop reason: HOSPADM

## 2021-03-08 RX ORDER — NICOTINE POLACRILEX 4 MG
15 LOZENGE BUCCAL PRN
Status: DISCONTINUED | OUTPATIENT
Start: 2021-03-08 | End: 2021-03-30 | Stop reason: HOSPADM

## 2021-03-08 RX ADMIN — Medication 6000 UNITS: at 17:20

## 2021-03-08 RX ADMIN — GLYCOPYRROLATE AND FORMOTEROL FUMARATE 2 PUFF: 9; 4.8 AEROSOL, METERED RESPIRATORY (INHALATION) at 17:15

## 2021-03-08 RX ADMIN — ATORVASTATIN CALCIUM 20 MG: 20 TABLET, FILM COATED ORAL at 23:17

## 2021-03-08 RX ADMIN — ENOXAPARIN SODIUM 30 MG: 30 INJECTION SUBCUTANEOUS at 20:44

## 2021-03-08 RX ADMIN — SODIUM CHLORIDE, PRESERVATIVE FREE 10 ML: 5 INJECTION INTRAVENOUS at 20:43

## 2021-03-08 RX ADMIN — SODIUM CHLORIDE: 9 INJECTION, SOLUTION INTRAVENOUS at 15:28

## 2021-03-08 RX ADMIN — IPRATROPIUM BROMIDE AND ALBUTEROL SULFATE 1 AMPULE: .5; 3 SOLUTION RESPIRATORY (INHALATION) at 12:34

## 2021-03-08 RX ADMIN — SODIUM CHLORIDE 1000 ML: 9 INJECTION, SOLUTION INTRAVENOUS at 15:28

## 2021-03-08 RX ADMIN — FAMOTIDINE 20 MG: 20 TABLET, FILM COATED ORAL at 20:44

## 2021-03-08 RX ADMIN — ACETAMINOPHEN 650 MG: 325 TABLET ORAL at 16:09

## 2021-03-08 RX ADMIN — DEXAMETHASONE SODIUM PHOSPHATE 6 MG: 4 INJECTION, SOLUTION INTRA-ARTICULAR; INTRALESIONAL; INTRAMUSCULAR; INTRAVENOUS; SOFT TISSUE at 16:09

## 2021-03-08 RX ADMIN — SODIUM CHLORIDE: 9 INJECTION, SOLUTION INTRAVENOUS at 17:20

## 2021-03-08 ASSESSMENT — ENCOUNTER SYMPTOMS
ABDOMINAL PAIN: 0
VOICE CHANGE: 0
SHORTNESS OF BREATH: 0
WHEEZING: 0
BACK PAIN: 0
DIARRHEA: 1
COUGH: 0
VOMITING: 1
CONSTIPATION: 0
RHINORRHEA: 0
SINUS PRESSURE: 0
CHEST TIGHTNESS: 0
TROUBLE SWALLOWING: 0
NAUSEA: 0
SORE THROAT: 0

## 2021-03-08 ASSESSMENT — PAIN SCALES - GENERAL
PAINLEVEL_OUTOF10: 0
PAINLEVEL_OUTOF10: 0

## 2021-03-08 NOTE — TELEPHONE ENCOUNTER
Patient called [facility] pre-service center [facility])  with red flag complaint. Brief description of triage: Daughter not with pt. Pt has been lethargic since getting COVID vaccine. Calling pt's other Royal Shirley that is with pt at this time. Both daughters state pt is hard to arouse. Pt woke up to speak to this writer only to state \"i'm tired and want to sleep. \" Pt appears weak and possibly dehydrated per daughters. Triage indicates for patient to call EMS now. Both daughters in agreement to call 911. Care advice provided, patient verbalizes understanding; denies any other questions or concerns; instructed to call back for any new or worsening symptoms. Attention Provider: Thank you for allowing me to participate in the care of your patient. The patient was connected to triage in response to information provided to the [facility]. Please do not respond through this encounter as the response is not directed to a shared pool. Reason for Disposition   Difficult to awaken or acting confused (e.g., disoriented, slurred speech)    Answer Assessment - Initial Assessment Questions  1. DESCRIPTION: \"Describe how you are feeling. \"      Pt states \"I'm just sleepy. \"    2. SEVERITY: \"How bad is it? \"  \"Can you stand and walk? \"    - MILD - Feels weak or tired, but does not interfere with work, school or normal activities    - [facility] to stand and walk; weakness interferes with work, school, or normal activities    - SEVERE - Unable to stand or walk      Pt unable to stand or walk, weak per daughters    3. ONSET:  \"When did the weakness begin? \"      3 days ago    4. CAUSE: \"What do you think is causing the weakness? \"      Had COVID vaccine recenlty    5. MEDICINES: Elaine Phalen you recently started a new medicine or had a change in the amount of a medicine? \"      Stopped taking most medications for past 2 days d/t being tired and not taking them    6. OTHER SYMPTOMS: \"Do you have any other symptoms? \" (e.g., chest pain, fever, cough, SOB, vomiting, diarrhea, bleeding, other areas of pain)      Pt denies pain, has chronic cough, has chronic diarrhea, has Hep C.    7. PREGNANCY: \"Is there any chance you are pregnant? \" \"When was your last menstrual period? \"      Postmenopausal    Protocols used: WEAKNESS (GENERALIZED) AND FATIGUE-ADULT-[place]

## 2021-03-08 NOTE — ED PROVIDER NOTES
[address] [facility] COMPLAINT    Chief Complaint   Patient presents with    Cough    Fatigue       Nurses Notes reviewed and I agree except as noted in the HPI. HPI    Johann Kinga is a 80 y.o. female who presents for evaluation of fatigue and has been having problem awakening since 3/2/2021 following her Covid shot. The patient had a Covid shot 3/1/2021 and the following day the patient has not been feeling well. Patient has been lethargic had a hard time staying awake feeling thirsty all the time and admits to have some diarrhea. Denies any fever however she had chills, no cough, no cold, has not been eating for the past 2 to 3 days, she vomited 2 nights ago. Patient was currently being worked up for hepatitis C. Patient lives with her daughter who is very supportive however her daughter works at night. Denies any melena hematochezia no urinary symptoms      REVIEW OF SYSTEMS    Review of Systems   Constitutional: Positive for fatigue. Negative for appetite change, chills, diaphoresis and fever. HENT: Negative for congestion, ear pain, postnasal drip, rhinorrhea, sinus pressure, sneezing, sore throat, trouble swallowing and voice change. Respiratory: Negative for cough, chest tightness, shortness of breath and wheezing. Cardiovascular: Negative for chest pain, palpitations and leg swelling. Gastrointestinal: Positive for diarrhea and vomiting. Negative for abdominal pain, constipation and nausea. Musculoskeletal: Negative for arthralgias, back pain, joint swelling, myalgias, neck pain and neck stiffness. Neurological: Positive for weakness. Negative for dizziness, syncope, light-headedness, numbness and headaches.         Patient had lethargy       PAST MEDICAL HISTORY     has a past medical history of Arthritis, Cancer of eye ([facility] [address] Dyslipidemia, Fatigue, History of tobacco abuse, Hyperlipidemia, Hypertension, Non morbid obesity due to excess calories, Pneumonia, Precordial pain, Rheumatoid arteritis ([address] Smoker, SOB (shortness of breath), and Tobacco abuse. SURGICAL HISTORY   has a past surgical history that includes eye surgery; Hemorrhoid surgery; and hernia repair (N/A, 2020). CURRENT MEDICATIONS    Previous Medications    ALBUTEROL (PROVENTIL) (2.5 MG/3ML) 0.083% NEBULIZER SOLUTION    Take 3 mLs by nebulization every 6 hours as needed for Wheezing or Shortness of Breath    ALBUTEROL SULFATE HFA (PROVENTIL HFA) 108 (90 BASE) MCG/ACT INHALER    Inhale 2 puffs into the lungs every 6 hours as needed for Wheezing    AMLODIPINE (NORVASC) 5 MG TABLET    TAKE 1 TABLET BY MOUTH DAILY    ATORVASTATIN (LIPITOR) 20 MG TABLET    TAKE 1 TABLET BY MOUTH ONE TIME A DAY     FAMOTIDINE (PEPCID) 20 MG TABLET    Take 1 tablet by mouth 2 times daily for 14 days    LISINOPRIL (PRINIVIL;ZESTRIL) 20 MG TABLET    Take 20 mg by mouth daily    ONDANSETRON (ZOFRAN) 4 MG TABLET    Take 1 tablet by mouth 3 times daily as needed for Nausea or Vomiting    PREDNISONE (DELTASONE) 5 MG TABLET    Take 1 tablet by mouth daily    SULFASALAZINE (AZULFIDINE) 500 MG TABLET    Take 1 tablet twice daily for 7 days the increase to 2 tablets twice daily    UMECLIDINIUM-VILANTEROL (ANORO ELLIPTA) 62.5-25 MCG/INH AEPB INHALER    Inhale 1 puff into the lungs daily       ALLERGIES    has No Known Allergies. FAMILY HISTORY    She indicated that her mother is . She indicated that her father is . She indicated that the status of her sister is unknown. She indicated that her brother is . She indicated that the status of her daughter is unknown.   family history includes Cancer in her brother, daughter, father, and mother; Heart Failure in her sister. SOCIAL HISTORY     reports that she quit smoking about 9 years ago. She started smoking about 58 years ago. She has a 102.00 pack-year smoking history.  She has never used smokeless tobacco. She reports that she does not drink alcohol or use drugs. PHYSICAL EXAM      INITIAL VITALS: BP (!) 150/90   Pulse 99   Temp 98 °F (36.7 °C) (Oral)   Resp 27   SpO2 96% Estimated body mass index is 27.44 kg/m² as calculated from the following:    Height as of 3/5/21: 4' 11.25\" (1.505 m). Weight as of 3/5/21: 137 lb (62.1 kg). Physical Exam  Vitals signs reviewed. Constitutional:       Appearance: She is well-developed. HENT:      Head: Normocephalic and atraumatic. Right Ear: External ear normal.      Left Ear: External ear normal.      Nose: Nose normal.      Mouth/Throat:      Mouth: Mucous membranes are dry. Eyes:      General: No scleral icterus. Conjunctiva/sclera: Conjunctivae normal.      Pupils: Pupils are equal, round, and reactive to light. Neck:      Musculoskeletal: Normal range of motion and neck supple. Thyroid: No thyromegaly. Vascular: No JVD. Cardiovascular:      Rate and Rhythm: Normal rate and regular rhythm. Heart sounds: No murmur. No friction rub. Pulmonary:      Effort: Pulmonary effort is normal.      Breath sounds: Normal breath sounds. No wheezing or rales. Comments: Crackles fine crackles at the bases bilateral  Chest:      Chest wall: No tenderness. Abdominal:      General: Bowel sounds are normal.      Palpations: Abdomen is soft. There is no mass. Tenderness: There is no abdominal tenderness. Lymphadenopathy:      Cervical: No cervical adenopathy. Skin:     Findings: No rash. Neurological:      Mental Status: She is alert and oriented to person, place, and time. Psychiatric:         Behavior: Behavior is cooperative.          MEDICAL DECISION MAKING    DIFFERENTIAL DIAGNOSIS:  Fatigue, lethargy, Covid vaccine side effect, rule out Covid infection, UTI pyelonephritis viral syndrome sepsis      DIAGNOSTIC RESULTS    EKG   Interpreted by Criselda Duenas MD      Rhythm: normal sinus with FILTRATION RATE, ESTIMATED - Abnormal; Notable for the following components:    Est, Glom Filt Rate 80 (*)     All other components within normal limits   OSMOLALITY - Abnormal; Notable for the following components:    Osmolality Calc 274.6 (*)     All other components within normal limits   RAPID INFLUENZA A/B ANTIGENS   TROPONIN   BRAIN NATRIURETIC PEPTIDE   AMMONIA   URINE RT REFLEX TO CULTURE     All other unresulted laboratory test above are normal:    Vitals:    Vitals:    03/08/21 1154 03/08/21 1158 03/08/21 1234 03/08/21 1315   BP:  (!) 160/95  (!) 150/90   Pulse:  95  99   Resp:   24 27   Temp:       TempSrc:       SpO2: 91% 92% 95% 96%       EMERGENCY DEPARTMENT COURSE:    Medications   0.9 % sodium chloride bolus (has no administration in time range)   0.9 % sodium chloride infusion (has no administration in time range)   dexamethasone (DECADRON) injection 6 mg (has no administration in time range)   ipratropium-albuterol (DUONEB) nebulizer solution 1 ampule (1 ampule Inhalation Given 3/8/21 1234)       The pt was seen and evaluated by me. Within the department, I observed the pt's vitalsigns to be within acceptable range. Laboratory and Radiological studies were performed, results were reviewed with the patient. Within the department, the pt was treated with IV fluids, DuoNeb, Decadron, and oxygen. I observed the pt's condition to be hemodynamically stable during the duration of their stay. I explained my proposed course of treatment to the pt, and they were amenable to my decision. The patient is admitted to the hospitalist services Dr. Jerrica Hall graciously admitted the patient      CRITICAL CARE:   None. CONSULTS:  hospitalist for admission    PROCEDURES:  None. FINAL IMPRESSION       1. Pneumonia due to COVID-19 virus    2.  Acute respiratory failure with hypoxia (HCC)          DISPOSITION/PLAN  PATIENT REFERRED TO: Patient is admitted to the hospitalist services Dr. Jerrica Hall graciously admitted the patient. (Please note that portions of this note were completed with a voice recognition program and electronically transcribed. Efforts were [facility] edit the dictations but occasionally words are mis-transcribed . The transcription may contain errors not detected in proofreading.   This transcription was electronically signed.)     03/08/21 1:41 PM      Suad Barboza MD      Emergency room physician           Suad Barboza MD  03/12/21 1600

## 2021-03-08 NOTE — FLOWSHEET NOTE
Patient arrived to unit from ER via 3A08. Patient transferred to ICU bed and placed on continuous ICU bedside monitor. Patient admitted for Acute respiratory failure ([facility] [address] [J96.00]. Vitals obtained. See flowsheets. Patient's IV access includes left 20g peripheral IV. Current infusions and rates of infusion include none. Assessment completed by Toi Xavier. Two nurse skin assessment completed by Toi Xavier and [facility] JOVANNI rOtiz. See flowsheets for assessment details. Policies and procedures of ICU able to be explained to patient at this time. Family member(s)/representative(s) present at time of admission include none. Patient rights explained to family member(s)/representatives and patient, as able. Patient/patient's family member(s)/representative(s) Declined to have physician notified of their admission. All questions posed by patient's family member(s)/representative(s) and patient answered at this time.

## 2021-03-08 NOTE — ED NOTES
Bed: 007A  Expected date:   Expected time:   Means of arrival: [facility]  Comments:     Alicia Morales RN  03/08/21 1142

## 2021-03-08 NOTE — TELEPHONE ENCOUNTER
Please call daughter and see how pt doing today  Let me know, thanks!     Future Appointments   Date Time Provider Bhupendra Violetta   4/12/2021  2:20 PM ERNESTO Alonzo - CNP [facility] [place]   4/28/2021  4:00 PM ERNESTO Jara - [id number] [address] [place]   6/7/2021 11:00 AM STR PULMONARY FUNCTION ROOM 1 [facility]   6/7/2021 12:40 PM STR CT IMAGING RM1  [facility] STRZ OUT EXP STR Radiolog   6/7/2021  1:40 PM DO PORTER Sarah SRPX Rheum P - [place]   6/14/2021  9:30 AM ERNESTO Nava - CNP [facility] [address] [facility]

## 2021-03-08 NOTE — H&P
maintain SPO2 greater than 90% and appears to have increased work of breathing value at rest.    Patient denies any pain, feelings of fever/chills, shortness of breath, nausea/vomiting, changes in bowel or bladder habits. Her only complaint is of weakness. Past Medical History:          Diagnosis Date    Arthritis     Cancer of eye ([address]     Right    Dyslipidemia 2/15/2017    Fatigue     History of tobacco abuse 2/15/2017    Hyperlipidemia 10/13/2014    Hypertension     Non morbid obesity due to excess calories 2/15/2017    Pneumonia     Precordial pain 2/15/2017    Rheumatoid arteritis (HCC)     Smoker     SOB (shortness of breath) 2/15/2017    Tobacco abuse        Past Surgical History:          Procedure Laterality Date    EYE SURGERY      Right eye cancer    HEMORRHOID SURGERY      HERNIA REPAIR N/A 5/04/0473    ROBOTIC UMBILICAL HERNIA REPAIR WITH MESH performed by Keo Gray MD at [facility]       Medications Prior to Admission:      Prior to Admission medications    Medication Sig Start Date End Date Taking?  Authorizing Provider   ondansetron (ZOFRAN) 4 MG tablet Take 1 tablet by mouth 3 times daily as needed for Nausea or Vomiting 3/5/21   Ruth May, DO   famotidine (PEPCID) 20 MG tablet Take 1 tablet by mouth 2 times daily for 14 days 3/5/21 3/19/21  Ruth May, DO   sulfaSALAzine (AZULFIDINE) 500 MG tablet Take 1 tablet twice daily for 7 days the increase to 2 tablets twice daily 2/16/21   ERNESTO Brar - CNP   predniSONE (DELTASONE) 5 MG tablet Take 1 tablet by mouth daily 2/8/21   ERNESTO Brar - CNP   albuterol (PROVENTIL) (2.5 MG/3ML) 0.083% nebulizer solution Take 3 mLs by nebulization every 6 hours as needed for Wheezing or Shortness of Breath 9/29/20   ERNESTO Herrera - CNP   umeclidinium-vilanterol [facility] ELLIP) 62.5-25 MCG/INH AEPB inhaler Inhale 1 puff into the lungs daily 9/23/20   ERNESTO Herrera CNP   lisinopril (PRINIVIL;ZESTRIL) 20 MG tablet Take 20 mg by mouth daily    Historical Provider, MD   albuterol sulfate HFA (PROVENTIL HFA) 108 (90 BASE) MCG/ACT inhaler Inhale 2 puffs into the lungs every 6 hours as needed for Wheezing 2/2/17   Mago Honeycutt PA-C   atorvastatin (LIPITOR) 20 MG tablet TAKE 1 TABLET BY MOUTH ONE TIME A DAY  9/26/16   Duc Mccabe MD   amLODIPine (NORVASC) 5 MG tablet TAKE 1 TABLET BY MOUTH DAILY 9/12/16   Duc Mccabe MD       Allergies:  Patient has no known allergies. Social History:      The patient currently lives by herself at home. TOBACCO:   reports that she quit smoking about 9 years ago. She started smoking about 58 years ago. She has a 102.00 pack-year smoking history. She has never used smokeless tobacco.  ETOH:   reports no history of alcohol use. Family History:      Reviewed in detail for DM, CAD, Cancer, CVA. Positive as follows:        Problem Relation Age of Onset    Cancer Mother     Cancer Father     Cancer Brother     Heart Failure Sister     Cancer Daughter        Diet:  DIET CARDIAC;    Constitutional: Negative for chills and fever. Positive for weakness and fatigue. Decreased intake for 3 days. HENT: Negative for congestion and sore throat. Eyes: Negative for visual disturbance. Respiratory: Positive for cough and denies shortness of breath but she is requiring 6 L nasal cannula to maintain SPO2 90%. Cardiovascular: Negative for chest pain. Gastrointestinal: Negative for abdominal pain and nausea. Genitourinary: Negative for dysuria. Skin: Negative for rash. Neurological: Negative for dizziness, light-headedness and headaches. Psychiatric/Behavioral: The patient is not nervous/anxious. PHYSICAL EXAM:    BP (!) 143/75   Pulse 94   Temp 98 °F (36.7 °C) (Oral)   Resp (!) 33   SpO2 97%     Constitutional:       General: she is not in acute distress.      Appearance: Appears acutely ill and very drowsy, but responds to name. HENT:      Head: Normocephalic. Right Ear: External ear normal.      Left Ear: External ear normal.      Nose: Nose normal.      Mouth/Throat:      Mouth: Mucous membranes are noticeably dry. Eyes:      General: No scleral icterus, but eyes are injected. Extraocular Movements: Extraocular movements intact. Neck:      Musculoskeletal: Normal range of motion. Cardiovascular:      Rate and Rhythm: Normal rate and regular rhythm. Pulses: Normal pulses. Heart sounds: Normal heart sounds. Pulmonary:      Effort: Pulmonary effort is increased despite supplemental O2. Breath sounds: Breath sounds are tight on inspiration and fine crackles at bases. Abdominal:      General: Abdomen is flat. There is no distension. Palpations: Abdomen is soft. Musculoskeletal: Normal range of motion. Skin:     General: Skin is warm and dry. Neurological:      Mental Status: she is drowsy but responds to questions. Motor: No weakness. Psychiatric:         Mood and Affect: Mood is unable to assess as patient is very sleepy. Labs:     Recent Labs     03/08/21  1247   WBC 3.5*   HGB 13.2   HCT 42.2        Recent Labs     03/08/21  1247      K 3.7      CO2 18*   BUN 11   CREATININE 0.7   CALCIUM 8.9     Recent Labs     03/08/21  1247   AST 28   ALT 10*   BILITOT 0.5   ALKPHOS 81     No results for input(s): INR in the last 72 hours. No results for input(s): Ish Killings in the last 72 hours. Urinalysis:      Lab Results   Component Value Date    NITRU NEGATIVE 06/20/2018    BLOODU NEGATIVE 06/20/2018    GLUCOSEU NEGATIVE 06/20/2018       Intake & Output:  No intake/output data recorded. No intake/output data recorded. Radiology:     XR CHEST PORTABLE   Final Result   Mild cardiomegaly. Interstitial edema noted in the lower lobes with peribronchial cuffing this can be related to acute bronchitis, or atypical viral pneumonia. **This report has been created using voice recognition software. It may contain minor errors which are inherent in voice recognition technology. **      Final report electronically signed by Dr. Sheba Billings on 3/8/2021 12:19 PM               DVT prophylaxis: [x] Lovenox                                 [] SCDs                                 [] SQ Heparin                                 [] Encourage ambulation           [] Already on Anticoagulation    Code Status: Limited    Disposition:    [x] Home       [] TCU       [] Rehab       [] Psych       [] SNF       [] [place]       [x] Other-ongoing medical management      Thank you ERNESTO Dale - KARLENE for the opportunity to be involved in this patient's care.     Electronically signed by Cristina Pineda DO on 3/8/2021 at 2:34 PM

## 2021-03-08 NOTE — TELEPHONE ENCOUNTER
Ok  Have her really try to drink fluids today  Can I see her in the office tomorrow? See if we can get las done later today or tomorrow morning to f/u     Diagnosis Orders   1. Gastroenteritis, acute  CBC Auto Differential    Basic Metabolic Panel   2.  Acute diarrhea  CBC Auto Differential    Basic Metabolic Panel

## 2021-03-08 NOTE — TELEPHONE ENCOUNTER
Patient came in Friday and was seen by Dr Angelina Fernandez, her daughter [facility] brought her in and they used our wheelchair. She stated she would love to have a wheelchair of her own as it is hard for her to move around anymore. Please write a script for a wheelchair and they will see if ins will pay for this. Call daughter [facility], who is on [employer]IP[employer] and the phone number for patient to let her know. Thanks.

## 2021-03-08 NOTE — FLOWSHEET NOTE
Since Paty Juarez is in the [facility] unit of , a copy of the AD was given to a nurse to give to her. Spiritual care is availabel to help with these documents as needed. 03/08/21 1620   Encounter Summary   Continue Visiting Yes  (3/8 AD info)   Complexity of Encounter Low   Length of Encounter 15 minutes   Spiritual/Yazidism   Type Spiritual support   Advance Directives (For Healthcare)   Healthcare Directive No, patient does not have an advance directive for healthcare treatment   Information on [place] Requested No   Patient Requests Assistance Yes, referral made to    Advance Directives Documents given  (to nurse to give to her. )   Care Plan:  Continue spiritual and emotional care for patient and family. Including prayers.

## 2021-03-08 NOTE — ED NOTES
Patient resting on cot at this time.  Remains on [address] [place] [address]  03/08/21 1057

## 2021-03-08 NOTE — ACP (ADVANCE CARE PLANNING)
Advance Care Planning     Advance Care Planning (ACP) Physician/NP/PA Conversation    Date of Conversation: 3/8/2021  Conducted with: Patient with Decision Making Capacity    Healthcare Decision Maker:      Primary Decision Maker: Leann Engel - [phone]    Secondary Decision Maker: Ade Cervantes - Maren - [phone]    Click here to complete Healthcare Decision Makers including selection of the Healthcare Decision Maker Relationship (ie \"Primary\")  Today we documented Decision Maker(s) consistent with Legal Next of Kin hierarchy. Care Preferences:    Hospitalization: \"If your health worsens and it becomes clear that your chance of recovery is unlikely, what would be your preference regarding hospitalization? \"  The patient would prefer hospitalization. Ventilation: \"If you were unable to breath on your own and your chance of recovery was unlikely, what would be your preference about the use of a ventilator (breathing machine) if it was available to you? \"  The patient would NOT desire the use of a ventilator. Resuscitation: \"In the event your heart stopped as a result of an underlying serious health condition, would you want attempts made to restart your heart, or would you prefer a natural death? \"  No, do NOT attempt to resuscitate.     Conversation Outcomes / Follow-Up Plan:  ACP in process - completing/providing documents  Requesting follow up on unit by palliative staff for portable [place] DNR form      Length of Voluntary ACP Conversation in minutes:  20 minutes    Bill García RN, BSN, [facility] Liaison  Advance Care

## 2021-03-08 NOTE — ED TRIAGE NOTES
Patient presents from home with concerns of cough and fatigue ever since she got her covid swab last week. Patient follows commands after several times of acting. Patient unable to answer questions appropriately. Patient noted to be 86% on RA.  Placed patient on 4L NC

## 2021-03-08 NOTE — TELEPHONE ENCOUNTER
Nasrin (pt's daughter) informed and verbalized understanding.   She will call her sister in law to see if she can bring pt into the office tommorrow

## 2021-03-08 NOTE — ED NOTES
Patient on 4L showing 88%. Encouraged patient to take deep breathes. Increased patient to 6L via nasal cannula.  Showing now, 91% on 6L      Madhuri Hunt, [place]  03/08/21 3972

## 2021-03-09 LAB
ALBUMIN SERPL-MCNC: 3.1 G/DL (ref 3.5–5.1)
ALP BLD-CCNC: 70 U/L (ref 38–126)
ALT SERPL-CCNC: 8 U/L (ref 11–66)
ANION GAP SERPL CALCULATED.3IONS-SCNC: 12 MEQ/L (ref 8–16)
AST SERPL-CCNC: 20 U/L (ref 5–40)
BASOPHILS # BLD: 0.5 %
BASOPHILS ABSOLUTE: 0 THOU/MM3 (ref 0–0.1)
BILIRUB SERPL-MCNC: 0.4 MG/DL (ref 0.3–1.2)
BUN BLDV-MCNC: 13 MG/DL (ref 7–22)
C-REACTIVE PROTEIN: 5.17 MG/DL (ref 0–1)
CALCIUM SERPL-MCNC: 8.7 MG/DL (ref 8.5–10.5)
CHLORIDE BLD-SCNC: 106 MEQ/L (ref 98–111)
CO2: 21 MEQ/L (ref 23–33)
CREAT SERPL-MCNC: 0.6 MG/DL (ref 0.4–1.2)
D-DIMER QUANTITATIVE: 6996 NG/ML FEU (ref 0–500)
EOSINOPHIL # BLD: 0 %
EOSINOPHILS ABSOLUTE: 0 THOU/MM3 (ref 0–0.4)
ERYTHROCYTE [DISTWIDTH] IN BLOOD BY AUTOMATED COUNT: 16.9 % (ref 11.5–14.5)
ERYTHROCYTE [DISTWIDTH] IN BLOOD BY AUTOMATED COUNT: 53.5 FL (ref 35–45)
FERRITIN: 354 NG/ML (ref 10–291)
GFR SERPL CREATININE-BSD FRML MDRD: > 90 ML/MIN/1.73M2
GLUCOSE BLD-MCNC: 93 MG/DL (ref 70–108)
HCT VFR BLD CALC: 39.1 % (ref 37–47)
HEMOGLOBIN: 12.1 GM/DL (ref 12–16)
IMMATURE GRANS (ABS): 0.05 THOU/MM3 (ref 0–0.07)
IMMATURE GRANULOCYTES: 2.3 %
LD: 302 U/L (ref 100–190)
LYMPHOCYTES # BLD: 30.5 %
LYMPHOCYTES ABSOLUTE: 0.7 THOU/MM3 (ref 1–4.8)
MCH RBC QN AUTO: 26.8 PG (ref 26–33)
MCHC RBC AUTO-ENTMCNC: 30.9 GM/DL (ref 32.2–35.5)
MCV RBC AUTO: 86.7 FL (ref 81–99)
MONOCYTES # BLD: 20.9 %
MONOCYTES ABSOLUTE: 0.5 THOU/MM3 (ref 0.4–1.3)
MRSA SCREEN RT-PCR: NEGATIVE
NUCLEATED RED BLOOD CELLS: 0 /100 WBC
PLATELET # BLD: 277 THOU/MM3 (ref 130–400)
PMV BLD AUTO: 10.4 FL (ref 9.4–12.4)
POTASSIUM REFLEX MAGNESIUM: 4.3 MEQ/L (ref 3.5–5.2)
PROCALCITONIN: 0.09 NG/ML (ref 0.01–0.09)
RBC # BLD: 4.51 MILL/MM3 (ref 4.2–5.4)
SEG NEUTROPHILS: 45.8 %
SEGMENTED NEUTROPHILS ABSOLUTE COUNT: 1 THOU/MM3 (ref 1.8–7.7)
SODIUM BLD-SCNC: 139 MEQ/L (ref 135–145)
TOTAL PROTEIN: 6.6 G/DL (ref 6.1–8)
WBC # BLD: 2.2 THOU/MM3 (ref 4.8–10.8)

## 2021-03-09 PROCEDURE — 83615 LACTATE (LD) (LDH) ENZYME: CPT

## 2021-03-09 PROCEDURE — 6370000000 HC RX 637 (ALT 250 FOR IP): Performed by: STUDENT IN AN ORGANIZED HEALTH CARE EDUCATION/TRAINING PROGRAM

## 2021-03-09 PROCEDURE — 6360000002 HC RX W HCPCS: Performed by: STUDENT IN AN ORGANIZED HEALTH CARE EDUCATION/TRAINING PROGRAM

## 2021-03-09 PROCEDURE — 94760 N-INVAS EAR/PLS OXIMETRY 1: CPT

## 2021-03-09 PROCEDURE — 80053 COMPREHEN METABOLIC PANEL: CPT

## 2021-03-09 PROCEDURE — 82728 ASSAY OF FERRITIN: CPT

## 2021-03-09 PROCEDURE — 2580000003 HC RX 258: Performed by: STUDENT IN AN ORGANIZED HEALTH CARE EDUCATION/TRAINING PROGRAM

## 2021-03-09 PROCEDURE — 86140 C-REACTIVE PROTEIN: CPT

## 2021-03-09 PROCEDURE — 6370000000 HC RX 637 (ALT 250 FOR IP): Performed by: FAMILY MEDICINE

## 2021-03-09 PROCEDURE — 84145 PROCALCITONIN (PCT): CPT

## 2021-03-09 PROCEDURE — 85379 FIBRIN DEGRADATION QUANT: CPT

## 2021-03-09 PROCEDURE — 6360000002 HC RX W HCPCS: Performed by: FAMILY MEDICINE

## 2021-03-09 PROCEDURE — 2500000003 HC RX 250 WO HCPCS: Performed by: FAMILY MEDICINE

## 2021-03-09 PROCEDURE — 85025 COMPLETE CBC W/AUTO DIFF WBC: CPT

## 2021-03-09 PROCEDURE — 2140000000 HC CCU INTERMEDIATE R&B

## 2021-03-09 PROCEDURE — 2580000003 HC RX 258: Performed by: FAMILY MEDICINE

## 2021-03-09 PROCEDURE — 94640 AIRWAY INHALATION TREATMENT: CPT

## 2021-03-09 PROCEDURE — 99233 SBSQ HOSP IP/OBS HIGH 50: CPT | Performed by: FAMILY MEDICINE

## 2021-03-09 PROCEDURE — 36415 COLL VENOUS BLD VENIPUNCTURE: CPT

## 2021-03-09 PROCEDURE — 2700000000 HC OXYGEN THERAPY PER DAY

## 2021-03-09 PROCEDURE — P9059 PLASMA, FRZ BETWEEN 8-24HOUR: HCPCS

## 2021-03-09 PROCEDURE — XW033E5 INTRODUCTION OF REMDESIVIR ANTI-INFECTIVE INTO PERIPHERAL VEIN, PERCUTANEOUS APPROACH, NEW TECHNOLOGY GROUP 5: ICD-10-PCS | Performed by: FAMILY MEDICINE

## 2021-03-09 RX ORDER — ZINC SULFATE 50(220)MG
50 CAPSULE ORAL DAILY
Status: DISCONTINUED | OUTPATIENT
Start: 2021-03-09 | End: 2021-03-30 | Stop reason: HOSPADM

## 2021-03-09 RX ORDER — LACTOBACILLUS RHAMNOSUS GG 10B CELL
1 CAPSULE ORAL 2 TIMES DAILY WITH MEALS
Status: DISCONTINUED | OUTPATIENT
Start: 2021-03-09 | End: 2021-03-10

## 2021-03-09 RX ORDER — AZITHROMYCIN 250 MG/1
250 TABLET, FILM COATED ORAL DAILY
Status: DISCONTINUED | OUTPATIENT
Start: 2021-03-10 | End: 2021-03-10

## 2021-03-09 RX ORDER — SODIUM CHLORIDE 9 MG/ML
INJECTION, SOLUTION INTRAVENOUS PRN
Status: DISCONTINUED | OUTPATIENT
Start: 2021-03-09 | End: 2021-03-30 | Stop reason: HOSPADM

## 2021-03-09 RX ORDER — ASCORBIC ACID 500 MG
1000 TABLET ORAL DAILY
Status: DISCONTINUED | OUTPATIENT
Start: 2021-03-09 | End: 2021-03-30 | Stop reason: HOSPADM

## 2021-03-09 RX ORDER — 0.9 % SODIUM CHLORIDE 0.9 %
30 INTRAVENOUS SOLUTION INTRAVENOUS PRN
Status: DISCONTINUED | OUTPATIENT
Start: 2021-03-09 | End: 2021-03-30 | Stop reason: HOSPADM

## 2021-03-09 RX ADMIN — AMLODIPINE BESYLATE 5 MG: 5 TABLET ORAL at 07:43

## 2021-03-09 RX ADMIN — Medication 50 MG: at 15:16

## 2021-03-09 RX ADMIN — FAMOTIDINE 20 MG: 20 TABLET, FILM COATED ORAL at 08:49

## 2021-03-09 RX ADMIN — CEFTRIAXONE SODIUM 1000 MG: 1 INJECTION, POWDER, FOR SOLUTION INTRAMUSCULAR; INTRAVENOUS at 20:09

## 2021-03-09 RX ADMIN — DEXAMETHASONE 6 MG: 4 TABLET ORAL at 07:43

## 2021-03-09 RX ADMIN — SODIUM CHLORIDE, PRESERVATIVE FREE 10 ML: 5 INJECTION INTRAVENOUS at 20:09

## 2021-03-09 RX ADMIN — ENOXAPARIN SODIUM 30 MG: 30 INJECTION SUBCUTANEOUS at 08:49

## 2021-03-09 RX ADMIN — GLYCOPYRROLATE AND FORMOTEROL FUMARATE 2 PUFF: 9; 4.8 AEROSOL, METERED RESPIRATORY (INHALATION) at 09:52

## 2021-03-09 RX ADMIN — Medication 6000 UNITS: at 07:44

## 2021-03-09 RX ADMIN — OXYCODONE HYDROCHLORIDE AND ACETAMINOPHEN 1000 MG: 500 TABLET ORAL at 15:16

## 2021-03-09 RX ADMIN — Medication 1 CAPSULE: at 15:16

## 2021-03-09 RX ADMIN — ATORVASTATIN CALCIUM 20 MG: 20 TABLET, FILM COATED ORAL at 20:10

## 2021-03-09 RX ADMIN — SODIUM CHLORIDE: 9 INJECTION, SOLUTION INTRAVENOUS at 11:23

## 2021-03-09 RX ADMIN — GLYCOPYRROLATE AND FORMOTEROL FUMARATE 2 PUFF: 9; 4.8 AEROSOL, METERED RESPIRATORY (INHALATION) at 21:41

## 2021-03-09 RX ADMIN — SODIUM CHLORIDE, PRESERVATIVE FREE 10 ML: 5 INJECTION INTRAVENOUS at 07:43

## 2021-03-09 RX ADMIN — ENOXAPARIN SODIUM 30 MG: 30 INJECTION SUBCUTANEOUS at 20:08

## 2021-03-09 RX ADMIN — FAMOTIDINE 20 MG: 20 TABLET, FILM COATED ORAL at 20:09

## 2021-03-09 RX ADMIN — AZITHROMYCIN DIHYDRATE 500 MG: 500 INJECTION, POWDER, LYOPHILIZED, FOR SOLUTION INTRAVENOUS at 16:25

## 2021-03-09 RX ADMIN — REMDESIVIR 200 MG: 100 INJECTION, POWDER, LYOPHILIZED, FOR SOLUTION INTRAVENOUS at 11:22

## 2021-03-09 ASSESSMENT — PAIN SCALES - GENERAL
PAINLEVEL_OUTOF10: 0
PAINLEVEL_OUTOF10: 0

## 2021-03-09 NOTE — PROGRESS NOTES
Interventions:   Food and/or Nutrient Delivery:  Continue Current Diet, Start Oral Nutrition Supplement  Nutrition Education/Counseling:  Education not appropriate   Coordination of Nutrition Care:  Continue to monitor while inpatient, Interdisciplinary Rounds    Goals:  Pt. will tolerate and consume 75% or more of meals to promote wound healign during LOS. Nutrition Monitoring and Evaluation:   Behavioral-Environmental Outcomes:  None Identified   Food/Nutrient Intake Outcomes:  Diet Advancement/Tolerance, Food and Nutrient Intake, Supplement Intake  Physical Signs/Symptoms Outcomes:  Biochemical Data, Chewing or Swallowing, Diarrhea, GI Status, Fluid Status or Edema, Hemodynamic Status, Nutrition Focused Physical Findings, Skin, Weight     Discharge Planning:     Too soon to determine     Electronically signed by Andrea Daniels RD, LD on 3/9/21 at 2:31 PM EST    Contact: [phone]

## 2021-03-09 NOTE — PROGRESS NOTES
Hospitalist Progress Note    Patient:  Licha Kyle      GTDT/ETS:[id number]    YOB: 1940    MRN: [id number]       Acct: [de-identified]     PCP: ERNESTO Mary CNP    Date of Admission: 3/8/2021    Assessment/Plan:    Anticipated Discharge in :    [place]/[facility] Problems    Diagnosis Date Noted    Acute respiratory failure ([facility] [J96.00] 03/08/2021     Pneumonia secondary to COVID 19  · Patient got the 1st dose of COVID vaccine on 3/1, since then se started having GI symptoms (nausea, diarrhea, decreased appetite)  · She started complaining of SOB and fatigue 3 days ago, Tested positive for COVID-19 infection on 3/8 (admission)  · Interstitial edema noted in the lower lobes with peribronchial cuffing this can be related to acute bronchitis, or atypical viral pneumonia. · Decadron started on admission 3/8  · Patient continues to be more short of breath, from being 6 lpm on admission, was weaned to 1 lpm O2 to now needing Hi-flow  · Will start remdesivir today 3/9 and she is agreeable to convalescent plasma as well  · Will also start 3/9 Rocephin and Azith for possible bacterial superinfection, will get procalcitonin, dimer, ferritin, CRP and LD  · Supportive therapy with vitamin C, D, zinc and lactobacillus  · Lovenox 30mg BID for DVT prophylaxis  · Incentive spirometer and acapella    Acute hypoxic respiratory failure secondary to above  · Patient now decompensating, from 6 lpm on admission, being weaned down to 1 lpm when she started becoming hypoxic again to 87% despite 6 lpm  · Will start Hi-flow, if she continues to get worse, may need BIPAP  · Keep SPO2 greater than 90%  · ABG prn if patient continues to become hypoxic, increased work of breathing or becoming lethargic  · Wean as tolerated    Leukopenia  · Secondary to COVID  · Continue to monitor    History of COPD  · Quit smoking 7-10 years ago. Had been 2 pack/day smoker prior.   · Continue bevespi and PRN duonebs  · Keep SPO2 greater than 90%    Recent diagnosis of HCV and liver mass  · Monitor LFTs while on remdesivir  · Follow up as outpatient    Hypertension, controlled  · Amlodipine 5 mg po daily with hold parameters    Chief Complaint:   fatigue    Hospital Course:  80 y.o. female who presented to [address] [place] [address] with increased weakness for the past 3 days. History is limited due to patient's drowsiness. Limited history provided by daughter-in-law who is in the room. Ms. Elijah Gomez received her covid vaccine on 3/1/2021. She did have some fatigue afterwards with nausea. However, she began having acutely worsening weakness and cough over the last 3 days with decreased oral intake. Family was unaware until her daughter-in-law went to check on her today. She denies any shortness of breath to me, but she is requiring 6 L supplemental O2 to maintain SPO2 greater than 90% and appears to have increased work of breathing value at rest.     Patient denies any pain, feelings of fever/chills, shortness of breath, nausea/vomiting, changes in bowel or bladder habits. Her only complaint is of weakness.       Subjective:   Patient seen and examined, appears very weak and frail, noted to be hypoxic this morning despite increasing to 6lpm O2/cannula. Discussed with RT to start on hi-flow protocol. She was noted to be febrile yesterday afternoon. Agreeable to starting treatment with remdesivir and convalescent plasma. Will also start Rocephin and azith. Will obtain procal, dimer and markers of inflammation. Continue steroids. NPO for now to avoid aspiration.        Medications:  Reviewed    Infusion Medications    sodium chloride 50 mL/hr at 03/09/21 1123    dextrose       Scheduled Medications    [START ON 3/10/2021] remdesivir IVPB  100 mg Intravenous Q24H    sodium chloride flush  10 mL Intravenous 2 times per day    enoxaparin  30 mg Subcutaneous BID    Vitamin D  6,000 Units Oral Daily    Followed by   Viky Moreira ON 3/15/2021] Vitamin D  2,000 Units Oral Daily    atorvastatin  20 mg Oral Daily    famotidine  20 mg Oral BID    glycopyrrolate-formoterol  2 puff Inhalation BID    amLODIPine  5 mg Oral Daily    dexamethasone  6 mg Oral Daily     PRN Meds: sodium chloride, sodium chloride flush, promethazine **OR** ondansetron, polyethylene glycol, acetaminophen **OR** acetaminophen, guaiFENesin-dextromethorphan, ipratropium-albuterol, glucose, dextrose, glucagon (rDNA), dextrose      Intake/Output Summary (Last 24 hours) at 3/9/2021 1317  Last data filed at 3/9/2021 0327  Gross per 24 hour   Intake 1531.12 ml   Output    Net 1531.12 ml       Diet:  DIET CARDIAC; Exam:  BP (!) 150/93   Pulse 86   Temp 98.3 °F (36.8 °C) (Oral)   Resp (!) 31   Ht 5' 2\" (1.575 m)   Wt 139 lb 15.9 oz (63.5 kg)   SpO2 92%   BMI 25.60 kg/m²     General appearance: appears acutely ill and frail  HEENT: Pupils equal, round, and reactive to light. Conjunctivae/corneas clear. Dry lips and oral mucosa  Neck: Supple, with full range of motion. No jugular venous distention. Trachea midline. Respiratory:  Normal respiratory effort. Diffused faint crackles bilaterally, no wheezing  Cardiovascular: Regular rate and rhythm with normal S1/S2 without murmurs, rubs or gallops. Abdomen: Soft, non-tender, non-distended with normal bowel sounds. Musculoskeletal: passive and active ROM x 4 extremities. Skin: Skin color, texture, turgor normal.  No rashes or lesions. Neurologic:  Neurovascularly intact without any focal sensory/motor deficits.  Cranial nerves: II-XII intact, grossly non-focal.  Psychiatric: Alert and oriented, thought content appropriate, normal insight  Capillary Refill: Brisk,< 3 seconds   Peripheral Pulses: +2 palpable, equal bilaterally       Labs:   Recent Labs     03/08/21  1247 03/09/21  0356   WBC 3.5* 2.2*   HGB 13.2 12.1   HCT 42.2 39.1    277     Recent Labs     03/08/21  1247 03/09/21  0356    139   K 3.7 4.3    106 CO2 18* 21*   BUN 11 13   CREATININE 0.7 0.6   CALCIUM 8.9 8.7     Recent Labs     03/08/21  1247 03/09/21  0356   AST 28 20   ALT 10* 8*   BILITOT 0.5 0.4   ALKPHOS 81 70     No results for input(s): INR in the last 72 hours. No results for input(s): Cherie Burdock in the last 72 hours. Urinalysis:      Lab Results   Component Value Date    NITRU NEGATIVE 06/20/2018    BLOODU NEGATIVE 06/20/2018    GLUCOSEU NEGATIVE 06/20/2018       Radiology:  XR CHEST PORTABLE   Final Result   Mild cardiomegaly. Interstitial edema noted in the lower lobes with peribronchial cuffing this can be related to acute bronchitis, or atypical viral pneumonia. **This report has been created using voice recognition software. It may contain minor errors which are inherent in voice recognition technology. **      Final report electronically signed by Dr. Damien Oshea on 3/8/2021 12:19 PM          Diet: DIET CARDIAC;    DVT prophylaxis: [] Lovenox                                 [] SCDs                                 [] SQ Heparin                                 [] Encourage ambulation           [] Already on Anticoagulation     Disposition:    [] Home       [] TCU       [] Rehab       [] Psych       [] SNF       [] [place]       [] Other-    Code Status: Limited    PT/OT Eval Status:         Electronically signed by Jose Luis Trevino MD on 3/9/2021 at 1:17 PM

## 2021-03-09 NOTE — CARE COORDINATION
3/9/21, 7:36 AM EST  DISCHARGE PLANNING EVALUATION:    Sherley Hunt       Admitted: 3/8/2021/ [address] day: 1   Location: -08/008-A Reason for admit: Acute respiratory failure ([facility] [J96.00]   PMH:  has a past medical history of Arthritis, Cancer of eye ([facility] Dyslipidemia, Fatigue, History of tobacco abuse, Hyperlipidemia, Hypertension, Non morbid obesity due to excess calories, Pneumonia, Precordial pain, Rheumatoid arteritis ([facility] Smoker, SOB (shortness of breath), and Tobacco abuse. Procedure:   3/8 CXR: Mild cardiomegaly. Interstitial edema noted in the lower lobes with peribronchial cuffing this can be related to acute bronchitis, or atypical viral pneumonia    Barriers to Discharge: Presented with c/o worsening fatigue and SOB that began 3 days PTA. Also reports decreased oral intake x 3 days. Placed on 6L O2. +COVID 19. Admitted to 3A COVID unit. Sats 92% on 6L O2. Tmax 101.8. NSR. Ox3, not situation. PT/OT. Limited code - no x4. Palliative Care consulted. IVF, norvasc, lipitor, po decadron, lovenox bid, pepcid, inhaler, IV remdesivir, vit d, . Received 1L fld bolus and 6 mg IV decadron x1. CRP 5.17, , trop neg, alb 3.1, wbc 2.2, ferritin 354. Rapid flu was neg. PCP: ERNESTO Grove CNP  Readmission Risk Score: 12%    Patient Goals/Plan/Treatment Preferences: Spoke with Mcclureberna Muse; states she lives at home with her daughter and did not use any DME PTA. She states she drives, cares for herself independently, and has a PCP. Spoke with Ren Muse about the possible need for rehab at discharge and she would prefer TCU at [facility] if she is unable to safely return home at discharge. SW consulted for backup plan of SNF as primary RN reports patient has been incontinent of urine and stool twice before 1000 this morning. Patient's daughter works at night. Transportation/Food Security/Housekeeping Addressed:  No issues identified.      BERE [facility] [address] text sent to Dr. Apolinar Omalley regarding if can get PT/OT and asking if patient qualifies for remdesivir or convalescent plasma. Dr. Abdoulaye Apodaca called this CM back and orders received.

## 2021-03-09 NOTE — PLAN OF CARE
Problem: Airway Clearance - Ineffective  Goal: Achieve or maintain patent airway  Outcome: Ongoing  Note: Pt able to maintain patent airway at this time. Pt is on RA. Problem: Gas Exchange - Impaired  Goal: Absence of hypoxia  Outcome: Ongoing  Note: No signs of hypoxia noted this shift. Pt is on RA with spo2 levels remaining above 90%. Problem: Body Temperature -  Risk of, Imbalanced  Goal: Ability to maintain a body temperature within defined limits  Outcome: Ongoing  Note: Pt body temperature is WDL at this time, will continue to monitor. Problem: Isolation Precautions - Risk of Spread of Infection  Goal: Prevent transmission of infection  Outcome: Ongoing  Note: Pt currently in droplet plus precautions related to covid 19. Pt is in negative pressure room. Problem: Nutrition Deficits  Goal: Optimize nutritional status  Outcome: Ongoing  Note: Patient has no complaints of loss of appetite this shift. Patient is on a cardiac diet. Fluids encouraged this shift. Problem: Risk for Fluid Volume Deficit  Goal: Maintain absence of muscle cramping  Outcome: Ongoing  Note: No complaints of muscle cramping at this time, will continue to monitor. Problem: Falls - Risk of:  Goal: Will remain free from falls  Description: Will remain free from falls  Outcome: Ongoing  Note: Patient remained free from falls this shift. Bed is in low position with alarm on and siderails up x2. Education given on use of call light and patient voiced understanding. Call light and beside table within reach. Arm band and falling star in place. Hourly rounds completed. Will continue to monitor. Problem: Skin Integrity:  Goal: Absence of new skin breakdown  Description: Absence of new skin breakdown  Outcome: Ongoing  Note: No signs of new skin breakdown noted with each assessment this shift. Skin warm, dry, and intact except where otherwise noted in head-to-toe assessment.  Mucous membranes pink and moist.

## 2021-03-09 NOTE — PROGRESS NOTES
Spoke with pt daughter, Flakito Henry, at this time. No further questions for this RN. Encouraged to call with any further questions.

## 2021-03-09 NOTE — PROGRESS NOTES
[place] DNR form and prescription to indicate DO NOT INTUBATE completed and placed in chart srikanth for MD signature. Updated charge RN, Linh Joyner. Please call palliative care if needs arise.

## 2021-03-09 NOTE — PROGRESS NOTES
Remdesivir Initiation Note    This patient meets criteria for initiation of remdesivir based on the following:  Proven COVID-19  Mild to moderate disease (SpO2 ? 94% on RA or requiring supplemental O2)  Acceptable hepatic function (ALT within 5 times ULN)    Exclusion Criteria:  Severe disease requiring invasive or non-invasive mechanical ventilation (includes HFNC & BiPAP)  Could consider use in patients requiring high flow if early on in the disease course (based on symptom duration)  Use of more than 1 vasopressor prior to remdesivir initiation  Already improving on supportive treatment and/or impending discharge  Patients in whom the clinical team think death is in the immediate short-term where remdesivir is unlikely to change the clinical outcome     Liver function tests will be monitored daily while on remdesivir.   LFT's ordered daily

## 2021-03-09 NOTE — PROGRESS NOTES
Updated pts daughter, Juve Estrada, at this time. No further questions for this RN. Family encouraged to call back with any further questions at this time.

## 2021-03-10 LAB
AFP-TUMOR MARKER: 0.6 UG/L
ALBUMIN SERPL-MCNC: 2.8 G/DL (ref 3.5–5.1)
ALLEN TEST: ABNORMAL
ALP BLD-CCNC: 60 U/L (ref 38–126)
ALT SERPL-CCNC: 9 U/L (ref 11–66)
ANION GAP SERPL CALCULATED.3IONS-SCNC: 13 MEQ/L (ref 8–16)
AST SERPL-CCNC: 20 U/L (ref 5–40)
BASE EXCESS (CALCULATED): -5.8 MMOL/L (ref -2.5–2.5)
BASOPHILS # BLD: 0.3 %
BASOPHILS ABSOLUTE: 0 THOU/MM3 (ref 0–0.1)
BILIRUB SERPL-MCNC: 0.3 MG/DL (ref 0.3–1.2)
BILIRUBIN DIRECT: < 0.2 MG/DL (ref 0–0.3)
BUN BLDV-MCNC: 14 MG/DL (ref 7–22)
CALCIUM SERPL-MCNC: 8.4 MG/DL (ref 8.5–10.5)
CHLORIDE BLD-SCNC: 107 MEQ/L (ref 98–111)
CO2: 17 MEQ/L (ref 23–33)
COLLECTED BY:: ABNORMAL
CREAT SERPL-MCNC: 0.6 MG/DL (ref 0.4–1.2)
DEVICE: ABNORMAL
EOSINOPHIL # BLD: 0 %
EOSINOPHILS ABSOLUTE: 0 THOU/MM3 (ref 0–0.4)
ERYTHROCYTE [DISTWIDTH] IN BLOOD BY AUTOMATED COUNT: 17.2 % (ref 11.5–14.5)
ERYTHROCYTE [DISTWIDTH] IN BLOOD BY AUTOMATED COUNT: 54.2 FL (ref 35–45)
GFR SERPL CREATININE-BSD FRML MDRD: > 90 ML/MIN/1.73M2
GLUCOSE BLD-MCNC: 79 MG/DL (ref 70–108)
HCO3: 17 MMOL/L (ref 23–28)
HCT VFR BLD CALC: 37.5 % (ref 37–47)
HEMOGLOBIN: 11.6 GM/DL (ref 12–16)
IFIO2: 80
IMMATURE GRANS (ABS): 0.04 THOU/MM3 (ref 0–0.07)
IMMATURE GRANULOCYTES: 1.2 %
INR BLD: 1.14 (ref 0.85–1.13)
LYMPHOCYTES # BLD: 18.9 %
LYMPHOCYTES ABSOLUTE: 0.6 THOU/MM3 (ref 1–4.8)
MAGNESIUM: 1.9 MG/DL (ref 1.6–2.4)
MCH RBC QN AUTO: 26.9 PG (ref 26–33)
MCHC RBC AUTO-ENTMCNC: 30.9 GM/DL (ref 32.2–35.5)
MCV RBC AUTO: 86.8 FL (ref 81–99)
MONOCYTES # BLD: 13.6 %
MONOCYTES ABSOLUTE: 0.4 THOU/MM3 (ref 0.4–1.3)
MRSA SCREEN: NORMAL
NUCLEATED RED BLOOD CELLS: 0 /100 WBC
O2 SATURATION: 84 %
PCO2: 25 MMHG (ref 35–45)
PH BLOOD GAS: 7.45 (ref 7.35–7.45)
PLATELET # BLD: 294 THOU/MM3 (ref 130–400)
PMV BLD AUTO: 10.6 FL (ref 9.4–12.4)
PO2: 45 MMHG (ref 71–104)
POTASSIUM REFLEX MAGNESIUM: 3.1 MEQ/L (ref 3.5–5.2)
RBC # BLD: 4.32 MILL/MM3 (ref 4.2–5.4)
SEG NEUTROPHILS: 66 %
SEGMENTED NEUTROPHILS ABSOLUTE COUNT: 2.1 THOU/MM3 (ref 1.8–7.7)
SODIUM BLD-SCNC: 137 MEQ/L (ref 135–145)
SOURCE, BLOOD GAS: ABNORMAL
TOTAL PROTEIN: 6.3 G/DL (ref 6.1–8)
WBC # BLD: 3.2 THOU/MM3 (ref 4.8–10.8)

## 2021-03-10 PROCEDURE — 2700000000 HC OXYGEN THERAPY PER DAY

## 2021-03-10 PROCEDURE — 6360000002 HC RX W HCPCS: Performed by: STUDENT IN AN ORGANIZED HEALTH CARE EDUCATION/TRAINING PROGRAM

## 2021-03-10 PROCEDURE — 85610 PROTHROMBIN TIME: CPT

## 2021-03-10 PROCEDURE — 6360000002 HC RX W HCPCS: Performed by: NURSE PRACTITIONER

## 2021-03-10 PROCEDURE — 2140000000 HC CCU INTERMEDIATE R&B

## 2021-03-10 PROCEDURE — 80053 COMPREHEN METABOLIC PANEL: CPT

## 2021-03-10 PROCEDURE — 6360000002 HC RX W HCPCS: Performed by: FAMILY MEDICINE

## 2021-03-10 PROCEDURE — 6370000000 HC RX 637 (ALT 250 FOR IP): Performed by: STUDENT IN AN ORGANIZED HEALTH CARE EDUCATION/TRAINING PROGRAM

## 2021-03-10 PROCEDURE — 6370000000 HC RX 637 (ALT 250 FOR IP): Performed by: FAMILY MEDICINE

## 2021-03-10 PROCEDURE — 94669 MECHANICAL CHEST WALL OSCILL: CPT

## 2021-03-10 PROCEDURE — 82105 ALPHA-FETOPROTEIN SERUM: CPT

## 2021-03-10 PROCEDURE — 83735 ASSAY OF MAGNESIUM: CPT

## 2021-03-10 PROCEDURE — 2580000003 HC RX 258: Performed by: FAMILY MEDICINE

## 2021-03-10 PROCEDURE — 99233 SBSQ HOSP IP/OBS HIGH 50: CPT | Performed by: HOSPITALIST

## 2021-03-10 PROCEDURE — 6370000000 HC RX 637 (ALT 250 FOR IP): Performed by: HOSPITALIST

## 2021-03-10 PROCEDURE — 85025 COMPLETE CBC W/AUTO DIFF WBC: CPT

## 2021-03-10 PROCEDURE — 94761 N-INVAS EAR/PLS OXIMETRY MLT: CPT

## 2021-03-10 PROCEDURE — 2580000003 HC RX 258: Performed by: STUDENT IN AN ORGANIZED HEALTH CARE EDUCATION/TRAINING PROGRAM

## 2021-03-10 PROCEDURE — 94640 AIRWAY INHALATION TREATMENT: CPT

## 2021-03-10 PROCEDURE — 2500000003 HC RX 250 WO HCPCS: Performed by: FAMILY MEDICINE

## 2021-03-10 PROCEDURE — 36415 COLL VENOUS BLD VENIPUNCTURE: CPT

## 2021-03-10 PROCEDURE — 82803 BLOOD GASES ANY COMBINATION: CPT

## 2021-03-10 PROCEDURE — 36600 WITHDRAWAL OF ARTERIAL BLOOD: CPT

## 2021-03-10 PROCEDURE — 94660 CPAP INITIATION&MGMT: CPT

## 2021-03-10 RX ORDER — POTASSIUM CHLORIDE 20 MEQ/1
40 TABLET, EXTENDED RELEASE ORAL PRN
Status: DISCONTINUED | OUTPATIENT
Start: 2021-03-10 | End: 2021-03-30 | Stop reason: HOSPADM

## 2021-03-10 RX ORDER — ASPIRIN 81 MG/1
81 TABLET, CHEWABLE ORAL DAILY
Status: DISCONTINUED | OUTPATIENT
Start: 2021-03-10 | End: 2021-03-30 | Stop reason: HOSPADM

## 2021-03-10 RX ORDER — POTASSIUM CHLORIDE 7.45 MG/ML
10 INJECTION INTRAVENOUS PRN
Status: DISCONTINUED | OUTPATIENT
Start: 2021-03-10 | End: 2021-03-30 | Stop reason: HOSPADM

## 2021-03-10 RX ADMIN — GUAIFENESIN AND DEXTROMETHORPHAN 5 ML: 100; 10 SYRUP ORAL at 00:37

## 2021-03-10 RX ADMIN — DEXAMETHASONE 6 MG: 4 TABLET ORAL at 09:50

## 2021-03-10 RX ADMIN — ENOXAPARIN SODIUM 30 MG: 30 INJECTION SUBCUTANEOUS at 20:31

## 2021-03-10 RX ADMIN — ACETAMINOPHEN 650 MG: 325 TABLET ORAL at 00:37

## 2021-03-10 RX ADMIN — FAMOTIDINE 20 MG: 20 TABLET, FILM COATED ORAL at 09:50

## 2021-03-10 RX ADMIN — POTASSIUM CHLORIDE 10 MEQ: 7.46 INJECTION, SOLUTION INTRAVENOUS at 18:11

## 2021-03-10 RX ADMIN — SODIUM CHLORIDE: 9 INJECTION, SOLUTION INTRAVENOUS at 09:48

## 2021-03-10 RX ADMIN — POTASSIUM CHLORIDE 10 MEQ: 7.46 INJECTION, SOLUTION INTRAVENOUS at 13:40

## 2021-03-10 RX ADMIN — ASPIRIN 81 MG: 81 TABLET, CHEWABLE ORAL at 11:46

## 2021-03-10 RX ADMIN — POTASSIUM CHLORIDE 10 MEQ: 7.46 INJECTION, SOLUTION INTRAVENOUS at 11:46

## 2021-03-10 RX ADMIN — SODIUM CHLORIDE, PRESERVATIVE FREE 10 ML: 5 INJECTION INTRAVENOUS at 09:51

## 2021-03-10 RX ADMIN — Medication 6000 UNITS: at 09:49

## 2021-03-10 RX ADMIN — GLYCOPYRROLATE AND FORMOTEROL FUMARATE 2 PUFF: 9; 4.8 AEROSOL, METERED RESPIRATORY (INHALATION) at 07:59

## 2021-03-10 RX ADMIN — REMDESIVIR 100 MG: 100 INJECTION, POWDER, LYOPHILIZED, FOR SOLUTION INTRAVENOUS at 09:48

## 2021-03-10 RX ADMIN — Medication 50 MG: at 09:49

## 2021-03-10 RX ADMIN — POTASSIUM CHLORIDE 10 MEQ: 7.46 INJECTION, SOLUTION INTRAVENOUS at 16:09

## 2021-03-10 RX ADMIN — ENOXAPARIN SODIUM 30 MG: 30 INJECTION SUBCUTANEOUS at 09:50

## 2021-03-10 RX ADMIN — AMLODIPINE BESYLATE 5 MG: 5 TABLET ORAL at 09:50

## 2021-03-10 RX ADMIN — FAMOTIDINE 20 MG: 20 TABLET, FILM COATED ORAL at 20:31

## 2021-03-10 RX ADMIN — GLYCOPYRROLATE AND FORMOTEROL FUMARATE 2 PUFF: 9; 4.8 AEROSOL, METERED RESPIRATORY (INHALATION) at 19:59

## 2021-03-10 RX ADMIN — SODIUM CHLORIDE, PRESERVATIVE FREE 10 ML: 5 INJECTION INTRAVENOUS at 20:31

## 2021-03-10 RX ADMIN — OXYCODONE HYDROCHLORIDE AND ACETAMINOPHEN 1000 MG: 500 TABLET ORAL at 09:50

## 2021-03-10 ASSESSMENT — PAIN SCALES - GENERAL
PAINLEVEL_OUTOF10: 0
PAINLEVEL_OUTOF10: 4
PAINLEVEL_OUTOF10: 0
PAINLEVEL_OUTOF10: 0

## 2021-03-10 NOTE — PROGRESS NOTES
Physician Progress Note      Katia Vazquez  CSN #:                  [id number]  :                       1940  ADMIT DATE:       3/8/2021 11:46 AM  DISCH DATE:  RESPONDING  PROVIDER #:        Wallace Garcia MD          QUERY TEXT:    Pt admitted with COVID-19 and noted to have  (SIRS, other signs of sepsis)   SEPSIS criteria:  PNA, WBC 2.2, LA 1.8, temp 101.8, HR , Resp 24-38. COVID Unit, precautions. If possible, please document below in progress   notes and discharge summary if you are evaluating and/or treating: The medical record reflects the following:  Risk Factors: COVID  Clinical Indicators: SEPSIS criteria: PNA, WBC 2.2, LA 1.8, temp 101.8, HR   , Resp 24-38. Treatment: COVID Unit, precautions. Thank you. Please call if you have any questions. (P) [phone]. Signed   by Enoch Peck RN Clinical , CRCR  Options provided:  -- Sepsis present on admission due to COVID-19 pneumonia  -- Sepsis not present on admission due to COVID-19 pneumonia  -- Covid-19 infection without sepsis  -- Covid-19 pneumonia without sepsis  -- Other - I will add my own diagnosis  -- Disagree - Not applicable / Not valid  -- Disagree - Clinically unable to determine / Unknown  -- Refer to Clinical Documentation Reviewer    PROVIDER RESPONSE TEXT:    This patient has sepsis which was present on admission due to COVID-19   pneumonia.     Query created by: Salma Moreira on 3/9/2021 8:40 AM      Electronically signed by:  Wallace Garcia MD 3/9/2021 8:44 PM

## 2021-03-10 NOTE — PROGRESS NOTES
Hospitalist Progress Note      Patient:  Lesli Zhao    CKIB/MMB:[id number]  YOB: 1940  MRN: [id number]   Acct: [de-identified]   PCP: ERNESTO Mccarty CNP  Date of Admission: 3/8/2021    Assessment/Plan:    Pneumonia secondary to COVID 19  · Patient got the 1st dose of COVID vaccine on 3/1,   · On Dexamethasone 6 mg QD (on H1 blocker) and Remdesivir  · Procal WNLs d/c'ed ABx  · Supportive therapy with vitamin C, D, zinc and lactobacillus (d/c'ed)  · LMWH 30mg BID for DVT prophylaxis; added ASA 81 QD as prothrombotic markers elevated  · IS/Acapella/PT to see     Acute hypoxic respiratory failure secondary to above  · On HFO FiO2 80% and 60 lpm w/ SaO2 in high 90s  · Rn aware to wean supplemental O2 aggressively as tolerated to SpO2 > 90%  · ABG ordered    Leukopenia  · Likely secondary to COVID  · Continue to monitor       HypoK: mild, K 3.1. likely nutritional; will replace and trend    History of COPD  · Quit smoking 7-10 years ago. Annie Felipe been 2 pack/day smoker prior  · Continue guideline-directed standing and prn inhaler regimen     Recent diagnosis of HCV w/o ESLD  ·  LFTs WNLs while on remdesivir; INR ordered  · Follow up as outpatient for consideration of Tx     Hx of Hypertension, controlled  · Amlodipine 5 mg po daily with hold parameters     Liver/pancreatic cystic mass: incidental finding on Liver US:  MR A/P ordered to further assess; AFP and Ca 19-9 ordered    Code status: Limited x4       Chief Complaint: SOB    Initial H and P:-    Admitted for management of Acute hypoxic respiratory failure secondary to COVID 19. I took over care on 3/10/2021        Subjective (past 24 hours):   Sleeping comfortably. No new issues overnight      Past medical history, family history, social history and allergies reviewed again and is unchanged since admission. ROS (12 point review of systems completed. Pertinent positives noted.  Otherwise ROS is negative)     Medications:  Reviewed    Infusion Medications    sodium chloride      sodium chloride      sodium chloride 75 mL/hr at 03/09/21 2009    dextrose       Scheduled Medications    remdesivir IVPB  100 mg Intravenous Q24H    cefTRIAXone (ROCEPHIN) IV  1,000 mg Intravenous Q24H    azithromycin  250 mg Oral Daily    ascorbic acid  1,000 mg Oral Daily    zinc sulfate  50 mg Oral Daily    lactobacillus  1 capsule Oral BID WC    sodium chloride flush  10 mL Intravenous 2 times per day    enoxaparin  30 mg Subcutaneous BID    Vitamin D  6,000 Units Oral Daily    Followed by   Waldoissa Going ON 3/15/2021] Vitamin D  2,000 Units Oral Daily    atorvastatin  20 mg Oral Daily    famotidine  20 mg Oral BID    glycopyrrolate-formoterol  2 puff Inhalation BID    amLODIPine  5 mg Oral Daily    dexamethasone  6 mg Oral Daily     PRN Meds: potassium chloride **OR** potassium alternative oral replacement **OR** potassium chloride, sodium chloride, sodium chloride, sodium chloride, sodium chloride flush, promethazine **OR** ondansetron, polyethylene glycol, acetaminophen **OR** acetaminophen, guaiFENesin-dextromethorphan, ipratropium-albuterol, glucose, dextrose, glucagon (rDNA), dextrose      Intake/Output Summary (Last 24 hours) at 3/10/2021 0711  Last data filed at 3/10/2021 0519  Gross per 24 hour   Intake 952.5 ml   Output    Net 952.5 ml       Diet:  DIET CARDIAC; Dietary Nutrition Supplements: Standard High Calorie Oral Supplement  Dietary Nutrition Supplements: Other Oral Supplement (see comment)    Exam:  /75   Pulse 63   Temp 98.7 °F (37.1 °C) (Oral)   Resp 19   Ht 5' 2\" (1.575 m)   Wt 139 lb 15.9 oz (63.5 kg)   SpO2 99%   BMI 25.60 kg/m²   General appearance: No apparent distress, appears stated age and cooperative. HEENT: Pupils equal, round, and reactive to light. Conjunctivae/corneas clear. Neck: Supple, with full range of motion. No jugular venous distention. Trachea midline. Respiratory:  Diminished A/T bilat lung fields  Cardiovascular: Regular rate and rhythm with normal S1/S2 without murmurs, rubs or gallops. Abdomen: Soft, non-tender, non-distended with normal bowel sounds. Musculoskeletal: passive and active ROM x 4 extremities. Skin: Skin color, texture, turgor normal.  No rashes or lesions. Neurologic:  Neurovascularly intact without any focal sensory/motor deficits. Cranial nerves: II-XII intact, grossly non-focal.  Psychiatric: Alert and oriented, thought content appropriate, normal insight  Capillary Refill: Brisk,< 3 seconds   Peripheral Pulses: +2 palpable, equal bilaterally     Labs:   Recent Labs     03/08/21  1247 03/09/21  0356 03/10/21  0408   WBC 3.5* 2.2* 3.2*   HGB 13.2 12.1 11.6*   HCT 42.2 39.1 37.5    277 294     Recent Labs     03/08/21  1247 03/09/21  0356 03/10/21  0408    139 137   K 3.7 4.3 3.1*    106 107   CO2 18* 21* 17*   BUN 11 13 14   CREATININE 0.7 0.6 0.6   CALCIUM 8.9 8.7 8.4*     Recent Labs     03/08/21  1247 03/09/21  0356 03/10/21  0408   AST 28 20 20   ALT 10* 8* 9*   BILIDIR  --   --  <0.2   BILITOT 0.5 0.4 0.3   ALKPHOS 81 70 60     No results for input(s): INR in the last 72 hours. No results for input(s): Roseland Pippins in the last 72 hours.     Microbiology:    Blood culture #1:   Lab Results   Component Value Date    BC No growth-preliminary  No growth   06/20/2018       Blood culture #2:No results found for: Gerry Pouch    Organism:No results found for: ORG    No results found for: LABGRAM    MRSA culture only:No results found for: [facility]    Urine culture: No results found for: LABURIN    Respiratory culture: No results found for: CULTRESP    Aerobic and Anaerobic :  No results found for: LABAERO  No results found for: LABANAE    Urinalysis:      Lab Results   Component Value Date    NITRU NEGATIVE 06/20/2018    BLOODU NEGATIVE 06/20/2018    GLUCOSEU NEGATIVE 06/20/2018       Radiology:  XR CHEST PORTABLE   Final Result   Mild cardiomegaly. Interstitial edema noted in the lower lobes with peribronchial cuffing this can be related to acute bronchitis, or atypical viral pneumonia. **This report has been created using voice recognition software. It may contain minor errors which are inherent in voice recognition technology. **      Final report electronically signed by Dr. Art Dawson on 3/8/2021 12:19 PM        Xr Chest Portable    Result Date: 3/8/2021  PROCEDURE: XR CHEST PORTABLE CLINICAL INFORMATION: cough. congested. . COMPARISON: 9/22/2020 TECHNIQUE: Portable upright FINDINGS: Cardiomegaly. No mediastinal widening. Mild interstitial edema with a lower lobe predominance. Parabronchial cuffing can be seen. There is however no pleural effusion identified and no confluent airspace consolidation. Pulmonary vessels are not  congested     Mild cardiomegaly. Interstitial edema noted in the lower lobes with peribronchial cuffing this can be related to acute bronchitis, or atypical viral pneumonia. **This report has been created using voice recognition software. It may contain minor errors which are inherent in voice recognition technology. ** Final report electronically signed by Dr. Art Dawson on 3/8/2021 12:19 PM    With RN in room, patient was updated about and agreed upon the treatment plan, all the questions and concerns were addressed. Patient was seen in PPE (PERSONAL PROTECTIVE EQUIPMENT). Patient was interviewed in Strict Airborne and Droplet Precautions.     Electronically signed by Kia Ruiz MD on 3/10/2021 at 7:11 AM

## 2021-03-10 NOTE — CARE COORDINATION
3/10/21, 2:01 PM EST    DISCHARGE PLANNING EVALUATION    SW consult received. RIAN noted TCU consult. Discussed with CM Chloé discharge POC, TCU vs ECF. RIAN called are ECF's to see who maybe taking new Covid pts. RIAN noted that no HCF's are accepting new Covid pts. RIAN spoke with Anitha Ramirez with [facility], they are accepting new pts. RIAN will continue to follow and assist with appropriate discharge POC.

## 2021-03-10 NOTE — PLAN OF CARE
Problem: Airway Clearance - Ineffective  Goal: Achieve or maintain patent airway  Outcome: Ongoing     Problem: Gas Exchange - Impaired  Goal: Absence of hypoxia  Outcome: Ongoing PATIENT REMAINS ON HFNC AT 60 L AND 95 % FIO2   SATS STABLE IN 90'S. PATIENT REFUSED TURN.

## 2021-03-10 NOTE — CARE COORDINATION
3/10/21, 1:11 PM EST    DISCHARGE ON GOING EVALUATION    [facility] day: 2  Location: 3A-08/008-A Reason for admit: Acute respiratory failure ([address] [J96.00]   Procedure:   3/9 Convalescent plasma x1    Barriers to Discharge: Received convalescent plasma yesterday. Remained incontinent of liquid stool; flexiseal placed. MRA abdomen ordered for liver/pancreatic mass. Now on HFNC, 60L, 100% FIO2, with sats 99%. Tmax 99.3. NSR. Ox4. Follows commands. IS/Acapella. PT/OT. Limited code - no x4. Palliative Care consulted. IVF, norvasc, vit C, lipitor, po decadron, lovenox bid, pepcid, inhaler, IV remdesivir, vit d, zinc. K+ 3.1, alb 2.8, wbc 3.2, hgb 11.6, INR 1.14, d-dimer 6996. PCP: ERNESTO Mccarty CNP  Readmission Risk Score: 19%  Patient Goals/Plan/Treatment Preferences: From home w/daughter. Plan for poss TCU vs SNF. SW on case.

## 2021-03-10 NOTE — PLAN OF CARE
[address]  OCCUPATIONAL THERAPY MISSED TREATMENT NOTE  [facility] CCU 3A  3A-08008-A      Date: 3/10/2021  Patient Name: Joseph Solomon        CSN: [id number]   : 1940  (80 y.o.)  Gender: female   Referring Practitioner: Dr. Nuno Benitez MD  Diagnosis: Acute Respiratory Failure         REASON FOR MISSED TREATMENT: Hold Treatment per Nursing  Pt was desaturating quickly while doing bed mobility this morning with nursing. Pt was put on hold this day as she was placed on BiPap.

## 2021-03-11 LAB
ALBUMIN SERPL-MCNC: 2.8 G/DL (ref 3.5–5.1)
ALP BLD-CCNC: 61 U/L (ref 38–126)
ALT SERPL-CCNC: 8 U/L (ref 11–66)
ANION GAP SERPL CALCULATED.3IONS-SCNC: 13 MEQ/L (ref 8–16)
AST SERPL-CCNC: 22 U/L (ref 5–40)
BASOPHILS # BLD: 0.3 %
BASOPHILS ABSOLUTE: 0 THOU/MM3 (ref 0–0.1)
BILIRUB SERPL-MCNC: 0.3 MG/DL (ref 0.3–1.2)
BILIRUBIN DIRECT: < 0.2 MG/DL (ref 0–0.3)
BUN BLDV-MCNC: 11 MG/DL (ref 7–22)
CALCIUM SERPL-MCNC: 7.9 MG/DL (ref 8.5–10.5)
CHLORIDE BLD-SCNC: 111 MEQ/L (ref 98–111)
CO2: 14 MEQ/L (ref 23–33)
CREAT SERPL-MCNC: 0.5 MG/DL (ref 0.4–1.2)
EOSINOPHIL # BLD: 0.3 %
EOSINOPHILS ABSOLUTE: 0 THOU/MM3 (ref 0–0.4)
ERYTHROCYTE [DISTWIDTH] IN BLOOD BY AUTOMATED COUNT: 17.3 % (ref 11.5–14.5)
ERYTHROCYTE [DISTWIDTH] IN BLOOD BY AUTOMATED COUNT: 59 FL (ref 35–45)
GFR SERPL CREATININE-BSD FRML MDRD: > 90 ML/MIN/1.73M2
GLUCOSE BLD-MCNC: 73 MG/DL (ref 70–108)
HCT VFR BLD CALC: 37.8 % (ref 37–47)
HEMOGLOBIN: 11.1 GM/DL (ref 12–16)
IMMATURE GRANS (ABS): 0.08 THOU/MM3 (ref 0–0.07)
IMMATURE GRANULOCYTES: 2.2 %
LYMPHOCYTES # BLD: 19.8 %
LYMPHOCYTES ABSOLUTE: 0.7 THOU/MM3 (ref 1–4.8)
MCH RBC QN AUTO: 27.1 PG (ref 26–33)
MCHC RBC AUTO-ENTMCNC: 29.4 GM/DL (ref 32.2–35.5)
MCV RBC AUTO: 92.4 FL (ref 81–99)
MONOCYTES # BLD: 12.2 %
MONOCYTES ABSOLUTE: 0.5 THOU/MM3 (ref 0.4–1.3)
NUCLEATED RED BLOOD CELLS: 0 /100 WBC
PLATELET # BLD: 243 THOU/MM3 (ref 130–400)
PMV BLD AUTO: 10.3 FL (ref 9.4–12.4)
POTASSIUM REFLEX MAGNESIUM: 3.6 MEQ/L (ref 3.5–5.2)
RBC # BLD: 4.09 MILL/MM3 (ref 4.2–5.4)
SEG NEUTROPHILS: 65.2 %
SEGMENTED NEUTROPHILS ABSOLUTE COUNT: 2.4 THOU/MM3 (ref 1.8–7.7)
SODIUM BLD-SCNC: 138 MEQ/L (ref 135–145)
TOTAL PROTEIN: 5.2 G/DL (ref 6.1–8)
WBC # BLD: 3.7 THOU/MM3 (ref 4.8–10.8)

## 2021-03-11 PROCEDURE — 97167 OT EVAL HIGH COMPLEX 60 MIN: CPT

## 2021-03-11 PROCEDURE — 6360000002 HC RX W HCPCS

## 2021-03-11 PROCEDURE — 36415 COLL VENOUS BLD VENIPUNCTURE: CPT

## 2021-03-11 PROCEDURE — 94669 MECHANICAL CHEST WALL OSCILL: CPT

## 2021-03-11 PROCEDURE — 6370000000 HC RX 637 (ALT 250 FOR IP): Performed by: STUDENT IN AN ORGANIZED HEALTH CARE EDUCATION/TRAINING PROGRAM

## 2021-03-11 PROCEDURE — 97530 THERAPEUTIC ACTIVITIES: CPT

## 2021-03-11 PROCEDURE — 6370000000 HC RX 637 (ALT 250 FOR IP): Performed by: HOSPITALIST

## 2021-03-11 PROCEDURE — 2140000000 HC CCU INTERMEDIATE R&B

## 2021-03-11 PROCEDURE — 6360000002 HC RX W HCPCS: Performed by: STUDENT IN AN ORGANIZED HEALTH CARE EDUCATION/TRAINING PROGRAM

## 2021-03-11 PROCEDURE — 97162 PT EVAL MOD COMPLEX 30 MIN: CPT

## 2021-03-11 PROCEDURE — 6370000000 HC RX 637 (ALT 250 FOR IP): Performed by: FAMILY MEDICINE

## 2021-03-11 PROCEDURE — 80053 COMPREHEN METABOLIC PANEL: CPT

## 2021-03-11 PROCEDURE — 2500000003 HC RX 250 WO HCPCS: Performed by: FAMILY MEDICINE

## 2021-03-11 PROCEDURE — 6360000002 HC RX W HCPCS: Performed by: FAMILY MEDICINE

## 2021-03-11 PROCEDURE — 85025 COMPLETE CBC W/AUTO DIFF WBC: CPT

## 2021-03-11 PROCEDURE — 99233 SBSQ HOSP IP/OBS HIGH 50: CPT | Performed by: HOSPITALIST

## 2021-03-11 PROCEDURE — 2580000003 HC RX 258: Performed by: FAMILY MEDICINE

## 2021-03-11 PROCEDURE — 2700000000 HC OXYGEN THERAPY PER DAY

## 2021-03-11 PROCEDURE — 94761 N-INVAS EAR/PLS OXIMETRY MLT: CPT

## 2021-03-11 PROCEDURE — 2580000003 HC RX 258: Performed by: STUDENT IN AN ORGANIZED HEALTH CARE EDUCATION/TRAINING PROGRAM

## 2021-03-11 PROCEDURE — 94640 AIRWAY INHALATION TREATMENT: CPT

## 2021-03-11 RX ORDER — MORPHINE SULFATE 2 MG/ML
2 INJECTION, SOLUTION INTRAMUSCULAR; INTRAVENOUS EVERY 4 HOURS PRN
Status: DISCONTINUED | OUTPATIENT
Start: 2021-03-11 | End: 2021-03-30 | Stop reason: HOSPADM

## 2021-03-11 RX ORDER — MORPHINE SULFATE 2 MG/ML
INJECTION, SOLUTION INTRAMUSCULAR; INTRAVENOUS
Status: COMPLETED
Start: 2021-03-11 | End: 2021-03-11

## 2021-03-11 RX ADMIN — Medication 50 MG: at 08:54

## 2021-03-11 RX ADMIN — ATORVASTATIN CALCIUM 20 MG: 20 TABLET, FILM COATED ORAL at 19:58

## 2021-03-11 RX ADMIN — FAMOTIDINE 20 MG: 20 TABLET, FILM COATED ORAL at 19:58

## 2021-03-11 RX ADMIN — SODIUM CHLORIDE, PRESERVATIVE FREE 10 ML: 5 INJECTION INTRAVENOUS at 19:59

## 2021-03-11 RX ADMIN — Medication 6000 UNITS: at 08:54

## 2021-03-11 RX ADMIN — REMDESIVIR 100 MG: 100 INJECTION, POWDER, LYOPHILIZED, FOR SOLUTION INTRAVENOUS at 08:53

## 2021-03-11 RX ADMIN — AMLODIPINE BESYLATE 5 MG: 5 TABLET ORAL at 08:54

## 2021-03-11 RX ADMIN — ENOXAPARIN SODIUM 30 MG: 30 INJECTION SUBCUTANEOUS at 08:53

## 2021-03-11 RX ADMIN — ENOXAPARIN SODIUM 30 MG: 30 INJECTION SUBCUTANEOUS at 19:58

## 2021-03-11 RX ADMIN — OXYCODONE HYDROCHLORIDE AND ACETAMINOPHEN 1000 MG: 500 TABLET ORAL at 08:54

## 2021-03-11 RX ADMIN — DEXAMETHASONE 6 MG: 4 TABLET ORAL at 08:53

## 2021-03-11 RX ADMIN — SODIUM CHLORIDE, PRESERVATIVE FREE 10 ML: 5 INJECTION INTRAVENOUS at 08:55

## 2021-03-11 RX ADMIN — GLYCOPYRROLATE AND FORMOTEROL FUMARATE 2 PUFF: 9; 4.8 AEROSOL, METERED RESPIRATORY (INHALATION) at 20:46

## 2021-03-11 RX ADMIN — SODIUM CHLORIDE: 9 INJECTION, SOLUTION INTRAVENOUS at 00:47

## 2021-03-11 RX ADMIN — ASPIRIN 81 MG: 81 TABLET, CHEWABLE ORAL at 08:53

## 2021-03-11 RX ADMIN — GLYCOPYRROLATE AND FORMOTEROL FUMARATE 2 PUFF: 9; 4.8 AEROSOL, METERED RESPIRATORY (INHALATION) at 10:07

## 2021-03-11 RX ADMIN — MORPHINE SULFATE 2 MG: 2 INJECTION, SOLUTION INTRAMUSCULAR; INTRAVENOUS at 19:59

## 2021-03-11 RX ADMIN — FAMOTIDINE 20 MG: 20 TABLET, FILM COATED ORAL at 08:53

## 2021-03-11 ASSESSMENT — PAIN SCALES - GENERAL
PAINLEVEL_OUTOF10: 0
PAINLEVEL_OUTOF10: 0

## 2021-03-11 NOTE — CARE COORDINATION
3/11/21, 2:33 PM EST    DISCHARGE ON GOING EVALUATION    [facility] day: 3  Location: 3A-08/008-A Reason for admit: Acute respiratory failure ([address] [J96.00]   Procedure:   3/9 Convalescent plasma x1    Barriers to Discharge: Added PD&P. Remains on HFNC, 60L, 90% FIO2, with sats 93%. Afebrile. NSR. Ox4. Follows commands. IS/Acapella. PT/OT. Limited code - no x4. Palliative Care consulted. IVF, norvasc, vit C, asa, lipitor, po decadron, lovenox bid, pepcid, inhaler, IV remdesivir, vit d, zinc. Alb 2.8, wbc 3.7, hgb 11.1. PCP: ERNESTO Subramanian CNP  Readmission Risk Score: 19%  Patient Goals/Plan/Treatment Preferences: From home w/daughter. Plan for poss TCU vs SNF. SW on case.

## 2021-03-11 NOTE — PROGRESS NOTES
Patient with SPO2 84-95% on heated hi flow. Encouraged patient to wear BiPAP this evening. Patient refusing, stating she does not need it, she will not wear it, and she would like to just go to sleep. Patient also states she likes to sleep on her sides which will help her breathe better. Patient verbally acknowledges understanding with education. Respiratory updated and also aware.

## 2021-03-11 NOTE — PROGRESS NOTES
3/11/2021. Subjective (past 24 hours):   Sleeping comfortably. Disengaged with conversation. Aside from intermittent episodes of worsening hypoxiao no new issues overnight. Denies any complaints. Past medical history, family history, social history and allergies reviewed again and is unchanged since admission. ROS (12 point review of systems completed. Pertinent positives noted. Otherwise ROS is negative)     Medications:  Reviewed    Infusion Medications    sodium chloride      sodium chloride      sodium chloride 75 mL/hr at 03/11/21 0047    dextrose       Scheduled Medications    aspirin  81 mg Oral Daily    remdesivir IVPB  100 mg Intravenous Q24H    ascorbic acid  1,000 mg Oral Daily    zinc sulfate  50 mg Oral Daily    sodium chloride flush  10 mL Intravenous 2 times per day    enoxaparin  30 mg Subcutaneous BID    Vitamin D  6,000 Units Oral Daily    Followed by   Earnstine Laser ON 3/15/2021] Vitamin D  2,000 Units Oral Daily    atorvastatin  20 mg Oral Daily    famotidine  20 mg Oral BID    glycopyrrolate-formoterol  2 puff Inhalation BID    amLODIPine  5 mg Oral Daily    dexamethasone  6 mg Oral Daily     PRN Meds: potassium chloride **OR** potassium alternative oral replacement **OR** potassium chloride, sodium chloride, sodium chloride, sodium chloride, sodium chloride flush, promethazine **OR** ondansetron, polyethylene glycol, acetaminophen **OR** acetaminophen, guaiFENesin-dextromethorphan, ipratropium-albuterol, glucose, dextrose, glucagon (rDNA), dextrose      Intake/Output Summary (Last 24 hours) at 3/11/2021 0847  Last data filed at 3/11/2021 0610  Gross per 24 hour   Intake 2954.55 ml   Output    Net 2954.55 ml       Diet:  DIET CARDIAC;   Dietary Nutrition Supplements: Standard High Calorie Oral Supplement  Dietary Nutrition Supplements: Other Oral Supplement (see comment)    Exam:  BP (!) 157/95   Pulse 64   Temp 97.6 °F (36.4 °C) (Axillary)   Resp 26   Ht 5' 2\" culture: No results found for: CULTRESP    Aerobic and Anaerobic :  No results found for: LABAERO  No results found for: LABANAE    Urinalysis:      Lab Results   Component Value Date    NITRU NEGATIVE 06/20/2018    BLOODU NEGATIVE 06/20/2018    GLUCOSEU NEGATIVE 06/20/2018       Radiology:  XR CHEST PORTABLE   Final Result   Mild cardiomegaly. Interstitial edema noted in the lower lobes with peribronchial cuffing this can be related to acute bronchitis, or atypical viral pneumonia. **This report has been created using voice recognition software. It may contain minor errors which are inherent in voice recognition technology. **      Final report electronically signed by Dr. Juaquin Reddy on 3/8/2021 12:19 PM      MRI ABDOMEN [address]    (Results Pending)     Xr Chest Portable    Result Date: 3/8/2021  PROCEDURE: XR CHEST PORTABLE CLINICAL INFORMATION: cough. congested. . COMPARISON: 9/22/2020 TECHNIQUE: Portable upright FINDINGS: Cardiomegaly. No mediastinal widening. Mild interstitial edema with a lower lobe predominance. Parabronchial cuffing can be seen. There is however no pleural effusion identified and no confluent airspace consolidation. Pulmonary vessels are not  congested     Mild cardiomegaly. Interstitial edema noted in the lower lobes with peribronchial cuffing this can be related to acute bronchitis, or atypical viral pneumonia. **This report has been created using voice recognition software. It may contain minor errors which are inherent in voice recognition technology. ** Final report electronically signed by Dr. Juaquin Reddy on 3/8/2021 12:19 PM    With RN in room, patient was updated about and agreed upon the treatment plan, all the questions and concerns were addressed. Patient was seen in PPE (PERSONAL PROTECTIVE EQUIPMENT). Patient was interviewed in Strict Airborne and Droplet Precautions.     Electronically signed by Heber Mercer MD on 3/11/2021 at 8:47 AM

## 2021-03-11 NOTE — PROGRESS NOTES
[facility] PHYSICAL THERAPY  EVALUATION  [facility] CCU 3A - 3A-08/008-A  Co-eval with OT due to medical complexity and unsure if pt would tolerate two therapy sessions in one day. Time In: 1311  Time Out: 1349  Timed Code Treatment Minutes: 25 Minutes  Minutes: 38        Date: 3/11/2021  Patient Name: Stefany Cruz,  Gender:  female        MRN: [id number]  : 1940  (80 y.o.)      Referring Practitioner: Asim Summers MD  Diagnosis: acute respiratory failure  Additional Pertinent Hx: Per HPI: \"80 y.o. female who presented to [address] [place] [address] with increased weakness for the past 3 days. History is limited due to patient's drowsiness. Limited history provided by daughter-in-law who is in the room. Ms. Kaden Vergara received her covid vaccine on 3/1/2021. She did have some fatigue afterwards with nausea. However, she began having acutely worsening weakness and cough over the last 3 days with decreased oral intake. Family was unaware until her daughter-in-law went to check on her today. She denies any shortness of breath to me, but she is requiring 6 L supplemental O2 to maintain SPO2 greater than 90% and appears to have increased work of breathing value at rest.\" Pt is positive for COVID-19. Restrictions/Precautions:  Restrictions/Precautions: Isolation(droplet + precautions)  Position Activity Restriction  Other position/activity restrictions: 3/11: HFNC 60L FiO2 100%    Subjective:  Chart Reviewed: Yes  Patient assessed for rehabilitation services?: Yes  Family / Caregiver Present: No  Subjective: RN approved PT evaluation. Pt awake in bed, slightly disheveled. She is agreeable to therapy and agrees it would be nice to sit in a chair. She is pleasant and cooperative throughout session.     General:  Follows Commands: Within Functional Limits    Vision: Impaired  Vision Exceptions: Wears glasses for reading    Hearing: Exceptions to [facility]  Hearing Exceptions: Hard of hearing/hearing concerns    Pain: none stated     Vitals: Oxygen: 87-93% throughout session; other vitals remained stable, BP in 122J systolic    Social/Functional History:    Lives With: Daughter  Type of Home: House  Home Layout: Multi-level  Home Access: Stairs to enter with rails  Entrance Stairs - Number of Steps: 3 steps + 7 to get to her bedroom/bathroom  Home Equipment: (no DME PTA)     Bathroom Equipment: Shower chair    ADL Assistance: Needs assistance(daughter helps with \"everything\")  Homemaking Assistance: Needs assistance  Ambulation Assistance: Independent  Transfer Assistance: Independent    Active : Yes     OBJECTIVE:  Range of Motion:  Bilateral Lower Extremity: WFL    Strength:  Bilateral Lower Extremity: some functional weakness present with STS transfer    Balance:  Static Sitting Balance:  Contact Guard Assistance, X 1, with verbal cues   Dynamic Sitting Balance: Contact Guard Assistance  Static Standing Balance: Minimal Assistance, X 2  Dynamic Standing Balance: Minimal Assistance, X 2, with cues for safety, with verbal cues    Pt sat EOB x ~8 minutes. She reported dizziness. Several cues to look straight ahead and to breathe in through the nose, out through the mouth    Bed Mobility:  Supine to Sit: Minimal Assistance, X 2, with head of bed raised, with verbal cues , with increased time for completion  Scooting: Moderate Assistance, X 1, with verbal cues , with increased time for completion, to scoot forward at EOB    Transfers:  Sit to Stand: Minimal Assistance, X 2, with increased time for completion, cues for hand placement, with verbal cues  Stand to Sit:Minimal Assistance, with increased time for completion, cues for hand placement    Ambulation:  Minimal Assistance, X 2, with cues for safety, with verbal cues   Distance: < 3' to chair from bed  Surface: Level Tile  Device:gait belt  Gait Deviations:   Forward Flexed Posture, Slow Arabella, Decreased Step Length Bilaterally, Decreased Gait Speed and Decreased Terminal Knee Extension  *cues for sequencing. Pt was having difficulty stepping forward with cues to \"walk towards the chair. \" She responded better to \"right foot, left foot, turn, etc\"    Functional Outcome Measures: Completed  AM-PAC Inpatient Mobility Raw Score : 12  AM-PAC Inpatient T-Scale Score : 35.33    ASSESSMENT:  Activity Tolerance:  Patient tolerance of  treatment: fair. Pt required several cues to breathe in through nose and out through mouth to increase O2 saturation. She is a \"mouth-breather. \"      Treatment Initiated: Treatment and education initiated within context of evaluation. Evaluation time included review of current medical information, gathering information related to past medical, social and functional history, completion of standardized testing, formal and informal observation of tasks, assessment of data and development of plan of care and goals. Treatment time included skilled education and facilitation of tasks to increase safety and independence with functional mobility for improved independence and quality of life. Assessment: Body structures, Functions, Activity limitations: Decreased functional mobility , Decreased strength, Decreased endurance, Decreased safe awareness  Assessment: Pt is below PLOF by way of transfers and ambulation. She is primarily limited by poor endurance associated with COVID-19. She will benefit from continued physical therapy to return to PLOF. Prognosis: Good    REQUIRES PT FOLLOW UP: Yes    Discharge Recommendations:  Discharge Recommendations: Continue to assess pending progress(TCU)--pt would benefit from continued therapy after discharge.      Patient Education:  PT Education: Goals, PT Role, Plan of Care, Transfer Training, Gait Training, Energy Conservation, General Safety    Equipment Recommendations:  Equipment Needed: (likely a rolling walker, continue to assess)    Plan:  Times per week: 5x (coordinate with OT--one discipline up to chair, one back to bed)  Times per day: Daily  Current Treatment Recommendations: Strengthening, Functional Mobility Training, Home Exercise Program, Endurance Training, Balance Training, Patient/Caregiver Education & Training, Transfer Training, Gait Training, Safety Education & Training    Goals:  Patient goals : return home  Short term goals  Time Frame for Short term goals: by hospital discharge  Short term goal 1: Supine to/from sit with modified independence for ease of transfers at discharge site. Short term goal 2: Sit to/from stand with modified independence for ease of transfers at discharge site. Short term goal 3: Ambulate 21' with CGA x 1 and LRAD, maintaining O2 saturation >88%, for progression to home distance ambulation. Short term goal 4: Ascend/descend 3 steps with 1 HR and Rich for progression to safe entry into home. Long term goals  Time Frame for Long term goals : N/A due to short ELOS. Following session, patient left in safe position with all fall risk precautions in place.     Juliano Arnold, PT, DPT

## 2021-03-11 NOTE — PLAN OF CARE
Problem: Gas Exchange - Impaired  Goal: Promote optimal lung function  Outcome: Ongoing     Problem: Airway Clearance - Ineffective  Goal: Achieve or maintain patent airway  Outcome: Ongoing     Problem: Gas Exchange - Impaired  Goal: Absence of hypoxia  Outcome: Ongoing  Goal: Promote optimal lung function  Outcome: Ongoing     Problem: Breathing Pattern - Ineffective  Goal: Ability to achieve and maintain a regular respiratory rate  Outcome: Ongoing     Problem:  Body Temperature -  Risk of, Imbalanced  Goal: Ability to maintain a body temperature within defined limits  Outcome: Ongoing  Goal: Will regain or maintain usual level of consciousness  Outcome: Ongoing  Goal: Complications related to the disease process, condition or treatment will be avoided or minimized  Outcome: Ongoing     Problem: Isolation Precautions - Risk of Spread of Infection  Goal: Prevent transmission of infection  Outcome: Ongoing     Problem: Nutrition Deficits  Goal: Optimize nutritional status  Outcome: Ongoing     Problem: Risk for Fluid Volume Deficit  Goal: Maintain normal heart rhythm  Outcome: Ongoing  Goal: Maintain absence of muscle cramping  Outcome: Ongoing  Goal: Maintain normal serum potassium, sodium, calcium, phosphorus, and pH  Outcome: Ongoing     Problem: Loneliness or Risk for Loneliness  Goal: Demonstrate positive use of time alone when socialization is not possible  Outcome: Ongoing     Problem: Fatigue  Goal: Verbalize increase energy and improved vitality  Outcome: Ongoing     Problem: Patient Education: Go to Patient Education Activity  Goal: Patient/Family Education  Outcome: Ongoing     Problem: Falls - Risk of:  Goal: Will remain free from falls  Description: Will remain free from falls  Outcome: Ongoing  Goal: Absence of physical injury  Description: Absence of physical injury  Outcome: Ongoing     Problem: Skin Integrity:  Goal: Will show no infection signs and symptoms  Description: Will show no infection signs and symptoms  Outcome: Ongoing  Goal: Absence of new skin breakdown  Description: Absence of new skin breakdown  Outcome: Ongoing     Problem: Nutrition  Goal: Optimal nutrition therapy  Outcome: Ongoing

## 2021-03-11 NOTE — PROGRESS NOTES
[address]  INPATIENT OCCUPATIONAL THERAPY  [facility] CCU 3A  EVALUATION    Time:   Time In: 1311  Time Out: 1349  Timed Code Treatment Minutes: 23 Minutes  Minutes: 38     Co-tx completed with PT due to medically complex and questionable tolerance for 2 separate therapy sessions. Date: 3/11/2021  Patient Name: Lucien Agosto,   Gender: female      MRN: [id number]  : 1940  (80 y.o.)  Referring Practitioner: Dr. María Elena Condon MD  Diagnosis: Acute Respiratory Failure  Additional Pertinent Hx: Per H&P: Pt presented to [facility] with increased weakness for the past 3 days. Limited history provided by daughter-in-law who is in the room. Ms. Randolph Juares received her covid vaccine on 3/1/2021. She did have some fatigue afterwards with nausea. However, she began having acutely worsening weakness and cough over the last 3 days with decreased oral intake. Family was unaware until her daughter-in-law went to check on her the day of admission. She is requiring 6 L supplemental O2 to maintain SPO2 greater than 90% and appears to have increased work of breathing value at rest. COVID +    Restrictions/Precautions:  Restrictions/Precautions: Isolation(droplet + precautions)  Position Activity Restriction  Other position/activity restrictions: 3/11: HFNC 60L FiO2 100%    Subjective  Chart Reviewed: Yes, Orders, Progress Notes, History and Physical  Patient assessed for rehabilitation services?: Yes  Family / Caregiver Present: No    Subjective: Pt supine in bed upon arrival, agreeable to OT/PT session and wanting to get OOB.     Pain:  Pain Assessment  Patient Currently in Pain: Denies    Vitals: Oxygen: 93% on arrival while on high flow with Fio2 of 100% and 60 LPM; decreased to 87-88% occasionally with activity throughout, requiring moderate cues for pursed lip breathing and pt able to recover to 90% within 15-30 seconds  Heart Rate: 80 bpm on arrival; increased to 110s with mobility to bedside chair although quickly returned to baseline    Social/Functional History:  Lives With: Daughter  Type of Home: House  Home Layout: Multi-level  Home Access: Stairs to enter with rails  Entrance Stairs - Number of Steps: 3 steps + 7 to get to her bedroom/bathroom  Home Equipment: (no DME PTA)   Bathroom Equipment: Shower chair       ADL Assistance: Needs assistance  Homemaking Assistance: (daughter completes all)  Ambulation Assistance: Independent  Transfer Assistance: Independent    Active : Yes          VISION:WFL    HEARING:  WFL    COGNITION: WFL    RANGE OF MOTION:  Bilateral Upper Extremity:  WFL-through observation    STRENGTH:  Bilateral Upper Extremity:  Impaired - grossly deconditioned    ADL:   Lower Extremity Dressing: Dependent. Donning socks. BALANCE:  Sitting Balance:  Contact Guard Assistance. at EOB; tolerated sitting at EOB for ~8 minutes, requiring cues for pursed lip breathing occasionally with fair carryover once cued  Standing Balance: Minimal Assistance, X 2, with verbal cues . ; cues for pursed lip breathing and upright posture    BED MOBILITY:  Supine to Sit: Minimal Assistance, X 2    Scooting: Moderate Assistance advancign hips forward to EOB    TRANSFERS:  Sit to Stand:  Minimal Assistance, X 2, with increased time for completion, cues for hand placement. from EOB  Stand to Sit: Minimal Assistance, X 1, with increased time for completion, cues for hand placement. to bedside chair    FUNCTIONAL MOBILITY:  Assistive Device: hand held assist  Assist Level:  Minimal Assistance and X 2. Distance: EOB>bedside chair  Unsteady throughout, required step by step instruction to advance feet, cues for pursed lip breathing and upright posture. Activity Tolerance:  Patient tolerance of  treatment: good. Assessment:  Assessment: Pt presents requiring increased assistance for ADLs, transfers, and functional mobility compared to PLOF.  Pt will continue to benefit from OT hygiene/clothing management. Short term goal 3: Pt will complete BADL tasks with minimal assistance, incorporating ECT with minimal cues, to increase independence and ease with self care tasks. Short term goal 4: Pt will complete functional transfers with CGA in prep for BSC/toilet transfers. Following session, patient left in safe position with all fall risk precautions in place.

## 2021-03-12 ENCOUNTER — APPOINTMENT (OUTPATIENT)
Dept: GENERAL RADIOLOGY | Age: 81
DRG: 871 | End: 2021-03-12
Payer: MEDICARE

## 2021-03-12 LAB
ALBUMIN SERPL-MCNC: 2.7 G/DL (ref 3.5–5.1)
ALP BLD-CCNC: 58 U/L (ref 38–126)
ALT SERPL-CCNC: 9 U/L (ref 11–66)
ANION GAP SERPL CALCULATED.3IONS-SCNC: 11 MEQ/L (ref 8–16)
AST SERPL-CCNC: 14 U/L (ref 5–40)
ATYPICAL LYMPHOCYTES: ABNORMAL %
BASOPHILS # BLD: 0.3 %
BASOPHILS ABSOLUTE: 0 THOU/MM3 (ref 0–0.1)
BILIRUB SERPL-MCNC: 0.4 MG/DL (ref 0.3–1.2)
BILIRUBIN DIRECT: < 0.2 MG/DL (ref 0–0.3)
BUN BLDV-MCNC: 10 MG/DL (ref 7–22)
CALCIUM SERPL-MCNC: 8.3 MG/DL (ref 8.5–10.5)
CHLORIDE BLD-SCNC: 110 MEQ/L (ref 98–111)
CO2: 18 MEQ/L (ref 23–33)
CREAT SERPL-MCNC: 0.5 MG/DL (ref 0.4–1.2)
CRENATED RBC'S: ABNORMAL
ELLIPTOCYTES: ABNORMAL
EOSINOPHIL # BLD: 0 %
EOSINOPHILS ABSOLUTE: 0 THOU/MM3 (ref 0–0.4)
ERYTHROCYTE [DISTWIDTH] IN BLOOD BY AUTOMATED COUNT: 16.8 % (ref 11.5–14.5)
ERYTHROCYTE [DISTWIDTH] IN BLOOD BY AUTOMATED COUNT: 51.8 FL (ref 35–45)
GFR SERPL CREATININE-BSD FRML MDRD: > 90 ML/MIN/1.73M2
GLUCOSE BLD-MCNC: 118 MG/DL (ref 70–108)
HCT VFR BLD CALC: 35.5 % (ref 37–47)
HEMOGLOBIN: 11.1 GM/DL (ref 12–16)
IMMATURE GRANS (ABS): 0.04 THOU/MM3 (ref 0–0.07)
IMMATURE GRANULOCYTES: 1.3 %
LYMPHOCYTES # BLD: 17.8 %
LYMPHOCYTES ABSOLUTE: 0.6 THOU/MM3 (ref 1–4.8)
MAGNESIUM: 1.8 MG/DL (ref 1.6–2.4)
MCH RBC QN AUTO: 26.3 PG (ref 26–33)
MCHC RBC AUTO-ENTMCNC: 31.3 GM/DL (ref 32.2–35.5)
MCV RBC AUTO: 84.1 FL (ref 81–99)
MONOCYTES # BLD: 12.7 %
MONOCYTES ABSOLUTE: 0.4 THOU/MM3 (ref 0.4–1.3)
NUCLEATED RED BLOOD CELLS: 0 /100 WBC
PLATELET # BLD: 307 THOU/MM3 (ref 130–400)
PLATELET ESTIMATE: ADEQUATE
PMV BLD AUTO: 10 FL (ref 9.4–12.4)
POIKILOCYTES: ABNORMAL
POTASSIUM REFLEX MAGNESIUM: 3 MEQ/L (ref 3.5–5.2)
RBC # BLD: 4.22 MILL/MM3 (ref 4.2–5.4)
SCAN OF BLOOD SMEAR: NORMAL
SEG NEUTROPHILS: 67.9 %
SEGMENTED NEUTROPHILS ABSOLUTE COUNT: 2.2 THOU/MM3 (ref 1.8–7.7)
SODIUM BLD-SCNC: 139 MEQ/L (ref 135–145)
TOTAL PROTEIN: 5.8 G/DL (ref 6.1–8)
WBC # BLD: 3.2 THOU/MM3 (ref 4.8–10.8)

## 2021-03-12 PROCEDURE — 2580000003 HC RX 258: Performed by: FAMILY MEDICINE

## 2021-03-12 PROCEDURE — 6360000002 HC RX W HCPCS: Performed by: NURSE PRACTITIONER

## 2021-03-12 PROCEDURE — 36415 COLL VENOUS BLD VENIPUNCTURE: CPT

## 2021-03-12 PROCEDURE — 99233 SBSQ HOSP IP/OBS HIGH 50: CPT | Performed by: HOSPITALIST

## 2021-03-12 PROCEDURE — 6360000002 HC RX W HCPCS: Performed by: STUDENT IN AN ORGANIZED HEALTH CARE EDUCATION/TRAINING PROGRAM

## 2021-03-12 PROCEDURE — 6370000000 HC RX 637 (ALT 250 FOR IP): Performed by: STUDENT IN AN ORGANIZED HEALTH CARE EDUCATION/TRAINING PROGRAM

## 2021-03-12 PROCEDURE — 6360000002 HC RX W HCPCS: Performed by: FAMILY MEDICINE

## 2021-03-12 PROCEDURE — 2500000003 HC RX 250 WO HCPCS: Performed by: FAMILY MEDICINE

## 2021-03-12 PROCEDURE — 94640 AIRWAY INHALATION TREATMENT: CPT

## 2021-03-12 PROCEDURE — 2140000000 HC CCU INTERMEDIATE R&B

## 2021-03-12 PROCEDURE — 80053 COMPREHEN METABOLIC PANEL: CPT

## 2021-03-12 PROCEDURE — 2580000003 HC RX 258: Performed by: STUDENT IN AN ORGANIZED HEALTH CARE EDUCATION/TRAINING PROGRAM

## 2021-03-12 PROCEDURE — 85025 COMPLETE CBC W/AUTO DIFF WBC: CPT

## 2021-03-12 PROCEDURE — 71045 X-RAY EXAM CHEST 1 VIEW: CPT

## 2021-03-12 PROCEDURE — 6370000000 HC RX 637 (ALT 250 FOR IP): Performed by: PHYSICIAN ASSISTANT

## 2021-03-12 PROCEDURE — 6370000000 HC RX 637 (ALT 250 FOR IP): Performed by: HOSPITALIST

## 2021-03-12 PROCEDURE — 6360000002 HC RX W HCPCS: Performed by: PHYSICIAN ASSISTANT

## 2021-03-12 PROCEDURE — 2700000000 HC OXYGEN THERAPY PER DAY

## 2021-03-12 PROCEDURE — 83735 ASSAY OF MAGNESIUM: CPT

## 2021-03-12 PROCEDURE — 6370000000 HC RX 637 (ALT 250 FOR IP): Performed by: FAMILY MEDICINE

## 2021-03-12 PROCEDURE — 94669 MECHANICAL CHEST WALL OSCILL: CPT

## 2021-03-12 PROCEDURE — 94761 N-INVAS EAR/PLS OXIMETRY MLT: CPT

## 2021-03-12 RX ORDER — HYDRALAZINE HYDROCHLORIDE 10 MG/1
10 TABLET, FILM COATED ORAL EVERY 8 HOURS SCHEDULED
Status: DISCONTINUED | OUTPATIENT
Start: 2021-03-12 | End: 2021-03-12

## 2021-03-12 RX ORDER — POTASSIUM CHLORIDE 20 MEQ/1
40 TABLET, EXTENDED RELEASE ORAL DAILY
Status: DISCONTINUED | OUTPATIENT
Start: 2021-03-12 | End: 2021-03-30 | Stop reason: HOSPADM

## 2021-03-12 RX ORDER — HYDRALAZINE HYDROCHLORIDE 10 MG/1
10 TABLET, FILM COATED ORAL EVERY 8 HOURS PRN
Status: DISCONTINUED | OUTPATIENT
Start: 2021-03-12 | End: 2021-03-30 | Stop reason: HOSPADM

## 2021-03-12 RX ADMIN — DEXAMETHASONE 6 MG: 4 TABLET ORAL at 08:39

## 2021-03-12 RX ADMIN — MORPHINE SULFATE 2 MG: 2 INJECTION, SOLUTION INTRAMUSCULAR; INTRAVENOUS at 10:39

## 2021-03-12 RX ADMIN — ASPIRIN 81 MG: 81 TABLET, CHEWABLE ORAL at 08:25

## 2021-03-12 RX ADMIN — FAMOTIDINE 20 MG: 20 TABLET, FILM COATED ORAL at 20:19

## 2021-03-12 RX ADMIN — HYDRALAZINE HYDROCHLORIDE 10 MG: 10 TABLET, FILM COATED ORAL at 21:06

## 2021-03-12 RX ADMIN — AMLODIPINE BESYLATE 5 MG: 5 TABLET ORAL at 08:26

## 2021-03-12 RX ADMIN — POTASSIUM CHLORIDE 10 MEQ: 7.46 INJECTION, SOLUTION INTRAVENOUS at 10:06

## 2021-03-12 RX ADMIN — SODIUM CHLORIDE, PRESERVATIVE FREE 10 ML: 5 INJECTION INTRAVENOUS at 20:22

## 2021-03-12 RX ADMIN — Medication 6000 UNITS: at 08:26

## 2021-03-12 RX ADMIN — Medication 50 MG: at 08:25

## 2021-03-12 RX ADMIN — ENOXAPARIN SODIUM 30 MG: 30 INJECTION SUBCUTANEOUS at 08:27

## 2021-03-12 RX ADMIN — POTASSIUM CHLORIDE 40 MEQ: 1500 TABLET, EXTENDED RELEASE ORAL at 12:29

## 2021-03-12 RX ADMIN — POTASSIUM CHLORIDE 10 MEQ: 7.46 INJECTION, SOLUTION INTRAVENOUS at 06:04

## 2021-03-12 RX ADMIN — OXYCODONE HYDROCHLORIDE AND ACETAMINOPHEN 1000 MG: 500 TABLET ORAL at 08:24

## 2021-03-12 RX ADMIN — POTASSIUM CHLORIDE 10 MEQ: 7.46 INJECTION, SOLUTION INTRAVENOUS at 05:10

## 2021-03-12 RX ADMIN — POTASSIUM CHLORIDE 10 MEQ: 7.46 INJECTION, SOLUTION INTRAVENOUS at 07:28

## 2021-03-12 RX ADMIN — ATORVASTATIN CALCIUM 20 MG: 20 TABLET, FILM COATED ORAL at 20:22

## 2021-03-12 RX ADMIN — REMDESIVIR 100 MG: 100 INJECTION, POWDER, LYOPHILIZED, FOR SOLUTION INTRAVENOUS at 08:28

## 2021-03-12 RX ADMIN — POTASSIUM CHLORIDE 10 MEQ: 7.46 INJECTION, SOLUTION INTRAVENOUS at 11:24

## 2021-03-12 RX ADMIN — FAMOTIDINE 20 MG: 20 TABLET, FILM COATED ORAL at 08:25

## 2021-03-12 RX ADMIN — SODIUM CHLORIDE, PRESERVATIVE FREE 10 ML: 5 INJECTION INTRAVENOUS at 08:41

## 2021-03-12 RX ADMIN — SODIUM CHLORIDE: 9 INJECTION, SOLUTION INTRAVENOUS at 03:47

## 2021-03-12 RX ADMIN — GLYCOPYRROLATE AND FORMOTEROL FUMARATE 2 PUFF: 9; 4.8 AEROSOL, METERED RESPIRATORY (INHALATION) at 07:50

## 2021-03-12 RX ADMIN — POTASSIUM CHLORIDE 10 MEQ: 7.46 INJECTION, SOLUTION INTRAVENOUS at 08:45

## 2021-03-12 RX ADMIN — GLYCOPYRROLATE AND FORMOTEROL FUMARATE 2 PUFF: 9; 4.8 AEROSOL, METERED RESPIRATORY (INHALATION) at 21:33

## 2021-03-12 RX ADMIN — ENOXAPARIN SODIUM 30 MG: 30 INJECTION SUBCUTANEOUS at 20:19

## 2021-03-12 ASSESSMENT — PAIN SCALES - GENERAL
PAINLEVEL_OUTOF10: 0
PAINLEVEL_OUTOF10: 0
PAINLEVEL_OUTOF10: 7

## 2021-03-12 NOTE — PROGRESS NOTES
Comprehensive Nutrition Assessment    Type and Reason for Visit:  Reassess(follow-up)    Nutrition Recommendations/Plan:   Continue current diet  Continue ensure enlive and kefir TID    Nutrition Assessment:  Pt no improvement in nutritional standpoint AEB questionable adequate intake (only two meals of 0% intake noted in graphics). Remains at risk for further nutritional compromise r/t admitted with +covid, advanced age, overweight, diarrhea, and underlying medical condition (hx: HLD, HTN, RA). Nutrition recommendations/interventions as per above. Malnutrition Assessment:  Malnutrition Status: insufficient data  (+covid)      Estimated Daily Nutrient Needs:  Energy (kcal):  3196-0106 (18-20 kcals/kg) acute phase; Weight Used for Energy Requirements:  (64 kgm 3/9)     Protein (g):   gm (1.2-2); Weight Used for Protein Requirements:  Ideal(50 kgm)        Fluid (ml/day):  per MD; Method Used for Fluid Requirements:  (n/a)      Nutrition Related Findings:  Covid dx 3/8; received 1st vaccine 3/1; on high flow. Spoke to RN who reports that patient is not a breakfast eater but does better with lunch and dinner. RN reports that she does have open ONS at bedside (per graphics patient is drinking % of ONS). RN does mention that patient drank 100% of apple juice containter with her meds. Patient has flexi and 300ml output noted. Potassium: 3.0. Meds: vitamin C and D, zinc, decadron, remdesivir. Wounds:  Stage I(buttocks)       Current Nutrition Therapies:    DIET CARDIAC;   Dietary Nutrition Supplements: Standard High Calorie Oral Supplement  Dietary Nutrition Supplements: Other Oral Supplement (see comment)    Anthropometric Measures:  · Height: 5' 2\" (157.5 cm)  · Current Body Weight: 143 lb (64.9 kg)(3/12/21, bedscale, no edema)   · Admission Body Weight: 135 lb 12.9 oz (61.6 kg)(3/8 no edema)    · Usual Body Weight: (per EMR: 9/15/20: 165# 11.2 oz, 9/18/20: 159# 12.8 oz)     · Ideal Body Weight: 110 lbs;     · BMI: 26.1  · Adjusted Body Weight:  ; No Adjustment   · BMI Categories: Overweight (BMI 25.0-29. 9)       Nutrition Diagnosis:   · Inadequate oral intake related to impaired respiratory function, inadequate protein-energy intake as evidenced by poor intake prior to admission, intake 0-25%    Nutrition Interventions:   Food and/or Nutrient Delivery:  Continue Oral Nutrition Supplement, Continue Current Diet  Nutrition Education/Counseling:  No recommendation at this time   Coordination of Nutrition Care:  Continue to monitor while inpatient, Interdisciplinary Rounds    Goals:  Pt. will tolerate and consume 75% or more of meals to promote wound healign during LOS. Nutrition Monitoring and Evaluation:   Behavioral-Environmental Outcomes:  None Identified   Food/Nutrient Intake Outcomes:  Diet Advancement/Tolerance, Food and Nutrient Intake, Supplement Intake  Physical Signs/Symptoms Outcomes:  Biochemical Data, GI Status, Fluid Status or Edema, Hemodynamic Status, Meal Time Behavior, Skin, Weight     Discharge Planning:     Too soon to determine     Electronically signed by Hansel Mckenzie RD, LD on 3/12/21 at 10:45 AM EST    Contact: [phone]

## 2021-03-12 NOTE — PROGRESS NOTES
Hospitalist Progress Note      Patient:  Ezekiel Johnson    XGGF/WOT:1T-88/025-H  YOB: 1940  MRN: [id number]   Acct: [de-identified]   PCP: ERNESTO Dale CNP  Date of Admission: 3/8/2021    Assessment/Plan:    Pneumonia secondary to COVID 19  · Patient got the 1st dose of COVID vaccine on 3/1,   · On Dexamethasone 6 mg QD (on H1 blocker) and Remdesivir  · Procal WNLs d/c'ed ABx  · Supportive therapy with vitamin C, D, zinc and lactobacillus (d/c'ed)  · LMWH 30mg BID for DVT prophylaxis; added ASA 81 QD as prothrombotic markers elevated  · IS/Acapella/PT to see  3/11: stable; SaO2 drops w/ exertion and also postural changes; CCM. Added chest physio  3/12: improving; encouraged CCM w/ aggressive weaning as tolerated to SaO2 greater than 90%     Acute hypoxic respiratory failure secondary to above  · On HFO FiO2 80% and 60 lpm w/ SaO2 in high 90s  · Rn aware to wean supplemental O2 aggressively as tolerated to SpO2 > 90%  3/11: ABG negative for hypercapnia; managing as above  3/12: managing as above    Leukopenia  · Likely secondary to COVID  · Stable. Continue to monitor     HypoK: mild, K 3.1. likely nutritional; will replace and trend  3/11: resolved. K 3.6. will monitor  3/12: K 3.0 today; will add standing K-l;or 40 meq QD in addition to replacement protocol    History of COPD  · Quit smoking 7-10 years ago. 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 HEENT: Pupils equal, round, and reactive to light. Conjunctivae/corneas clear. Neck: Supple, with full range of motion. No jugular venous distention. Trachea midline. Respiratory:  Diminished A/T bilat lung fields  Cardiovascular: Regular rate and rhythm with normal S1/S2 without murmurs, rubs or gallops. Abdomen: Soft, non-tender, non-distended with normal bowel sounds. Musculoskeletal: passive and active ROM x 4 extremities. Skin: Skin color, texture, turgor normal.  No rashes or lesions. Neurologic:  Neurovascularly intact without any focal sensory/motor deficits. Cranial nerves: II-XII intact, grossly non-focal.  Psychiatric: Alert and oriented, thought content appropriate, normal insight  Capillary Refill: Brisk,< 3 seconds   Peripheral Pulses: +2 palpable, equal bilaterally     Labs:   Recent Labs     03/10/21  0408 03/11/21  0547 03/12/21  0349   WBC 3.2* 3.7* 3.2*   HGB 11.6* 11.1* 11.1*   HCT 37.5 37.8 35.5*    243 307     Recent Labs     03/10/21  0408 03/11/21  0547 03/12/21  0349    138 139   K 3.1* 3.6 3.0*    111 110   CO2 17* 14* 18*   BUN 14 11 10   CREATININE 0.6 0.5 0.5   CALCIUM 8.4* 7.9* 8.3*     Recent Labs     03/10/21  0408 03/11/21  0547 03/12/21  0349   AST 20 22 14   ALT 9* 8* 9*   BILIDIR <0.2 <0.2 <0.2   BILITOT 0.3 0.3 0.4   ALKPHOS 60 61 58     Recent Labs     03/10/21  1000   INR 1.14*     No results for input(s): CKTOTAL, TROPONINI in the last 72 hours.     Microbiology:    Blood culture #1:   Lab Results   Component Value Date    BC No growth-preliminary  No growth   06/20/2018       Blood culture #2:No results found for: Lonn Mauritian    Organism:No results found for: ORG    No results found for: LABGRAM    MRSA culture only:No results found for: [facility]    Urine culture: No results found for: LABURIN    Respiratory culture: No results found for: CULTRESP    Aerobic and Anaerobic :  No results found for: LABAERO  No results found for: LABANAE    Urinalysis:      Lab Results   Component Value Date    NITRU NEGATIVE 06/20/2018    BLOODU NEGATIVE 06/20/2018    GLUCOSEU NEGATIVE 06/20/2018       Radiology:  XR CHEST PORTABLE   Final Result   Mild cardiomegaly. Interstitial edema noted in the lower lobes with peribronchial cuffing this can be related to acute bronchitis, or atypical viral pneumonia. **This report has been created using voice recognition software. It may contain minor errors which are inherent in voice recognition technology. **      Final report electronically signed by Dr. Ramon Forrester on 3/8/2021 12:19 PM      MRI ABDOMEN [address]    (Results Pending)     Xr Chest Portable    Result Date: 3/8/2021  PROCEDURE: XR CHEST PORTABLE CLINICAL INFORMATION: cough. congested. . COMPARISON: 9/22/2020 TECHNIQUE: Portable upright FINDINGS: Cardiomegaly. No mediastinal widening. Mild interstitial edema with a lower lobe predominance. Parabronchial cuffing can be seen. There is however no pleural effusion identified and no confluent airspace consolidation. Pulmonary vessels are not  congested     Mild cardiomegaly. Interstitial edema noted in the lower lobes with peribronchial cuffing this can be related to acute bronchitis, or atypical viral pneumonia. **This report has been created using voice recognition software. It may contain minor errors which are inherent in voice recognition technology. ** Final report electronically signed by Dr. Ramon Forrester on 3/8/2021 12:19 PM    With RN in room, patient was updated about and agreed upon the treatment plan, all the questions and concerns were addressed. Patient was seen in PPE (PERSONAL PROTECTIVE EQUIPMENT). Patient was interviewed in Strict Airborne and Droplet Precautions.     Electronically signed by Ariane Yoo MD on 3/12/2021 at 8:44 AM

## 2021-03-12 NOTE — PLAN OF CARE
Problem: Nutrition  Goal: Optimal nutrition therapy  Outcome: Ongoing   Nutrition Problem #1: Inadequate oral intake  Intervention: Food and/or Nutrient Delivery: Continue Oral Nutrition Supplement, Continue Current Diet  Nutritional Goals: Pt. will tolerate and consume 75% or more of meals to promote wound healign during LOS.

## 2021-03-12 NOTE — PROGRESS NOTES
[address]  PHYSICAL THERAPY MISSED TREATMENT NOTE  [facility] 3A    Date: 3/12/2021  Patient Name: Anton Shore        MRN: [id number]   : 1940  (80 y.o.)  Gender: female   Referring Practitioner: Juaquin Silva MD  Diagnosis: acute respiratory failure         REASON FOR MISSED TREATMENT:  Attempted PT tx session with pt. Her oxygen saturation was not reading upon PT entry into room. Reapplied pulse ox sensor, and pt remained in low to mid 80s. Nursing student notified. He came and instructed her to cough and perform acapella exercises. O2 briefly increased to 89%, however quickly fell back down. Several cues provided for breathing in through nose and out through mouth. O2 did not improve. RN notified. BP was also 175/98. Will hold physical therapy treatment today.     Maddi Concepcion, PT, DPT

## 2021-03-12 NOTE — PLAN OF CARE
Problem: Airway Clearance - Ineffective  Goal: Achieve or maintain patent airway  3/11/2021 2059 by Jerrod Santana RN  Outcome: Ongoing  Note: Patient able to maintain patent airway on her own. Problem: Gas Exchange - Impaired  Goal: Absence of hypoxia  3/11/2021 2059 by Jerrod Santana RN  Outcome: Ongoing  Note: Patient continues on heated hiflow to maintain oxygen saturation above 90%. Continue to wean as patient tolerates. Problem: Gas Exchange - Impaired  Goal: Promote optimal lung function  3/11/2021 2059 by Jerrod Santana RN  Outcome: Ongoing  Note: Accapella and incentive spirometer at bedside. Problem: Breathing Pattern - Ineffective  Goal: Ability to achieve and maintain a regular respiratory rate  3/11/2021 2059 by Jerrod Santana RN  Outcome: Ongoing  Note: Respiratory rate remains within defined limits. Problem: Body Temperature -  Risk of, Imbalanced  Goal: Ability to maintain a body temperature within defined limits  3/11/2021 2059 by Jerrod Santana RN  Outcome: Ongoing  Note: Body temperature remains within defined limites. Problem: Isolation Precautions - Risk of Spread of Infection  Goal: Prevent transmission of infection  3/11/2021 2118 by Jerrod Santana RN  Note: Droplet plus precautions remain in place. Problem: Nutrition Deficits  Goal: Optimize nutritional status  3/11/2021 2118 by Jerrod Santana RN  Outcome: Ongoing  Note: Patient tolerating PO nutrition fairly well. Problem: Falls - Risk of:  Goal: Will remain free from falls  Description: Will remain free from falls  3/11/2021 2118 by Jerrod Santana RN  Outcome: Ongoing  Note: Call light in reach, bed in lowest position, and bed alarm activated. Education given on use of call light before ambulation and when in need of assistance. Patient expressed understanding. Hourly visual checks performed and charted. Toileting offered to patient. No falls this shift, at any time.   Arm band and falling star in place.  Will continue to monitor. Problem: Falls - Risk of:  Goal: Absence of physical injury  Description: Absence of physical injury  3/11/2021 2118 by Brandon Rangel RN  Outcome: Ongoing  Note: Call light in reach, bed in lowest position, and bed alarm activated. Education given on use of call light before ambulation and when in need of assistance. Patient expressed understanding. Hourly visual checks performed and charted. Toileting offered to patient. No falls this shift, at any time. Arm band and falling star in place. Will continue to monitor. Problem: Skin Integrity:  Goal: Absence of new skin breakdown  Description: Absence of new skin breakdown  3/11/2021 2118 by Brandon Rangel RN  Outcome: Ongoing  Note: No new signs of skin breakdown. Skin warm, dry, and intact. Mucous membranes pink and moist.  Assistance with turns/ambulation provided every 2 hours and PRN. Will continue to monitor. Problem: PAIN  Goal: Patient's pain/discomfort is manageable  Outcome: Ongoing  Note: Patient able to use 0-10 pain scale. Denies pain at this time. Patient has a pain goal of \"no pain. \" Agreeable to take PRN pain medications. Problem: DISCHARGE BARRIERS  Goal: Patient's continuum of care needs are met  Outcome: Ongoing  Note: Discharge planning in process and discussed with patient/family. Social work consulted for any additional needs. Care manager aware of discharge needs. Patient is from home with daughter and plans to return there upon discharge. Care plan reviewed with patient and family. Patient and family verbalize understanding of the plan of care and contribute to goal setting.

## 2021-03-12 NOTE — PROGRESS NOTES
[address]  OCCUPATIONAL THERAPY MISSED TREATMENT NOTE  [facility] 3A  3A-008-A      Date: 3/12/2021  Patient Name: Johann Donato        CSN: [id number]   : 1940  (80 y.o.)  Gender: female   Referring Practitioner: Dr. Jia Arevalo MD  Diagnosis: Acute Respiratory Failure         REASON FOR MISSED TREATMENT: Per PT handoff, recommend hold for therapies this date d/t increased BP, decreased O2 sats, and for increased O2 needs via HFNC. Will attempt for next scheduled session.

## 2021-03-12 NOTE — FLOWSHEET NOTE
This  responded to a spiritual consult requesting a zoom teleconference between 80year old female patient and her daughter Kyleigh Sarkar. This  placed a telephone call to Kyleigh Sarkar, patient's daughter to verify her zoom telehealth meeting time and other needed information. Patient's daughter told me she would like for the zoom to be be held at 1600 GMT.  also received the right email address for link and phone number for contact purposes. Nasrin told me the email to send the zoom link to is [email or web address]. SC will follow up today to help set up the zoom meeting between patient and her daughters. [place] services remain available to patient as needed at this time.

## 2021-03-12 NOTE — PLAN OF CARE
Problem: Airway Clearance - Ineffective  Goal: Achieve or maintain patent airway  3/12/2021 1055 by Janelle Crandall  Outcome: Ongoing  Note: Monitoring oxygen saturation, pt uses ocapella every hour, pt coughs occasionally, chest physiotherapy completed, chest x-ray done, high-flow nasal cannula in use, pt turned every 2 hours, pt encouraged to cough and deep breathe, lung sounds auscultated with assessments     Problem: Gas Exchange - Impaired  Goal: Absence of hypoxia  3/12/2021 1055 by Janelle Crandall  Outcome: Ongoing  Note: Monitor oxygen saturation, titrate oxygen flow and FiO2 as needed, monitor vital signs and lab results, assessed mental status and respiratory effort. Pt turned every 2 hours and encouraged to deep breathe and use ocapella. Problem: Gas Exchange - Impaired  Goal: Promote optimal lung function  3/12/2021 1055 by Janelle Crandall  Outcome: Ongoing  Note: Turn pt every 2 hours, encourage deep breathing and coughing, pt uses ocapella throughout shift, oxygen saturation continually monitored. Problem: Breathing Pattern - Ineffective  Goal: Ability to achieve and maintain a regular respiratory rate  3/12/2021 1055 by Janelle Crandall  Outcome: Ongoing  Note: Monitor oxygen saturation, titrate oxygen flow and FiO2 as needed, monitor vital signs and lab results, assessed mental status and respiratory effort. Pt turned every 2 hours and encouraged to deep breathe and use ocapella. Hi-flow NC in use. Problem: Body Temperature -  Risk of, Imbalanced  Goal: Ability to maintain a body temperature within defined limits  3/12/2021 1055 by Janelle Crandall  Outcome: Ongoing  Note: Monitor vital signs, keep room temperature comfortable, pt given warm blankets, no fever. Pt received Remdesivir dose this AM. Pt receiving IV maintenance fluids at 50 mL/hr.       Problem: Isolation Precautions - Risk of Spread of Infection  Goal: Prevent transmission of infection  3/12/2021 1055 by Janelle Crandall Outcome: Ongoing  Note: Pt in droplet plus precautions in negative airflow room. Gown, gloves, N95, and face shield worn in the room, door closed at all times. Surfaces cleaned with alcohol and/or bleach. Hand hygiene practiced adequately. Problem: Nutrition Deficits  Goal: Optimize nutritional status  3/12/2021 1055 by Coleen Hurst  Outcome: Ongoing  Note: Bring meal tray into room, offer Ensure supplement, monitor intake and output, allow pt to order own meals. Problem: Patient Education: Go to Patient Education Activity  Goal: Patient/Family Education  Outcome: Ongoing  Note: Pt education documented. Family updated via phone. Problem: Falls - Risk of:  Goal: Will remain free from falls  Description: Will remain free from falls  3/12/2021 1055 by Coleen Hurst  Outcome: Ongoing  Note: Call light in reach, bed in lowest position, bed alarm activated. Educated on use of call light before ambulation and when in need of assistance. Patient expressed understanding. Hourly visual checks performed and charted. Toileting offered to patient. No falls during shift, at this time. Arm band & falling star in place. Pt in bed lying left side for optimal lung perfusion. Continuing to monitor     Problem: Falls - Risk of:  Goal: Absence of physical injury  Description: Absence of physical injury  3/12/2021 1055 by Coleen Hurst  Outcome: Ongoing  Note: Call light in reach, bed in lowest position, bed alarm activated. Educated on use of call light before ambulation and when in need of assistance. Patient expressed understanding. Hourly visual checks performed and charted. Toileting offered to patient. No falls during shift, at this time. Arm band & falling star in place. Pt turned every 2 hours. Continuing to monitor.        Problem: Skin Integrity:  Goal: Will show no infection signs and symptoms  Description: Will show no infection signs and symptoms  Outcome: Ongoing  Note: Pt turned every 2 hours, pure wick changed, yasemin care completed, vital signs being monitored, lab values being monitored, heels assessed, sacrum assessed, flexiseal to be removed. Problem: Skin Integrity:  Goal: Absence of new skin breakdown  Description: Absence of new skin breakdown  3/12/2021 1055 by Janelle Crandall  Outcome: Ongoing  Note: Pt turned every 2 hours, pure wick changed, yasemin care completed, vital signs being monitored, lab values being monitored, heels assessed, sacrum assessed, flexiseal to be removed. Problem: Nutrition  Goal: Optimal nutrition therapy  3/12/2021 1055 by Janelle Crandall  Outcome: Ongoing  Note: Bring meal tray into room, offer Ensure supplement, monitor intake and output, allow pt to order own meals. Problem: PAIN  Goal: Patient's pain/discomfort is manageable  3/12/2021 1055 by Janelle Crandall  Outcome: Ongoing  Note: Pain assessment with hourly rounds, pt administered prn pain medications per order, pt turned every two hours with pillow support, flexiseal to be removed, pt given warm blanket, oxygen needs being met     Problem: DISCHARGE BARRIERS  Goal: Patient's continuum of care needs are met  3/12/2021 1055 by Janelle Crandall  Outcome: Ongoing  Note: Pt discharge planning ongoing. Discharge needs being assessed. Care plan reviewed with patient and and daughter. Patient and daughter verbalize understanding of the plan of care and contribute to goal setting.

## 2021-03-12 NOTE — FLOWSHEET NOTE
Zoom meeting was conducted for pt and her daughter. It was so joyful for mother and daughter to see and communicate.      03/12/21 1511   Encounter Summary   Services provided to: Patient and family together   Referral/Consult From: [facility]  (3/12)   Complexity of Encounter Moderate   Length of Encounter 15 minutes   Spiritual/Confucianism   Type Spiritual support   Assessment Approachable;Calm   Intervention Nurtured hope; Active listening;Empowerment

## 2021-03-12 NOTE — PROGRESS NOTES
Nursing staff used alcohol swabs to patient's bilateral nares this shift. Pt daughter Trinity Valadez updated about pt condition and care plan over the phone. Nasrin plans to Zoom call with pt after school.

## 2021-03-12 NOTE — PROGRESS NOTES
This RN updated Dr. Melvin Kaiser, that patient's FiO2 was increased and patient has crackles in her bases of lungs. Orders received for a chest xray and to decrease her fluids to 50mls/hr.

## 2021-03-12 NOTE — PROGRESS NOTES
Pt oxygen needs increased from from FiO2 of 65% to 75%. Pt O2 saturation of 88%-89% on Hi-Flow NC. Pt repositioned to left side. Crackles in bilateral lung bases noted. IV 0.9% adjusted to 20 mL/hr.

## 2021-03-12 NOTE — CARE COORDINATION
3/12/21, 2:55 PM EST    DISCHARGE ON GOING EVALUATION    [facility] day: 4  Location: 3A-08/008-A Reason for admit: Acute respiratory failure ([address] [J96.00]   Procedure: 3/9 Convalescent plasma x1  Barriers to Discharge: K+ 3.0, CO2 18. Heated high-flow, FiO2 70, 60L, noted crackles in bases. PT held today as patient sats dropping to low 80s. Dietitian following for poor oral intake. IVF decreased, po decadron, asa, lovenox, duonebs, lyte replacement, IV remdesivir, Vit C,D, and zinc.  Acapella, chest physiotherapy. PCP: ERNESTO Mccarty CNP  Readmission Risk Score: 20%  Patient Goals/Plan/Treatment Preferences: from home with daughter. TCU eval.  SW on case for possible ECF need.

## 2021-03-13 LAB
ALBUMIN SERPL-MCNC: 2.6 G/DL (ref 3.5–5.1)
ALP BLD-CCNC: 58 U/L (ref 38–126)
ALT SERPL-CCNC: 9 U/L (ref 11–66)
ANION GAP SERPL CALCULATED.3IONS-SCNC: 8 MEQ/L (ref 8–16)
AST SERPL-CCNC: 12 U/L (ref 5–40)
ATYPICAL LYMPHOCYTES: ABNORMAL %
BASE EXCESS (CALCULATED): -3.4 MMOL/L (ref -2.5–2.5)
BASOPHILS # BLD: 0.2 %
BASOPHILS ABSOLUTE: 0 THOU/MM3 (ref 0–0.1)
BILIRUB SERPL-MCNC: 0.3 MG/DL (ref 0.3–1.2)
BILIRUBIN DIRECT: < 0.2 MG/DL (ref 0–0.3)
BUN BLDV-MCNC: 13 MG/DL (ref 7–22)
CALCIUM SERPL-MCNC: 8.3 MG/DL (ref 8.5–10.5)
CHLORIDE BLD-SCNC: 109 MEQ/L (ref 98–111)
CO2: 19 MEQ/L (ref 23–33)
COLLECTED BY:: ABNORMAL
CREAT SERPL-MCNC: 0.5 MG/DL (ref 0.4–1.2)
DEVICE: ABNORMAL
ELLIPTOCYTES: ABNORMAL
EOSINOPHIL # BLD: 0 %
EOSINOPHILS ABSOLUTE: 0 THOU/MM3 (ref 0–0.4)
ERYTHROCYTE [DISTWIDTH] IN BLOOD BY AUTOMATED COUNT: 17.2 % (ref 11.5–14.5)
ERYTHROCYTE [DISTWIDTH] IN BLOOD BY AUTOMATED COUNT: 54.3 FL (ref 35–45)
GFR SERPL CREATININE-BSD FRML MDRD: > 90 ML/MIN/1.73M2
GLUCOSE BLD-MCNC: 112 MG/DL (ref 70–108)
HCO3: 18 MMOL/L (ref 23–28)
HCT VFR BLD CALC: 35.8 % (ref 37–47)
HEMOGLOBIN: 11.1 GM/DL (ref 12–16)
IFIO2: 100
IMMATURE GRANS (ABS): 0.11 THOU/MM3 (ref 0–0.07)
IMMATURE GRANULOCYTES: 2.2 %
LYMPHOCYTES # BLD: 16.8 %
LYMPHOCYTES ABSOLUTE: 0.9 THOU/MM3 (ref 1–4.8)
MCH RBC QN AUTO: 26.6 PG (ref 26–33)
MCHC RBC AUTO-ENTMCNC: 31 GM/DL (ref 32.2–35.5)
MCV RBC AUTO: 85.6 FL (ref 81–99)
MONOCYTES # BLD: 10.9 %
MONOCYTES ABSOLUTE: 0.6 THOU/MM3 (ref 0.4–1.3)
NUCLEATED RED BLOOD CELLS: 0 /100 WBC
O2 SATURATION: 94 %
PCO2: 22 MMHG (ref 35–45)
PH BLOOD GAS: 7.52 (ref 7.35–7.45)
PLATELET # BLD: 298 THOU/MM3 (ref 130–400)
PLATELET ESTIMATE: ADEQUATE
PMV BLD AUTO: 10.1 FL (ref 9.4–12.4)
PO2: 61 MMHG (ref 71–104)
POIKILOCYTES: ABNORMAL
POTASSIUM REFLEX MAGNESIUM: 4.1 MEQ/L (ref 3.5–5.2)
PROCALCITONIN: 0.07 NG/ML (ref 0.01–0.09)
RBC # BLD: 4.18 MILL/MM3 (ref 4.2–5.4)
SCAN OF BLOOD SMEAR: NORMAL
SEG NEUTROPHILS: 69.9 %
SEGMENTED NEUTROPHILS ABSOLUTE COUNT: 3.6 THOU/MM3 (ref 1.8–7.7)
SODIUM BLD-SCNC: 136 MEQ/L (ref 135–145)
SOURCE, BLOOD GAS: ABNORMAL
TOTAL PROTEIN: 5.9 G/DL (ref 6.1–8)
WBC # BLD: 5.1 THOU/MM3 (ref 4.8–10.8)

## 2021-03-13 PROCEDURE — 82803 BLOOD GASES ANY COMBINATION: CPT

## 2021-03-13 PROCEDURE — 99233 SBSQ HOSP IP/OBS HIGH 50: CPT | Performed by: HOSPITALIST

## 2021-03-13 PROCEDURE — 2580000003 HC RX 258: Performed by: STUDENT IN AN ORGANIZED HEALTH CARE EDUCATION/TRAINING PROGRAM

## 2021-03-13 PROCEDURE — 6360000002 HC RX W HCPCS: Performed by: STUDENT IN AN ORGANIZED HEALTH CARE EDUCATION/TRAINING PROGRAM

## 2021-03-13 PROCEDURE — 6370000000 HC RX 637 (ALT 250 FOR IP): Performed by: PHYSICIAN ASSISTANT

## 2021-03-13 PROCEDURE — 85025 COMPLETE CBC W/AUTO DIFF WBC: CPT

## 2021-03-13 PROCEDURE — 2140000000 HC CCU INTERMEDIATE R&B

## 2021-03-13 PROCEDURE — 36415 COLL VENOUS BLD VENIPUNCTURE: CPT

## 2021-03-13 PROCEDURE — 6370000000 HC RX 637 (ALT 250 FOR IP): Performed by: FAMILY MEDICINE

## 2021-03-13 PROCEDURE — 84145 PROCALCITONIN (PCT): CPT

## 2021-03-13 PROCEDURE — 6360000002 HC RX W HCPCS: Performed by: FAMILY MEDICINE

## 2021-03-13 PROCEDURE — 80053 COMPREHEN METABOLIC PANEL: CPT

## 2021-03-13 PROCEDURE — 36600 WITHDRAWAL OF ARTERIAL BLOOD: CPT

## 2021-03-13 PROCEDURE — 2500000003 HC RX 250 WO HCPCS: Performed by: FAMILY MEDICINE

## 2021-03-13 PROCEDURE — 94669 MECHANICAL CHEST WALL OSCILL: CPT

## 2021-03-13 PROCEDURE — 2580000003 HC RX 258: Performed by: FAMILY MEDICINE

## 2021-03-13 PROCEDURE — 94640 AIRWAY INHALATION TREATMENT: CPT

## 2021-03-13 PROCEDURE — 2700000000 HC OXYGEN THERAPY PER DAY

## 2021-03-13 PROCEDURE — 6370000000 HC RX 637 (ALT 250 FOR IP): Performed by: STUDENT IN AN ORGANIZED HEALTH CARE EDUCATION/TRAINING PROGRAM

## 2021-03-13 PROCEDURE — 6370000000 HC RX 637 (ALT 250 FOR IP): Performed by: HOSPITALIST

## 2021-03-13 PROCEDURE — 94761 N-INVAS EAR/PLS OXIMETRY MLT: CPT

## 2021-03-13 RX ADMIN — Medication 50 MG: at 08:21

## 2021-03-13 RX ADMIN — FAMOTIDINE 20 MG: 20 TABLET, FILM COATED ORAL at 21:02

## 2021-03-13 RX ADMIN — ACETAMINOPHEN 650 MG: 325 TABLET ORAL at 08:21

## 2021-03-13 RX ADMIN — REMDESIVIR 100 MG: 100 INJECTION, POWDER, LYOPHILIZED, FOR SOLUTION INTRAVENOUS at 08:22

## 2021-03-13 RX ADMIN — HYDRALAZINE HYDROCHLORIDE 10 MG: 10 TABLET, FILM COATED ORAL at 06:21

## 2021-03-13 RX ADMIN — GLYCOPYRROLATE AND FORMOTEROL FUMARATE 2 PUFF: 9; 4.8 AEROSOL, METERED RESPIRATORY (INHALATION) at 20:27

## 2021-03-13 RX ADMIN — HYDRALAZINE HYDROCHLORIDE 10 MG: 10 TABLET, FILM COATED ORAL at 08:22

## 2021-03-13 RX ADMIN — ENOXAPARIN SODIUM 30 MG: 30 INJECTION SUBCUTANEOUS at 08:20

## 2021-03-13 RX ADMIN — FAMOTIDINE 20 MG: 20 TABLET, FILM COATED ORAL at 08:21

## 2021-03-13 RX ADMIN — DEXAMETHASONE 6 MG: 4 TABLET ORAL at 08:22

## 2021-03-13 RX ADMIN — SODIUM CHLORIDE, PRESERVATIVE FREE 10 ML: 5 INJECTION INTRAVENOUS at 21:03

## 2021-03-13 RX ADMIN — GLYCOPYRROLATE AND FORMOTEROL FUMARATE 2 PUFF: 9; 4.8 AEROSOL, METERED RESPIRATORY (INHALATION) at 07:49

## 2021-03-13 RX ADMIN — ATORVASTATIN CALCIUM 20 MG: 20 TABLET, FILM COATED ORAL at 21:03

## 2021-03-13 RX ADMIN — ASPIRIN 81 MG: 81 TABLET, CHEWABLE ORAL at 08:21

## 2021-03-13 RX ADMIN — Medication 6000 UNITS: at 08:21

## 2021-03-13 RX ADMIN — OXYCODONE HYDROCHLORIDE AND ACETAMINOPHEN 1000 MG: 500 TABLET ORAL at 08:21

## 2021-03-13 RX ADMIN — AMLODIPINE BESYLATE 5 MG: 5 TABLET ORAL at 08:21

## 2021-03-13 RX ADMIN — POTASSIUM CHLORIDE 40 MEQ: 1500 TABLET, EXTENDED RELEASE ORAL at 08:21

## 2021-03-13 RX ADMIN — ENOXAPARIN SODIUM 30 MG: 30 INJECTION SUBCUTANEOUS at 21:02

## 2021-03-13 ASSESSMENT — PAIN SCALES - GENERAL
PAINLEVEL_OUTOF10: 0
PAINLEVEL_OUTOF10: 7
PAINLEVEL_OUTOF10: 0

## 2021-03-13 ASSESSMENT — PAIN DESCRIPTION - ORIENTATION: ORIENTATION: ANTERIOR

## 2021-03-13 ASSESSMENT — PAIN DESCRIPTION - PAIN TYPE: TYPE: ACUTE PAIN

## 2021-03-13 ASSESSMENT — PAIN DESCRIPTION - ONSET: ONSET: GRADUAL

## 2021-03-13 ASSESSMENT — PAIN DESCRIPTION - DESCRIPTORS: DESCRIPTORS: HEADACHE

## 2021-03-13 NOTE — PLAN OF CARE
Problem: Airway Clearance - Ineffective  Goal: Achieve or maintain patent airway  3/13/2021 1119 by Jina Durbin RN  Outcome: Ongoing  Note: Clear airway at this time. Patient requiring heated high-flow NC with intermittent use of non-rebreather on top of high flow to maintain sats >90%. Encouraged use of acapella and IS. Problem: Isolation Precautions - Risk of Spread of Infection  Goal: Prevent transmission of infection  3/13/2021 1119 by Jina Durbin RN  Outcome: Ongoing  Note: Droplet+ isolation precuations in place. Patient in negative pressure room. Problem: Nutrition Deficits  Goal: Optimize nutritional status  3/13/2021 1119 by Jina Duribn RN  Outcome: Ongoing  Note: Patient has poor PO intake (<25% of meals) and decreased appetite. Encourage fluid intake during hourly rounds and IVFs 0.9 @ 50 mL/hr running. Ensure with each meal was not consumed. Problem: Falls - Risk of:  Goal: Will remain free from falls  Description: Will remain free from falls  3/13/2021 1119 by Jina Durbin RN  Outcome: Ongoing  Note: No falls up to this point in shift. Bed alarm on, Falling star program in place. Call light within reach. Problem: Skin Integrity:  Goal: Absence of new skin breakdown  Description: Absence of new skin breakdown  3/13/2021 1119 by Jina Durbin RN  Outcome: Ongoing  Note: No new skin breakdown this shift. Q2 hour turns, heels elevated off bed. Problem: DISCHARGE BARRIERS  Goal: Patient's continuum of care needs are met  Outcome: Ongoing  Note: Patient prefers to discharge home w/daughter. TCU consult pending vs. ECF. Care plan reviewed with patient and daughter, Bin Fernando. Patient and Nasrin verbalize understanding of the plan of care and contribute to goal setting.

## 2021-03-13 NOTE — PROGRESS NOTES
Patient's daughter, Radha Villarreal, came to floor to drop off personal items and was updated on patient status.

## 2021-03-13 NOTE — PROGRESS NOTES
will monitor for now  3/13: BP fairly controlled. CCM and monitor     Liver/pancreatic cystic mass: incidental finding on Liver US:  MR A/P ordered to further assess (will be done when pt able to lie flat); AFP WNls and Ca 19-9 pending    Code status: Limited x4       Chief Complaint: SOB    Initial H and P:-    Admitted for management of Acute hypoxic respiratory failure secondary to COVID 19. I took over care on 3/13/2021. Subjective (past 24 hours):   Sleeping comfortably. No new issues overnight. Denies any complaints. Past medical history, family history, social history and allergies reviewed again and is unchanged since admission. ROS (12 point review of systems completed. Pertinent positives noted.  Otherwise ROS is negative)     Medications:  Reviewed    Infusion Medications    sodium chloride      sodium chloride      sodium chloride 50 mL/hr at 03/13/21 0700    dextrose       Scheduled Medications    potassium chloride  40 mEq Oral Daily    aspirin  81 mg Oral Daily    remdesivir IVPB  100 mg Intravenous Q24H    ascorbic acid  1,000 mg Oral Daily    zinc sulfate  50 mg Oral Daily    sodium chloride flush  10 mL Intravenous 2 times per day    enoxaparin  30 mg Subcutaneous BID    Vitamin D  6,000 Units Oral Daily    Followed by   Lucien Going ON 3/15/2021] Vitamin D  2,000 Units Oral Daily    atorvastatin  20 mg Oral Daily    famotidine  20 mg Oral BID    glycopyrrolate-formoterol  2 puff Inhalation BID    amLODIPine  5 mg Oral Daily    dexamethasone  6 mg Oral Daily     PRN Meds: hydrALAZINE, morphine, potassium chloride **OR** potassium alternative oral replacement **OR** potassium chloride, sodium chloride, sodium chloride, sodium chloride, sodium chloride flush, promethazine **OR** ondansetron, polyethylene glycol, acetaminophen **OR** acetaminophen, guaiFENesin-dextromethorphan, ipratropium-albuterol, glucose, dextrose, glucagon (rDNA), dextrose      Intake/Output Summary (Last 24 hours) at 3/13/2021 0711  Last data filed at 3/13/2021 0359  Gross per 24 hour   Intake 3139.46 ml   Output 600 ml   Net 2539.46 ml       Diet:  DIET CARDIAC; Dietary Nutrition Supplements: Standard High Calorie Oral Supplement  Dietary Nutrition Supplements: Other Oral Supplement (see comment)    Exam:  BP (!) 157/91   Pulse 58   Temp 97.9 °F (36.6 °C) (Axillary)   Resp 15   Ht 5' 2\" (1.575 m)   Wt 139 lb 15.9 oz (63.5 kg)   SpO2 100%   BMI 25.60 kg/m²   General appearance: No apparent distress, appears stated age and cooperative. HEENT: Pupils equal, round, and reactive to light. Conjunctivae/corneas clear. Neck: Supple, with full range of motion. No jugular venous distention. Trachea midline. Respiratory:  Diminished A/T bilat lung fields  Cardiovascular: Regular rate and rhythm with normal S1/S2 without murmurs, rubs or gallops. Abdomen: Soft, non-tender, non-distended with normal bowel sounds. Musculoskeletal: passive and active ROM x 4 extremities. Skin: Skin color, texture, turgor normal.  No rashes or lesions. Neurologic:  Neurovascularly intact without any focal sensory/motor deficits.  Cranial nerves: II-XII intact, grossly non-focal.  Psychiatric: Alert and oriented, thought content appropriate, normal insight  Capillary Refill: Brisk,< 3 seconds   Peripheral Pulses: +2 palpable, equal bilaterally     Labs:   Recent Labs     03/11/21  0547 03/12/21  0349   WBC 3.7* 3.2*   HGB 11.1* 11.1*   HCT 37.8 35.5*    307     Recent Labs     03/11/21  0547 03/12/21  0349 03/13/21  0352    139 136   K 3.6 3.0* 4.1    110 109   CO2 14* 18* 19*   BUN 11 10 13   CREATININE 0.5 0.5 0.5   CALCIUM 7.9* 8.3* 8.3*     Recent Labs     03/11/21  0547 03/12/21  0349 03/13/21  0352   AST 22 14 12   ALT 8* 9* 9*   BILIDIR <0.2 <0.2 <0.2   BILITOT 0.3 0.4 0.3   ALKPHOS 61 58 58     Recent Labs     03/10/21  1000   INR 1.14*     No results for input(s): Ish Killings in the last 72

## 2021-03-13 NOTE — PLAN OF CARE
limits  3/12/2021   Outcome: Ongoing  3/12/2021   Outcome: Ongoing  Note: Monitor vital signs, keep room temperature comfortable, pt given warm blankets, no fever. Pt received Remdesivir dose this AM. Pt receiving IV maintenance fluids at 50 mL/hr. Problem: Isolation Precautions - Risk of Spread of Infection  Goal: Prevent transmission of infection  3/12/2021  Outcome: Ongoing  3/12/2021  Outcome: Ongoing  Note: Pt in droplet plus precautions in negative airflow room. Gown, gloves, N95, and face shield worn in the room, door closed at all times. Surfaces cleaned with alcohol and/or bleach. Hand hygiene practiced adequately. Problem: Patient Education: Go to Patient Education Activity  Goal: Patient/Family Education  3/12/2021   Outcome: Ongoing  3/12/2021   Outcome: Ongoing  Note: Pt education documented. Family updated via phone. Problem: Falls - Risk of:  Goal: Will remain free from falls  Description: Will remain free from falls  3/12/2021   Outcome: Ongoing  3/12/2021   Outcome: Ongoing  Note: Call light in reach, bed in lowest position, bed alarm activated. Educated on use of call light before ambulation and when in need of assistance. Patient expressed understanding. Hourly visual checks performed and charted. Toileting offered to patient. No falls during shift, at this time. Arm band & falling star in place. Pt in bed lying left side for optimal lung perfusion. Continuing to monitor  Goal: Absence of physical injury  Description: Absence of physical injury  3/12/2021   Outcome: Ongoing  3/12/2021   Outcome: Ongoing  Note: Call light in reach, bed in lowest position, bed alarm activated. Educated on use of call light before ambulation and when in need of assistance. Patient expressed understanding. Hourly visual checks performed and charted. Toileting offered to patient. No falls during shift, at this time. Arm band & falling star in place. Pt turned every 2 hours. Continuing to monitor. Problem: Skin Integrity:  Goal: Will show no infection signs and symptoms  Description: Will show no infection signs and symptoms  3/12/2021   Outcome: Ongoing  3/12/2021   Outcome: Ongoing  Note: Pt turned every 2 hours, pure wick changed, yasemin care completed, vital signs being monitored, lab values being monitored, heels assessed, sacrum assessed, flexiseal to be removed. Goal: Absence of new skin breakdown  Description: Absence of new skin breakdown  3/12/2021     Outcome: Ongoing  Note: Pt turned every 2 hours, pure wick changed, yasemin care completed, vital signs being monitored, lab values being monitored, heels assessed, sacrum assessed, flexiseal to be removed. Problem: Nutrition  Goal: Optimal nutrition therapy  3/12/2021 2237 by Ernestina Carvajal  Outcome: Ongoing  3/12/2021   Outcome: Ongoing  Note: Bring meal tray into room, offer Ensure supplement, monitor intake and output, allow pt to order own meals. 3/12/2021   Outcome: Ongoing     Problem: DISCHARGE BARRIERS  Goal: Patient's continuum of care needs are met  3/12/2021   Outcome: Ongoing  Note: Pt discharge planning ongoing. Discharge needs being assessed.

## 2021-03-14 LAB
ALBUMIN SERPL-MCNC: 2.9 G/DL (ref 3.5–5.1)
ALP BLD-CCNC: 61 U/L (ref 38–126)
ALT SERPL-CCNC: 8 U/L (ref 11–66)
ANION GAP SERPL CALCULATED.3IONS-SCNC: 10 MEQ/L (ref 8–16)
ANISOCYTOSIS: PRESENT
AST SERPL-CCNC: 12 U/L (ref 5–40)
ATYPICAL LYMPHOCYTES: ABNORMAL %
BASOPHILS # BLD: 0.3 %
BASOPHILS ABSOLUTE: 0 THOU/MM3 (ref 0–0.1)
BILIRUB SERPL-MCNC: 0.5 MG/DL (ref 0.3–1.2)
BILIRUBIN DIRECT: < 0.2 MG/DL (ref 0–0.3)
BUN BLDV-MCNC: 14 MG/DL (ref 7–22)
CALCIUM SERPL-MCNC: 8.5 MG/DL (ref 8.5–10.5)
CHLORIDE BLD-SCNC: 110 MEQ/L (ref 98–111)
CO2: 19 MEQ/L (ref 23–33)
CREAT SERPL-MCNC: 0.4 MG/DL (ref 0.4–1.2)
EOSINOPHIL # BLD: 0.2 %
EOSINOPHILS ABSOLUTE: 0 THOU/MM3 (ref 0–0.4)
ERYTHROCYTE [DISTWIDTH] IN BLOOD BY AUTOMATED COUNT: 17.1 % (ref 11.5–14.5)
ERYTHROCYTE [DISTWIDTH] IN BLOOD BY AUTOMATED COUNT: 51 FL (ref 35–45)
GFR SERPL CREATININE-BSD FRML MDRD: > 90 ML/MIN/1.73M2
GLUCOSE BLD-MCNC: 96 MG/DL (ref 70–108)
HCT VFR BLD CALC: 36 % (ref 37–47)
HEMOGLOBIN: 11.7 GM/DL (ref 12–16)
IMMATURE GRANS (ABS): 0.25 THOU/MM3 (ref 0–0.07)
IMMATURE GRANULOCYTES: 4 %
LYMPHOCYTES # BLD: 19.3 %
LYMPHOCYTES ABSOLUTE: 1.2 THOU/MM3 (ref 1–4.8)
MAGNESIUM: 1.9 MG/DL (ref 1.6–2.4)
MCH RBC QN AUTO: 26.8 PG (ref 26–33)
MCHC RBC AUTO-ENTMCNC: 32.5 GM/DL (ref 32.2–35.5)
MCV RBC AUTO: 82.4 FL (ref 81–99)
MONOCYTES # BLD: 9.1 %
MONOCYTES ABSOLUTE: 0.6 THOU/MM3 (ref 0.4–1.3)
NUCLEATED RED BLOOD CELLS: 0 /100 WBC
PLATELET # BLD: 346 THOU/MM3 (ref 130–400)
PLATELET ESTIMATE: ADEQUATE
PMV BLD AUTO: 9.7 FL (ref 9.4–12.4)
POIKILOCYTES: ABNORMAL
POTASSIUM REFLEX MAGNESIUM: 3.5 MEQ/L (ref 3.5–5.2)
RBC # BLD: 4.37 MILL/MM3 (ref 4.2–5.4)
SCAN OF BLOOD SMEAR: NORMAL
SEG NEUTROPHILS: 67.1 %
SEGMENTED NEUTROPHILS ABSOLUTE COUNT: 4.2 THOU/MM3 (ref 1.8–7.7)
SODIUM BLD-SCNC: 139 MEQ/L (ref 135–145)
TOTAL PROTEIN: 6 G/DL (ref 6.1–8)
WBC # BLD: 6.3 THOU/MM3 (ref 4.8–10.8)

## 2021-03-14 PROCEDURE — 2580000003 HC RX 258: Performed by: STUDENT IN AN ORGANIZED HEALTH CARE EDUCATION/TRAINING PROGRAM

## 2021-03-14 PROCEDURE — 94761 N-INVAS EAR/PLS OXIMETRY MLT: CPT

## 2021-03-14 PROCEDURE — 99233 SBSQ HOSP IP/OBS HIGH 50: CPT | Performed by: HOSPITALIST

## 2021-03-14 PROCEDURE — 6360000002 HC RX W HCPCS: Performed by: STUDENT IN AN ORGANIZED HEALTH CARE EDUCATION/TRAINING PROGRAM

## 2021-03-14 PROCEDURE — 85025 COMPLETE CBC W/AUTO DIFF WBC: CPT

## 2021-03-14 PROCEDURE — 6370000000 HC RX 637 (ALT 250 FOR IP): Performed by: PHYSICIAN ASSISTANT

## 2021-03-14 PROCEDURE — 6370000000 HC RX 637 (ALT 250 FOR IP): Performed by: STUDENT IN AN ORGANIZED HEALTH CARE EDUCATION/TRAINING PROGRAM

## 2021-03-14 PROCEDURE — 80053 COMPREHEN METABOLIC PANEL: CPT

## 2021-03-14 PROCEDURE — 94640 AIRWAY INHALATION TREATMENT: CPT

## 2021-03-14 PROCEDURE — 94669 MECHANICAL CHEST WALL OSCILL: CPT

## 2021-03-14 PROCEDURE — 36415 COLL VENOUS BLD VENIPUNCTURE: CPT

## 2021-03-14 PROCEDURE — 2580000003 HC RX 258: Performed by: HOSPITALIST

## 2021-03-14 PROCEDURE — 6370000000 HC RX 637 (ALT 250 FOR IP): Performed by: FAMILY MEDICINE

## 2021-03-14 PROCEDURE — 83735 ASSAY OF MAGNESIUM: CPT

## 2021-03-14 PROCEDURE — 6370000000 HC RX 637 (ALT 250 FOR IP): Performed by: HOSPITALIST

## 2021-03-14 PROCEDURE — 6360000002 HC RX W HCPCS: Performed by: FAMILY MEDICINE

## 2021-03-14 PROCEDURE — 2140000000 HC CCU INTERMEDIATE R&B

## 2021-03-14 PROCEDURE — 2700000000 HC OXYGEN THERAPY PER DAY

## 2021-03-14 RX ORDER — CHOLECALCIFEROL (VITAMIN D3) 125 MCG
3 CAPSULE ORAL NIGHTLY PRN
Status: DISCONTINUED | OUTPATIENT
Start: 2021-03-14 | End: 2021-03-30 | Stop reason: HOSPADM

## 2021-03-14 RX ORDER — TRAMADOL HYDROCHLORIDE 50 MG/1
25 TABLET ORAL EVERY 6 HOURS PRN
Status: DISCONTINUED | OUTPATIENT
Start: 2021-03-14 | End: 2021-03-30 | Stop reason: HOSPADM

## 2021-03-14 RX ADMIN — OXYCODONE HYDROCHLORIDE AND ACETAMINOPHEN 1000 MG: 500 TABLET ORAL at 09:05

## 2021-03-14 RX ADMIN — SODIUM CHLORIDE: 9 INJECTION, SOLUTION INTRAVENOUS at 21:20

## 2021-03-14 RX ADMIN — AMLODIPINE BESYLATE 5 MG: 5 TABLET ORAL at 09:05

## 2021-03-14 RX ADMIN — Medication 2.5 MG: at 21:19

## 2021-03-14 RX ADMIN — TRAMADOL HYDROCHLORIDE 25 MG: 50 TABLET, FILM COATED ORAL at 21:19

## 2021-03-14 RX ADMIN — Medication 50 MG: at 09:05

## 2021-03-14 RX ADMIN — FAMOTIDINE 20 MG: 20 TABLET, FILM COATED ORAL at 09:05

## 2021-03-14 RX ADMIN — GLYCOPYRROLATE AND FORMOTEROL FUMARATE 2 PUFF: 9; 4.8 AEROSOL, METERED RESPIRATORY (INHALATION) at 21:21

## 2021-03-14 RX ADMIN — ATORVASTATIN CALCIUM 20 MG: 20 TABLET, FILM COATED ORAL at 21:19

## 2021-03-14 RX ADMIN — GLYCOPYRROLATE AND FORMOTEROL FUMARATE 2 PUFF: 9; 4.8 AEROSOL, METERED RESPIRATORY (INHALATION) at 07:51

## 2021-03-14 RX ADMIN — HYDRALAZINE HYDROCHLORIDE 10 MG: 10 TABLET, FILM COATED ORAL at 06:15

## 2021-03-14 RX ADMIN — ENOXAPARIN SODIUM 30 MG: 30 INJECTION SUBCUTANEOUS at 09:06

## 2021-03-14 RX ADMIN — Medication 6000 UNITS: at 09:05

## 2021-03-14 RX ADMIN — SODIUM CHLORIDE: 9 INJECTION, SOLUTION INTRAVENOUS at 12:04

## 2021-03-14 RX ADMIN — SODIUM CHLORIDE, PRESERVATIVE FREE 10 ML: 5 INJECTION INTRAVENOUS at 21:19

## 2021-03-14 RX ADMIN — DEXAMETHASONE 6 MG: 4 TABLET ORAL at 09:05

## 2021-03-14 RX ADMIN — POTASSIUM CHLORIDE 40 MEQ: 1500 TABLET, EXTENDED RELEASE ORAL at 09:05

## 2021-03-14 RX ADMIN — FAMOTIDINE 20 MG: 20 TABLET, FILM COATED ORAL at 21:19

## 2021-03-14 RX ADMIN — ASPIRIN 81 MG: 81 TABLET, CHEWABLE ORAL at 09:05

## 2021-03-14 RX ADMIN — ENOXAPARIN SODIUM 30 MG: 30 INJECTION SUBCUTANEOUS at 21:19

## 2021-03-14 ASSESSMENT — PAIN DESCRIPTION - LOCATION: LOCATION: BACK

## 2021-03-14 ASSESSMENT — PAIN DESCRIPTION - FREQUENCY: FREQUENCY: CONTINUOUS

## 2021-03-14 ASSESSMENT — PAIN SCALES - GENERAL
PAINLEVEL_OUTOF10: 5
PAINLEVEL_OUTOF10: 1

## 2021-03-14 ASSESSMENT — PAIN DESCRIPTION - PROGRESSION: CLINICAL_PROGRESSION: NOT CHANGED

## 2021-03-14 ASSESSMENT — PAIN - FUNCTIONAL ASSESSMENT: PAIN_FUNCTIONAL_ASSESSMENT: ACTIVITIES ARE NOT PREVENTED

## 2021-03-14 ASSESSMENT — PAIN DESCRIPTION - ONSET: ONSET: ON-GOING

## 2021-03-14 NOTE — PROGRESS NOTES
Hospitalist Progress Note      Patient:  Kina Camp    [id number]  YOB: 1940  MRN: [id number]   Acct: [de-identified]   PCP: ERNESTO Subramanian CNP  Date of Admission: 3/8/2021    Assessment/Plan:    Pneumonia secondary to COVID 19  · Patient got the 1st dose of COVID vaccine on 3/1,   · On Dexamethasone 6 mg QD (on H1 blocker) and Remdesivir  · Procal WNLs d/c'ed ABx  · Supportive therapy with vitamin C, D, zinc and lactobacillus (d/c'ed)  · LMWH 30mg BID for DVT prophylaxis; added ASA 81 QD as prothrombotic markers elevated  · IS/Acapella/PT to see  3/11: stable; SaO2 drops w/ exertion and also postural changes; CCM. Added chest physio  3/12: improving; encouraged CCM w/ aggressive weaning as tolerated to SaO2 greater than 90%  3/13: slow wean w/ short-lived episodes of worsening hypoxia which is positional. CCM. Reduce IVF to 50 cc/hr given inadequate PO intake. Repeat ABG non-contributroy  3/14: slowly weaning.  CCM     Acute hypoxic respiratory failure secondary to above  · On HFO FiO2 80% and 60 lpm w/ SaO2 in high 90s  · Rn aware to wean supplemental O2 aggressively as tolerated to SpO2 > 90%  3/11: ABG negative for hypercapnia; managing as above  3/14: managing as above    Leukopenia  · Likely secondary to COVID  · resolved Continue to monitor     HypoK: mild, K 3.1. likely nutritional; will replace and trend  3/12: K 3.0 today; will add standing K-l;or 40 meq QD in addition to replacement protocol  3/13: resolved; K 4.1. will monitor  3/14: K 3.5    History of COPD  · Quit smoking 7-10 years ago. 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  ·  LFTs WNLs while on remdesivir; INR WNLs  · Follow up as outpatient for consideration of Tx     Hx of Hypertension, controlled  · Amlodipine 5 mg po daily with hold parameters  3/11: noted BP in **OR** ondansetron, polyethylene glycol, acetaminophen **OR** acetaminophen, guaiFENesin-dextromethorphan, ipratropium-albuterol, glucose, dextrose, glucagon (rDNA), dextrose      Intake/Output Summary (Last 24 hours) at 3/14/2021 0758  Last data filed at 3/14/2021 0500  Gross per 24 hour   Intake 2376.02 ml   Output 1400 ml   Net 976.02 ml       Diet:  DIET CARDIAC; Dietary Nutrition Supplements: Standard High Calorie Oral Supplement  Dietary Nutrition Supplements: Other Oral Supplement (see comment)    Exam:  BP (!) 163/87   Pulse 67   Temp 97.6 °F (36.4 °C) (Oral)   Resp 17   Ht 5' 2\" (1.575 m)   Wt 138 lb 0.1 oz (62.6 kg)   SpO2 100%   BMI 25.24 kg/m²   General appearance: No apparent distress, appears stated age and cooperative. HEENT: Pupils equal, round, and reactive to light. Conjunctivae/corneas clear. Neck: Supple, with full range of motion. No jugular venous distention. Trachea midline. Respiratory:  Diminished A/T bilat lung fields  Cardiovascular: Regular rate and rhythm with normal S1/S2 without murmurs, rubs or gallops. Abdomen: Soft, non-tender, non-distended with normal bowel sounds. Musculoskeletal: passive and active ROM x 4 extremities. Skin: Skin color, texture, turgor normal.  No rashes or lesions. Neurologic:  Neurovascularly intact without any focal sensory/motor deficits.  Cranial nerves: II-XII intact, grossly non-focal.  Psychiatric: Alert and oriented, thought content appropriate, normal insight  Capillary Refill: Brisk,< 3 seconds   Peripheral Pulses: +2 palpable, equal bilaterally     Labs:   Recent Labs     03/12/21 0349 03/13/21 0352   WBC 3.2* 5.1   HGB 11.1* 11.1*   HCT 35.5* 35.8*    298     Recent Labs     03/12/21 0349 03/13/21  0352 03/14/21  0659    136 139   K 3.0* 4.1 3.5    109 110   CO2 18* 19* 19*   BUN 10 13 14   CREATININE 0.5 0.5 0.4   CALCIUM 8.3* 8.3* 8.5     Recent Labs     03/12/21 0349 03/13/21  0352 03/14/21  0659   AST 14 12 12   ALT 9* 9* 8*   BILIDIR <0.2 <0.2 <0.2   BILITOT 0.4 0.3 0.5   ALKPHOS 58 58 61     No results for input(s): INR in the last 72 hours. No results for input(s): Cherene Hines in the last 72 hours. Microbiology:    Blood culture #1:   Lab Results   Component Value Date    BC No growth-preliminary  No growth   06/20/2018       Blood culture #2:No results found for: Harmony Zuniga    Organism:No results found for: ORG    No results found for: LABGRAM    MRSA culture only:No results found for: [facility]    Urine culture: No results found for: LABURIN    Respiratory culture: No results found for: CULTRESP    Aerobic and Anaerobic :  No results found for: LABAERO  No results found for: LABANAE    Urinalysis:      Lab Results   Component Value Date    NITRU NEGATIVE 06/20/2018    BLOODU NEGATIVE 06/20/2018    GLUCOSEU NEGATIVE 06/20/2018       Radiology:  XR CHEST PORTABLE   Final Result      Multifocal interstitial and alveolar opacities the lungs intervally increased from prior exam concerning for infectious process. Cardiomegaly, stable from prior. **This report has been created using voice recognition software. It may contain minor errors which are inherent in voice recognition technology. **      Final report electronically signed by Dr. Emmy Billingsley on 3/12/2021 12:35 PM      XR CHEST PORTABLE   Final Result   Mild cardiomegaly. Interstitial edema noted in the lower lobes with peribronchial cuffing this can be related to acute bronchitis, or atypical viral pneumonia. **This report has been created using voice recognition software. It may contain minor errors which are inherent in voice recognition technology. **      Final report electronically signed by Dr. Ingrid Choudhury on 3/8/2021 12:19 PM      MRI ABDOMEN W [address]    (Results Pending)     Xr Chest Portable    Result Date: 3/8/2021  PROCEDURE: XR CHEST PORTABLE CLINICAL INFORMATION: cough. congested.  . COMPARISON: 9/22/2020 TECHNIQUE: Portable upright FINDINGS: Cardiomegaly. No mediastinal widening. Mild interstitial edema with a lower lobe predominance. Parabronchial cuffing can be seen. There is however no pleural effusion identified and no confluent airspace consolidation. Pulmonary vessels are not  congested     Mild cardiomegaly. Interstitial edema noted in the lower lobes with peribronchial cuffing this can be related to acute bronchitis, or atypical viral pneumonia. **This report has been created using voice recognition software. It may contain minor errors which are inherent in voice recognition technology. ** Final report electronically signed by Dr. Jennifer Nash on 3/8/2021 12:19 PM    With RN in room, patient was updated about and agreed upon the treatment plan, all the questions and concerns were addressed. Patient was seen in PPE (PERSONAL PROTECTIVE EQUIPMENT). Patient was interviewed in Strict Airborne and Droplet Precautions.     Electronically signed by Nuzhat Cardoza MD on 3/14/2021 at 7:58 AM

## 2021-03-14 NOTE — PLAN OF CARE
Problem: Airway Clearance - Ineffective  Goal: Achieve or maintain patent airway  Outcome: Ongoing  Note: Patient has patent airway, able to wean oxygen to 85%. Problem: Gas Exchange - Impaired  Goal: Absence of hypoxia  Outcome: Ongoing  Note: Patient tolerating high flow at 85% today, will continue to assess. Problem: Body Temperature -  Risk of, Imbalanced  Goal: Ability to maintain a body temperature within defined limits  Outcome: Ongoing  Note: No fever today, will continue to assess. Problem: Isolation Precautions - Risk of Spread of Infection  Goal: Prevent transmission of infection  Outcome: Ongoing  Note: Patient in contact plus isolation. Problem: Nutrition Deficits  Goal: Optimize nutritional status  Outcome: Ongoing  Note: Patient has poor appetite, ensures encouraged. Small frequent meals. Problem: Falls - Risk of:  Goal: Will remain free from falls  Description: Will remain free from falls  Outcome: Ongoing  Note: Hourly rounding continues, bed alarm activated for safety. Problem: PAIN  Goal: Patient's pain/discomfort is manageable  Outcome: Ongoing  Note: Patient denies pain, will continue to assess for pain     Problem: Skin Integrity:  Goal: Absence of new skin breakdown  Description: Absence of new skin breakdown  Note: Patient buttocks and heels reddened. Moisture barrier cream applied. Heels elevated off bed. Patient rolls in bed per self. Care plan reviewed with patient and family. Patient and family verbalize understanding of the plan of care and contribute to goal setting.

## 2021-03-15 ENCOUNTER — TELEPHONE (OUTPATIENT)
Dept: FAMILY MEDICINE CLINIC | Age: 81
End: 2021-03-15

## 2021-03-15 LAB
ALBUMIN SERPL-MCNC: 2.8 G/DL (ref 3.5–5.1)
ALP BLD-CCNC: 58 U/L (ref 38–126)
ALT SERPL-CCNC: 8 U/L (ref 11–66)
ANION GAP SERPL CALCULATED.3IONS-SCNC: 9 MEQ/L (ref 8–16)
AST SERPL-CCNC: 11 U/L (ref 5–40)
ATYPICAL LYMPHOCYTES: ABNORMAL %
BASOPHILS # BLD: 0.2 %
BASOPHILS ABSOLUTE: 0 THOU/MM3 (ref 0–0.1)
BILIRUB SERPL-MCNC: 0.4 MG/DL (ref 0.3–1.2)
BUN BLDV-MCNC: 15 MG/DL (ref 7–22)
CALCIUM SERPL-MCNC: 8.5 MG/DL (ref 8.5–10.5)
CHLORIDE BLD-SCNC: 110 MEQ/L (ref 98–111)
CO2: 20 MEQ/L (ref 23–33)
CREAT SERPL-MCNC: 0.5 MG/DL (ref 0.4–1.2)
EOSINOPHIL # BLD: 0 %
EOSINOPHILS ABSOLUTE: 0 THOU/MM3 (ref 0–0.4)
ERYTHROCYTE [DISTWIDTH] IN BLOOD BY AUTOMATED COUNT: 17.2 % (ref 11.5–14.5)
ERYTHROCYTE [DISTWIDTH] IN BLOOD BY AUTOMATED COUNT: 52.7 FL (ref 35–45)
GFR SERPL CREATININE-BSD FRML MDRD: > 90 ML/MIN/1.73M2
GLUCOSE BLD-MCNC: 114 MG/DL (ref 70–108)
HCT VFR BLD CALC: 36 % (ref 37–47)
HEMOGLOBIN: 11.5 GM/DL (ref 12–16)
IMMATURE GRANS (ABS): 0.18 THOU/MM3 (ref 0–0.07)
IMMATURE GRANULOCYTES: 2.9 %
LYMPHOCYTES # BLD: 14.6 %
LYMPHOCYTES ABSOLUTE: 0.9 THOU/MM3 (ref 1–4.8)
MCH RBC QN AUTO: 27 PG (ref 26–33)
MCHC RBC AUTO-ENTMCNC: 31.9 GM/DL (ref 32.2–35.5)
MCV RBC AUTO: 84.5 FL (ref 81–99)
MONOCYTES # BLD: 9.9 %
MONOCYTES ABSOLUTE: 0.6 THOU/MM3 (ref 0.4–1.3)
NUCLEATED RED BLOOD CELLS: 0 /100 WBC
PLATELET # BLD: 340 THOU/MM3 (ref 130–400)
PLATELET ESTIMATE: ADEQUATE
PMV BLD AUTO: 10.3 FL (ref 9.4–12.4)
POIKILOCYTES: ABNORMAL
POTASSIUM REFLEX MAGNESIUM: 3.9 MEQ/L (ref 3.5–5.2)
RBC # BLD: 4.26 MILL/MM3 (ref 4.2–5.4)
SCAN OF BLOOD SMEAR: NORMAL
SEG NEUTROPHILS: 72.4 %
SEGMENTED NEUTROPHILS ABSOLUTE COUNT: 4.6 THOU/MM3 (ref 1.8–7.7)
SODIUM BLD-SCNC: 139 MEQ/L (ref 135–145)
TOTAL PROTEIN: 5.8 G/DL (ref 6.1–8)
WBC # BLD: 6.3 THOU/MM3 (ref 4.8–10.8)

## 2021-03-15 PROCEDURE — 97530 THERAPEUTIC ACTIVITIES: CPT

## 2021-03-15 PROCEDURE — 6370000000 HC RX 637 (ALT 250 FOR IP): Performed by: STUDENT IN AN ORGANIZED HEALTH CARE EDUCATION/TRAINING PROGRAM

## 2021-03-15 PROCEDURE — 80053 COMPREHEN METABOLIC PANEL: CPT

## 2021-03-15 PROCEDURE — 94640 AIRWAY INHALATION TREATMENT: CPT

## 2021-03-15 PROCEDURE — 6360000002 HC RX W HCPCS: Performed by: STUDENT IN AN ORGANIZED HEALTH CARE EDUCATION/TRAINING PROGRAM

## 2021-03-15 PROCEDURE — 2700000000 HC OXYGEN THERAPY PER DAY

## 2021-03-15 PROCEDURE — 6370000000 HC RX 637 (ALT 250 FOR IP): Performed by: FAMILY MEDICINE

## 2021-03-15 PROCEDURE — 94761 N-INVAS EAR/PLS OXIMETRY MLT: CPT

## 2021-03-15 PROCEDURE — 2580000003 HC RX 258: Performed by: STUDENT IN AN ORGANIZED HEALTH CARE EDUCATION/TRAINING PROGRAM

## 2021-03-15 PROCEDURE — 85025 COMPLETE CBC W/AUTO DIFF WBC: CPT

## 2021-03-15 PROCEDURE — 99233 SBSQ HOSP IP/OBS HIGH 50: CPT | Performed by: HOSPITALIST

## 2021-03-15 PROCEDURE — 94669 MECHANICAL CHEST WALL OSCILL: CPT

## 2021-03-15 PROCEDURE — 6370000000 HC RX 637 (ALT 250 FOR IP): Performed by: PHYSICIAN ASSISTANT

## 2021-03-15 PROCEDURE — 36415 COLL VENOUS BLD VENIPUNCTURE: CPT

## 2021-03-15 PROCEDURE — 6370000000 HC RX 637 (ALT 250 FOR IP): Performed by: HOSPITALIST

## 2021-03-15 PROCEDURE — 6360000002 HC RX W HCPCS: Performed by: FAMILY MEDICINE

## 2021-03-15 PROCEDURE — 2140000000 HC CCU INTERMEDIATE R&B

## 2021-03-15 RX ADMIN — HYDRALAZINE HYDROCHLORIDE 10 MG: 10 TABLET, FILM COATED ORAL at 23:19

## 2021-03-15 RX ADMIN — HYDRALAZINE HYDROCHLORIDE 10 MG: 10 TABLET, FILM COATED ORAL at 14:04

## 2021-03-15 RX ADMIN — ENOXAPARIN SODIUM 30 MG: 30 INJECTION SUBCUTANEOUS at 08:36

## 2021-03-15 RX ADMIN — ATORVASTATIN CALCIUM 20 MG: 20 TABLET, FILM COATED ORAL at 20:49

## 2021-03-15 RX ADMIN — ASPIRIN 81 MG: 81 TABLET, CHEWABLE ORAL at 08:36

## 2021-03-15 RX ADMIN — AMLODIPINE BESYLATE 5 MG: 5 TABLET ORAL at 08:36

## 2021-03-15 RX ADMIN — Medication 50 MG: at 08:36

## 2021-03-15 RX ADMIN — SODIUM CHLORIDE, PRESERVATIVE FREE 10 ML: 5 INJECTION INTRAVENOUS at 20:49

## 2021-03-15 RX ADMIN — POTASSIUM CHLORIDE 40 MEQ: 1500 TABLET, EXTENDED RELEASE ORAL at 08:36

## 2021-03-15 RX ADMIN — ENOXAPARIN SODIUM 30 MG: 30 INJECTION SUBCUTANEOUS at 20:49

## 2021-03-15 RX ADMIN — SODIUM CHLORIDE, PRESERVATIVE FREE 10 ML: 5 INJECTION INTRAVENOUS at 08:35

## 2021-03-15 RX ADMIN — FAMOTIDINE 20 MG: 20 TABLET, FILM COATED ORAL at 08:36

## 2021-03-15 RX ADMIN — GLYCOPYRROLATE AND FORMOTEROL FUMARATE 2 PUFF: 9; 4.8 AEROSOL, METERED RESPIRATORY (INHALATION) at 12:29

## 2021-03-15 RX ADMIN — Medication 2000 UNITS: at 08:36

## 2021-03-15 RX ADMIN — DEXAMETHASONE 6 MG: 4 TABLET ORAL at 08:36

## 2021-03-15 RX ADMIN — FAMOTIDINE 20 MG: 20 TABLET, FILM COATED ORAL at 20:49

## 2021-03-15 RX ADMIN — GLYCOPYRROLATE AND FORMOTEROL FUMARATE 2 PUFF: 9; 4.8 AEROSOL, METERED RESPIRATORY (INHALATION) at 21:13

## 2021-03-15 RX ADMIN — OXYCODONE HYDROCHLORIDE AND ACETAMINOPHEN 1000 MG: 500 TABLET ORAL at 08:36

## 2021-03-15 ASSESSMENT — PAIN SCALES - GENERAL
PAINLEVEL_OUTOF10: 0

## 2021-03-15 NOTE — CARE COORDINATION
3/15/21, 12:13 PM EDT    DISCHARGE ON GOING [address] day: 7  Location: 3A-08/008-A Reason for admit: Acute respiratory failure ([facility] [address] [J96.00]   Procedure:   3/9 Convalescent plasma x1   Barriers to Discharge: Afebrile. Remains on high flow O2, 55L and 50% FiO2. Discussed with Dr. Camilla Rodriguez, pt is improving but needs to remain on high flow for now. Vit C, Vit D, Zinc. Decadron po daily. Lovenox. PT/OT. Dietitian consulted. Palliative Care. PCP: Daryle Riser, APRN - CNP  Readmission Risk Score: 16%  Patient Goals/Plan/Treatment Preferences: From home with daughter. TCU eval, will relook when off high flow o2. SW on case for possible ECF need.

## 2021-03-15 NOTE — PROGRESS NOTES
Assistance, Moderate Assistance HOB elevated, used bedrail  Supine to Sit: Moderate Assistance, X 2 HOB elevated, used bedrail  Sit to Supine: Maximum Assistance, X 2 HOB flat    TRANSFERS:  Sit to Stand:  Minimal Assistance. HHA  Stand to Sit: Minimal Assistance. FUNCTIONAL MOBILITY:  Assistive Device: HHA  Assist Level:  Minimal Assistance. Distance: 2 steps to [facility]     ASSESSMENT:     Activity Tolerance:  Patient tolerance of  treatment: good. Limited by BP and lightheadedness      Discharge Recommendations: Subacute/Skilled Nursing Facility(TCU)   Equipment Recommendations: Other: will continue to monitor pending progress, discharge location  Plan: Times per week: 5x  Current Treatment Recommendations: Strengthening, Balance Training, Functional Mobility Training, Endurance Training, Safety Education & Training, Patient/Caregiver Education & Training, Equipment Evaluation, Education, & procurement, Self-Care / ADL    Patient Education  Patient Education: Orientation    Goals  Short term goals  Time Frame for Short term goals: by discharge  Short term goal 1: Pt will increase activity tolerance for functional mobility to/from [facility] or chair with CGA and Sp02 >=90% in prep for toileting tasks. Short term goal 2: Pt will tolerate dynamic standing X2 minutes with CGA and unilateral release in prep for hygiene/clothing management. Short term goal 3: Pt will complete BADL tasks with minimal assistance, incorporating ECT with minimal cues, to increase independence and ease with self care tasks. Short term goal 4: Pt will complete functional transfers with CGA in prep for BSC/toilet transfers. Following session, patient left in safe position with all fall risk precautions in place.

## 2021-03-15 NOTE — PROGRESS NOTES
3/11: noted BP in 150-160s; possibly w/ episodes of hypoxia; will monitor for now  3/13: BP fairly controlled. CCM and monitor  3/14: BP suboptimally controlled overnight coinciding w/ episodes of hypoxia; will continue monitoring for now; consider switching CCB to qhs from QD.  3/15: improved. 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 I took over care on 3/15/2021. Subjective (past 24 hours): No new issues overnight. Denies any complaints. Past medical history, family history, social history and allergies reviewed again and is unchanged since admission. ROS (12 point review of systems completed. Pertinent positives noted.  Otherwise ROS is negative)     Medications:  Reviewed    Infusion Medications    sodium chloride      sodium chloride      sodium chloride 50 mL/hr at 03/14/21 2120    dextrose       Scheduled Medications    potassium chloride  40 mEq Oral Daily    aspirin  81 mg Oral Daily    ascorbic acid  1,000 mg Oral Daily    zinc sulfate  50 mg Oral Daily    sodium chloride flush  10 mL Intravenous 2 times per day    enoxaparin  30 mg Subcutaneous BID    Vitamin D  2,000 Units Oral Daily    atorvastatin  20 mg Oral Daily    famotidine  20 mg Oral BID    glycopyrrolate-formoterol  2 puff Inhalation BID    amLODIPine  5 mg Oral Daily    dexamethasone  6 mg Oral Daily     PRN Meds: melatonin, traMADol, hydrALAZINE, morphine, potassium chloride **OR** potassium alternative oral replacement **OR** potassium chloride, sodium chloride, sodium chloride, sodium chloride, sodium chloride flush, promethazine **OR** 03/15/21  0347   AST 12 12 11   ALT 9* 8* 8*   BILIDIR <0.2 <0.2  --    BILITOT 0.3 0.5 0.4   ALKPHOS 58 61 58     No results for input(s): INR in the last 72 hours. No results for input(s): Neal Pressley in the last 72 hours. Microbiology:    Blood culture #1:   Lab Results   Component Value Date    BC No growth-preliminary  No growth   06/20/2018       Blood culture #2:No results found for: Lanis Christie    Organism:No results found for: ORG    No results found for: LABGRAM    MRSA culture only:No results found for: [facility]    Urine culture: No results found for: LABURIN    Respiratory culture: No results found for: CULTRESP    Aerobic and Anaerobic :  No results found for: LABAERO  No results found for: LABANAE    Urinalysis:      Lab Results   Component Value Date    NITRU NEGATIVE 06/20/2018    BLOODU NEGATIVE 06/20/2018    GLUCOSEU NEGATIVE 06/20/2018       Radiology:  XR CHEST PORTABLE   Final Result      Multifocal interstitial and alveolar opacities the lungs intervally increased from prior exam concerning for infectious process. Cardiomegaly, stable from prior. **This report has been created using voice recognition software. It may contain minor errors which are inherent in voice recognition technology. **      Final report electronically signed by Dr. Merlene Ramey on 3/12/2021 12:35 PM      XR CHEST PORTABLE   Final Result   Mild cardiomegaly. Interstitial edema noted in the lower lobes with peribronchial cuffing this can be related to acute bronchitis, or atypical viral pneumonia. **This report has been created using voice recognition software. It may contain minor errors which are inherent in voice recognition technology. **      Final report electronically signed by Dr. El Richter on 3/8/2021 12:19 PM      MRI ABDOMEN W [address]    (Results Pending)     Xr Chest Portable    Result Date: 3/8/2021  PROCEDURE: XR CHEST PORTABLE CLINICAL INFORMATION: cough. congested. Chris Mauro COMPARISON: 9/22/2020 TECHNIQUE: Portable upright FINDINGS: Cardiomegaly. No mediastinal widening. Mild interstitial edema with a lower lobe predominance. Parabronchial cuffing can be seen. There is however no pleural effusion identified and no confluent airspace consolidation. Pulmonary vessels are not  congested     Mild cardiomegaly. Interstitial edema noted in the lower lobes with peribronchial cuffing this can be related to acute bronchitis, or atypical viral pneumonia. **This report has been created using voice recognition software. It may contain minor errors which are inherent in voice recognition technology. ** Final report electronically signed by Dr. Deshaun Tillman on 3/8/2021 12:19 PM    With RN in room, patient and daughter Radha Magallon) on the phone were updated about and agreed upon the treatment plan, all the questions and concerns were addressed. Patient was seen in PPE (PERSONAL PROTECTIVE EQUIPMENT). Patient was interviewed in Strict Airborne and Droplet Precautions.     Electronically signed by Debra Erickson MD on 3/15/2021 at 8:50 AM

## 2021-03-15 NOTE — PROGRESS NOTES
[address] [place] [address] PHYSICAL THERAPY  DAILY NOTE  [facility] CCU 3A - 3A-08/008-A  Co-rx with OT, RN and respiratory therapist stating pt very fatigued this am but ok for rx  Time In: 1033  Time Out: 1100  Timed Code Treatment Minutes: 27 Minutes  Minutes: 27          Date: 3/15/2021  Patient Name: Hiren Shell,  Gender:  female        MRN: [id number]  : 1940  (80 y.o.)     Referring Practitioner: Eran Haider MD  Diagnosis: acute respiratory failure  Additional Pertinent Hx: Per HPI: \"80 y.o. female who presented to [address] [place] [address] with increased weakness for the past 3 days. History is limited due to patient's drowsiness. Limited history provided by daughter-in-law who is in the room. Ms. Maury Morrow received her covid vaccine on 3/1/2021. She did have some fatigue afterwards with nausea. However, she began having acutely worsening weakness and cough over the last 3 days with decreased oral intake. Family was unaware until her daughter-in-law went to check on her today. She denies any shortness of breath to me, but she is requiring 6 L supplemental O2 to maintain SPO2 greater than 90% and appears to have increased work of breathing value at rest.\" Pt is positive for COVID-19.      Prior Level of Function:  Lives With: Daughter  Type of Home: House  Home Layout: Multi-level  Home Access: Stairs to enter with rails  Entrance Stairs - Number of Steps: 3 steps + 7 to get to her bedroom/bathroom  Home Equipment: (no DME PTA)   Bathroom Equipment: Shower chair    ADL Assistance: Needs assistance  Homemaking Assistance: (daughter completes all)  Ambulation Assistance: Independent  Transfer Assistance: Independent  Active : Yes    Restrictions/Precautions:  Restrictions/Precautions: Isolation(droplet + precautions)  Position Activity Restriction  Other position/activity restrictions: 3/11: HFNC 60L FiO2 100%     SUBJECTIVE: pt very fatigued upon arrival and difficult to arouse    PAIN: no c/o pain    Vitals: Blood Pressure: Supine 139/79 Sitting with lightheadedness 187/134 return to supine 151/87  Patient on 55 L O2 at 48% Fi02 via High Flow  upon arrival to room. Patient O2 sats at 95 %. Upon activity patient dropping O2 at 84 %. Pt requiring moderate rest break(s) to recover. OBJECTIVE:  Bed Mobility:  Rolling to Left: Minimal Assistance, Moderate Assistance, X 2   Supine to Sit: Moderate Assistance, X 2  Sit to Supine: Maximum Assistance, X 2   Scooting: Contact Guard Assistance, Maximum Assistance, X 2, fwd in sitting to edge of bed  HOB up 30 degrees, cues for sequencing  Transfers:  Sit to Stand: Minimal Assistance, X 1  Stand to [address] [address] [facility], X 1  From edge of bed, BUE HHA  Ambulation:  Minimal Assistance, X 1  Distance: 2 steps to [facility]  Surface: Level Tile  Device:Hand-Held Assist  Gait Deviations: Forward Flexed Posture, Slow Arabella, Decreased Step Length Bilaterally, Unsteady Gait and fatigued quickly with c/o nausea and lightheadedness    Balance:  Static Sitting Balance:  Contact Guard Assistance, tolerated around 10 min, fluctuated to SBA, attempted ADLs with OT  Static Standing Balance: Minimal Assistance with BUE HHA        Functional Outcome Measures: Completed  AM-PAC Inpatient Mobility Raw Score : 12  AM-PAC Inpatient T-Scale Score : 35.33    ASSESSMENT:  Assessment: Patient progressing toward established goals. Activity Tolerance:  Patient tolerance of  treatment: fair.       Equipment Recommendations:Equipment Needed: (likely a rolling walker, continue to assess)  Discharge Recommendations:  Continue to assess pending progress(TCU)    Plan: Times per week: 5x (coordinate with OT--one discipline up to chair, one back to bed)  Times per day: Daily  Current Treatment Recommendations: Strengthening, Functional Mobility Training, Home Exercise Program, Endurance Training, Balance Training, Patient/Caregiver Education & Training, Transfer Training, Gait Training,

## 2021-03-15 NOTE — PLAN OF CARE
Problem: Airway Clearance - Ineffective  Goal: Achieve or maintain patent airway  Outcome: Ongoing  Note: Remains on HFO able to wean to 70% and 60L, airway patent. Problem: Gas Exchange - Impaired  Goal: Absence of hypoxia  Outcome: Ongoing  Note: Pt remains on HFO, weaning as tolerated. Problem: Isolation Precautions - Risk of Spread of Infection  Goal: Prevent transmission of infection  Outcome: Ongoing  Note: Remains in droplet plus isoalation, PPE worn when in contact with patient. Problem: Falls - Risk of:  Goal: Will remain free from falls  Description: Will remain free from falls  Outcome: Ongoing  Note: Call light in reach, bed in lowest position, and bed alarm activated. Education given on use of call light before ambulation and when in need of assistance. Hourly visual checks performed and charted. Toileting offered to patient. No falls this shift, at any time. Problem: Skin Integrity:  Goal: Will show no infection signs and symptoms  Description: Will show no infection signs and symptoms  Outcome: Ongoing  Note: Stage 1 pressure injury noted to buttocks. No signs of new skin breakdown with each assessment. Skin remains warm, dry, intact. Mucous membranes pink & moist. Turning patient every 2 hours and when needed. Problem: Nutrition  Goal: Optimal nutrition therapy  Outcome: Ongoing  Note: Pt has poor appetite. Continuing to encourage oral intake. Problem: PAIN  Goal: Patient's pain/discomfort is manageable  Outcome: Ongoing  Note: Patient able to use 0-10 pain scale. Denies pain at this time. Agreeable to take PRN pain medications. Problem: DISCHARGE BARRIERS  Goal: Patient's continuum of care needs are met  Outcome: Ongoing  Note: Pt from home with daughter. Discharge planning in process and discussed with patient/family. Social work consulted for any additional needs. Care manager aware of discharge needs.       Patient is unable to participate in care planning and goal setting. Family has been updated and voices understanding.

## 2021-03-16 LAB
ALBUMIN SERPL-MCNC: 2.9 G/DL (ref 3.5–5.1)
ALP BLD-CCNC: 63 U/L (ref 38–126)
ALT SERPL-CCNC: 8 U/L (ref 11–66)
ANION GAP SERPL CALCULATED.3IONS-SCNC: 11 MEQ/L (ref 8–16)
AST SERPL-CCNC: 12 U/L (ref 5–40)
BASOPHILS # BLD: 0.2 %
BASOPHILS ABSOLUTE: 0 THOU/MM3 (ref 0–0.1)
BILIRUB SERPL-MCNC: 0.5 MG/DL (ref 0.3–1.2)
BUN BLDV-MCNC: 16 MG/DL (ref 7–22)
CALCIUM SERPL-MCNC: 8.6 MG/DL (ref 8.5–10.5)
CHLORIDE BLD-SCNC: 109 MEQ/L (ref 98–111)
CO2: 21 MEQ/L (ref 23–33)
CREAT SERPL-MCNC: 0.5 MG/DL (ref 0.4–1.2)
EOSINOPHIL # BLD: 0 %
EOSINOPHILS ABSOLUTE: 0 THOU/MM3 (ref 0–0.4)
ERYTHROCYTE [DISTWIDTH] IN BLOOD BY AUTOMATED COUNT: 17.3 % (ref 11.5–14.5)
ERYTHROCYTE [DISTWIDTH] IN BLOOD BY AUTOMATED COUNT: 54.7 FL (ref 35–45)
GFR SERPL CREATININE-BSD FRML MDRD: > 90 ML/MIN/1.73M2
GLUCOSE BLD-MCNC: 98 MG/DL (ref 70–108)
HCT VFR BLD CALC: 37.5 % (ref 37–47)
HEMOGLOBIN: 11.6 GM/DL (ref 12–16)
IMMATURE GRANS (ABS): 0.25 THOU/MM3 (ref 0–0.07)
IMMATURE GRANULOCYTES: 3 %
LYMPHOCYTES # BLD: 15.7 %
LYMPHOCYTES ABSOLUTE: 1.3 THOU/MM3 (ref 1–4.8)
MCH RBC QN AUTO: 26.9 PG (ref 26–33)
MCHC RBC AUTO-ENTMCNC: 30.9 GM/DL (ref 32.2–35.5)
MCV RBC AUTO: 86.8 FL (ref 81–99)
MONOCYTES # BLD: 10.4 %
MONOCYTES ABSOLUTE: 0.9 THOU/MM3 (ref 0.4–1.3)
NUCLEATED RED BLOOD CELLS: 0 /100 WBC
PLATELET # BLD: 370 THOU/MM3 (ref 130–400)
PMV BLD AUTO: 10.6 FL (ref 9.4–12.4)
POTASSIUM REFLEX MAGNESIUM: 4 MEQ/L (ref 3.5–5.2)
RBC # BLD: 4.32 MILL/MM3 (ref 4.2–5.4)
SEG NEUTROPHILS: 70.7 %
SEGMENTED NEUTROPHILS ABSOLUTE COUNT: 6 THOU/MM3 (ref 1.8–7.7)
SODIUM BLD-SCNC: 141 MEQ/L (ref 135–145)
TOTAL PROTEIN: 5.9 G/DL (ref 6.1–8)
WBC # BLD: 8.5 THOU/MM3 (ref 4.8–10.8)

## 2021-03-16 PROCEDURE — 94761 N-INVAS EAR/PLS OXIMETRY MLT: CPT

## 2021-03-16 PROCEDURE — 36415 COLL VENOUS BLD VENIPUNCTURE: CPT

## 2021-03-16 PROCEDURE — 94669 MECHANICAL CHEST WALL OSCILL: CPT

## 2021-03-16 PROCEDURE — 97530 THERAPEUTIC ACTIVITIES: CPT

## 2021-03-16 PROCEDURE — 6370000000 HC RX 637 (ALT 250 FOR IP): Performed by: FAMILY MEDICINE

## 2021-03-16 PROCEDURE — 6360000002 HC RX W HCPCS: Performed by: STUDENT IN AN ORGANIZED HEALTH CARE EDUCATION/TRAINING PROGRAM

## 2021-03-16 PROCEDURE — 6370000000 HC RX 637 (ALT 250 FOR IP): Performed by: STUDENT IN AN ORGANIZED HEALTH CARE EDUCATION/TRAINING PROGRAM

## 2021-03-16 PROCEDURE — 99233 SBSQ HOSP IP/OBS HIGH 50: CPT | Performed by: HOSPITALIST

## 2021-03-16 PROCEDURE — 2580000003 HC RX 258: Performed by: STUDENT IN AN ORGANIZED HEALTH CARE EDUCATION/TRAINING PROGRAM

## 2021-03-16 PROCEDURE — 85025 COMPLETE CBC W/AUTO DIFF WBC: CPT

## 2021-03-16 PROCEDURE — 80053 COMPREHEN METABOLIC PANEL: CPT

## 2021-03-16 PROCEDURE — 97116 GAIT TRAINING THERAPY: CPT

## 2021-03-16 PROCEDURE — 97535 SELF CARE MNGMENT TRAINING: CPT

## 2021-03-16 PROCEDURE — 94640 AIRWAY INHALATION TREATMENT: CPT

## 2021-03-16 PROCEDURE — 2140000000 HC CCU INTERMEDIATE R&B

## 2021-03-16 PROCEDURE — 6360000002 HC RX W HCPCS: Performed by: FAMILY MEDICINE

## 2021-03-16 PROCEDURE — 6370000000 HC RX 637 (ALT 250 FOR IP): Performed by: PHYSICIAN ASSISTANT

## 2021-03-16 PROCEDURE — 6370000000 HC RX 637 (ALT 250 FOR IP): Performed by: HOSPITALIST

## 2021-03-16 RX ADMIN — ASPIRIN 81 MG: 81 TABLET, CHEWABLE ORAL at 09:24

## 2021-03-16 RX ADMIN — ATORVASTATIN CALCIUM 20 MG: 20 TABLET, FILM COATED ORAL at 20:07

## 2021-03-16 RX ADMIN — POTASSIUM CHLORIDE 40 MEQ: 1500 TABLET, EXTENDED RELEASE ORAL at 09:25

## 2021-03-16 RX ADMIN — ENOXAPARIN SODIUM 30 MG: 30 INJECTION SUBCUTANEOUS at 20:07

## 2021-03-16 RX ADMIN — SODIUM CHLORIDE, PRESERVATIVE FREE 10 ML: 5 INJECTION INTRAVENOUS at 09:26

## 2021-03-16 RX ADMIN — Medication 2000 UNITS: at 09:25

## 2021-03-16 RX ADMIN — ENOXAPARIN SODIUM 30 MG: 30 INJECTION SUBCUTANEOUS at 09:25

## 2021-03-16 RX ADMIN — GLYCOPYRROLATE AND FORMOTEROL FUMARATE 2 PUFF: 9; 4.8 AEROSOL, METERED RESPIRATORY (INHALATION) at 09:42

## 2021-03-16 RX ADMIN — Medication 50 MG: at 09:25

## 2021-03-16 RX ADMIN — FAMOTIDINE 20 MG: 20 TABLET, FILM COATED ORAL at 09:46

## 2021-03-16 RX ADMIN — FAMOTIDINE 20 MG: 20 TABLET, FILM COATED ORAL at 20:07

## 2021-03-16 RX ADMIN — Medication 2.5 MG: at 20:07

## 2021-03-16 RX ADMIN — AMLODIPINE BESYLATE 5 MG: 5 TABLET ORAL at 09:25

## 2021-03-16 RX ADMIN — OXYCODONE HYDROCHLORIDE AND ACETAMINOPHEN 1000 MG: 500 TABLET ORAL at 09:25

## 2021-03-16 RX ADMIN — DEXAMETHASONE 6 MG: 4 TABLET ORAL at 09:25

## 2021-03-16 NOTE — PROGRESS NOTES
Hospitalist Progress Note      Patient:  Francis Morillo    EQEM/AUDRA:[id number]  YOB: 1940  MRN: [id number]   Acct: [de-identified]   PCP: ERNESTO Norwood CNP  Date of Admission: 3/8/2021    Assessment/Plan:    Pneumonia secondary to COVID 19  · Patient got the 1st dose of COVID vaccine on 3/1,   · On Dexamethasone 6 mg QD (on H1 blocker) and Remdesivir  · Procal WNLs d/c'ed ABx  · Supportive therapy with vitamin C, D, zinc and lactobacillus (d/c'ed)  · LMWH 30mg BID for DVT prophylaxis; added ASA 81 QD as prothrombotic markers elevated  · IS/Acapella/PT to see  3/11: stable; SaO2 drops w/ exertion and also postural changes; CCM. Added chest physio  3/12: improving; encouraged CCM w/ aggressive weaning as tolerated to SaO2 greater than 90%  3/13: slow wean w/ short-lived episodes of worsening hypoxia which is positional. CCM. Reduce IVF to 50 cc/hr given inadequate PO intake. Repeat ABG non-contributroy  3/14: slowly weaning. CCM  3/16: Improving steadily. Now down to FiO2 45% at 45 lpm w/ SaO2 high 90s.  CCM     Acute hypoxic respiratory failure secondary to above  · On HFO FiO2 80% and 60 lpm w/ SaO2 in high 90s  · Rn aware to wean supplemental O2 aggressively as tolerated to SpO2 > 90%  3/11: ABG negative for hypercapnia; managing as above  3/16: managing as above    Leukopenia  · Likely secondary to COVID  · resolved Continue to monitor     HypoK: mild, K 3.1. likely nutritional; will replace and trend  3/12: K 3.0 today; will add standing K-l;or 40 meq QD in addition to replacement protocol  3/13: resolved; K 4.1. will monitor  3/16: K 4.0    History of COPD  · Quit smoking 7-10 years ago. 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 CCM and monitor  3/14: BP suboptimally controlled overnight coinciding w/ episodes of hypoxia; will continue monitoring for now; consider switching CCB to qhs from QD.  3/16: [place] controlled. 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 I took over care on 3/16/2021. Subjective (past 24 hours): No new issues overnight. Denies any complaints. Past medical history, family history, social history and allergies reviewed again and is unchanged since admission. ROS (12 point review of systems completed. Pertinent positives noted.  Otherwise ROS is negative)     Medications:  Reviewed    Infusion Medications    sodium chloride      sodium chloride      sodium chloride 50 mL/hr at 03/14/21 2120    dextrose       Scheduled Medications    potassium chloride  40 mEq Oral Daily    aspirin  81 mg Oral Daily    ascorbic acid  1,000 mg Oral Daily    zinc sulfate  50 mg Oral Daily    sodium chloride flush  10 mL Intravenous 2 times per day    enoxaparin  30 mg Subcutaneous BID    Vitamin D  2,000 Units Oral Daily    atorvastatin  20 mg Oral Daily    famotidine  20 mg Oral BID    glycopyrrolate-formoterol  2 puff Inhalation BID    amLODIPine  5 mg Oral Daily    dexamethasone  6 mg Oral Daily     PRN Meds: melatonin, traMADol, hydrALAZINE, morphine, potassium chloride **OR** potassium alternative oral replacement **OR** potassium chloride, sodium chloride, sodium 8.5 8.6     Recent Labs     03/14/21  0659 03/15/21  0347 03/16/21  0430   AST 12 11 12   ALT 8* 8* 8*   BILIDIR <0.2  --   --    BILITOT 0.5 0.4 0.5   ALKPHOS 61 58 63     No results for input(s): INR in the last 72 hours. No results for input(s): Oksana Hind in the last 72 hours. Microbiology:    Blood culture #1:   Lab Results   Component Value Date    BC No growth-preliminary  No growth   06/20/2018       Blood culture #2:No results found for: Viki Hock    Organism:No results found for: ORG    No results found for: LABGRAM    MRSA culture only:No results found for: [facility]    Urine culture: No results found for: LABURIN    Respiratory culture: No results found for: CULTRESP    Aerobic and Anaerobic :  No results found for: LABAERO  No results found for: LABANAE    Urinalysis:      Lab Results   Component Value Date    NITRU NEGATIVE 06/20/2018    BLOODU NEGATIVE 06/20/2018    GLUCOSEU NEGATIVE 06/20/2018       Radiology:  XR CHEST PORTABLE   Final Result      Multifocal interstitial and alveolar opacities the lungs intervally increased from prior exam concerning for infectious process. Cardiomegaly, stable from prior. **This report has been created using voice recognition software. It may contain minor errors which are inherent in voice recognition technology. **      Final report electronically signed by Dr. Gwen Bhandari on 3/12/2021 12:35 PM      XR CHEST PORTABLE   Final Result   Mild cardiomegaly. Interstitial edema noted in the lower lobes with peribronchial cuffing this can be related to acute bronchitis, or atypical viral pneumonia. **This report has been created using voice recognition software. It may contain minor errors which are inherent in voice recognition technology. **      Final report electronically signed by Dr. Vignesh Alvarez on 3/8/2021 12:19 PM      MRI ABDOMEN W [address]    (Results Pending)     Xr Chest Portable    Result Date: 3/8/2021  PROCEDURE: XR CHEST PORTABLE CLINICAL INFORMATION: cough. congested. . COMPARISON: 9/22/2020 TECHNIQUE: Portable upright FINDINGS: Cardiomegaly. No mediastinal widening. Mild interstitial edema with a lower lobe predominance. Parabronchial cuffing can be seen. There is however no pleural effusion identified and no confluent airspace consolidation. Pulmonary vessels are not  congested     Mild cardiomegaly. Interstitial edema noted in the lower lobes with peribronchial cuffing this can be related to acute bronchitis, or atypical viral pneumonia. **This report has been created using voice recognition software. It may contain minor errors which are inherent in voice recognition technology. ** Final report electronically signed by Dr. Ramon Forrester on 3/8/2021 12:19 PM    With RN in room, patient was updated about and agreed upon the treatment plan, all the questions and concerns were addressed. Patient was seen in PPE (PERSONAL PROTECTIVE EQUIPMENT). Patient was interviewed in Strict Airborne and Droplet Precautions.     Electronically signed by Anselmo Lee MD on 3/16/2021 at 8:42 AM

## 2021-03-16 NOTE — PROGRESS NOTES
BMI Categories: Overweight (BMI 25.0-29. 9)       Nutrition Diagnosis:   · Inadequate oral intake related to impaired respiratory function, inadequate protein-energy intake as evidenced by poor intake prior to admission, intake 0-25%      Nutrition Interventions:   Food and/or Nutrient Delivery:  Continue Current Diet, Modify Oral Nutrition Supplement  Nutrition Education/Counseling:  Education initiated(encouraged intake at best effort, use of ONS)   Coordination of Nutrition Care:  Continue to monitor while inpatient, Interdisciplinary Rounds    Goals:  Pt. will tolerate and consume 75% or more of meals to promote wound healign during LOS. Nutrition Monitoring and Evaluation:   Behavioral-Environmental Outcomes:  None Identified   Food/Nutrient Intake Outcomes:  Diet Advancement/Tolerance, Food and Nutrient Intake, Supplement Intake  Physical Signs/Symptoms Outcomes:  Biochemical Data, GI Status, Fluid Status or Edema, Hemodynamic Status, Meal Time Behavior, Skin, Weight     Discharge Planning:     Too soon to determine     Electronically signed by Attila Levine RD, LD on 3/16/21 at 12:42 PM EDT    Contact: [phone]

## 2021-03-16 NOTE — PLAN OF CARE
Problem: Airway Clearance - Ineffective  Goal: Achieve or maintain patent airway  3/16/2021 1110 by Janes Steel RN  Outcome: Ongoing  3/16/2021 0111 by Isabella Peter RN  Outcome: Ongoing  Note: Remains on HFO able to wean to 70% and 60L, airway patent     Problem: Gas Exchange - Impaired  Goal: Absence of hypoxia  3/16/2021 1110 by Janes Steel RN  Outcome: Ongoing  3/16/2021 0111 by Isabella Peter RN  Outcome: Ongoing  Note: Pt remains on HFO, weaning as tolerated  Goal: Promote optimal lung function  3/16/2021 1110 by Janes Steel RN  Outcome: Ongoing  3/16/2021 0111 by Isabella Peter RN  Outcome: Ongoing     Problem: Breathing Pattern - Ineffective  Goal: Ability to achieve and maintain a regular respiratory rate  3/16/2021 1110 by Janes Steel RN  Outcome: Ongoing  3/16/2021 0111 by Isabella Peter RN  Outcome: Ongoing     Problem: Body Temperature -  Risk of, Imbalanced  Goal: Ability to maintain a body temperature within defined limits  3/16/2021 1110 by Janes Steel RN  Outcome: Ongoing  3/16/2021 0111 by Isabella Peter RN  Outcome: Ongoing     Problem: Isolation Precautions - Risk of Spread of Infection  Goal: Prevent transmission of infection  3/16/2021 1110 by Janes Steel RN  Outcome: Ongoing  3/16/2021 0111 by Isabella Peter RN  Outcome: Ongoing  Note: Remains in droplet plus isoalation, PPE worn when in contact with patient.       Problem: Nutrition Deficits  Goal: Optimize nutritional status  3/16/2021 1110 by Janes Steel RN  Outcome: Ongoing  3/16/2021 0111 by Isabella Peter RN  Outcome: Ongoing     Problem: Patient Education: Go to Patient Education Activity  Goal: Patient/Family Education  3/16/2021 1110 by Janes Steel RN  Outcome: Ongoing  3/16/2021 0111 by Isabella Peter RN  Outcome: Ongoing     Problem: Falls - Risk of:  Goal: Will remain free from falls  Description: Will remain free from falls  3/16/2021 1110 by Janes Steel RN  Outcome: Ongoing  3/16/2021 0111 by Ann Marie Tello Ongoing  Goal: Control of acute pain  Description: Control of acute pain  3/16/2021 1110 by Maria Victoria Solis RN  Outcome: Ongoing  3/16/2021 0111 by Mathew Luna RN  Outcome: Ongoing  Note: Patient able to use 0-10 pain scale. Denies pain at this time. Agreeable to take PRN pain medications.      Goal: Control of chronic pain  Description: Control of chronic pain  3/16/2021 1110 by Maria Victoria Solis RN  Outcome: Ongoing  3/16/2021 0111 by Mathew Luna RN  Outcome: Ongoing

## 2021-03-16 NOTE — PLAN OF CARE
Problem: Airway Clearance - Ineffective  Goal: Achieve or maintain patent airway  Outcome: Ongoing  Note: Remains on HFO able to wean to 70% and 60L, airway patent       Problem: Gas Exchange - Impaired  Goal: Absence of hypoxia  Outcome: Ongoing  Note: Pt remains on HFO, weaning as tolerated     Problem: Isolation Precautions - Risk of Spread of Infection  Goal: Prevent transmission of infection  Outcome: Ongoing  Note: Remains in droplet plus isoalation, PPE worn when in contact with patient. Problem: Falls - Risk of:  Goal: Will remain free from falls  Description: Will remain free from falls  Outcome: Ongoing  Note: Call light in reach, bed in lowest position, and bed alarm activated. Education given on use of call light before ambulation and when in need of assistance. Hourly visual checks performed and charted. Toileting offered to patient. No falls this shift, at any time. Problem: Skin Integrity:  Goal: Absence of new skin breakdown  Description: Absence of new skin breakdown  Outcome: Ongoing  Note: Stage 1 pressure injury noted to buttocks. No signs of new skin breakdown with each assessment. Skin remains warm, dry, intact. Mucous membranes pink & moist. Turning patient every 2 hours and when needed. Problem: Nutrition  Goal: Optimal nutrition therapy  Outcome: Ongoing  Note: Pt has poor appetite. Continuing to encourage oral intake     Problem: Pain:  Goal: Control of acute pain  Description: Control of acute pain  Outcome: Ongoing  Note: Patient able to use 0-10 pain scale. Denies pain at this time. Agreeable to take PRN pain medications.

## 2021-03-16 NOTE — CARE COORDINATION
3/16/21, 1:19 PM EDT    DISCHARGE ON GOING [address] day: 8  Location: 3A-08/008-A Reason for admit: Acute respiratory failure ([facility] [address] [J96.00]   Procedure:   3/9 Convalescent plasma x1   Barriers to Discharge: Afebrile. Continues with High Flow O2, 45L and 45% FiO2. Sats 97%. Albumin 2.9. CO2 21. Hgb 11.6. Vit C, Baby ASA, Norvasc, Lipitor, Decadron, Lovenox, Pepcid, Bevespi, Vit D, Zinc. Dietitian. OT/PT. Palliative Care. PCP: ERNESTO Robertson CNP  Readmission Risk Score: 17%  Patient Goals/Plan/Treatment Preferences: From home with daughter. TCU eval, will relook when off high flow O2. If unable to wean high flow, may need to consider LTACH. SW on case for possible ECF need.

## 2021-03-16 NOTE — PROGRESS NOTES
difficult to wake and resistive to movement, ith extra time and cues pt did become more alert and cooperative. Pt appears to be confused however oriented. PAIN: no c/o pain    Vitals: Oxygen: pt on HFNC 45LPM 48% FiO2. SpO2 ranged from 84-93% on continuous monitor. ptneeding cues for deep breathing thru nose with dips in O2 with mobility. Heart Rate: WNL  RR: 30s with mobility    OBJECTIVE:  Bed Mobility:  Supine to Sit: Minimal Assistance, Maximum Assistance, X 2, with verbal cues , with increased time for completion, from L sidelying  Scooting: Moderate Assistance, X 1, to EOB    Transfers:  Sit to Stand: Minimal Assistance, Moderate Assistance, X 2, with increased time for completion, cues for hand placement, with verbal cues  Stand to Sit:Minimal Assistance, Moderate Assistance, X 2, cues for hand placement, with verbal cues  2 trials during session, from EOB and recliner    Ambulation:  Minimal Assistance, Moderate Assistance, X 2, with cues for safety, with verbal cues , with increased time for completion  Distance: 5ft  Surface: Level Tile  Device:arvind HHA  Gait Deviations: Forward Flexed Posture, Slow Arabella, Decreased Step Length Bilaterally, Decreased Gait Speed, Decreased Heel Strike Bilaterally, Wide Base of Support, Mild Path Deviations, Unsteady Gait, Decreased Terminal Knee Extension and leaning posterior, unsteady, cues for direction. Balance:  Static Sitting Balance:  Stand By Assistance  Dynamic Sitting Balance: Stand By Assistance, Minimal Assistance, occasional CGA/min A of 1  Pt EOB ~10 mins for orientation to sitting, completed tasks with OT, cued pt for erect trunk and midline. Extra time prepping for mobility due to mult lines    Functional Outcome Measures: Completed  AM-PAC Inpatient Mobility Raw Score : 8  AM-PAC Inpatient T-Scale Score : 28.52    ASSESSMENT:  Assessment: Patient progressing toward established goals. Activity Tolerance:  Patient tolerance of  treatment: fair.  Pt limited by SOB and fatigue. Pt demos decreased cognition and independence with mobility. Pt will benefit from cont PT at this time to improve overall function and safety with mobility. Equipment Recommendations:Equipment Needed: (likely a rolling walker, continue to assess)  Discharge Recommendations:  Continue to assess pending progress(TCU) vs LTACH    Plan: Times per week: 5x GM  Times per day: Daily  Current Treatment Recommendations: Strengthening, Functional Mobility Training, Home Exercise Program, Endurance Training, Balance Training, Patient/Caregiver Education & Training, Transfer Training, Gait Training, Safety Education & Training    Patient Education  Patient Education: Plan of Care, [employer] Mobility, Transfers, Gait, Verbal Exercise Instruction, deep breathing, cues for posture/midline    Goals:  Patient goals : return home  Short term goals  Time Frame for Short term goals: by hospital discharge  Short term goal 1: Supine to/from sit with modified independence for ease of transfers at discharge site. Short term goal 2: Sit to/from stand with modified independence for ease of transfers at discharge site. Short term goal 3: Ambulate 21' with CGA x 1 and LRAD, maintaining O2 saturation >88%, for progression to home distance ambulation. Short term goal 4: Ascend/descend 3 steps with 1 HR and Rich for progression to safe entry into home. Long term goals  Time Frame for Long term goals : N/A due to short ELOS. Following session, patient left in safe position with all fall risk precautions in place.

## 2021-03-16 NOTE — PROGRESS NOTES
Nursing staff used alcohol swabs to patient's bilateral nares this shift. Updated patient daughter, Rivera Mack on patient status. She recommended the nurse to update patient that she cannot come home unless she starts to eat b/c she needs to regain her strength. Updated patient of daughter's wishes. Patient does prefer to sleep during the day and is up at night, which is when she likes to eat and snack.

## 2021-03-16 NOTE — PROGRESS NOTES
[address] CCU 3A  Occupational Therapy  Daily Note  Time:   Time In: 840  Time Out: 920  Timed Code Treatment Minutes: 40 Minutes  Minutes: 40      Cotx with PT due to patient unable to tolerate 2 sessions    Date: 3/16/2021  Patient Name: Charlene Osman,   Gender: female      Room: [facility]08/008-A  MRN: [id number]  : 1940  (80 y.o.)  Referring Practitioner: Dr. Chris Ace MD  Diagnosis: Acute Respiratory Failure  Additional Pertinent Hx: Per H&P: Pt presented to [facility] with increased weakness for the past 3 days. Limited history provided by daughter-in-law who is in the room. Ms. Christophe Westbrook received her covid vaccine on 3/1/2021. She did have some fatigue afterwards with nausea. However, she began having acutely worsening weakness and cough over the last 3 days with decreased oral intake. Family was unaware until her daughter-in-law went to check on her the day of admission. She is requiring 6 L supplemental O2 to maintain SPO2 greater than 90% and appears to have increased work of breathing value at rest. COVID +    Restrictions/Precautions:  Restrictions/Precautions: Isolation(droplet + precautions)  Position Activity Restriction  Other position/activity restrictions: 3/11: HFNC 60L FiO2 100%     SUBJECTIVE: Nurse AVNI cowan'tip session, In bed upon arrival, encouragement given to start session    PAIN: Did not rate    Vitals: Patient on 45 L O2 Fi02 48% via High Flow  upon arrival to room. Patient O2 sats at 93 %. Upon activity patient dropping O2 at 84 %. Pt requiring moderate rest break(s) to recover. COGNITION: Slow Processing    ADL:   Feeding: with set-up. A for opening containers  Grooming: Dependent. for combing hair, patient refused, able to wash face with set up  Lower Extremity Dressing: Dependent. for donning B slipper socks. BALANCE:  Sitting Balance:  Stand By Assistance.  x 10 minutes with B UE support  Standing Balance x 2 minutes with Mod x1 and Min x 1  BED MOBILITY:  Supine to Sit: Maximum Assistance and x min A    TRANSFERS:  Sit to Stand: Moderate Assistance. x1 and Min x 1, EOB and bedside chair  Stand to Sit: Minimal Assistance, Moderate Assistance. FUNCTIONAL MOBILITY:  Assistive Device: HHA x 2  Assist Level:  Minimal Assistance and Moderate Assistance. Distance: 5 feet    ASSESSMENT:     Activity Tolerance:  Patient tolerance of  treatment: good. Discharge Recommendations: Subacute/Skilled Nursing Facility(TCU)   Equipment Recommendations: Other: will continue to monitor pending progress, discharge location  Plan: Times per week: 5x  Current Treatment Recommendations: Strengthening, Balance Training, Functional Mobility Training, Endurance Training, Safety Education & Training, Patient/Caregiver Education & Training, Equipment Evaluation, Education, & procurement, Self-Care / ADL    Patient Education  Patient Education: Importance of Increasing Activity and Orientation    Goals  Short term goals  Time Frame for Short term goals: by discharge  Short term goal 1: Pt will increase activity tolerance for functional mobility to/from [facility] or chair with CGA and Sp02 >=90% in prep for toileting tasks. Short term goal 2: Pt will tolerate dynamic standing X2 minutes with CGA and unilateral release in prep for hygiene/clothing management. Short term goal 3: Pt will complete BADL tasks with minimal assistance, incorporating ECT with minimal cues, to increase independence and ease with self care tasks. Short term goal 4: Pt will complete functional transfers with CGA in prep for BSC/toilet transfers. Following session, patient left in safe position with all fall risk precautions in place.

## 2021-03-17 LAB
ALBUMIN SERPL-MCNC: 2.9 G/DL (ref 3.5–5.1)
ALP BLD-CCNC: 59 U/L (ref 38–126)
ALT SERPL-CCNC: 8 U/L (ref 11–66)
ANION GAP SERPL CALCULATED.3IONS-SCNC: 10 MEQ/L (ref 8–16)
AST SERPL-CCNC: 13 U/L (ref 5–40)
BASOPHILS # BLD: 0.1 %
BASOPHILS ABSOLUTE: 0 THOU/MM3 (ref 0–0.1)
BILIRUB SERPL-MCNC: 0.4 MG/DL (ref 0.3–1.2)
BUN BLDV-MCNC: 16 MG/DL (ref 7–22)
CALCIUM SERPL-MCNC: 8.7 MG/DL (ref 8.5–10.5)
CHLORIDE BLD-SCNC: 108 MEQ/L (ref 98–111)
CO2: 19 MEQ/L (ref 23–33)
CREAT SERPL-MCNC: 0.5 MG/DL (ref 0.4–1.2)
EOSINOPHIL # BLD: 0.2 %
EOSINOPHILS ABSOLUTE: 0 THOU/MM3 (ref 0–0.4)
ERYTHROCYTE [DISTWIDTH] IN BLOOD BY AUTOMATED COUNT: 17.2 % (ref 11.5–14.5)
ERYTHROCYTE [DISTWIDTH] IN BLOOD BY AUTOMATED COUNT: 54.2 FL (ref 35–45)
GFR SERPL CREATININE-BSD FRML MDRD: > 90 ML/MIN/1.73M2
GLUCOSE BLD-MCNC: 95 MG/DL (ref 70–108)
HCT VFR BLD CALC: 37.5 % (ref 37–47)
HEMOGLOBIN: 11.7 GM/DL (ref 12–16)
IMMATURE GRANS (ABS): 0.18 THOU/MM3 (ref 0–0.07)
IMMATURE GRANULOCYTES: 2.1 %
LYMPHOCYTES # BLD: 15.9 %
LYMPHOCYTES ABSOLUTE: 1.4 THOU/MM3 (ref 1–4.8)
MCH RBC QN AUTO: 27.1 PG (ref 26–33)
MCHC RBC AUTO-ENTMCNC: 31.2 GM/DL (ref 32.2–35.5)
MCV RBC AUTO: 86.8 FL (ref 81–99)
MONOCYTES # BLD: 10.2 %
MONOCYTES ABSOLUTE: 0.9 THOU/MM3 (ref 0.4–1.3)
NUCLEATED RED BLOOD CELLS: 0 /100 WBC
PLATELET # BLD: 317 THOU/MM3 (ref 130–400)
PMV BLD AUTO: 10 FL (ref 9.4–12.4)
POTASSIUM REFLEX MAGNESIUM: 3.9 MEQ/L (ref 3.5–5.2)
RBC # BLD: 4.32 MILL/MM3 (ref 4.2–5.4)
REASON FOR REJECTION: NORMAL
REJECTED TEST: NORMAL
SEG NEUTROPHILS: 71.5 %
SEGMENTED NEUTROPHILS ABSOLUTE COUNT: 6.1 THOU/MM3 (ref 1.8–7.7)
SODIUM BLD-SCNC: 137 MEQ/L (ref 135–145)
TOTAL PROTEIN: 5.8 G/DL (ref 6.1–8)
WBC # BLD: 8.6 THOU/MM3 (ref 4.8–10.8)

## 2021-03-17 PROCEDURE — 99233 SBSQ HOSP IP/OBS HIGH 50: CPT | Performed by: HOSPITALIST

## 2021-03-17 PROCEDURE — 94667 MNPJ CHEST WALL 1ST: CPT

## 2021-03-17 PROCEDURE — 6370000000 HC RX 637 (ALT 250 FOR IP): Performed by: STUDENT IN AN ORGANIZED HEALTH CARE EDUCATION/TRAINING PROGRAM

## 2021-03-17 PROCEDURE — 36415 COLL VENOUS BLD VENIPUNCTURE: CPT

## 2021-03-17 PROCEDURE — 6370000000 HC RX 637 (ALT 250 FOR IP): Performed by: FAMILY MEDICINE

## 2021-03-17 PROCEDURE — 6360000002 HC RX W HCPCS: Performed by: FAMILY MEDICINE

## 2021-03-17 PROCEDURE — 94761 N-INVAS EAR/PLS OXIMETRY MLT: CPT

## 2021-03-17 PROCEDURE — 97530 THERAPEUTIC ACTIVITIES: CPT

## 2021-03-17 PROCEDURE — 80053 COMPREHEN METABOLIC PANEL: CPT

## 2021-03-17 PROCEDURE — 97110 THERAPEUTIC EXERCISES: CPT

## 2021-03-17 PROCEDURE — 2700000000 HC OXYGEN THERAPY PER DAY

## 2021-03-17 PROCEDURE — 6370000000 HC RX 637 (ALT 250 FOR IP): Performed by: HOSPITALIST

## 2021-03-17 PROCEDURE — 94640 AIRWAY INHALATION TREATMENT: CPT

## 2021-03-17 PROCEDURE — 94669 MECHANICAL CHEST WALL OSCILL: CPT

## 2021-03-17 PROCEDURE — 85025 COMPLETE CBC W/AUTO DIFF WBC: CPT

## 2021-03-17 PROCEDURE — 2580000003 HC RX 258: Performed by: HOSPITALIST

## 2021-03-17 PROCEDURE — 2140000000 HC CCU INTERMEDIATE R&B

## 2021-03-17 PROCEDURE — 6360000002 HC RX W HCPCS: Performed by: STUDENT IN AN ORGANIZED HEALTH CARE EDUCATION/TRAINING PROGRAM

## 2021-03-17 PROCEDURE — 2580000003 HC RX 258: Performed by: STUDENT IN AN ORGANIZED HEALTH CARE EDUCATION/TRAINING PROGRAM

## 2021-03-17 RX ADMIN — SODIUM CHLORIDE, PRESERVATIVE FREE 10 ML: 5 INJECTION INTRAVENOUS at 20:46

## 2021-03-17 RX ADMIN — FAMOTIDINE 20 MG: 20 TABLET, FILM COATED ORAL at 20:45

## 2021-03-17 RX ADMIN — Medication 2000 UNITS: at 10:51

## 2021-03-17 RX ADMIN — DEXAMETHASONE 6 MG: 4 TABLET ORAL at 10:51

## 2021-03-17 RX ADMIN — ENOXAPARIN SODIUM 30 MG: 30 INJECTION SUBCUTANEOUS at 10:49

## 2021-03-17 RX ADMIN — AMLODIPINE BESYLATE 5 MG: 5 TABLET ORAL at 10:51

## 2021-03-17 RX ADMIN — ASPIRIN 81 MG: 81 TABLET, CHEWABLE ORAL at 10:50

## 2021-03-17 RX ADMIN — SODIUM CHLORIDE: 9 INJECTION, SOLUTION INTRAVENOUS at 13:58

## 2021-03-17 RX ADMIN — ATORVASTATIN CALCIUM 20 MG: 20 TABLET, FILM COATED ORAL at 20:46

## 2021-03-17 RX ADMIN — POTASSIUM CHLORIDE 40 MEQ: 1500 TABLET, EXTENDED RELEASE ORAL at 11:04

## 2021-03-17 RX ADMIN — SODIUM CHLORIDE, PRESERVATIVE FREE 10 ML: 5 INJECTION INTRAVENOUS at 10:48

## 2021-03-17 RX ADMIN — Medication 50 MG: at 10:51

## 2021-03-17 RX ADMIN — ENOXAPARIN SODIUM 30 MG: 30 INJECTION SUBCUTANEOUS at 20:46

## 2021-03-17 RX ADMIN — GLYCOPYRROLATE AND FORMOTEROL FUMARATE 2 PUFF: 9; 4.8 AEROSOL, METERED RESPIRATORY (INHALATION) at 17:06

## 2021-03-17 RX ADMIN — OXYCODONE HYDROCHLORIDE AND ACETAMINOPHEN 1000 MG: 500 TABLET ORAL at 10:50

## 2021-03-17 RX ADMIN — FAMOTIDINE 20 MG: 20 TABLET, FILM COATED ORAL at 10:49

## 2021-03-17 ASSESSMENT — PAIN SCALES - GENERAL
PAINLEVEL_OUTOF10: 0

## 2021-03-17 NOTE — CARE COORDINATION
3/17/21, 11:29 AM EDT    DISCHARGE ON GOING EVALUATION    [facility] day: 9  Location: 3A-09/009-A Reason for admit: Acute respiratory failure ([address] [J96.00]   Procedure:   3/9 Convalescent plasma x1   Barriers to Discharge: Afebrile. Remains on high flow O2, 40L and 40% FiO2. Sats 92-98%. Discussed with Dr. José Talamantes, planning to trial 6L today. External durham. CO2 19, Total protein 5.8, Albumin 2.9. Hgb 11.7. Norvasc, Vit C, baby ASA, Lipitor, Lovenox, Pepcid bid, Bevespi, Vit D, Zinc. Dietitian. OT/PT. Palliative Care. PCP: ERNESTO Subramanian CNP  Readmission Risk Score: 17%  Patient Goals/Plan/Treatment Preferences: From home with daughter. TCU eval, will relook when off high flow O2.  If unable to wean high flow, may need to consider LTACH. SW on case for possible ECF need.

## 2021-03-17 NOTE — PROGRESS NOTES
Hospitalist Progress Note      Patient:  Licha Kyle    YMFB/YRP:[id number]/145-D  YOB: 1940  MRN: [id number]   Acct: [de-identified]   PCP: ERNESTO Mary CNP  Date of Admission: 3/8/2021    Assessment/Plan:    Pneumonia secondary to COVID 19  · Patient got the 1st dose of COVID vaccine on 3/1,   · On Dexamethasone 6 mg QD (on H1 blocker) and Remdesivir  · Procal WNLs d/c'ed ABx  · Supportive therapy with vitamin C, D, zinc and lactobacillus (d/c'ed)  · LMWH 30mg BID for DVT prophylaxis; added ASA 81 QD as prothrombotic markers elevated  · IS/Acapella/PT to see  3/11: stable; SaO2 drops w/ exertion and also postural changes; CCM. Added chest physio  3/12: improving; encouraged CCM w/ aggressive weaning as tolerated to SaO2 greater than 90%  3/13: slow wean w/ short-lived episodes of worsening hypoxia which is positional. CCM. Reduce IVF to 50 cc/hr given inadequate PO intake. Repeat ABG non-contributroy  3/14: slowly weaning. CCM  3/16: Improving steadily. Now down to FiO2 45% at 45 lpm w/ SaO2 high 90s. CCM  3/17: down to HFO 40/40.  Will try stepping down to NC at 6 lpm today as tolerated.      Acute hypoxic respiratory failure secondary to above  · On HFO FiO2 80% and 60 lpm w/ SaO2 in high 90s  · Rn aware to wean supplemental O2 aggressively as tolerated to SpO2 > 90%  3/11: ABG negative for hypercapnia; managing as above  3/17: managing as above    Leukopenia  · Likely secondary to COVID  · resolved Continue to monitor     HypoK: mild, K 3.1. likely nutritional; will replace and trend  3/12: K 3.0 today; will add standing K-l;or 40 meq QD in addition to replacement protocol  3/13: resolved; K 4.1. will monitor  3/17: K 3.9    History of COPD  · Quit smoking 7-10 years ago. 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 CCM and monitor  3/14: BP suboptimally controlled overnight coinciding w/ episodes of hypoxia; will continue monitoring for now; consider switching CCB to qhs from QD.  3/17: Up when working w/ physio likely 2/2 deconditioning. Rohit Albertina controlled otherwise. CCM and monitor     Liver/pancreatic cystic mass: incidental finding on Liver US:  MR A/P ordered to further assess (will be done when pt able to lie flat); AFP WNls and Ca 19-9 pending    Code status: Limited x4    Medical/physical deconditioning: PT/OT/Sw to follow    Inadequate PO intake: encouraged feeding, dietician to see       Chief Complaint: SOB    Initial H and P:-    Admitted for management of Acute hypoxic respiratory failure secondary to COVID 19. I took over care on 3/17/2021. Subjective (past 24 hours): No new issues overnight. Feeling improved. More engaged w/ conversation. Denies any complaints. Past medical history, family history, social history and allergies reviewed again and is unchanged since admission. ROS (12 point review of systems completed. Pertinent positives noted.  Otherwise ROS is negative)     Medications:  Reviewed    Infusion Medications    sodium chloride      sodium chloride      sodium chloride 50 mL/hr at 03/14/21 2120    dextrose       Scheduled Medications    potassium chloride  40 mEq Oral Daily    aspirin  81 mg Oral Daily    ascorbic acid  1,000 mg Oral Daily    zinc sulfate  50 mg Oral Daily    sodium chloride flush  10 mL Intravenous 2 times per day    enoxaparin  30 mg Subcutaneous BID    Vitamin D  2,000 Units Oral Daily    atorvastatin  20 mg Oral Daily    famotidine  20 mg Oral BID    glycopyrrolate-formoterol  2 puff Inhalation BID  amLODIPine  5 mg Oral Daily    dexamethasone  6 mg Oral Daily     PRN Meds: melatonin, traMADol, hydrALAZINE, morphine, potassium chloride **OR** potassium alternative oral replacement **OR** potassium chloride, sodium chloride, sodium chloride, sodium chloride, sodium chloride flush, promethazine **OR** ondansetron, polyethylene glycol, acetaminophen **OR** acetaminophen, guaiFENesin-dextromethorphan, ipratropium-albuterol, glucose, dextrose, glucagon (rDNA), dextrose      Intake/Output Summary (Last 24 hours) at 3/17/2021 0707  Last data filed at 3/17/2021 0005  Gross per 24 hour   Intake 1803.35 ml   Output 1250 ml   Net 553.35 ml       Diet:  DIET CARDIAC; Dietary Nutrition Supplements: Other Oral Supplement (see comment)  Dietary Nutrition Supplements: Frozen Oral Supplement    Exam:  BP (!) 148/88   Pulse 61   Temp 98.6 °F (37 °C) (Axillary)   Resp 16   Ht 5' 2\" (1.575 m)   Wt 139 lb 8.8 oz (63.3 kg)   SpO2 94%   BMI 25.52 kg/m²   General appearance: No apparent distress, appears stated age and cooperative. HEENT: Pupils equal, round, and reactive to light. Conjunctivae/corneas clear. Neck: Supple, with full range of motion. No jugular venous distention. Trachea midline. Respiratory:  Diminished A/T bilat lung fields  Cardiovascular: Regular rate and rhythm with normal S1/S2 without murmurs, rubs or gallops. Abdomen: Soft, non-tender, non-distended with normal bowel sounds. Musculoskeletal: passive and active ROM x 4 extremities. Skin: Skin color, texture, turgor normal.  No rashes or lesions. Neurologic:  Neurovascularly intact without any focal sensory/motor deficits.  Cranial nerves: II-XII intact, grossly non-focal.  Psychiatric: Alert and oriented, thought content appropriate, normal insight  Capillary Refill: Brisk,< 3 seconds   Peripheral Pulses: +2 palpable, equal bilaterally     Labs:   Recent Labs     03/15/21  0347 03/16/21  0430   WBC 6.3 8.5   HGB 11.5* 11.6*   HCT 36.0* 37.5  370     Recent Labs     03/15/21  0347 03/16/21  0430    141   K 3.9 4.0    109   CO2 20* 21*   BUN 15 16   CREATININE 0.5 0.5   CALCIUM 8.5 8.6     Recent Labs     03/15/21  0347 03/16/21  0430   AST 11 12   ALT 8* 8*   BILITOT 0.4 0.5   ALKPHOS 58 63     No results for input(s): INR in the last 72 hours. No results for input(s): Charley Loth in the last 72 hours. Microbiology:    Blood culture #1:   Lab Results   Component Value Date    BC No growth-preliminary  No growth   06/20/2018       Blood culture #2:No results found for: Miguelito Lockwood    Organism:No results found for: ORG    No results found for: LABGRAM    MRSA culture only:No results found for: [address]     Urine culture: No results found for: LABURIN    Respiratory culture: No results found for: CULTRESP    Aerobic and Anaerobic :  No results found for: LABAERO  No results found for: LABANAE    Urinalysis:      Lab Results   Component Value Date    NITRU NEGATIVE 06/20/2018    BLOODU NEGATIVE 06/20/2018    GLUCOSEU NEGATIVE 06/20/2018       Radiology:  XR CHEST PORTABLE   Final Result      Multifocal interstitial and alveolar opacities the lungs intervally increased from prior exam concerning for infectious process. Cardiomegaly, stable from prior. **This report has been created using voice recognition software. It may contain minor errors which are inherent in voice recognition technology. **      Final report electronically signed by Dr. Rani Ace on 3/12/2021 12:35 PM      XR CHEST PORTABLE   Final Result   Mild cardiomegaly. Interstitial edema noted in the lower lobes with peribronchial cuffing this can be related to acute bronchitis, or atypical viral pneumonia. **This report has been created using voice recognition software. It may contain minor errors which are inherent in voice recognition technology. **      Final report electronically signed by Dr. Foreign Camargo on 3/8/2021 12:19 PM MRI ABDOMEN W WO CONTRAST    (Results Pending)     Xr Chest Portable    Result Date: 3/8/2021  PROCEDURE: XR CHEST PORTABLE CLINICAL INFORMATION: cough. congested. . COMPARISON: 9/22/2020 TECHNIQUE: Portable upright FINDINGS: Cardiomegaly. No mediastinal widening. Mild interstitial edema with a lower lobe predominance. Parabronchial cuffing can be seen. There is however no pleural effusion identified and no confluent airspace consolidation. Pulmonary vessels are not  congested     Mild cardiomegaly. Interstitial edema noted in the lower lobes with peribronchial cuffing this can be related to acute bronchitis, or atypical viral pneumonia. **This report has been created using voice recognition software. It may contain minor errors which are inherent in voice recognition technology. ** Final report electronically signed by Dr. Corie Bardales on 3/8/2021 12:19 PM    With RN in room, patient was updated about and agreed upon the treatment plan, all the questions and concerns were addressed. Patient was seen in PPE (PERSONAL PROTECTIVE EQUIPMENT). Patient was interviewed in Strict Airborne and Droplet Precautions.     Electronically signed by Jayjay Mehta MD on 3/17/2021 at 7:07 AM

## 2021-03-17 NOTE — PROGRESS NOTES
[address] CCU 3A  Occupational Therapy  Daily Note  Time:   Time In: 9826  Time Out: 1015  Timed Code Treatment Minutes: 46 Minutes  Minutes: 46      co-tx completed d/t medical complexity, pt would not tolerate 2 sessions. Staggered in/out time with PT. Date: 3/17/2021  Patient Name: Delano Earl,   Gender: female      Room: [facility]/009-  MRN: [id number]  : 1940  (80 y.o.)  Referring Practitioner: Dr. Martinez Person MD  Diagnosis: Acute Respiratory Failure  Additional Pertinent Hx: Per H&P: Pt presented to [facility] with increased weakness for the past 3 days. Limited history provided by daughter-in-law who is in the room. Ms. Isma Obando received her covid vaccine on 3/1/2021. She did have some fatigue afterwards with nausea. However, she began having acutely worsening weakness and cough over the last 3 days with decreased oral intake. Family was unaware until her daughter-in-law went to check on her the day of admission. She is requiring 6 L supplemental O2 to maintain SPO2 greater than 90% and appears to have increased work of breathing value at rest. COVID +    Restrictions/Precautions:  Restrictions/Precautions: Isolation(droplet + precautions)  Position Activity Restriction  Other position/activity restrictions: 3/11: HFNC     SUBJECTIVE: Pt lying in bed upon OT arrival, RN had reported that pt was reluctant to engage in nursing tasks this AM and wanting to      PAIN: did not rate    Vitals: Pt on HFNC at 40LPM 44% FiO2. While seated at EOB, pt's O2 fluctuated from 86-92%. Increased HFNC to 64% FiO2 per HFNC order prior to ambulation due to low O2 sats at EOB at 89-91%. Once O2 adjusted pt maintained 90%-94% while ambulation. This therapist left pt up in chair with PT and PT weaned pt back down--see PT note for weaning details.     COGNITION: Slow Processing, Decreased Recall, Decreased Insight, Decreased Problem Solving, Decreased Safety Awareness and Impaired Attention    ADL:   Lower Extremity Dressing: Dependent-socks    BALANCE:  Sitting Balance:  Stand By Assistance, X 2. at EOB  Standing Balance: Minimal Assistance, X 2.      BED MOBILITY:  Supine to Sit: Contact Guard Assistance, Minimal Assistance    Scooting: Contact Guard Assistance, Minimal Assistance    *Pt with dizziness and slightly decreased O2 requiring extended time. BP taken at /95--pt had not received meds this AM prior to session d/t pt refusal.    TRANSFERS:  Sit to Stand:  Minimal Assistance, X 2.    Stand to Sit: Minimal Assistance, X 2.      FUNCTIONAL MOBILITY:  Assistive Device: HHA of 2  Assist Level:  Minimal Assistance. Of 2  Distance: EOB to recliner  Assist of 2, unsteadiness, slow pace    ADDITIONAL ACTIVITIES:  Pt completed B UE exs to increase strength required to complete ADL routine, x 1 set, x 5-10 reps, no resistance: shoulder flexion, chest press, bicep curls, horizontal AB/ADduction. Fair pace, exhibits mod fatigue, requires min RBs during exs. ASSESSMENT:      Activity Tolerance:  Patient tolerance of  treatment: good. Discharge Recommendations: Subacute/Skilled Nursing Facility(TCU)   Equipment Recommendations: Other: will continue to monitor pending progress, discharge location  Plan: Times per week: 5x  Current Treatment Recommendations: Strengthening, Balance Training, Functional Mobility Training, Endurance Training, Safety Education & Training, Patient/Caregiver Education & Training, Equipment Evaluation, Education, & procurement, Self-Care / ADL    Patient Education  Patient Education: importance of therapy    Goals  Short term goals  Time Frame for Short term goals: by discharge  Short term goal 1: Pt will increase activity tolerance for functional mobility to/from [facility] or chair with CGA and Sp02 >=90% in prep for toileting tasks.   Short term goal 2: Pt will tolerate dynamic standing X2 minutes with CGA and unilateral release in prep for hygiene/clothing management. Short term goal 3: Pt will complete BADL tasks with minimal assistance, incorporating ECT with minimal cues, to increase independence and ease with self care tasks. Short term goal 4: Pt will complete functional transfers with CGA in prep for BSC/toilet transfers. Following session, patient left in safe position with all fall risk precautions in place.

## 2021-03-17 NOTE — PROGRESS NOTES
[address] [place] [address]  INPATIENT PHYSICAL THERAPY  DAILY NOTE  [facility] CCU 3A - 3A-09/009-A    Time In: 929  Time Out: 1029  Timed Code Treatment Minutes: 60 Minutes  Minutes: 61   Co-treatment with OT d/t pt unable to tolerate 2 sessions and medical complexity. Date: 3/17/2021  Patient Name: Michelle Kan,  Gender:  female        MRN: [id number]  : 1940  (80 y.o.)     Referring Practitioner: Bridger Montes MD  Diagnosis: acute respiratory failure  Additional Pertinent Hx: Per HPI: \"80 y.o. female who presented to [address] [place] [address] with increased weakness for the past 3 days. History is limited due to patient's drowsiness. Limited history provided by daughter-in-law who is in the room. Ms. Esther Rosas received her covid vaccine on 3/1/2021. She did have some fatigue afterwards with nausea. However, she began having acutely worsening weakness and cough over the last 3 days with decreased oral intake. Family was unaware until her daughter-in-law went to check on her today. She denies any shortness of breath to me, but she is requiring 6 L supplemental O2 to maintain SPO2 greater than 90% and appears to have increased work of breathing value at rest.\" Pt is positive for COVID-19. Prior Level of Function:  Lives With: Daughter  Type of Home: House  Home Layout: Multi-level  Home Access: Stairs to enter with rails  Entrance Stairs - Number of Steps: 3 steps + 7 to get to her bedroom/bathroom  Home Equipment: (no DME PTA)   Bathroom Equipment: Shower chair    ADL Assistance: Needs assistance  Homemaking Assistance: (daughter completes all)  Ambulation Assistance: Independent  Transfer Assistance: Independent  Active : Yes    Restrictions/Precautions:  Restrictions/Precautions: Isolation(droplet + precautions)  Position Activity Restriction  Other position/activity restrictions: 3/11: HFNC     SUBJECTIVE: RN approved session.  Pt in bed upon arrival. Pt difficult to wake and required encouragement to participate in therapy. Pt became cooperative as time went on and held conversation. Much extra time spent for adequate rest breaks for extended periods of time d/t low oxygen sats. PAIN: Not Indicated    Vitals: Blood Pressure: 156/95 after sitting up  Oxygen: Pt on HFNC at 40LPM 45% FiO2. On EOB ranged from 85%-95%. Increased HFNC to 65% FiO2 per HFNC order prior to ambulation due to low O2 sats at EOB. After adjusted pt  Maintained 90%-94% ambulating with cues for deep breathing throughout. After pt in chair weaned down to original setting with nursing notified. Heart Rate: Within normal limits  RR: 30's with mobility    OBJECTIVE:  Bed Mobility:  Supine to Sit: Contact Guard Assistance, Minimal Assistance, X 1, with verbal cues , with increased time for completion. Assistance required for trunk control. Pt dizzy after movement, Blood pressure assessed and symptoms resolved with rest break. Scooting: Contact Guard Assistance, Minimal Assistance, X 1, with verbal cues , with increased time for completion     Transfers:  Sit to Stand: Minimal Assistance, X 2, with increased time for completion, cues for hand placement, with verbal cues. Cues to push off bed  Stand to Sit:Minimal Assistance, X 2, with increased time for completion, cues for hand placement, with verbal cues. Cues for sequencing and reaching back for chair. Ambulation:  Minimal Assistance, X 2, with verbal cues , with increased time for completion  Distance: 5'x1  Surface: Level Tile  Device: B Hand-Held Assist  Gait Deviations: Forward Flexed Posture, Decreased Step Length Bilaterally, Decreased Weight Shift Bilaterally, Decreased Gait Speed, Decreased Heel Strike Bilaterally, Narrow Base of Support and Unsteady Gait    Balance:  Static Sitting Balance:  Stand By Assistance.   Dynamic Sitting Balance: Stand By Assistance, with verbal cues , with increased time for completion  Sat EOB ~ 25 min completing exercises, orienting, and maintaining oxygen sats. Cues for erect trunk and midline positioning. Extra time taken for line management and chair preparation. Pt able to balance with B UE and no UE support at times during exercise. Pt had minimal sway during UE exercises with correction to midline. Pt steady throughout with rest breaks utilizing B UE support  Exercise:  Patient was guided in 1 set(s) 10 reps of exercise to both lower extremities. Ankle pumps, Glut sets and Long arc quads. Exercises were completed for increased independence with functional mobility. Functional Outcome Measures: Completed  AM-PAC Inpatient Mobility Raw Score : 11  AM-PAC Inpatient T-Scale Score : 33.86    ASSESSMENT:  Assessment: Patient progressing toward established goals. Activity Tolerance:  Patient tolerance of  treatment: good. Pt demonstrated SOB when completing bed mobility. Pt fatigues quickly and requires encouragement throughout to participate. Generalized weakness and decreased endurance d/t deconditioning. Pt would benefit from continued PT to increase functional strength and mobiltiy for improved independence. Equipment Recommendations:Equipment Needed: (likely a rolling walker, continue to assess)  Discharge Recommendations:  Continue to assess pending progress(TCU) Would benefit for continued therapy. TCU vs LTACH?     Plan: Times per week: 5x GM  Times per day: Daily  Current Treatment Recommendations: Strengthening, Functional Mobility Training, Home Exercise Program, Endurance Training, Balance Training, Patient/Caregiver Education & Training, Transfer Training, Gait Training, Safety Education & Training    Patient Education  Patient Education: Plan of Care, [employer] Mobility, Transfers, Gait, Use of Gait Williamson, Verbal Exercise Instruction    Goals:  Patient goals : return home  Short term goals  Time Frame for Short term goals: by hospital discharge  Short term goal 1: Supine to/from sit with modified independence for ease of transfers

## 2021-03-17 NOTE — PLAN OF CARE
Problem: Skin Integrity:  Goal: Absence of new skin breakdown  Description: Absence of new skin breakdown  Outcome: Ongoing   Pt with gluteal cleft redness and nonblanchable redness to upper cleft and coccyx.  Pt is able to turn self, skin cleansed utilizing external female cath, barrier cream in place, pillow supported

## 2021-03-17 NOTE — PLAN OF CARE
Problem: Skin Integrity:  Goal: Absence of new skin breakdown  Description: Absence of new skin breakdown  3/17/2021 1809 by Nolene Litten, RN  Outcome: Ongoing  3/17/2021 0649 by Cynthia Gautam RN  Outcome: Ongoing     Problem: Skin Integrity:  Goal: Will show no infection signs and symptoms  Description: Will show no infection signs and symptoms  Outcome: Ongoing     Problem: Falls - Risk of:  Goal: Absence of physical injury  Description: Absence of physical injury  Outcome: Ongoing     Problem: Falls - Risk of:  Goal: Will remain free from falls  Description: Will remain free from falls  Outcome: Ongoing     Problem: Patient Education: Go to Patient Education Activity  Goal: Patient/Family Education  Outcome: Ongoing     Problem: DISCHARGE BARRIERS  Goal: Patient's continuum of care needs are met  Outcome: Ongoing     Problem: Nutrition  Goal: Optimal nutrition therapy  Outcome: Ongoing     Problem: Nutrition Deficits  Goal: Optimize nutritional status  Outcome: Met This Shift  Note: Pt educated on importance of eating. Pt ate a banana and ordered dinner. Did eat a Sonda [employer] sandwich as well as a vanilla magic cup.       Problem: PAIN  Goal: Patient's pain/discomfort is manageable  Outcome: Met This Shift  Note: Pt had no complaints of pain on this shift      Problem: Pain:  Goal: Pain level will decrease  Description: Pain level will decrease  Outcome: Met This Shift  Goal: Control of acute pain  Description: Control of acute pain  Outcome: Met This Shift  Goal: Control of chronic pain  Description: Control of chronic pain  Outcome: Met This Shift

## 2021-03-17 NOTE — PLAN OF CARE
Problem: Gas Exchange - Impaired  Goal: Promote optimal lung function  3/16/2021 1110 by Magali Sevilla RN  Outcome: Ongoing   Pt breathing oxygen via  HFNC , NAD , pulse ox continuous, will continue to monitor      Problem: Falls - Risk of:  Goal: Will remain free from falls  Description: Will remain free from falls  3/16/2021 1110 by Magali Sevilla RN  Outcome: Ongoing    Pt at risk for falls. Bed alarm in on position, wheels locked. Pt educated on fall precautions utilizing call bell system and calling for assistance. Pt verbalized understanding of information. Non skid socks on , room well lit and free of clutter. Rounding per policy and as needed, will continue to monitor patient for safety.

## 2021-03-18 LAB
ALBUMIN SERPL-MCNC: 2.9 G/DL (ref 3.5–5.1)
ALP BLD-CCNC: 58 U/L (ref 38–126)
ALT SERPL-CCNC: 10 U/L (ref 11–66)
ANION GAP SERPL CALCULATED.3IONS-SCNC: 9 MEQ/L (ref 8–16)
AST SERPL-CCNC: 11 U/L (ref 5–40)
BASOPHILS # BLD: 0.1 %
BASOPHILS ABSOLUTE: 0 THOU/MM3 (ref 0–0.1)
BILIRUB SERPL-MCNC: 0.4 MG/DL (ref 0.3–1.2)
BUN BLDV-MCNC: 18 MG/DL (ref 7–22)
CALCIUM SERPL-MCNC: 8.4 MG/DL (ref 8.5–10.5)
CHLORIDE BLD-SCNC: 108 MEQ/L (ref 98–111)
CO2: 19 MEQ/L (ref 23–33)
CREAT SERPL-MCNC: 0.5 MG/DL (ref 0.4–1.2)
EOSINOPHIL # BLD: 0 %
EOSINOPHILS ABSOLUTE: 0 THOU/MM3 (ref 0–0.4)
ERYTHROCYTE [DISTWIDTH] IN BLOOD BY AUTOMATED COUNT: 17.4 % (ref 11.5–14.5)
ERYTHROCYTE [DISTWIDTH] IN BLOOD BY AUTOMATED COUNT: 54.5 FL (ref 35–45)
GFR SERPL CREATININE-BSD FRML MDRD: > 90 ML/MIN/1.73M2
GLUCOSE BLD-MCNC: 110 MG/DL (ref 70–108)
HCT VFR BLD CALC: 37.9 % (ref 37–47)
HEMOGLOBIN: 11.8 GM/DL (ref 12–16)
IMMATURE GRANS (ABS): 0.13 THOU/MM3 (ref 0–0.07)
IMMATURE GRANULOCYTES: 1.8 %
LYMPHOCYTES # BLD: 14.3 %
LYMPHOCYTES ABSOLUTE: 1.1 THOU/MM3 (ref 1–4.8)
MCH RBC QN AUTO: 27.1 PG (ref 26–33)
MCHC RBC AUTO-ENTMCNC: 31.1 GM/DL (ref 32.2–35.5)
MCV RBC AUTO: 87.1 FL (ref 81–99)
MONOCYTES # BLD: 7.6 %
MONOCYTES ABSOLUTE: 0.6 THOU/MM3 (ref 0.4–1.3)
NUCLEATED RED BLOOD CELLS: 0 /100 WBC
PLATELET # BLD: 336 THOU/MM3 (ref 130–400)
PMV BLD AUTO: 10.8 FL (ref 9.4–12.4)
POTASSIUM REFLEX MAGNESIUM: 4 MEQ/L (ref 3.5–5.2)
RBC # BLD: 4.35 MILL/MM3 (ref 4.2–5.4)
SEG NEUTROPHILS: 76.2 %
SEGMENTED NEUTROPHILS ABSOLUTE COUNT: 5.6 THOU/MM3 (ref 1.8–7.7)
SODIUM BLD-SCNC: 136 MEQ/L (ref 135–145)
TOTAL PROTEIN: 6 G/DL (ref 6.1–8)
WBC # BLD: 7.4 THOU/MM3 (ref 4.8–10.8)

## 2021-03-18 PROCEDURE — 85025 COMPLETE CBC W/AUTO DIFF WBC: CPT

## 2021-03-18 PROCEDURE — 97116 GAIT TRAINING THERAPY: CPT

## 2021-03-18 PROCEDURE — 36415 COLL VENOUS BLD VENIPUNCTURE: CPT

## 2021-03-18 PROCEDURE — 6370000000 HC RX 637 (ALT 250 FOR IP): Performed by: STUDENT IN AN ORGANIZED HEALTH CARE EDUCATION/TRAINING PROGRAM

## 2021-03-18 PROCEDURE — 6360000002 HC RX W HCPCS: Performed by: STUDENT IN AN ORGANIZED HEALTH CARE EDUCATION/TRAINING PROGRAM

## 2021-03-18 PROCEDURE — 80053 COMPREHEN METABOLIC PANEL: CPT

## 2021-03-18 PROCEDURE — 94761 N-INVAS EAR/PLS OXIMETRY MLT: CPT

## 2021-03-18 PROCEDURE — 2580000003 HC RX 258: Performed by: HOSPITALIST

## 2021-03-18 PROCEDURE — 99233 SBSQ HOSP IP/OBS HIGH 50: CPT | Performed by: HOSPITALIST

## 2021-03-18 PROCEDURE — 6370000000 HC RX 637 (ALT 250 FOR IP): Performed by: FAMILY MEDICINE

## 2021-03-18 PROCEDURE — 2700000000 HC OXYGEN THERAPY PER DAY

## 2021-03-18 PROCEDURE — 97530 THERAPEUTIC ACTIVITIES: CPT

## 2021-03-18 PROCEDURE — 6370000000 HC RX 637 (ALT 250 FOR IP): Performed by: HOSPITALIST

## 2021-03-18 PROCEDURE — 94668 MNPJ CHEST WALL SBSQ: CPT

## 2021-03-18 PROCEDURE — 94640 AIRWAY INHALATION TREATMENT: CPT

## 2021-03-18 PROCEDURE — 2140000000 HC CCU INTERMEDIATE R&B

## 2021-03-18 PROCEDURE — 2580000003 HC RX 258: Performed by: STUDENT IN AN ORGANIZED HEALTH CARE EDUCATION/TRAINING PROGRAM

## 2021-03-18 RX ADMIN — ASPIRIN 81 MG: 81 TABLET, CHEWABLE ORAL at 10:30

## 2021-03-18 RX ADMIN — GLYCOPYRROLATE AND FORMOTEROL FUMARATE 2 PUFF: 9; 4.8 AEROSOL, METERED RESPIRATORY (INHALATION) at 08:02

## 2021-03-18 RX ADMIN — ENOXAPARIN SODIUM 30 MG: 30 INJECTION SUBCUTANEOUS at 10:30

## 2021-03-18 RX ADMIN — Medication 2000 UNITS: at 10:29

## 2021-03-18 RX ADMIN — SODIUM CHLORIDE: 9 INJECTION, SOLUTION INTRAVENOUS at 03:00

## 2021-03-18 RX ADMIN — POTASSIUM CHLORIDE 40 MEQ: 1500 TABLET, EXTENDED RELEASE ORAL at 10:30

## 2021-03-18 RX ADMIN — ENOXAPARIN SODIUM 30 MG: 30 INJECTION SUBCUTANEOUS at 21:47

## 2021-03-18 RX ADMIN — SODIUM CHLORIDE, PRESERVATIVE FREE 10 ML: 5 INJECTION INTRAVENOUS at 10:31

## 2021-03-18 RX ADMIN — FAMOTIDINE 20 MG: 20 TABLET, FILM COATED ORAL at 21:48

## 2021-03-18 RX ADMIN — Medication 50 MG: at 10:29

## 2021-03-18 RX ADMIN — AMLODIPINE BESYLATE 5 MG: 5 TABLET ORAL at 10:29

## 2021-03-18 RX ADMIN — OXYCODONE HYDROCHLORIDE AND ACETAMINOPHEN 1000 MG: 500 TABLET ORAL at 10:30

## 2021-03-18 RX ADMIN — FAMOTIDINE 20 MG: 20 TABLET, FILM COATED ORAL at 10:30

## 2021-03-18 RX ADMIN — GLYCOPYRROLATE AND FORMOTEROL FUMARATE 2 PUFF: 9; 4.8 AEROSOL, METERED RESPIRATORY (INHALATION) at 20:09

## 2021-03-18 RX ADMIN — ACETAMINOPHEN 650 MG: 325 TABLET ORAL at 21:48

## 2021-03-18 ASSESSMENT — PAIN SCALES - GENERAL
PAINLEVEL_OUTOF10: 0
PAINLEVEL_OUTOF10: 0

## 2021-03-18 NOTE — PLAN OF CARE
Problem: Airway Clearance - Ineffective  Goal: Achieve or maintain patent airway  3/18/2021 0139 by Ashlie Escoto  Outcome: Ongoing  3/17/2021   Outcome: Ongoing     Problem: Breathing Pattern - Ineffective  Goal: Ability to achieve and maintain a regular respiratory rate  3/18/2021 0139 by Ashlie Escoto  Outcome: Ongoing  3/17/2021   Outcome: Ongoing     Problem: Gas Exchange - Impaired  Goal: Absence of hypoxia  3/18/2021 0139 by Ashlie Escoto  Outcome: Ongoing  3/17/2021   Outcome: Ongoing  Goal: Promote optimal lung function  3/18/2021 0139 by Ashlie Escoto  Outcome: Ongoing  3/17/2021   Outcome: Ongoing     Problem: Body Temperature -  Risk of, Imbalanced  Goal: Ability to maintain a body temperature within defined limits  3/18/2021 0139 by Ashlie Escoto  Outcome: Ongoing  3/17/2021  Outcome: Ongoing     Problem: Isolation Precautions - Risk of Spread of Infection  Goal: Prevent transmission of infection  3/18/2021 0139 by Ashlie Escoto  Outcome: Ongoing  3/17/2021  Outcome: Ongoing     Problem: Isolation Precautions - Risk of Spread of Infection  Goal: Prevent transmission of infection  3/18/2021 0139 by Ashlie Escoto  Outcome: Ongoing  3/17/2021   Outcome: Ongoing     Problem: Body Temperature -  Risk of, Imbalanced  Goal: Ability to maintain a body temperature within defined limits  3/18/2021 0139 by Ashlie Escoto  Outcome: Ongoing  3/17/2021   Outcome: Ongoing     Problem: Isolation Precautions - Risk of Spread of Infection  Goal: Prevent transmission of infection  3/18/2021 0139 by Ashlie Escoto  Outcome: Ongoing  3/17/2021   Outcome: Ongoing     Problem: Nutrition Deficits  Goal: Optimize nutritional status  3/18/2021 0139 by Ashlie Escoto  Outcome: Met This Shift     Outcome: Met This Shift  Note: Pt educated on importance of eating.      Problem: Isolation Precautions - Risk of Spread of Infection  Goal: Prevent transmission of infection  3/18/2021 0139 by Ashlie Escoto  Outcome: Ongoing  3/17/2021   Outcome: Ongoing     Problem: Falls - Risk of:  Goal: Will remain free from falls  Description: Will remain free from falls  3/18/2021 0139 by Nuris Drake  Outcome: Ongoing  3/17/2021  Outcome: Ongoing     Problem: Patient Education: Go to Patient Education Activity  Goal: Patient/Family Education  3/18/2021 0139 by Nuris Drake  Outcome: Ongoing  3/17/2021   Outcome: Ongoing     Problem: Nutrition  Goal: Optimal nutrition therapy  3/18/2021 0139 by Nuris Drake  Outcome: Ongoing  3/17/2021   Outcome: Ongoing     Problem: Skin Integrity:  Goal: Will show no infection signs and symptoms  Description: Will show no infection signs and symptoms  3/18/2021 0139 by Nuris Drake  Outcome: Ongoing  3/17/2021   Outcome: Ongoing  Goal: Absence of new skin breakdown  Description: Absence of new skin breakdown  3/18/2021 0139 by Nuris Drake  Outcome: Ongoing  3/17/2021   Outcome: Ongoing     Problem: PAIN  Goal: Patient's pain/discomfort is manageable  3/18/2021 0139 by Nuris Drake  Outcome: Ongoing  3/17/2021   Outcome: Met This Shift  Note: Pt had no complaints of pain on this shift

## 2021-03-18 NOTE — PROGRESS NOTES
other activity at this time    ASSESSMENT:     Activity Tolerance:  Patient tolerance of  treatment: fair. Due to patient being fatigued      Discharge Recommendations: Subacute/Skilled Nursing Facility(TCU)   Equipment Recommendations: Other: will continue to monitor pending progress, discharge location  Plan: Times per week: 5x  Current Treatment Recommendations: Strengthening, Balance Training, Functional Mobility Training, Endurance Training, Safety Education & Training, Patient/Caregiver Education & Training, Equipment Evaluation, Education, & procurement, Self-Care / ADL    Patient Education  Patient Education: Importance of Increasing Activity    Goals  Short term goals  Time Frame for Short term goals: by discharge  Short term goal 1: Pt will increase activity tolerance for functional mobility to/from [facility] or chair with CGA and Sp02 >=90% in prep for toileting tasks. Short term goal 2: Pt will tolerate dynamic standing X2 minutes with CGA and unilateral release in prep for hygiene/clothing management. Short term goal 3: Pt will complete BADL tasks with minimal assistance, incorporating ECT with minimal cues, to increase independence and ease with self care tasks. Short term goal 4: Pt will complete functional transfers with CGA in prep for BSC/toilet transfers. Following session, patient left in safe position with all fall risk precautions in place.

## 2021-03-18 NOTE — PLAN OF CARE
Problem: Nutrition  Goal: Optimal nutrition therapy  3/18/2021 1029 by Nora Dodd RD, LD  Outcome: Ongoing  3/18/2021 0139 by Roxi East  Outcome: Ongoing   Nutrition Problem #1: Inadequate oral intake  Intervention: Food and/or Nutrient Delivery: Continue NPO, Continue Current Diet, Modify Oral Nutrition Supplement  Nutritional Goals: Pt. will tolerate and consume 75% or more of meals to promote wound healign during LOS.

## 2021-03-18 NOTE — PROGRESS NOTES
Hospitalist Progress Note      Patient:  Sima Jasso    YIDQ/CNQ:[id number]  YOB: 1940  MRN: [id number]   Acct: [de-identified]   PCP: ERNESTO Muñoz CNP  Date of Admission: 3/8/2021    Assessment/Plan:    Pneumonia secondary to COVID 19  · Patient got the 1st dose of COVID vaccine on 3/1,   · On Dexamethasone 6 mg QD (on H1 blocker) and Remdesivir  · Procal WNLs d/c'ed ABx  · Supportive therapy with vitamin C, D, zinc and lactobacillus (d/c'ed)  · LMWH 30mg BID for DVT prophylaxis; added ASA 81 QD as prothrombotic markers elevated  · IS/Acapella/PT to see  3/11: stable; SaO2 drops w/ exertion and also postural changes; CCM. Added chest physio  3/12: improving; encouraged CCM w/ aggressive weaning as tolerated to SaO2 greater than 90%  3/13: slow wean w/ short-lived episodes of worsening hypoxia which is positional. CCM. Reduce IVF to 50 cc/hr given inadequate PO intake. Repeat ABG non-contributroy  3/14: slowly weaning. CCM  3/16: Improving steadily. Now down to FiO2 45% at 45 lpm w/ SaO2 high 90s. CCM  3/17: down to HFO 40/40. Will try stepping down to NC at 6 lpm today as tolerated.   3/18: improving; still unable to wean down to NC; on minimal HFO parameters; CCM     Acute hypoxic respiratory failure secondary to above  · On HFO FiO2 80% and 60 lpm w/ SaO2 in high 90s  · Rn aware to wean supplemental O2 aggressively as tolerated to SpO2 > 90%  3/11: ABG negative for hypercapnia; managing as above  3/18: managing as above    Leukopenia  · Likely secondary to COVID  · resolved Continue to monitor     HypoK: mild, K 3.1. likely nutritional; will replace and trend  3/12: K 3.0 today; will add standing K-l;or 40 meq QD in addition to replacement protocol  3/13: resolved; K 4.1. will monitor  3/18: K 4.0    History of COPD  · Quit smoking 7-10 years ago. 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 CCM and monitor  3/14: BP suboptimally controlled overnight coinciding w/ episodes of hypoxia; will continue monitoring for now; consider switching CCB to qhs from QD.  3/17: Up when working w/ physio likely 2/2 deconditioning. Wandra Found controlled otherwise. 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 encouraged feeding, dietician following; consider d/c'ing maintenance IVF if adequate PO intake       Chief Complaint: SOB    Initial H and P:-    Admitted for management of Acute hypoxic respiratory failure secondary to COVID 19. I took over care on 3/18/2021. Subjective (past 24 hours): No new issues overnight. Feeling improved. More engaged w/ conversation. Denies any complaints. Past medical history, family history, social history and allergies reviewed again and is unchanged since admission. ROS (12 point review of systems completed. Pertinent positives noted.  Otherwise ROS is negative)     Medications:  Reviewed    Infusion Medications    sodium chloride      sodium chloride      sodium chloride 50 mL/hr at 03/18/21 0300    dextrose       Scheduled Medications    potassium chloride  40 mEq Oral Daily    aspirin  81 mg Oral Daily    ascorbic acid  1,000 mg Oral Daily    zinc sulfate  50 mg Oral Daily    sodium chloride flush  10 mL Intravenous 2 times per day    enoxaparin  30 mg Subcutaneous BID    Vitamin D  2,000 Units Oral Daily    atorvastatin  20 mg Oral Daily    famotidine  20 mg Oral BID    glycopyrrolate-formoterol  2 puff Inhalation BID    amLODIPine  5 mg Oral Daily     PRN Meds: melatonin, traMADol, hydrALAZINE, morphine, potassium chloride **OR** potassium alternative oral replacement **OR** potassium chloride, sodium chloride, sodium chloride, sodium chloride, sodium chloride flush, promethazine **OR** ondansetron, polyethylene glycol, acetaminophen **OR** acetaminophen, guaiFENesin-dextromethorphan, ipratropium-albuterol, glucose, dextrose, glucagon (rDNA), dextrose      Intake/Output Summary (Last 24 hours) at 3/18/2021 0825  Last data filed at 3/18/2021 0400  Gross per 24 hour   Intake 2081 ml   Output 400 ml   Net 1681 ml       Diet:  DIET CARDIAC; Dietary Nutrition Supplements: Other Oral Supplement (see comment)  Dietary Nutrition Supplements: Frozen Oral Supplement    Exam:  /73   Pulse (!) 48   Temp 97.7 °F (36.5 °C) (Axillary)   Resp 20   Ht 5' 2\" (1.575 m)   Wt 138 lb 14.2 oz (63 kg)   SpO2 98%   BMI 25.40 kg/m²   General appearance: No apparent distress, appears stated age and cooperative. HEENT: Pupils equal, round, and reactive to light. Conjunctivae/corneas clear. Neck: Supple, with full range of motion. No jugular venous distention. Trachea midline. Respiratory:  Diminished A/T bilat lung fields  Cardiovascular: Regular rate and rhythm with normal S1/S2 without murmurs, rubs or gallops. Abdomen: Soft, non-tender, non-distended with normal bowel sounds. Musculoskeletal: passive and active ROM x 4 extremities. Skin: Skin color, texture, turgor normal.  No rashes or lesions. Neurologic:  Neurovascularly intact without any focal sensory/motor deficits.  Cranial nerves: II-XII intact, grossly non-focal.  Psychiatric: Alert and oriented, thought content appropriate, normal insight  Capillary Refill: Brisk,< 3 seconds   Peripheral Pulses: +2 palpable, equal report has been created using voice recognition software. It may contain minor errors which are inherent in voice recognition technology. **      Final report electronically signed by Dr. Avi Suggs on 3/8/2021 12:19 PM      MRI ABDOMEN [address]    (Results Pending)     Xr Chest Portable    Result Date: 3/8/2021  PROCEDURE: XR CHEST PORTABLE CLINICAL INFORMATION: cough. congested. . COMPARISON: 9/22/2020 TECHNIQUE: Portable upright FINDINGS: Cardiomegaly. No mediastinal widening. Mild interstitial edema with a lower lobe predominance. Parabronchial cuffing can be seen. There is however no pleural effusion identified and no confluent airspace consolidation. Pulmonary vessels are not  congested     Mild cardiomegaly. Interstitial edema noted in the lower lobes with peribronchial cuffing this can be related to acute bronchitis, or atypical viral pneumonia. **This report has been created using voice recognition software. It may contain minor errors which are inherent in voice recognition technology. ** Final report electronically signed by Dr. Avi Suggs on 3/8/2021 12:19 PM    With RN in room, patient was updated about and agreed upon the treatment plan, all the questions and concerns were addressed. Patient was seen in PPE (PERSONAL PROTECTIVE EQUIPMENT). Patient was interviewed in Strict Airborne and Droplet Precautions.     Electronically signed by Zully Stone MD on 3/18/2021 at 8:25 AM

## 2021-03-18 NOTE — CARE COORDINATION
3/18/21, 1:46 PM EDT    DISCHARGE ON GOING EVALUATION    [facility] day: 10  Location: 3A-09/009-A Reason for admit: Acute respiratory failure ([address] [J96.00]   Procedure: 3/9 Convalescent plasma x1   Barriers to Discharge: Afebrile. Remains on high flow O2, now at 50L and 40%. Discussed with Dr. Werner Giraldo this am, plans to continue to attempt to wean. Lovenox. Vit C, Vit D, Zinc. Completed Decadron. PT/OT. Palliative Care. PCP: ERNESTO Santiago CNP  Readmission Risk Score: 19%  Patient Goals/Plan/Treatment Preferences: From home with daughter.  TCU eval, will relook when off high flow O2. If unable to wean high flow, may need to consider LTACH. SW on case for possible ECF need.

## 2021-03-18 NOTE — PROGRESS NOTES
11.2 oz, 9/18/20: 159# 12.8 oz)     · Ideal Body Weight: 110 lbs;      · BMI: 25.2  · Adjusted Body Weight:  ; No Adjustment   · BMI Categories: Overweight (BMI 25.0-29. 9)       Nutrition Diagnosis:   · Inadequate oral intake related to impaired respiratory function, inadequate protein-energy intake as evidenced by poor intake prior to admission, intake 0-25%    Nutrition Interventions:   Food and/or Nutrient Delivery:  Continue NPO, Continue Current Diet, Modify Oral Nutrition Supplement  Nutrition Education/Counseling:  Education initiated(encouraged intake at best effort, use of ONS)   Coordination of Nutrition Care:  Continue to monitor while inpatient, Interdisciplinary Rounds    Goals:  Pt. will tolerate and consume 75% or more of meals to promote wound healign during LOS. Nutrition Monitoring and Evaluation:   Behavioral-Environmental Outcomes:  None Identified   Food/Nutrient Intake Outcomes:  Diet Advancement/Tolerance, Food and Nutrient Intake, Supplement Intake  Physical Signs/Symptoms Outcomes:  Biochemical Data, GI Status, Fluid Status or Edema, Hemodynamic Status, Meal Time Behavior, Skin, Weight     Discharge Planning:     Too soon to determine     Electronically signed by Garrison Pham RD, LD on 3/18/21 at 10:25 AM EDT    Contact: casandra

## 2021-03-18 NOTE — PLAN OF CARE
Problem: Gas Exchange - Impaired  Goal: Absence of hypoxia  Outcome: Met This Shift  Note: Oxygen demands remained the same throughout shift      Problem:  Body Temperature -  Risk of, Imbalanced  Goal: Ability to maintain a body temperature within defined limits  Outcome: Met This Shift  Note: Pt afebrile throughout shift      Problem: Falls - Risk of:  Goal: Will remain free from falls  Description: Will remain free from falls  Outcome: Met This Shift  Note: No falls on this shift      Problem: Airway Clearance - Ineffective  Goal: Achieve or maintain patent airway  Outcome: Ongoing  Note: Pt remains on high flow      Problem: Nutrition  Goal: Optimal nutrition therapy  3/18/2021 1029 by Kenia Mcgovern RD, LD  Outcome: Ongoing     Problem: Nutrition Deficits  Goal: Optimize nutritional status  Outcome: Not Met This Shift  Note: Pt did not eat even with encouragement throughout shift

## 2021-03-18 NOTE — PROGRESS NOTES
[address] [facility] PHYSICAL THERAPY  DAILY NOTE  [facility] CCU 3A - 3A-09/009-A    Time In: 2822  Time Out: 1224  Timed Code Treatment Minutes: 48 Minutes  Minutes: 48          Date: 3/18/2021  Patient Name: Yesi Lara,  Gender:  female        MRN: [id number]  : 1940  (80 y.o.)     Referring Practitioner: Daryl Hernandez MD  Diagnosis: acute respiratory failure  Additional Pertinent Hx: Per HPI: \"80 y.o. female who presented to [facility] with increased weakness for the past 3 days. History is limited due to patient's drowsiness. Limited history provided by daughter-in-law who is in the room. Ms. Prince Navarrete received her covid vaccine on 3/1/2021. She did have some fatigue afterwards with nausea. However, she began having acutely worsening weakness and cough over the last 3 days with decreased oral intake. Family was unaware until her daughter-in-law went to check on her today. She denies any shortness of breath to me, but she is requiring 6 L supplemental O2 to maintain SPO2 greater than 90% and appears to have increased work of breathing value at rest.\" Pt is positive for COVID-19. Prior Level of Function:  Lives With: Daughter  Type of Home: House  Home Layout: Multi-level  Home Access: Stairs to enter with rails  Entrance Stairs - Number of Steps: 3 steps + 7 to get to her bedroom/bathroom  Home Equipment: (no DME PTA)   Bathroom Equipment: Shower chair    ADL Assistance: Needs assistance  Homemaking Assistance: (daughter completes all)  Ambulation Assistance: Independent  Transfer Assistance: Independent  Active : Yes    Restrictions/Precautions:  Restrictions/Precautions: Isolation(droplet + precautions)  Position Activity Restriction  Other position/activity restrictions: 3/11: HFNC     SUBJECTIVE: RN approved session. Pt in bed upon arrival and agrees to therapy. Pt required max encouragement to participate throughout session.  Pt required Assistance to initiate tasks due to not following verbal cues. Pt also appeared confused at times this date. Much extra time for encouragement and rest breaks for extended periods of time due to low O2 sats. PAIN: None Indicated    Vitals: Blood Pressure: 146/89  Oxygen: Pt on HFNC at 50 LPM 40% FiO2 upon arrival. Once at EOB ranged from 85%-90%. Increased HFNC to 50LPM 60% FiO2 per HFNC order. Maintained 90%-93% ambulating with cues for deep breathing. Pt continued deep breathing once seated and nursing stated they would wean pt HFNC down once O2 sats returned. Heart Rate: within normal limits  RR: 30s with mobility    OBJECTIVE:  Bed Mobility:  Supine to Sit: Contact Guard Assistance, Minimal Assistance, with verbal cues , with increased time for completion. Max encouragement to sit up. Assist for trunk control. Pt stated dizzy after movement but subsided shortly after. Cues for breathing. HFNC increased to 50 LPM 60% Fi O2 here due to low O2 sats  Scooting: Moderate Assistance, X 2, with verbal cues , with increased time for completion. Max Assist for initial scoot until pt provided help    Transfers:  Sit to Stand: Minimal Assistance, X 2, with increased time for completion, cues for hand placement, with verbal cues  Stand to Sit:Minimal Assistance, X 2, with increased time for completion, cues for hand placement, with verbal cues   Cues to push off bed to stand and reach for chair prior sitting    Ambulation:  Minimal Assistance, X 2, with verbal cues , with increased time for completion  Distance: 5'x1  Surface: Level Tile  Device: B Hand-Held Assist  Gait Deviations: Forward Flexed Posture, Slow Arabella, Decreased Step Length Bilaterally, Decreased Weight Shift Bilaterally, Decreased Gait Speed, Decreased Heel Strike Bilaterally, Mild Path Deviations and Unsteady Gait  Pt demonstrated anterior lean. Cues for posture and increasing step length. Pt confused by instruction at times.     Balance:  Static Sitting Balance:  Stand distance ambulation. Short term goal 4: Ascend/descend 3 steps with 1 HR and Rich for progression to safe entry into home. Long term goals  Time Frame for Long term goals : N/A due to short ELOS. Following session, patient left in safe position with all fall risk precautions in place.

## 2021-03-19 LAB
ALBUMIN SERPL-MCNC: 3 G/DL (ref 3.5–5.1)
ALP BLD-CCNC: 63 U/L (ref 38–126)
ALT SERPL-CCNC: 11 U/L (ref 11–66)
ANION GAP SERPL CALCULATED.3IONS-SCNC: 10 MEQ/L (ref 8–16)
AST SERPL-CCNC: 13 U/L (ref 5–40)
BASOPHILS # BLD: 0.1 %
BASOPHILS ABSOLUTE: 0 THOU/MM3 (ref 0–0.1)
BILIRUB SERPL-MCNC: 0.5 MG/DL (ref 0.3–1.2)
BUN BLDV-MCNC: 16 MG/DL (ref 7–22)
CALCIUM SERPL-MCNC: 8.7 MG/DL (ref 8.5–10.5)
CHLORIDE BLD-SCNC: 105 MEQ/L (ref 98–111)
CO2: 24 MEQ/L (ref 23–33)
CREAT SERPL-MCNC: 0.6 MG/DL (ref 0.4–1.2)
EOSINOPHIL # BLD: 1.7 %
EOSINOPHILS ABSOLUTE: 0.1 THOU/MM3 (ref 0–0.4)
ERYTHROCYTE [DISTWIDTH] IN BLOOD BY AUTOMATED COUNT: 17.2 % (ref 11.5–14.5)
ERYTHROCYTE [DISTWIDTH] IN BLOOD BY AUTOMATED COUNT: 53.6 FL (ref 35–45)
GFR SERPL CREATININE-BSD FRML MDRD: > 90 ML/MIN/1.73M2
GLUCOSE BLD-MCNC: 82 MG/DL (ref 70–108)
HCT VFR BLD CALC: 38.7 % (ref 37–47)
HEMOGLOBIN: 12.1 GM/DL (ref 12–16)
IMMATURE GRANS (ABS): 0.11 THOU/MM3 (ref 0–0.07)
IMMATURE GRANULOCYTES: 1.3 %
LYMPHOCYTES # BLD: 22.5 %
LYMPHOCYTES ABSOLUTE: 2 THOU/MM3 (ref 1–4.8)
MCH RBC QN AUTO: 27.1 PG (ref 26–33)
MCHC RBC AUTO-ENTMCNC: 31.3 GM/DL (ref 32.2–35.5)
MCV RBC AUTO: 86.6 FL (ref 81–99)
MONOCYTES # BLD: 12.2 %
MONOCYTES ABSOLUTE: 1.1 THOU/MM3 (ref 0.4–1.3)
NUCLEATED RED BLOOD CELLS: 0 /100 WBC
PLATELET # BLD: 360 THOU/MM3 (ref 130–400)
PMV BLD AUTO: 10.6 FL (ref 9.4–12.4)
POTASSIUM REFLEX MAGNESIUM: 4.1 MEQ/L (ref 3.5–5.2)
RBC # BLD: 4.47 MILL/MM3 (ref 4.2–5.4)
SEG NEUTROPHILS: 62.2 %
SEGMENTED NEUTROPHILS ABSOLUTE COUNT: 5.4 THOU/MM3 (ref 1.8–7.7)
SODIUM BLD-SCNC: 139 MEQ/L (ref 135–145)
TOTAL PROTEIN: 6 G/DL (ref 6.1–8)
WBC # BLD: 8.7 THOU/MM3 (ref 4.8–10.8)

## 2021-03-19 PROCEDURE — 85025 COMPLETE CBC W/AUTO DIFF WBC: CPT

## 2021-03-19 PROCEDURE — 6370000000 HC RX 637 (ALT 250 FOR IP): Performed by: HOSPITALIST

## 2021-03-19 PROCEDURE — 6370000000 HC RX 637 (ALT 250 FOR IP): Performed by: FAMILY MEDICINE

## 2021-03-19 PROCEDURE — 6360000002 HC RX W HCPCS: Performed by: STUDENT IN AN ORGANIZED HEALTH CARE EDUCATION/TRAINING PROGRAM

## 2021-03-19 PROCEDURE — 94640 AIRWAY INHALATION TREATMENT: CPT

## 2021-03-19 PROCEDURE — 36415 COLL VENOUS BLD VENIPUNCTURE: CPT

## 2021-03-19 PROCEDURE — 6370000000 HC RX 637 (ALT 250 FOR IP): Performed by: STUDENT IN AN ORGANIZED HEALTH CARE EDUCATION/TRAINING PROGRAM

## 2021-03-19 PROCEDURE — 97530 THERAPEUTIC ACTIVITIES: CPT

## 2021-03-19 PROCEDURE — 80053 COMPREHEN METABOLIC PANEL: CPT

## 2021-03-19 PROCEDURE — 94761 N-INVAS EAR/PLS OXIMETRY MLT: CPT

## 2021-03-19 PROCEDURE — 97116 GAIT TRAINING THERAPY: CPT

## 2021-03-19 PROCEDURE — 2140000000 HC CCU INTERMEDIATE R&B

## 2021-03-19 PROCEDURE — 2700000000 HC OXYGEN THERAPY PER DAY

## 2021-03-19 PROCEDURE — 99233 SBSQ HOSP IP/OBS HIGH 50: CPT | Performed by: HOSPITALIST

## 2021-03-19 PROCEDURE — 2580000003 HC RX 258: Performed by: STUDENT IN AN ORGANIZED HEALTH CARE EDUCATION/TRAINING PROGRAM

## 2021-03-19 RX ADMIN — SODIUM CHLORIDE, PRESERVATIVE FREE 10 ML: 5 INJECTION INTRAVENOUS at 08:32

## 2021-03-19 RX ADMIN — POTASSIUM CHLORIDE 40 MEQ: 1500 TABLET, EXTENDED RELEASE ORAL at 08:32

## 2021-03-19 RX ADMIN — Medication 50 MG: at 08:32

## 2021-03-19 RX ADMIN — FAMOTIDINE 20 MG: 20 TABLET, FILM COATED ORAL at 08:31

## 2021-03-19 RX ADMIN — AMLODIPINE BESYLATE 5 MG: 5 TABLET ORAL at 08:31

## 2021-03-19 RX ADMIN — Medication 2000 UNITS: at 08:32

## 2021-03-19 RX ADMIN — ENOXAPARIN SODIUM 30 MG: 30 INJECTION SUBCUTANEOUS at 20:47

## 2021-03-19 RX ADMIN — ASPIRIN 81 MG: 81 TABLET, CHEWABLE ORAL at 08:31

## 2021-03-19 RX ADMIN — ENOXAPARIN SODIUM 30 MG: 30 INJECTION SUBCUTANEOUS at 08:31

## 2021-03-19 RX ADMIN — ATORVASTATIN CALCIUM 20 MG: 20 TABLET, FILM COATED ORAL at 20:47

## 2021-03-19 RX ADMIN — GLYCOPYRROLATE AND FORMOTEROL FUMARATE 2 PUFF: 9; 4.8 AEROSOL, METERED RESPIRATORY (INHALATION) at 07:44

## 2021-03-19 RX ADMIN — SODIUM CHLORIDE, PRESERVATIVE FREE 10 ML: 5 INJECTION INTRAVENOUS at 00:00

## 2021-03-19 RX ADMIN — GLYCOPYRROLATE AND FORMOTEROL FUMARATE 2 PUFF: 9; 4.8 AEROSOL, METERED RESPIRATORY (INHALATION) at 19:53

## 2021-03-19 RX ADMIN — FAMOTIDINE 20 MG: 20 TABLET, FILM COATED ORAL at 20:47

## 2021-03-19 RX ADMIN — OXYCODONE HYDROCHLORIDE AND ACETAMINOPHEN 1000 MG: 500 TABLET ORAL at 08:31

## 2021-03-19 RX ADMIN — SODIUM CHLORIDE, PRESERVATIVE FREE 10 ML: 5 INJECTION INTRAVENOUS at 20:47

## 2021-03-19 ASSESSMENT — PAIN SCALES - GENERAL
PAINLEVEL_OUTOF10: 0
PAINLEVEL_OUTOF10: 0

## 2021-03-19 NOTE — PROGRESS NOTES
smoking 7-10 years ago. 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 CCM and monitor  3/14: BP suboptimally controlled overnight coinciding w/ episodes of hypoxia; will continue monitoring for now; consider switching CCB to qhs from QD.  3/17: Up when working w/ physio likely 2/2 deconditioning. Yasmani Puna controlled otherwise. CCM and monitor  3/18: Fairly well-controlled. 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 encouraged feeding, dietician following; consider d/c'ing maintenance IVF if adequate PO intake       Chief Complaint: SOB    Initial H and P:-    Admitted for management of Acute hypoxic respiratory failure secondary to COVID 19. I took over care on 3/19/2021. Subjective (past 24 hours): No new issues overnight. Feeling improved, tends to be awake at night and sleep during the day which at times make her miss PT. Encouraged mobilizing w/ Physio. Past medical history, family history, social history and allergies reviewed again and is unchanged since admission. ROS (12 point review of systems completed. Pertinent positives noted.  Otherwise ROS is negative)     Medications:  Reviewed    Infusion Medications    sodium chloride      sodium chloride      sodium chloride 50 mL/hr at 03/18/21 0300    dextrose       Scheduled Medications    potassium chloride  40 mEq Oral Daily    aspirin  81 mg Oral Daily    ascorbic acid  1,000 mg Oral Daily    zinc sulfate  50 mg Oral Daily    sodium chloride flush  10 mL Intravenous 2 times per day    enoxaparin  30 mg Subcutaneous BID    Vitamin D  2,000 Units Oral Daily    atorvastatin  20 mg Oral Daily    famotidine  20 mg Oral BID    glycopyrrolate-formoterol  2 puff Inhalation BID    amLODIPine  5 mg Oral Daily     PRN Meds: melatonin, traMADol, hydrALAZINE, morphine, potassium chloride **OR** potassium alternative oral replacement **OR** potassium chloride, sodium chloride, sodium chloride, sodium chloride, sodium chloride flush, promethazine **OR** ondansetron, polyethylene glycol, acetaminophen **OR** acetaminophen, guaiFENesin-dextromethorphan, ipratropium-albuterol, glucose, dextrose, glucagon (rDNA), dextrose      Intake/Output Summary (Last 24 hours) at 3/19/2021 0645  Last data filed at 3/19/2021 0000  Gross per 24 hour   Intake 240 ml   Output 290 ml   Net -50 ml       Diet:  Dietary Nutrition Supplements: Other Oral Supplement (see comment)  DIET CARDIAC; Dietary Nutrition Supplements: Frozen Oral Supplement    Exam:  /68   Pulse 61   Temp 97 °F (36.1 °C) (Axillary)   Resp 20   Ht 5' 2\" (1.575 m)   Wt 138 lb 14.2 oz (63 kg)   SpO2 99%   BMI 25.40 kg/m²   General appearance: No apparent distress, appears stated age and cooperative. HEENT: Pupils equal, round, and reactive to light. Conjunctivae/corneas clear. Neck: Supple, with full range of motion. No jugular venous distention. Trachea midline. Respiratory:  Diminished A/T bilat lung fields  Cardiovascular: Regular rate and rhythm with normal S1/S2 without murmurs, rubs or gallops. Abdomen: Soft, non-tender, non-distended with normal bowel sounds. Musculoskeletal: passive and active ROM x 4 extremities. Skin: Skin color, texture, turgor normal.  No rashes or lesions. Neurologic:  Neurovascularly intact without any focal sensory/motor deficits.  Cranial nerves: II-XII intact, grossly non-focal.  Psychiatric: Alert and oriented, thought content appropriate, normal insight  Capillary Refill: Brisk,< 3 seconds   Peripheral Pulses: +2 palpable, equal bilaterally     Labs:   Recent Labs     03/17/21  0813 03/18/21  0411 03/19/21  0354   WBC 8.6 7.4 8.7   HGB 11.7* 11.8* 12.1   HCT 37.5 37.9 38.7    336 360     Recent Labs     03/17/21  0729 03/18/21 0411 03/19/21  0354    136 139   K 3.9 4.0 4.1    108 105   CO2 19* 19* 24   BUN 16 18 16   CREATININE 0.5 0.5 0.6   CALCIUM 8.7 8.4* 8.7     Recent Labs     03/17/21  0729 03/18/21 0411 03/19/21  0354   AST 13 11 13   ALT 8* 10* 11   BILITOT 0.4 0.4 0.5   ALKPHOS 59 58 63     No results for input(s): INR in the last 72 hours. No results for input(s): Charley Loth in the last 72 hours. Microbiology:    Blood culture #1:   Lab Results   Component Value Date    BC No growth-preliminary  No growth   06/20/2018       Blood culture #2:No results found for: Miguelito Lockwood    Organism:No results found for: ORG    No results found for: LABGRAM    MRSA culture only:No results found for: [address] [place] [address]    Urine culture: No results found for: LABURIN    Respiratory culture: No results found for: CULTRESP    Aerobic and Anaerobic :  No results found for: LABAERO  No results found for: LABANAE    Urinalysis:      Lab Results   Component Value Date    NITRU NEGATIVE 06/20/2018    BLOODU NEGATIVE 06/20/2018    GLUCOSEU NEGATIVE 06/20/2018       Radiology:  XR CHEST PORTABLE   Final Result      Multifocal interstitial and alveolar opacities the lungs intervally increased from prior exam concerning for infectious process. Cardiomegaly, stable from prior. **This report has been created using voice recognition software. It may contain minor errors which are inherent in voice recognition technology. **      Final report electronically signed by Dr. Rani Ace on 3/12/2021 12:35 PM      XR CHEST PORTABLE

## 2021-03-19 NOTE — CARE COORDINATION
3/19/21, 10:49 AM EDT    DISCHARGE ON GOING [address] day: 11  Location: 3A-09/009-A Reason for admit: Acute respiratory failure ([facility] [address] [J96.00]   Procedure: 3/9 Convalescent plasma x1   Barriers to Discharge: Afebrile. Remains on high flow, increased overnight to 60L and 60% FiO2. Down to 40% FiO2 today. Pt became tachypneic today, up to 38 breaths per minute. Albumin 3.0. IVF. Vit C, Vit D, Zinc. Norvasc, baby ASA, Lipitor, Lovenox, Pepcid. PT/OT. Palliative Care. PCP: ERNESTO Jeffries CNP  Readmission Risk Score: 16%  Patient Goals/Plan/Treatment Preferences: From home with daughter.  TCU eval, will relook when off high flow O2. If unable to wean high flow, may need to consider LTACH. SW on case for possible ECF need.

## 2021-03-19 NOTE — PROGRESS NOTES
[address] [facility] PHYSICAL THERAPY  DAILY NOTE  [facility] CCU 3A - 3A-09/009-A    Time In: 8969  Time Out: 1200  Timed Code Treatment Minutes: 25 Minutes  Minutes: 25          Date: 3/19/2021  Patient Name: Dayana Calderon,  Gender:  female        MRN: [id number]  : 1940  (80 y.o.)     Referring Practitioner: Mar Morley MD  Diagnosis: acute respiratory failure  Additional Pertinent Hx: Per HPI: \"80 y.o. female who presented to [address] [place] [address] with increased weakness for the past 3 days. History is limited due to patient's drowsiness. Limited history provided by daughter-in-law who is in the room. Ms. Fred Cortez received her covid vaccine on 3/1/2021. She did have some fatigue afterwards with nausea. However, she began having acutely worsening weakness and cough over the last 3 days with decreased oral intake. Family was unaware until her daughter-in-law went to check on her today. She denies any shortness of breath to me, but she is requiring 6 L supplemental O2 to maintain SPO2 greater than 90% and appears to have increased work of breathing value at rest.\" Pt is positive for COVID-19. Prior Level of Function:  Lives With: Daughter  Type of Home: House  Home Layout: Multi-level  Home Access: Stairs to enter with rails  Entrance Stairs - Number of Steps: 3 steps + 7 to get to her bedroom/bathroom  Home Equipment: (no DME PTA)   Bathroom Equipment: Shower chair    ADL Assistance: Needs assistance  Homemaking Assistance: (daughter completes all)  Ambulation Assistance: Independent  Transfer Assistance: Independent  Active : Yes    Restrictions/Precautions:  Restrictions/Precautions: Isolation(droplet + precautions)  Position Activity Restriction  Other position/activity restrictions: 3/11: HFNC     SUBJECTIVE: RN approved session. Pt in bed upon arrival. Pt originally pleasant then upset and required encouragement to sit in chair.  RN came in with lunch and convinced pt to Sitting Balance:  Stand By Assistance  Pt sat EOB ~5 minutes for chair and line preparation. Pt demonstrated anterior posture and varied between B UE support to 1 UE support with no LOB. Exercise:  Not Tested    Functional Outcome Measures: Completed  AM-PAC Inpatient Mobility Raw Score : 12  AM-PAC Inpatient T-Scale Score : 35.33    ASSESSMENT:  Assessment: Patient progressing toward established goals. Activity Tolerance:  Patient tolerance of  treatment: good. Pt frustrated at time this treatment however completed session with encouragement. Pt limited due to SOB and fatigue. Pt decreased balance,strength, and endurance. Pt deconditioned and would benefit from continued therapy to increase mobility and strength for functional independence. Equipment Recommendations:Equipment Needed: (likely a rolling walker, continue to assess)  Discharge Recommendations:  Continue to assess pending progress(TCU) or LTACH    Plan: Times per week: 5x GM  Times per day: Daily  Current Treatment Recommendations: Strengthening, Functional Mobility Training, Home Exercise Program, Endurance Training, Balance Training, Patient/Caregiver Education & Training, Transfer Training, Gait Training, Safety Education & Training    Patient Education  Patient Education: Plan of Care, [employer] Mobility, Transfers, Gait, Use of Gait Belt Deep breathing     Goals:  Patient goals : return home  Short term goals  Time Frame for Short term goals: by hospital discharge  Short term goal 1: Supine to/from sit with modified independence for ease of transfers at discharge site. Short term goal 2: Sit to/from stand with modified independence for ease of transfers at discharge site. Short term goal 3: Ambulate 21' with CGA x 1 and LRAD, maintaining O2 saturation >88%, for progression to home distance ambulation. Short term goal 4: Ascend/descend 3 steps with 1 HR and Rich for progression to safe entry into home.   Long term goals  Time Frame for Long term goals : N/A due to short ELOS. Following session, patient left in safe position with all fall risk precautions in place.

## 2021-03-20 LAB
ALBUMIN SERPL-MCNC: 2.9 G/DL (ref 3.5–5.1)
ALP BLD-CCNC: 71 U/L (ref 38–126)
ALT SERPL-CCNC: 10 U/L (ref 11–66)
ANION GAP SERPL CALCULATED.3IONS-SCNC: 11 MEQ/L (ref 8–16)
AST SERPL-CCNC: 13 U/L (ref 5–40)
BASOPHILS # BLD: 0.4 %
BASOPHILS ABSOLUTE: 0 THOU/MM3 (ref 0–0.1)
BILIRUB SERPL-MCNC: 0.6 MG/DL (ref 0.3–1.2)
BUN BLDV-MCNC: 17 MG/DL (ref 7–22)
CALCIUM SERPL-MCNC: 9.2 MG/DL (ref 8.5–10.5)
CHLORIDE BLD-SCNC: 107 MEQ/L (ref 98–111)
CO2: 20 MEQ/L (ref 23–33)
CREAT SERPL-MCNC: 0.5 MG/DL (ref 0.4–1.2)
EOSINOPHIL # BLD: 1.7 %
EOSINOPHILS ABSOLUTE: 0.2 THOU/MM3 (ref 0–0.4)
ERYTHROCYTE [DISTWIDTH] IN BLOOD BY AUTOMATED COUNT: 17.5 % (ref 11.5–14.5)
ERYTHROCYTE [DISTWIDTH] IN BLOOD BY AUTOMATED COUNT: 54.7 FL (ref 35–45)
GFR SERPL CREATININE-BSD FRML MDRD: > 90 ML/MIN/1.73M2
GLUCOSE BLD-MCNC: 105 MG/DL (ref 70–108)
HCT VFR BLD CALC: 43 % (ref 37–47)
HEMOGLOBIN: 13.2 GM/DL (ref 12–16)
IMMATURE GRANS (ABS): 0.12 THOU/MM3 (ref 0–0.07)
IMMATURE GRANULOCYTES: 1.1 %
LYMPHOCYTES # BLD: 16.4 %
LYMPHOCYTES ABSOLUTE: 1.9 THOU/MM3 (ref 1–4.8)
MCH RBC QN AUTO: 26.9 PG (ref 26–33)
MCHC RBC AUTO-ENTMCNC: 30.7 GM/DL (ref 32.2–35.5)
MCV RBC AUTO: 87.6 FL (ref 81–99)
MONOCYTES # BLD: 10.4 %
MONOCYTES ABSOLUTE: 1.2 THOU/MM3 (ref 0.4–1.3)
NUCLEATED RED BLOOD CELLS: 0 /100 WBC
PLATELET # BLD: 361 THOU/MM3 (ref 130–400)
PMV BLD AUTO: 10.3 FL (ref 9.4–12.4)
POTASSIUM REFLEX MAGNESIUM: 4.2 MEQ/L (ref 3.5–5.2)
RBC # BLD: 4.91 MILL/MM3 (ref 4.2–5.4)
SEG NEUTROPHILS: 70 %
SEGMENTED NEUTROPHILS ABSOLUTE COUNT: 7.9 THOU/MM3 (ref 1.8–7.7)
SODIUM BLD-SCNC: 138 MEQ/L (ref 135–145)
TOTAL PROTEIN: 6.5 G/DL (ref 6.1–8)
WBC # BLD: 11.3 THOU/MM3 (ref 4.8–10.8)

## 2021-03-20 PROCEDURE — 6370000000 HC RX 637 (ALT 250 FOR IP): Performed by: STUDENT IN AN ORGANIZED HEALTH CARE EDUCATION/TRAINING PROGRAM

## 2021-03-20 PROCEDURE — 2140000000 HC CCU INTERMEDIATE R&B

## 2021-03-20 PROCEDURE — 6360000002 HC RX W HCPCS: Performed by: STUDENT IN AN ORGANIZED HEALTH CARE EDUCATION/TRAINING PROGRAM

## 2021-03-20 PROCEDURE — 2580000003 HC RX 258: Performed by: STUDENT IN AN ORGANIZED HEALTH CARE EDUCATION/TRAINING PROGRAM

## 2021-03-20 PROCEDURE — 6370000000 HC RX 637 (ALT 250 FOR IP): Performed by: PHYSICIAN ASSISTANT

## 2021-03-20 PROCEDURE — 6370000000 HC RX 637 (ALT 250 FOR IP): Performed by: FAMILY MEDICINE

## 2021-03-20 PROCEDURE — 36415 COLL VENOUS BLD VENIPUNCTURE: CPT

## 2021-03-20 PROCEDURE — 6360000002 HC RX W HCPCS: Performed by: INTERNAL MEDICINE

## 2021-03-20 PROCEDURE — 2580000003 HC RX 258: Performed by: HOSPITALIST

## 2021-03-20 PROCEDURE — 6370000000 HC RX 637 (ALT 250 FOR IP): Performed by: HOSPITALIST

## 2021-03-20 PROCEDURE — 80053 COMPREHEN METABOLIC PANEL: CPT

## 2021-03-20 PROCEDURE — 99232 SBSQ HOSP IP/OBS MODERATE 35: CPT | Performed by: INTERNAL MEDICINE

## 2021-03-20 PROCEDURE — 2700000000 HC OXYGEN THERAPY PER DAY

## 2021-03-20 PROCEDURE — 85025 COMPLETE CBC W/AUTO DIFF WBC: CPT

## 2021-03-20 PROCEDURE — 94761 N-INVAS EAR/PLS OXIMETRY MLT: CPT

## 2021-03-20 RX ORDER — FUROSEMIDE 10 MG/ML
40 INJECTION INTRAMUSCULAR; INTRAVENOUS ONCE
Status: COMPLETED | OUTPATIENT
Start: 2021-03-20 | End: 2021-03-20

## 2021-03-20 RX ADMIN — POTASSIUM CHLORIDE 40 MEQ: 1500 TABLET, EXTENDED RELEASE ORAL at 08:44

## 2021-03-20 RX ADMIN — TRAMADOL HYDROCHLORIDE 25 MG: 50 TABLET, FILM COATED ORAL at 17:19

## 2021-03-20 RX ADMIN — Medication 2000 UNITS: at 09:35

## 2021-03-20 RX ADMIN — SODIUM CHLORIDE, PRESERVATIVE FREE 10 ML: 5 INJECTION INTRAVENOUS at 20:38

## 2021-03-20 RX ADMIN — FUROSEMIDE 40 MG: 10 INJECTION, SOLUTION INTRAVENOUS at 12:11

## 2021-03-20 RX ADMIN — FAMOTIDINE 20 MG: 20 TABLET, FILM COATED ORAL at 20:36

## 2021-03-20 RX ADMIN — OXYCODONE HYDROCHLORIDE AND ACETAMINOPHEN 1000 MG: 500 TABLET ORAL at 09:34

## 2021-03-20 RX ADMIN — SODIUM CHLORIDE: 9 INJECTION, SOLUTION INTRAVENOUS at 09:34

## 2021-03-20 RX ADMIN — ACETAMINOPHEN 650 MG: 325 TABLET ORAL at 15:42

## 2021-03-20 RX ADMIN — ENOXAPARIN SODIUM 30 MG: 30 INJECTION SUBCUTANEOUS at 08:44

## 2021-03-20 RX ADMIN — FAMOTIDINE 20 MG: 20 TABLET, FILM COATED ORAL at 08:44

## 2021-03-20 RX ADMIN — AMLODIPINE BESYLATE 5 MG: 5 TABLET ORAL at 09:35

## 2021-03-20 RX ADMIN — ATORVASTATIN CALCIUM 20 MG: 20 TABLET, FILM COATED ORAL at 20:38

## 2021-03-20 RX ADMIN — GLYCOPYRROLATE AND FORMOTEROL FUMARATE 2 PUFF: 9; 4.8 AEROSOL, METERED RESPIRATORY (INHALATION) at 19:26

## 2021-03-20 RX ADMIN — ENOXAPARIN SODIUM 30 MG: 30 INJECTION SUBCUTANEOUS at 20:36

## 2021-03-20 RX ADMIN — Medication 50 MG: at 09:35

## 2021-03-20 RX ADMIN — ASPIRIN 81 MG: 81 TABLET, CHEWABLE ORAL at 08:44

## 2021-03-20 RX ADMIN — SODIUM CHLORIDE, PRESERVATIVE FREE 10 ML: 5 INJECTION INTRAVENOUS at 09:35

## 2021-03-20 ASSESSMENT — PAIN SCALES - GENERAL
PAINLEVEL_OUTOF10: 0

## 2021-03-20 ASSESSMENT — PAIN DESCRIPTION - LOCATION: LOCATION: NECK

## 2021-03-20 ASSESSMENT — PAIN DESCRIPTION - PAIN TYPE: TYPE: ACUTE PAIN

## 2021-03-20 ASSESSMENT — PAIN DESCRIPTION - DESCRIPTORS: DESCRIPTORS: ACHING

## 2021-03-20 NOTE — PLAN OF CARE
Problem: Airway Clearance - Ineffective  Goal: Achieve or maintain patent airway  Outcome: Ongoing     Problem: Gas Exchange - Impaired  Goal: Absence of hypoxia  Outcome: Ongoing     Problem: Gas Exchange - Impaired  Goal: Promote optimal lung function  Outcome: Ongoing     Problem: Breathing Pattern - Ineffective  Goal: Ability to achieve and maintain a regular respiratory rate  Outcome: Ongoing     Problem: Impaired respiratory status  Goal: Clear lung sounds  Description: Clear lung sounds  Outcome: Ongoing     Settings weaned, but goals ongoing

## 2021-03-20 NOTE — PLAN OF CARE
Problem: Airway Clearance - Ineffective  Goal: Achieve or maintain patent airway  3/20/2021 1142 by Elise Motley RN  Outcome: Ongoing     Problem: Gas Exchange - Impaired  Goal: Absence of hypoxia  3/20/2021 1142 by Elise Motley RN  Outcome: Ongoing     Problem: Gas Exchange - Impaired  Goal: Promote optimal lung function  3/20/2021 1142 by Elise Motley RN  Outcome: Ongoing     Problem: Breathing Pattern - Ineffective  Goal: Ability to achieve and maintain a regular respiratory rate  3/20/2021 1142 by Elise Motley RN  Outcome: Ongoing     Problem:  Body Temperature -  Risk of, Imbalanced  Goal: Ability to maintain a body temperature within defined limits  Outcome: Ongoing

## 2021-03-20 NOTE — PROGRESS NOTES
Hospitalist Progress Note      Patient:  Anton Shore    BQZH/CSQ:[id number]  YOB: 1940  MRN: [id number]   Acct: [de-identified]   PCP: ERNESTO Torres CNP  Date of Admission: 3/8/2021    Assessment/Plan:    Pneumonia secondary to COVID 19  · Patient got the 1st dose of COVID vaccine on 3/1,   · On Dexamethasone 6 mg QD (on H1 blocker) and Remdesivir  · Procal WNLs d/c'ed ABx  · Supportive therapy with vitamin C, D, zinc and lactobacillus (d/c'ed)  · LMWH 30mg BID for DVT prophylaxis; added ASA 81 QD as prothrombotic markers elevated  · IS/Acapella/PT to see  3/11: stable; SaO2 drops w/ exertion and also postural changes; CCM. Added chest physio  3/12: improving; encouraged CCM w/ aggressive weaning as tolerated to SaO2 greater than 90%  3/13: slow wean w/ short-lived episodes of worsening hypoxia which is positional. CCM. Reduce IVF to 50 cc/hr given inadequate PO intake. Repeat ABG non-contributroy  3/14: slowly weaning. CCM  3/16: Improving steadily. Now down to FiO2 45% at 45 lpm w/ SaO2 high 90s. CCM  3/17: down to HFO 40/40. Will try stepping down to NC at 6 lpm today as tolerated. 3/18: improving; still unable to wean down to NC; on minimal HFO parameters; CCM  3/19: as clinically improving, pt more mobile w/ desaturation w/ exertion. CCM. RN aware to slowly wean as tolerated  3/20: Continues to slowly wean down. Feeling well otherwise. +22L fluid balance. Will give a dose of Lasix today and see if that helps getting patient off HFNC. Repeat CXR tomorrow am. Check inflam markers. If D-dimer trending up consider CTA.  Trial of Bipap.     Acute hypoxic respiratory failure secondary to above  · On HFO FiO2 80% and 60 lpm w/ SaO2 in high 90s  · Rn aware to wean supplemental O2 aggressively as tolerated to SpO2 > 90%  3/11: ABG negative for hypercapnia; managing as above  3/19: managing as above    History of COPD  · Quit smoking 7-10 years ago. Cherie Oh been 2 pack/day smoker prior  · Continue guideline-directed standing and prn inhaler regimen     Recent diagnosis of HCV w/o ESLD  ·  LFTs WNLs while on remdesivir; INR WNLs  Follow up as outpatient for consideration of Tx    3/20: Recently dx on 2/8. Plan was for patient to see GI outpatient. Is suppose to see GI associated OP with w/u including MRI of abd/pelvis. LFT's stable. Clinically, patient has no complaints.     Hx of Hypertension, controlled  · Amlodipine 5 mg po daily with hold parameters  3/11: noted BP in 150-160s; possibly w/ episodes of hypoxia; will monitor for now  3/13: BP fairly controlled. CCM and monitor  3/14: BP suboptimally controlled overnight coinciding w/ episodes of hypoxia; will continue monitoring for now; consider switching CCB to qhs from QD.  3/17: Up when working w/ physio likely 2/2 deconditioning. Ilah Carbine controlled otherwise. CCM and monitor  3/18: Fairly well-controlled. 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 MRI abd/pelvis likely can be done outpatient as originally planned by GI. Code status: Limited x4    Medical/physical deconditioning: PT/OT/Sw to follow    Inadequate PO intake: improved. encouraged feeding, dietician following; consider d/c'ing maintenance IVF if adequate PO intake       Chief Complaint: SOB    Initial H and P:-    Admitted for management of Acute hypoxic respiratory failure secondary to COVID 19. I took over care on 3/20/2021. Subjective (past 24 hours): No new issues overnight. Feeling improved. Wants to go home. Denies any complaints. Past medical history, family history, social history and allergies reviewed again and is unchanged since admission. ROS (12 point review of systems completed. Pertinent positives noted.  Otherwise ROS is negative)     Medications:  Reviewed    Infusion Medications    sodium chloride      sodium chloride      sodium chloride 50 mL/hr at 03/20/21 0934    dextrose       Scheduled Medications    potassium chloride  40 mEq Oral Daily    aspirin  81 mg Oral Daily    ascorbic acid  1,000 mg Oral Daily    zinc sulfate  50 mg Oral Daily    sodium chloride flush  10 mL Intravenous 2 times per day    enoxaparin  30 mg Subcutaneous BID    Vitamin D  2,000 Units Oral Daily    atorvastatin  20 mg Oral Daily    famotidine  20 mg Oral BID    glycopyrrolate-formoterol  2 puff Inhalation BID    amLODIPine  5 mg Oral Daily     PRN Meds: melatonin, traMADol, hydrALAZINE, morphine, potassium chloride **OR** potassium alternative oral replacement **OR** potassium chloride, sodium chloride, sodium chloride, sodium chloride, sodium chloride flush, promethazine **OR** ondansetron, polyethylene glycol, acetaminophen **OR** acetaminophen, guaiFENesin-dextromethorphan, ipratropium-albuterol, glucose, dextrose, glucagon (rDNA), dextrose      Intake/Output Summary (Last 24 hours) at 3/20/2021 1218  Last data filed at 3/20/2021 0500  Gross per 24 hour   Intake 560 ml   Output 950 ml   Net -390 ml       Diet:  Dietary Nutrition Supplements: Other Oral Supplement (see comment)  DIET CARDIAC; Dietary Nutrition Supplements: Frozen Oral Supplement    Exam:  /63   Pulse 77   Temp 98.2 °F (36.8 °C) (Oral)   Resp 26   Ht 5' 2\" (1.575 m)   Wt 138 lb 14.2 oz (63 kg)   SpO2 95%   BMI 25.40 kg/m²   General appearance: No apparent distress, appears stated age and cooperative. HEENT: Pupils equal, round, and reactive to light. Conjunctivae/corneas clear. Neck: Supple, with full range of motion. No jugular venous distention. Trachea midline. Respiratory:  Diminished A/T bilat lung fields  Cardiovascular: Regular rate and rhythm with normal S1/S2 without murmurs, rubs or gallops.   Abdomen: Soft, non-tender, non-distended with normal bowel sounds. Musculoskeletal: passive and active ROM x 4 extremities. Skin: Skin color, texture, turgor normal.  No rashes or lesions. Neurologic:  Neurovascularly intact without any focal sensory/motor deficits. Cranial nerves: II-XII intact, grossly non-focal.  Psychiatric: Alert and oriented, thought content appropriate, normal insight  Capillary Refill: Brisk,< 3 seconds   Peripheral Pulses: +2 palpable, equal bilaterally     Labs:   Recent Labs     03/18/21 0411 03/19/21 0354 03/20/21 0410   WBC 7.4 8.7 11.3*   HGB 11.8* 12.1 13.2   HCT 37.9 38.7 43.0    360 361     Recent Labs     03/18/21 0411 03/19/21 0354 03/20/21 0410    139 138   K 4.0 4.1 4.2    105 107   CO2 19* 24 20*   BUN 18 16 17   CREATININE 0.5 0.6 0.5   CALCIUM 8.4* 8.7 9.2     Recent Labs     03/18/21 0411 03/19/21  0354 03/20/21 0410   AST 11 13 13   ALT 10* 11 10*   BILITOT 0.4 0.5 0.6   ALKPHOS 58 63 71     No results for input(s): INR in the last 72 hours. No results for input(s): Earnie Lent in the last 72 hours. Microbiology:    Blood culture #1:   Lab Results   Component Value Date    BC No growth-preliminary  No growth   06/20/2018       Blood culture #2:No results found for: Lyric Blazing    Organism:No results found for: ORG    No results found for: LABGRAM    MRSA culture only:No results found for: [facility]    Urine culture: No results found for: LABURIN    Respiratory culture: No results found for: CULTRESP    Aerobic and Anaerobic :  No results found for: LABAERO  No results found for: LABANAE    Urinalysis:      Lab Results   Component Value Date    NITRU NEGATIVE 06/20/2018    BLOODU NEGATIVE 06/20/2018    GLUCOSEU NEGATIVE 06/20/2018       Radiology:  XR CHEST PORTABLE   Final Result      Multifocal interstitial and alveolar opacities the lungs intervally increased from prior exam concerning for infectious process. Cardiomegaly, stable from prior.                **This report has been created using voice recognition software. It may contain minor errors which are inherent in voice recognition technology. **      Final report electronically signed by Dr. Pinky Parish on 3/12/2021 12:35 PM      XR CHEST PORTABLE   Final Result   Mild cardiomegaly. Interstitial edema noted in the lower lobes with peribronchial cuffing this can be related to acute bronchitis, or atypical viral pneumonia. **This report has been created using voice recognition software. It may contain minor errors which are inherent in voice recognition technology. **      Final report electronically signed by Dr. Jillian Gudino on 3/8/2021 12:19 PM      MRI ABDOMEN [address]    (Results Pending)   XR CHEST PORTABLE    (Results Pending)     Xr Chest Portable    Result Date: 3/8/2021  PROCEDURE: XR CHEST PORTABLE CLINICAL INFORMATION: cough. congested. . COMPARISON: 9/22/2020 TECHNIQUE: Portable upright FINDINGS: Cardiomegaly. No mediastinal widening. Mild interstitial edema with a lower lobe predominance. Parabronchial cuffing can be seen. There is however no pleural effusion identified and no confluent airspace consolidation. Pulmonary vessels are not  congested     Mild cardiomegaly. Interstitial edema noted in the lower lobes with peribronchial cuffing this can be related to acute bronchitis, or atypical viral pneumonia. **This report has been created using voice recognition software. It may contain minor errors which are inherent in voice recognition technology. ** Final report electronically signed by Dr. Jillian Gudino on 3/8/2021 12:19 PM    With RN in room, patient was updated about and agreed upon the treatment plan, all the questions and concerns were addressed. Patient was seen in PPE (PERSONAL PROTECTIVE EQUIPMENT). Patient was interviewed in Strict Airborne and Droplet Precautions.     Electronically signed by Giles Cotton MD on 3/20/2021 at 12:18 PM

## 2021-03-21 ENCOUNTER — APPOINTMENT (OUTPATIENT)
Dept: CT IMAGING | Age: 81
DRG: 871 | End: 2021-03-21
Payer: MEDICARE

## 2021-03-21 ENCOUNTER — APPOINTMENT (OUTPATIENT)
Dept: GENERAL RADIOLOGY | Age: 81
DRG: 871 | End: 2021-03-21
Payer: MEDICARE

## 2021-03-21 LAB
ALBUMIN SERPL-MCNC: 2.9 G/DL (ref 3.5–5.1)
ALP BLD-CCNC: 81 U/L (ref 38–126)
ALT SERPL-CCNC: 9 U/L (ref 11–66)
ANION GAP SERPL CALCULATED.3IONS-SCNC: 12 MEQ/L (ref 8–16)
AST SERPL-CCNC: 12 U/L (ref 5–40)
BASOPHILS # BLD: 0.3 %
BASOPHILS ABSOLUTE: 0 THOU/MM3 (ref 0–0.1)
BILIRUB SERPL-MCNC: 0.8 MG/DL (ref 0.3–1.2)
BUN BLDV-MCNC: 18 MG/DL (ref 7–22)
C-REACTIVE PROTEIN: 12.68 MG/DL (ref 0–1)
CALCIUM SERPL-MCNC: 9.1 MG/DL (ref 8.5–10.5)
CHLORIDE BLD-SCNC: 104 MEQ/L (ref 98–111)
CO2: 19 MEQ/L (ref 23–33)
CREAT SERPL-MCNC: 0.8 MG/DL (ref 0.4–1.2)
D-DIMER QUANTITATIVE: 4666 NG/ML FEU (ref 0–500)
EOSINOPHIL # BLD: 0.8 %
EOSINOPHILS ABSOLUTE: 0.1 THOU/MM3 (ref 0–0.4)
ERYTHROCYTE [DISTWIDTH] IN BLOOD BY AUTOMATED COUNT: 17.7 % (ref 11.5–14.5)
ERYTHROCYTE [DISTWIDTH] IN BLOOD BY AUTOMATED COUNT: 55.2 FL (ref 35–45)
FERRITIN: 265 NG/ML (ref 10–291)
GFR SERPL CREATININE-BSD FRML MDRD: 69 ML/MIN/1.73M2
GLUCOSE BLD-MCNC: 120 MG/DL (ref 70–108)
HCT VFR BLD CALC: 43.9 % (ref 37–47)
HEMOGLOBIN: 13.6 GM/DL (ref 12–16)
IMMATURE GRANS (ABS): 0.1 THOU/MM3 (ref 0–0.07)
IMMATURE GRANULOCYTES: 0.7 %
LD: 272 U/L (ref 100–190)
LYMPHOCYTES # BLD: 13.4 %
LYMPHOCYTES ABSOLUTE: 1.9 THOU/MM3 (ref 1–4.8)
MCH RBC QN AUTO: 27.1 PG (ref 26–33)
MCHC RBC AUTO-ENTMCNC: 31 GM/DL (ref 32.2–35.5)
MCV RBC AUTO: 87.6 FL (ref 81–99)
MONOCYTES # BLD: 10.2 %
MONOCYTES ABSOLUTE: 1.5 THOU/MM3 (ref 0.4–1.3)
NUCLEATED RED BLOOD CELLS: 0 /100 WBC
PLATELET # BLD: 369 THOU/MM3 (ref 130–400)
PMV BLD AUTO: 11 FL (ref 9.4–12.4)
POTASSIUM REFLEX MAGNESIUM: 4 MEQ/L (ref 3.5–5.2)
PROCALCITONIN: 0.08 NG/ML (ref 0.01–0.09)
RBC # BLD: 5.01 MILL/MM3 (ref 4.2–5.4)
SEG NEUTROPHILS: 74.6 %
SEGMENTED NEUTROPHILS ABSOLUTE COUNT: 10.7 THOU/MM3 (ref 1.8–7.7)
SODIUM BLD-SCNC: 135 MEQ/L (ref 135–145)
TOTAL PROTEIN: 6.8 G/DL (ref 6.1–8)
WBC # BLD: 14.3 THOU/MM3 (ref 4.8–10.8)

## 2021-03-21 PROCEDURE — 82728 ASSAY OF FERRITIN: CPT

## 2021-03-21 PROCEDURE — 99232 SBSQ HOSP IP/OBS MODERATE 35: CPT | Performed by: INTERNAL MEDICINE

## 2021-03-21 PROCEDURE — 86140 C-REACTIVE PROTEIN: CPT

## 2021-03-21 PROCEDURE — 94640 AIRWAY INHALATION TREATMENT: CPT

## 2021-03-21 PROCEDURE — 6370000000 HC RX 637 (ALT 250 FOR IP): Performed by: STUDENT IN AN ORGANIZED HEALTH CARE EDUCATION/TRAINING PROGRAM

## 2021-03-21 PROCEDURE — 84145 PROCALCITONIN (PCT): CPT

## 2021-03-21 PROCEDURE — 6370000000 HC RX 637 (ALT 250 FOR IP): Performed by: FAMILY MEDICINE

## 2021-03-21 PROCEDURE — 36415 COLL VENOUS BLD VENIPUNCTURE: CPT

## 2021-03-21 PROCEDURE — 6360000002 HC RX W HCPCS: Performed by: STUDENT IN AN ORGANIZED HEALTH CARE EDUCATION/TRAINING PROGRAM

## 2021-03-21 PROCEDURE — 83615 LACTATE (LD) (LDH) ENZYME: CPT

## 2021-03-21 PROCEDURE — 71045 X-RAY EXAM CHEST 1 VIEW: CPT

## 2021-03-21 PROCEDURE — 80053 COMPREHEN METABOLIC PANEL: CPT

## 2021-03-21 PROCEDURE — 2700000000 HC OXYGEN THERAPY PER DAY

## 2021-03-21 PROCEDURE — 2140000000 HC CCU INTERMEDIATE R&B

## 2021-03-21 PROCEDURE — 85379 FIBRIN DEGRADATION QUANT: CPT

## 2021-03-21 PROCEDURE — 71275 CT ANGIOGRAPHY CHEST: CPT

## 2021-03-21 PROCEDURE — 6360000002 HC RX W HCPCS

## 2021-03-21 PROCEDURE — 6370000000 HC RX 637 (ALT 250 FOR IP): Performed by: PHYSICIAN ASSISTANT

## 2021-03-21 PROCEDURE — 6370000000 HC RX 637 (ALT 250 FOR IP): Performed by: HOSPITALIST

## 2021-03-21 PROCEDURE — 85025 COMPLETE CBC W/AUTO DIFF WBC: CPT

## 2021-03-21 PROCEDURE — 94761 N-INVAS EAR/PLS OXIMETRY MLT: CPT

## 2021-03-21 PROCEDURE — 2580000003 HC RX 258: Performed by: STUDENT IN AN ORGANIZED HEALTH CARE EDUCATION/TRAINING PROGRAM

## 2021-03-21 PROCEDURE — 6360000004 HC RX CONTRAST MEDICATION: Performed by: INTERNAL MEDICINE

## 2021-03-21 RX ORDER — LORAZEPAM 2 MG/ML
1 INJECTION INTRAMUSCULAR ONCE
Status: COMPLETED | OUTPATIENT
Start: 2021-03-21 | End: 2021-03-21

## 2021-03-21 RX ORDER — LORAZEPAM 2 MG/ML
INJECTION INTRAMUSCULAR
Status: COMPLETED
Start: 2021-03-21 | End: 2021-03-21

## 2021-03-21 RX ADMIN — LORAZEPAM 1 MG: 2 INJECTION INTRAMUSCULAR at 09:15

## 2021-03-21 RX ADMIN — ATORVASTATIN CALCIUM 20 MG: 20 TABLET, FILM COATED ORAL at 21:19

## 2021-03-21 RX ADMIN — OXYCODONE HYDROCHLORIDE AND ACETAMINOPHEN 1000 MG: 500 TABLET ORAL at 08:44

## 2021-03-21 RX ADMIN — AMLODIPINE BESYLATE 5 MG: 5 TABLET ORAL at 08:44

## 2021-03-21 RX ADMIN — POTASSIUM CHLORIDE 40 MEQ: 1500 TABLET, EXTENDED RELEASE ORAL at 08:44

## 2021-03-21 RX ADMIN — SODIUM CHLORIDE, PRESERVATIVE FREE 10 ML: 5 INJECTION INTRAVENOUS at 08:44

## 2021-03-21 RX ADMIN — Medication 50 MG: at 08:44

## 2021-03-21 RX ADMIN — ENOXAPARIN SODIUM 30 MG: 30 INJECTION SUBCUTANEOUS at 08:45

## 2021-03-21 RX ADMIN — FAMOTIDINE 20 MG: 20 TABLET, FILM COATED ORAL at 21:19

## 2021-03-21 RX ADMIN — Medication 2000 UNITS: at 08:43

## 2021-03-21 RX ADMIN — ASPIRIN 81 MG: 81 TABLET, CHEWABLE ORAL at 08:43

## 2021-03-21 RX ADMIN — IOPAMIDOL 80 ML: 755 INJECTION, SOLUTION INTRAVENOUS at 10:15

## 2021-03-21 RX ADMIN — GLYCOPYRROLATE AND FORMOTEROL FUMARATE 2 PUFF: 9; 4.8 AEROSOL, METERED RESPIRATORY (INHALATION) at 19:18

## 2021-03-21 RX ADMIN — ENOXAPARIN SODIUM 30 MG: 30 INJECTION SUBCUTANEOUS at 21:16

## 2021-03-21 RX ADMIN — LORAZEPAM 1 MG: 2 INJECTION INTRAMUSCULAR; INTRAVENOUS at 09:15

## 2021-03-21 RX ADMIN — FAMOTIDINE 20 MG: 20 TABLET, FILM COATED ORAL at 08:44

## 2021-03-21 RX ADMIN — HYDRALAZINE HYDROCHLORIDE 10 MG: 10 TABLET, FILM COATED ORAL at 08:43

## 2021-03-21 NOTE — PROGRESS NOTES
bilat lung fields  Cardiovascular: Regular rate and rhythm with normal S1/S2 without murmurs, rubs or gallops. Abdomen: Soft, non-tender, non-distended with normal bowel sounds. Musculoskeletal: passive and active ROM x 4 extremities. Skin: Skin color, texture, turgor normal.  No rashes or lesions. Neurologic:  Neurovascularly intact without any focal sensory/motor deficits. Cranial nerves: II-XII intact, grossly non-focal.  Psychiatric: Alert and oriented, thought content appropriate, normal insight  Capillary Refill: Brisk,< 3 seconds   Peripheral Pulses: +2 palpable, equal bilaterally     Labs:   Recent Labs     03/19/21  0354 03/20/21  0410 03/21/21  0430   WBC 8.7 11.3* 14.3*   HGB 12.1 13.2 13.6   HCT 38.7 43.0 43.9    361 369     Recent Labs     03/19/21  0354 03/20/21  0410 03/21/21  0430    138 135   K 4.1 4.2 4.0    107 104   CO2 24 20* 19*   BUN 16 17 18   CREATININE 0.6 0.5 0.8   CALCIUM 8.7 9.2 9.1     Recent Labs     03/19/21  0354 03/20/21  0410 03/21/21  0430   AST 13 13 12   ALT 11 10* 9*   BILITOT 0.5 0.6 0.8   ALKPHOS 63 71 81     No results for input(s): INR in the last 72 hours. No results for input(s): Daksha Councilman in the last 72 hours. Microbiology:    Blood culture #1:   Lab Results   Component Value Date    BC No growth-preliminary  No growth   06/20/2018       Blood culture #2:No results found for: Osvaldo Abbot    Organism:No results found for: ORG    No results found for: LABGRAM    MRSA culture only:No results found for: [facility]    Urine culture: No results found for: LABURIN    Respiratory culture: No results found for: CULTRESP    Aerobic and Anaerobic :  No results found for: LABAERO  No results found for: LABANAE    Urinalysis:      Lab Results   Component Value Date    NITRU NEGATIVE 06/20/2018    BLOODU NEGATIVE 06/20/2018    GLUCOSEU NEGATIVE 06/20/2018       Radiology:  CTA CHEST W WO CONTRAST   Final Result    IMPRESSION:      1.  No evidence of pulmonary emboli. 2. Abnormal density in the left lower lobe, left mid lung field and to a lesser extent right lung consistent with inflammatory process possibly representing Covid 19 infection. 3. Evidence for old granulomatous disease in left lower lobe. 4. Cardiomegaly. 5. Atherosclerotic calcification in the thoracic aorta. 6. Thoracic spondylosis. 7. Hiatal hernia. .               **This report has been created using voice recognition software. It may contain minor errors which are inherent in voice recognition technology. **      Final report electronically signed by DR Neeru Dickens on 3/21/2021 10:58 AM      XR CHEST PORTABLE   Final Result   Decreased bilateral airspace disease and bibasilar consolidations. This document has been electronically signed by: Kelly Rueda MD on    03/21/2021 04:20 AM      XR CHEST PORTABLE   Final Result      Multifocal interstitial and alveolar opacities the lungs intervally increased from prior exam concerning for infectious process. Cardiomegaly, stable from prior. **This report has been created using voice recognition software. It may contain minor errors which are inherent in voice recognition technology. **      Final report electronically signed by Dr. Sarbjit Cruz on 3/12/2021 12:35 PM      XR CHEST PORTABLE   Final Result   Mild cardiomegaly. Interstitial edema noted in the lower lobes with peribronchial cuffing this can be related to acute bronchitis, or atypical viral pneumonia. **This report has been created using voice recognition software. It may contain minor errors which are inherent in voice recognition technology. **      Final report electronically signed by Dr. Deshaun Tillman on 3/8/2021 12:19 PM      MRI ABDOMEN W [address]    (Results Pending)     Xr Chest Portable    Result Date: 3/8/2021  PROCEDURE: XR CHEST PORTABLE CLINICAL INFORMATION: cough. congested.  . COMPARISON: 9/22/2020 TECHNIQUE: Portable upright

## 2021-03-21 NOTE — PLAN OF CARE
Patient repositioned in Bed placed in semi-flowers position. Problem: Gas Exchange - Impaired  Goal: Promote optimal lung function  3/21/2021 0141 by aNncy Healy RN  Outcome: Met This Shift  3/20/2021 1142 by Danya Marina RN  Outcome: Ongoing     Temperature within normal range throughout shift  Problem: Body Temperature -  Risk of, Imbalanced  Goal: Ability to maintain a body temperature within defined limits  3/21/2021 0141 by Nancy Healy RN  Outcome: Met This Shift  3/20/2021 1142 by Danya Marina RN  Outcome: Ongoing     Isolation precautions maintained during shift  Problem: Isolation Precautions - Risk of Spread of Infection  Goal: Prevent transmission of infection  Outcome: Met This Shift    Patient educated on oxygenation and Medications during shift  Problem: Patient Education: Go to Patient Education Activity  Goal: Patient/Family Education  Outcome: Met This Shift     No falls occurred during shift. Call light within reach, Bed alarm on.  Non-slip footwear applied  Problem: Falls - Risk of:  Goal: Will remain free from falls  Description: Will remain free from falls  Outcome: Met This Shift  Goal: Absence of physical injury  Description: Absence of physical injury  Outcome: Met This Shift     No complaints of pain during shift  Problem: PAIN  Goal: Patient's pain/discomfort is manageable  Outcome: Met This Shift     Problem: Pain:  Goal: Pain level will decrease  Description: Pain level will decrease  Outcome: Met This Shift  Goal: Control of acute pain  Description: Control of acute pain  Outcome: Met This Shift  Goal: Control of chronic pain  Description: Control of chronic pain  Outcome: Met This Shift

## 2021-03-21 NOTE — PLAN OF CARE
Problem: Airway Clearance - Ineffective  Goal: Achieve or maintain patent airway  Outcome: Ongoing     Problem: Gas Exchange - Impaired  Goal: Absence of hypoxia  Outcome: Ongoing     Problem: Gas Exchange - Impaired  Goal: Promote optimal lung function  Outcome: Ongoing     Problem: Breathing Pattern - Ineffective  Goal: Ability to achieve and maintain a regular respiratory rate  Outcome: Ongoing     Problem:  Body Temperature -  Risk of, Imbalanced  Goal: Ability to maintain a body temperature within defined limits  Outcome: Ongoing     Problem: Isolation Precautions - Risk of Spread of Infection  Goal: Prevent transmission of infection  Outcome: Ongoing     Problem: Nutrition Deficits  Goal: Optimize nutritional status  Outcome: Ongoing

## 2021-03-22 LAB
ALBUMIN SERPL-MCNC: 2.5 G/DL (ref 3.5–5.1)
ALP BLD-CCNC: 71 U/L (ref 38–126)
ALT SERPL-CCNC: 8 U/L (ref 11–66)
ANION GAP SERPL CALCULATED.3IONS-SCNC: 12 MEQ/L (ref 8–16)
AST SERPL-CCNC: 10 U/L (ref 5–40)
BASOPHILS # BLD: 0.3 %
BASOPHILS ABSOLUTE: 0 THOU/MM3 (ref 0–0.1)
BILIRUB SERPL-MCNC: 0.8 MG/DL (ref 0.3–1.2)
BUN BLDV-MCNC: 17 MG/DL (ref 7–22)
CALCIUM SERPL-MCNC: 8.8 MG/DL (ref 8.5–10.5)
CHLORIDE BLD-SCNC: 103 MEQ/L (ref 98–111)
CO2: 19 MEQ/L (ref 23–33)
CREAT SERPL-MCNC: 0.6 MG/DL (ref 0.4–1.2)
EOSINOPHIL # BLD: 1.1 %
EOSINOPHILS ABSOLUTE: 0.1 THOU/MM3 (ref 0–0.4)
ERYTHROCYTE [DISTWIDTH] IN BLOOD BY AUTOMATED COUNT: 17.2 % (ref 11.5–14.5)
ERYTHROCYTE [DISTWIDTH] IN BLOOD BY AUTOMATED COUNT: 55.2 FL (ref 35–45)
GFR SERPL CREATININE-BSD FRML MDRD: > 90 ML/MIN/1.73M2
GLUCOSE BLD-MCNC: 93 MG/DL (ref 70–108)
HCT VFR BLD CALC: 38.7 % (ref 37–47)
HEMOGLOBIN: 11.9 GM/DL (ref 12–16)
IMMATURE GRANS (ABS): 0.07 THOU/MM3 (ref 0–0.07)
IMMATURE GRANULOCYTES: 0.6 %
LYMPHOCYTES # BLD: 13.6 %
LYMPHOCYTES ABSOLUTE: 1.5 THOU/MM3 (ref 1–4.8)
MCH RBC QN AUTO: 27.2 PG (ref 26–33)
MCHC RBC AUTO-ENTMCNC: 30.7 GM/DL (ref 32.2–35.5)
MCV RBC AUTO: 88.6 FL (ref 81–99)
MONOCYTES # BLD: 11 %
MONOCYTES ABSOLUTE: 1.2 THOU/MM3 (ref 0.4–1.3)
NUCLEATED RED BLOOD CELLS: 0 /100 WBC
PLATELET # BLD: 318 THOU/MM3 (ref 130–400)
PMV BLD AUTO: 11.2 FL (ref 9.4–12.4)
POTASSIUM REFLEX MAGNESIUM: 3.9 MEQ/L (ref 3.5–5.2)
RBC # BLD: 4.37 MILL/MM3 (ref 4.2–5.4)
SEG NEUTROPHILS: 73.4 %
SEGMENTED NEUTROPHILS ABSOLUTE COUNT: 8.2 THOU/MM3 (ref 1.8–7.7)
SODIUM BLD-SCNC: 134 MEQ/L (ref 135–145)
TOTAL PROTEIN: 6.3 G/DL (ref 6.1–8)
WBC # BLD: 11.2 THOU/MM3 (ref 4.8–10.8)

## 2021-03-22 PROCEDURE — 80053 COMPREHEN METABOLIC PANEL: CPT

## 2021-03-22 PROCEDURE — 6370000000 HC RX 637 (ALT 250 FOR IP): Performed by: FAMILY MEDICINE

## 2021-03-22 PROCEDURE — 97530 THERAPEUTIC ACTIVITIES: CPT

## 2021-03-22 PROCEDURE — 6370000000 HC RX 637 (ALT 250 FOR IP): Performed by: HOSPITALIST

## 2021-03-22 PROCEDURE — 99231 SBSQ HOSP IP/OBS SF/LOW 25: CPT | Performed by: FAMILY MEDICINE

## 2021-03-22 PROCEDURE — 97535 SELF CARE MNGMENT TRAINING: CPT

## 2021-03-22 PROCEDURE — 94640 AIRWAY INHALATION TREATMENT: CPT

## 2021-03-22 PROCEDURE — 2700000000 HC OXYGEN THERAPY PER DAY

## 2021-03-22 PROCEDURE — 94669 MECHANICAL CHEST WALL OSCILL: CPT

## 2021-03-22 PROCEDURE — 36415 COLL VENOUS BLD VENIPUNCTURE: CPT

## 2021-03-22 PROCEDURE — 6360000002 HC RX W HCPCS: Performed by: STUDENT IN AN ORGANIZED HEALTH CARE EDUCATION/TRAINING PROGRAM

## 2021-03-22 PROCEDURE — 6370000000 HC RX 637 (ALT 250 FOR IP): Performed by: STUDENT IN AN ORGANIZED HEALTH CARE EDUCATION/TRAINING PROGRAM

## 2021-03-22 PROCEDURE — 2580000003 HC RX 258: Performed by: INTERNAL MEDICINE

## 2021-03-22 PROCEDURE — 2580000003 HC RX 258: Performed by: STUDENT IN AN ORGANIZED HEALTH CARE EDUCATION/TRAINING PROGRAM

## 2021-03-22 PROCEDURE — 85025 COMPLETE CBC W/AUTO DIFF WBC: CPT

## 2021-03-22 PROCEDURE — 94668 MNPJ CHEST WALL SBSQ: CPT

## 2021-03-22 PROCEDURE — 2140000000 HC CCU INTERMEDIATE R&B

## 2021-03-22 PROCEDURE — 94761 N-INVAS EAR/PLS OXIMETRY MLT: CPT

## 2021-03-22 RX ADMIN — OXYCODONE HYDROCHLORIDE AND ACETAMINOPHEN 1000 MG: 500 TABLET ORAL at 09:48

## 2021-03-22 RX ADMIN — GLYCOPYRROLATE AND FORMOTEROL FUMARATE 2 PUFF: 9; 4.8 AEROSOL, METERED RESPIRATORY (INHALATION) at 20:11

## 2021-03-22 RX ADMIN — ASPIRIN 81 MG: 81 TABLET, CHEWABLE ORAL at 09:48

## 2021-03-22 RX ADMIN — SODIUM CHLORIDE: 9 INJECTION, SOLUTION INTRAVENOUS at 09:48

## 2021-03-22 RX ADMIN — ATORVASTATIN CALCIUM 20 MG: 20 TABLET, FILM COATED ORAL at 20:48

## 2021-03-22 RX ADMIN — SODIUM CHLORIDE, PRESERVATIVE FREE 10 ML: 5 INJECTION INTRAVENOUS at 20:48

## 2021-03-22 RX ADMIN — FAMOTIDINE 20 MG: 20 TABLET, FILM COATED ORAL at 20:48

## 2021-03-22 RX ADMIN — ENOXAPARIN SODIUM 30 MG: 30 INJECTION SUBCUTANEOUS at 20:47

## 2021-03-22 RX ADMIN — Medication 2000 UNITS: at 09:48

## 2021-03-22 RX ADMIN — Medication 50 MG: at 09:48

## 2021-03-22 RX ADMIN — ENOXAPARIN SODIUM 30 MG: 30 INJECTION SUBCUTANEOUS at 09:48

## 2021-03-22 RX ADMIN — POTASSIUM CHLORIDE 40 MEQ: 1500 TABLET, EXTENDED RELEASE ORAL at 09:50

## 2021-03-22 RX ADMIN — GLYCOPYRROLATE AND FORMOTEROL FUMARATE 2 PUFF: 9; 4.8 AEROSOL, METERED RESPIRATORY (INHALATION) at 08:08

## 2021-03-22 RX ADMIN — FAMOTIDINE 20 MG: 20 TABLET, FILM COATED ORAL at 09:48

## 2021-03-22 ASSESSMENT — PAIN SCALES - GENERAL
PAINLEVEL_OUTOF10: 0

## 2021-03-22 NOTE — CARE COORDINATION
3/22/21, 2:50 PM EDT    DISCHARGE ON GOING [address] day: 14  Location: -09/009-A Reason for admit: Acute respiratory failure ([facility] [address] [J96.00]   Procedure:   3/9 Convalescent plasma x1   Barriers to Discharge: Remains on high flow 45%, 50L. IVF. PT/OT. Discussed Ltac with hospitalist, would like palliative to follow-up. Spoke with palliative, they will continue following and attempt to speak with physician re: recommendations/next stes. PCP: ERNESTO Sutton CNP  Readmission Risk Score: 18%  Patient Goals/Plan/Treatment Preferences: From home with daughter.  TCU eval, will relook when off high flow O2. If unable to wean high flow,  consider LTAC. SW following for possible ECF need.  MD Please advise    See message below

## 2021-03-22 NOTE — PLAN OF CARE
Problem: Airway Clearance - Ineffective  Goal: Achieve or maintain patent airway  3/22/2021 0901 by Steffanie Covarrubias RN  Outcome: Ongoing  Note: Airway patent. Weaning HFO as tolerated. Problem: Gas Exchange - Impaired  Goal: Absence of hypoxia  3/22/2021 0901 by Steffanie Covarrubias RN  Outcome: Ongoing  Note: On HFO at 50L and 50%, weaning as tolerated. Encouraging IS and Acapella. Problem: Body Temperature -  Risk of, Imbalanced  Goal: Ability to maintain a body temperature within defined limits  3/22/2021 0901 by Steffanie Covarrubias RN  Outcome: Ongoing  Note: Pt afebrile. Monitoring. Problem: Nutrition Deficits  Goal: Optimize nutritional status  3/22/2021 0901 by Steffanie Covarrubias RN  Outcome: Ongoing  Note: Pt has poor appetite. Encouraging PO intake. Problem: Falls - Risk of:  Goal: Will remain free from falls  Description: Will remain free from falls  3/22/2021 0901 by Steffanie Covarrubias RN  Outcome: Ongoing  Note: Call light in reach, bed in lowest position, and bed alarm activated. Education given on use of call light before ambulation and when in need of assistance. Patient expressed understanding. Hourly visual checks performed and charted. Toileting offered to patient. No falls this shift, at any time. Will continue to monitor. Problem: Skin Integrity:  Goal: Will show no infection signs and symptoms  Description: Will show no infection signs and symptoms  Outcome: Ongoing  Note: No signs of new skin breakdown with each assessment. Skin remains warm, dry, intact. Mucous membranes pink & moist. Assisting pt with turning and repositioning. Problem: PAIN  Goal: Patient's pain/discomfort is manageable  3/22/2021 0901 by Steffanie Covarrubias RN  Outcome: Ongoing  Note: Patient able to use 0-10 pain scale. Denies pain at this time. Agreeable to take PRN pain medications.        Problem: DISCHARGE BARRIERS  Goal: Patient's continuum of care needs are met  Outcome: Ongoing  Note: Discharge planning in process and discussed with patient/family. Social work consulted for any additional needs. Care manager aware of discharge needs. Care plan reviewed with patient and family. Patient verbalizes understanding of the plan of care and contributed to goal setting. 1544: Updated patient's daughter Mariella Figueroa over the phone, all questions answered.

## 2021-03-22 NOTE — PROGRESS NOTES
[address] [facility] PHYSICAL THERAPY  DAILY NOTE  [facility] CCU 3A - 3A-09/009-A  Time In: 1430  Time Out: 1458  Timed Code Treatment Minutes: 28 Minutes  Minutes: 28          Date: 3/22/2021  Patient Name: Delano Earl,  Gender:  female        MRN: [id number]  : 1940  (80 y.o.)     Referring Practitioner: Talya Plaza MD  Diagnosis: acute respiratory failure  Additional Pertinent Hx: Per HPI: \"80 y.o. female who presented to [facility] with increased weakness for the past 3 days. History is limited due to patient's drowsiness. Limited history provided by daughter-in-law who is in the room. Ms. Isma Obando received her covid vaccine on 3/1/2021. She did have some fatigue afterwards with nausea. However, she began having acutely worsening weakness and cough over the last 3 days with decreased oral intake. Family was unaware until her daughter-in-law went to check on her today. She denies any shortness of breath to me, but she is requiring 6 L supplemental O2 to maintain SPO2 greater than 90% and appears to have increased work of breathing value at rest.\" Pt is positive for COVID-19. Prior Level of Function:  Lives With: Daughter  Type of Home: House  Home Layout: Multi-level  Home Access: Stairs to enter with rails  Entrance Stairs - Number of Steps: 3 steps + 7 to get to her bedroom/bathroom  Home Equipment: (no DME PTA)   Bathroom Equipment: Shower chair    ADL Assistance: Needs assistance  Homemaking Assistance: (daughter completes all)  Ambulation Assistance: Independent  Transfer Assistance: Independent  Active : Yes    Restrictions/Precautions:  Restrictions/Precautions: Isolation(droplet + precautions)  Position Activity Restriction  Other position/activity restrictions: 3/11: HFNC     SUBJECTIVE: Patient in room in chair, requesting to use restroom. RN okay with PT session, into room to assist PT with transfer. Pt with significant incontinence of BM.     PAIN: no complaints of pain    Vitals: Oxygen: O2 93% on high flow at end of treatment, FiO2 40 at 50 L/min    OBJECTIVE:  Bed Mobility:  Rolling to Left: mod assist    Rolling to Right: Moderate Assistance   Sit to Supine: Dependent, X 2, pt with decreased stability edge of bed, requiring increased assistance into bed  -Pt rolling multiple times to assist with clean up from bowel incontinence. Transfers:  Sit to Stand: Minimal Assistance, X 2  Stand to Sit:Minimal Assistance, X 2   -Pt dizzy sitting edge of chair, requiring several minutes rest before standing, blood pressure WFL    Ambulation:  Minimal Assistance, X 2  Distance: 2'  Surface: Level Tile  Device:No Device  Gait Deviations: Forward Flexed Posture, Slow Arabella, Decreased Step Length Bilaterally, Decreased Gait Speed, Decreased Heel Strike Bilaterally and Unsteady Gait  -Verbal cues for sequencing    Balance:  Static Sitting Balance:  Contact Guard Assistance, Minimal Assistance  Static Standing Balance: Minimal Assistance, X 2  Dynamic Standing Balance: Minimal Assistance, X 2    Functional Outcome Measures: Completed  -PAC Inpatient Mobility Raw Score : 10  -PAC Inpatient T-Scale Score : 32.29    ASSESSMENT:  Assessment: Patient progressing toward established goals. Activity Tolerance:  Patient tolerance of  treatment: fair.  Limited by weakness/fatigue     Equipment Recommendations:Equipment Needed: (likely a rolling walker, continue to assess)  Discharge Recommendations:  Continue to assess pending progress(TCU)    Plan: Times per week: 5x GM  Times per day: Daily  Current Treatment Recommendations: Strengthening, Functional Mobility Training, Home Exercise Program, Endurance Training, Balance Training, Patient/Caregiver Education & Training, Transfer Training, Gait Training, Safety Education & Training    Patient Education  Patient Education: [employer] Mobility, Transfers,  - Patient Verbalized Understanding, - Patient Requires Continued Education Goals:  Patient goals : return home  Short term goals  Time Frame for Short term goals: by hospital discharge  Short term goal 1: Supine to/from sit with modified independence for ease of transfers at discharge site. Short term goal 2: Sit to/from stand with modified independence for ease of transfers at discharge site. Short term goal 3: Ambulate 21' with CGA x 1 and LRAD, maintaining O2 saturation >88%, for progression to home distance ambulation. Short term goal 4: Ascend/descend 3 steps with 1 HR and Rich for progression to safe entry into home. Long term goals  Time Frame for Long term goals : N/A due to short ELOS. Following session, patient left in safe position with all fall risk precautions in place.

## 2021-03-22 NOTE — PLAN OF CARE
Problem: Airway Clearance - Ineffective  Goal: Achieve or maintain patent airway  3/22/2021 0137 by Migdalia Mishra RN  Outcome: Ongoing  Note: Patient with strong, frequent, productive cough. Problem: Gas Exchange - Impaired  Goal: Absence of hypoxia  3/22/2021 0137 by Migdalia Mishra RN  Outcome: Ongoing  Note: Remains on hi saji. Problem: Body Temperature -  Risk of, Imbalanced  Goal: Ability to maintain a body temperature within defined limits  3/22/2021 0137 by Migdalia Mishra RN  Outcome: Ongoing  Note: Afebrile. Problem: Nutrition Deficits  Goal: Optimize nutritional status  3/22/2021 0137 by Migdalia Mishra RN  Outcome: Ongoing  Note: Poor diet. No appetite. Offered food, declined. Problem: Falls - Risk of:  Goal: Will remain free from falls  Description: Will remain free from falls  Outcome: Ongoing  Note: No falls this shift. Patient remains on falling star program with fall band on and falling star on door. Bed exit alarm on. Problem: Skin Integrity:  Goal: Absence of new skin breakdown  Description: Absence of new skin breakdown  Outcome: Ongoing  Note: No new skin breakdown noted to bony prominences or coccyx area. Patient is turned every 2 hours and prn with back care given at this time. Problem: PAIN  Goal: Patient's pain/discomfort is manageable  Outcome: Ongoing  Note: No complaints of pain. Reviewed care plan with patient, no concerns at this time.

## 2021-03-22 NOTE — PLAN OF CARE
Patient receiving inhaler and bronchiole hygiene; remains on high flow; tolerating well.   Will continue to monitor patients respiratory status

## 2021-03-22 NOTE — PROGRESS NOTES
Comprehensive Nutrition Assessment    Type and Reason for Visit:  Reassess(follow-up)    Nutrition Recommendations/Plan:   Continue current diet  Continue to send magic cup TID  Patient to take meds with ensure (has several in room)  Recommend appetite stimulant    Nutrition Assessment:  Pt. with no improvement from a nutritional standpoint AEB poor oral intake, refuses meals at times. Remains at risk for further nutritional compromise r/t admitted with acute respiratory failure, +covid, advanced age, LOS day 14, on High Flow O2, diarrhea earlier in stay, and underlying medical condition (hx: HLD, HTN, RA). Nutrition recommendations/interventions as per above. Malnutrition Assessment:  Malnutrition Status:  (+covid)      Estimated Daily Nutrient Needs:  Energy (kcal):  3157-2840 (25-35 kcals/kg) - late phase; Weight Used for Energy Requirements:  Current(63kg bedscale weight on 3/18/21)     Protein (g):   gm (1.2-2); Weight Used for Protein Requirements:  Ideal(50 kgm)        Fluid (ml/day):  per MD; Method Used for Fluid Requirements:  (n/a)      Nutrition Related Findings:  Covid dx 3/8; received first vaccine 3/1. Spoke to RN who reports patient is ususally awake at night and will eat a little during this time. Patient sleeps most of the day. RN reports that family has informed RN that this is patient's normal schedule at home. RN states that patient refused breakfast (note past refused meals during stay). RN does report that patient has ensure in room and she will drink ensure with her meds. RN is not sure about patient's intake of the magic cups. Labs: (3/22) sodium: 134. Labs (3/21) CRP: 12.68, LD: 272. Meds: vitamin D and C, zinc. Note last BM was 3/20/21. Wounds:  Stage I(buttocks)       Current Nutrition Therapies:    Dietary Nutrition Supplements: Other Oral Supplement (see comment)  DIET CARDIAC;   Dietary Nutrition Supplements: Frozen Oral Supplement    Anthropometric Measures:  · Height: 5' 2\" (157.5 cm)  · Current Body Weight: 127 lb (57.6 kg)(3/22/21, bedscale, no edema)   · Admission Body Weight: 135 lb 12.9 oz (61.6 kg)(3/8 no edema)    · Usual Body Weight: (per EMR: 9/15/20: 165# 11.2 oz, 9/18/20: 159# 12.8 oz)     · Ideal Body Weight: 110 lbs;     · BMI: 23.2  · Adjusted Body Weight:  ; No Adjustment   · BMI Categories: Normal Weight (BMI 18.5-24. 9)       Nutrition Diagnosis:   · Inadequate oral intake related to impaired respiratory function, inadequate protein-energy intake as evidenced by poor intake prior to admission, intake 0-25%    Nutrition Interventions:   Food and/or Nutrient Delivery:  Continue Current Diet, Vitamin Supplement, Mineral Supplement, Continue Oral Nutrition Supplement  Nutrition Education/Counseling:  Education initiated(encouraged intake at best effort, use of ONS)   Coordination of Nutrition Care:  Continue to monitor while inpatient    Goals:  Pt. will tolerate and consume 75% or more of meals to promote wound healign during LOS. Nutrition Monitoring and Evaluation:   Behavioral-Environmental Outcomes:  None Identified   Food/Nutrient Intake Outcomes:  Diet Advancement/Tolerance, Food and Nutrient Intake, Supplement Intake  Physical Signs/Symptoms Outcomes:  Biochemical Data, GI Status, Fluid Status or Edema, Hemodynamic Status, Meal Time Behavior, Skin, Weight     Discharge Planning:     Too soon to determine     Electronically signed by Romero Bird RD, LD on 3/22/21 at 11:03 AM EDT    Contact: [phone]

## 2021-03-22 NOTE — PROGRESS NOTES
Hospitalist Progress Note      Patient:  Marie Fernandes    SAMU/RCQ:6L-87/[id number]  YOB: 1940  MRN: [id number]   Acct: [de-identified]   PCP: ERNESTO Levine CNP  Date of Admission: 3/8/2021    Assessment and Plan:        Pneumonia secondary to COVID 19  · Patient got the 1st dose of COVID vaccine on 3/1,   · On Dexamethasone 6 mg QD (on H1 blocker) and Remdesivir  · Procal WNLs d/c'ed ABx  · Supportive therapy with vitamin C, D, zinc and lactobacillus (d/c'ed)  · LMWH 30mg BID for DVT prophylaxis; added ASA 81 QD as prothrombotic markers elevated  · IS/Acapella/PT to see  3/11: stable; SaO2 drops w/ exertion and also postural changes; CCM. Added chest physio  3/12: improving; encouraged CCM w/ aggressive weaning as tolerated to SaO2 greater than 90%  3/13: slow wean w/ short-lived episodes of worsening hypoxia which is positional. CCM. Reduce IVF to 50 cc/hr given inadequate PO intake. Repeat ABG non-contributroy  3/14: slowly weaning. CCM  3/16: Improving steadily. Now down to FiO2 45% at 45 lpm w/ SaO2 high 90s. CCM  3/17: down to HFO 40/40. Will try stepping down to NC at 6 lpm today as tolerated. 3/18: improving; still unable to wean down to NC; on minimal HFO parameters; CCM  3/19: as clinically improving, pt more mobile w/ desaturation w/ exertion. CCM. RN aware to slowly wean as tolerated  3/20: Continues to slowly wean down. Feeling well otherwise. +22L fluid balance. Will give a dose of Lasix today and see if that helps getting patient off HFNC. Repeat CXR tomorrow am. Check inflam markers. If D-dimer trending up consider CTA. Trial of Bipap.  3/21: D-dimer elevated. CTA negative for PE. Inflam markers trending up. Aggressive CPT. Consider more diuretics. Will hold off today due to contrast given. 3/22: After discussion with the nurse patient her usual is sleeping during the day and awake all night long, she was sleeping when I encountered her in the morning, she is arousable though. Continued to be on high flow nasal cannula oxygen very difficult to wean off currently, FiO2 45, flow rate 50 L/minute and satting 92% on high flow nasal cannula, CTA did not show any PE, although showed an inflammatory process due to current COVID-19 virus infection, slight leukocytosis, remained afebrile in the past 48 hours, procalcitonin normal, finished treatment for COVID-19 virus infection, last echocardiogram in September 2020 showed 60% EF otherwise normal, continue wean off oxygen as tolerated.     Acute hypoxic respiratory failure secondary to above  · On HFO FiO2 80% and 60 lpm w/ SaO2 in high 90s  · Rn aware to wean supplemental O2 aggressively as tolerated to SpO2 > 90%  3/11: ABG negative for hypercapnia; managing as above  3/19: managing as above     History of COPD  · Quit smoking 7-10 years ago. 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 Plan was for patient to see GI outpatient. Is suppose to see GI associated OP with w/u including MRI of abd/pelvis. LFT's stable. Clinically, patient has no complaints. Physical debility: Patient would benefit from inpatient rehab versus LTAC as she is not progressing very well and very slow progression, monitor closely. PMH, PSH, SH, FHX reviewed in chart review snap shot in EPIC    CC: Acute hypoxic respiratory failure likely secondary to COVID-19 virus infection, physical debility    HPI: Initial admission HPI by admitting physician reviewed as below:  80 y.o. female [facility] weakness for the past 3 days. History is limited due to patient's drowsiness. Limited history provided by daughter-in-law who is in the room. Ms. Ray Police received her covid vaccine on 3/1/2021.   She did have some fatigue afterwards with nausea. However, she began having acutely worsening weakness and cough over the last 3 days with decreased oral intake. Family was unaware until her daughter-in-law went to check on her today. She denies any shortness of breath to me, but she is requiring 6 L supplemental O2 to maintain SPO2 greater than 90% and appears to have increased work of breathing value at rest.     Patient denies any pain, feelings of fever/chills, shortness of breath, nausea/vomiting, changes in bowel or bladder habits. Her only complaint is of weakness. For further details and updates please refer to assessment and plan at the beginning of the note. ROS (10 point review of systems completed. Pertinent positives noted. Otherwise ROS is negative) : Generalized weak  PMH:  Per HPI and reviewed in the chart  SHX: Reviewed in the chart, also the patient  FHX: Reviewed in the chart, also with the patient  Allergies: Reviewed in the chart  Medications:     sodium chloride      sodium chloride      sodium chloride 20 mL/hr at 03/20/21 1222    dextrose        potassium chloride  40 mEq Oral Daily    aspirin  81 mg Oral Daily    ascorbic acid  1,000 mg Oral Daily    zinc sulfate  50 mg Oral Daily    sodium chloride flush  10 mL Intravenous 2 times per day    enoxaparin  30 mg Subcutaneous BID    Vitamin D  2,000 Units Oral Daily    atorvastatin  20 mg Oral Daily    famotidine  20 mg Oral BID    glycopyrrolate-formoterol  2 puff Inhalation BID    amLODIPine  5 mg Oral Daily   Subjective 3/22/2021: Very weak, sleepy in bed, arousable, continue to be on high flow nasal cannula oxygen, she would benefit from TCU versus LTAC, monitor closely, nursing notes, labs, vitals reviewed.     Vital Signs:   Vitals reviewed and compared with the previous ones  /83   Pulse 85   Temp 98.6 °F (37 °C) (Oral)   Resp 22   Ht 5' 2\" (1.575 m)   Wt 127 lb 6.8 oz (57.8 kg)   SpO2 94%   BMI 23.31 kg/m²      Intake/Output results found for: JAIR    Urinalysis:      Lab Results   Component Value Date    NITRU NEGATIVE 06/20/2018    BLOODU NEGATIVE 06/20/2018    GLUCOSEU NEGATIVE 06/20/2018       Radiology:  CTA CHEST W WO CONTRAST   Final Result    IMPRESSION:      1. No evidence of pulmonary emboli. 2. Abnormal density in the left lower lobe, left mid lung field and to a lesser extent right lung consistent with inflammatory process possibly representing Covid 19 infection. 3. Evidence for old granulomatous disease in left lower lobe. 4. Cardiomegaly. 5. Atherosclerotic calcification in the thoracic aorta. 6. Thoracic spondylosis. 7. Hiatal hernia. .               **This report has been created using voice recognition software. It may contain minor errors which are inherent in voice recognition technology. **      Final report electronically signed by DR Precious Malcolm on 3/21/2021 10:58 AM      XR CHEST PORTABLE   Final Result   Decreased bilateral airspace disease and bibasilar consolidations. This document has been electronically signed by: Renaldo Polo MD on    03/21/2021 04:20 AM      XR CHEST PORTABLE   Final Result      Multifocal interstitial and alveolar opacities the lungs intervally increased from prior exam concerning for infectious process. Cardiomegaly, stable from prior. **This report has been created using voice recognition software. It may contain minor errors which are inherent in voice recognition technology. **      Final report electronically signed by Dr. Dawit Garcia on 3/12/2021 12:35 PM      XR CHEST PORTABLE   Final Result   Mild cardiomegaly. Interstitial edema noted in the lower lobes with peribronchial cuffing this can be related to acute bronchitis, or atypical viral pneumonia. **This report has been created using voice recognition software. It may contain minor errors which are inherent in voice recognition technology. **      Final report electronically

## 2021-03-22 NOTE — PLAN OF CARE
Problem: Nutrition  Goal: Optimal nutrition therapy  3/22/2021 1106 by Trevon Guerin RD, LD  Outcome: Ongoing   Nutrition Problem #1: Inadequate oral intake  Intervention: Food and/or Nutrient Delivery: Continue Current Diet, Vitamin Supplement, Mineral Supplement, Continue Oral Nutrition Supplement  Nutritional Goals: Pt. will tolerate and consume 75% or more of meals to promote wound healign during LOS.

## 2021-03-22 NOTE — PLAN OF CARE
Patient continues on acapella and high flow; pt needs lots of encouragement with acapella.   Will continue to monitor patients respiratory status

## 2021-03-23 LAB
ALBUMIN SERPL-MCNC: 2.5 G/DL (ref 3.5–5.1)
ALP BLD-CCNC: 75 U/L (ref 38–126)
ALT SERPL-CCNC: 9 U/L (ref 11–66)
ANION GAP SERPL CALCULATED.3IONS-SCNC: 14 MEQ/L (ref 8–16)
AST SERPL-CCNC: 12 U/L (ref 5–40)
BASOPHILS # BLD: 0.5 %
BASOPHILS ABSOLUTE: 0 THOU/MM3 (ref 0–0.1)
BILIRUB SERPL-MCNC: 0.6 MG/DL (ref 0.3–1.2)
BUN BLDV-MCNC: 16 MG/DL (ref 7–22)
CALCIUM SERPL-MCNC: 8.7 MG/DL (ref 8.5–10.5)
CHLORIDE BLD-SCNC: 105 MEQ/L (ref 98–111)
CO2: 17 MEQ/L (ref 23–33)
CREAT SERPL-MCNC: 0.5 MG/DL (ref 0.4–1.2)
EOSINOPHIL # BLD: 3.8 %
EOSINOPHILS ABSOLUTE: 0.3 THOU/MM3 (ref 0–0.4)
ERYTHROCYTE [DISTWIDTH] IN BLOOD BY AUTOMATED COUNT: 17.1 % (ref 11.5–14.5)
ERYTHROCYTE [DISTWIDTH] IN BLOOD BY AUTOMATED COUNT: 56.3 FL (ref 35–45)
GFR SERPL CREATININE-BSD FRML MDRD: > 90 ML/MIN/1.73M2
GLUCOSE BLD-MCNC: 84 MG/DL (ref 70–108)
HCT VFR BLD CALC: 36.5 % (ref 37–47)
HEMOGLOBIN: 11.1 GM/DL (ref 12–16)
IMMATURE GRANS (ABS): 0.05 THOU/MM3 (ref 0–0.07)
IMMATURE GRANULOCYTES: 0.8 %
LYMPHOCYTES # BLD: 20.7 %
LYMPHOCYTES ABSOLUTE: 1.4 THOU/MM3 (ref 1–4.8)
MCH RBC QN AUTO: 27.6 PG (ref 26–33)
MCHC RBC AUTO-ENTMCNC: 30.4 GM/DL (ref 32.2–35.5)
MCV RBC AUTO: 90.8 FL (ref 81–99)
MONOCYTES # BLD: 12.2 %
MONOCYTES ABSOLUTE: 0.8 THOU/MM3 (ref 0.4–1.3)
NUCLEATED RED BLOOD CELLS: 0 /100 WBC
PLATELET # BLD: 282 THOU/MM3 (ref 130–400)
PMV BLD AUTO: 11.1 FL (ref 9.4–12.4)
POTASSIUM REFLEX MAGNESIUM: 3.7 MEQ/L (ref 3.5–5.2)
RBC # BLD: 4.02 MILL/MM3 (ref 4.2–5.4)
SEG NEUTROPHILS: 62 %
SEGMENTED NEUTROPHILS ABSOLUTE COUNT: 4.1 THOU/MM3 (ref 1.8–7.7)
SODIUM BLD-SCNC: 136 MEQ/L (ref 135–145)
TOTAL PROTEIN: 6.3 G/DL (ref 6.1–8)
WBC # BLD: 6.6 THOU/MM3 (ref 4.8–10.8)

## 2021-03-23 PROCEDURE — 6360000002 HC RX W HCPCS: Performed by: STUDENT IN AN ORGANIZED HEALTH CARE EDUCATION/TRAINING PROGRAM

## 2021-03-23 PROCEDURE — 6370000000 HC RX 637 (ALT 250 FOR IP): Performed by: STUDENT IN AN ORGANIZED HEALTH CARE EDUCATION/TRAINING PROGRAM

## 2021-03-23 PROCEDURE — 6370000000 HC RX 637 (ALT 250 FOR IP): Performed by: FAMILY MEDICINE

## 2021-03-23 PROCEDURE — 2580000003 HC RX 258: Performed by: STUDENT IN AN ORGANIZED HEALTH CARE EDUCATION/TRAINING PROGRAM

## 2021-03-23 PROCEDURE — 97535 SELF CARE MNGMENT TRAINING: CPT

## 2021-03-23 PROCEDURE — 2140000000 HC CCU INTERMEDIATE R&B

## 2021-03-23 PROCEDURE — 97530 THERAPEUTIC ACTIVITIES: CPT

## 2021-03-23 PROCEDURE — 94668 MNPJ CHEST WALL SBSQ: CPT

## 2021-03-23 PROCEDURE — 94669 MECHANICAL CHEST WALL OSCILL: CPT

## 2021-03-23 PROCEDURE — 85025 COMPLETE CBC W/AUTO DIFF WBC: CPT

## 2021-03-23 PROCEDURE — 36415 COLL VENOUS BLD VENIPUNCTURE: CPT

## 2021-03-23 PROCEDURE — 94640 AIRWAY INHALATION TREATMENT: CPT

## 2021-03-23 PROCEDURE — 94761 N-INVAS EAR/PLS OXIMETRY MLT: CPT

## 2021-03-23 PROCEDURE — 99231 SBSQ HOSP IP/OBS SF/LOW 25: CPT | Performed by: FAMILY MEDICINE

## 2021-03-23 PROCEDURE — 6370000000 HC RX 637 (ALT 250 FOR IP): Performed by: PHYSICIAN ASSISTANT

## 2021-03-23 PROCEDURE — 6370000000 HC RX 637 (ALT 250 FOR IP): Performed by: HOSPITALIST

## 2021-03-23 PROCEDURE — 97110 THERAPEUTIC EXERCISES: CPT

## 2021-03-23 PROCEDURE — 80053 COMPREHEN METABOLIC PANEL: CPT

## 2021-03-23 RX ADMIN — FAMOTIDINE 20 MG: 20 TABLET, FILM COATED ORAL at 10:34

## 2021-03-23 RX ADMIN — Medication 2000 UNITS: at 10:34

## 2021-03-23 RX ADMIN — GLYCOPYRROLATE AND FORMOTEROL FUMARATE 2 PUFF: 9; 4.8 AEROSOL, METERED RESPIRATORY (INHALATION) at 08:46

## 2021-03-23 RX ADMIN — OXYCODONE HYDROCHLORIDE AND ACETAMINOPHEN 1000 MG: 500 TABLET ORAL at 10:34

## 2021-03-23 RX ADMIN — ENOXAPARIN SODIUM 30 MG: 30 INJECTION SUBCUTANEOUS at 20:24

## 2021-03-23 RX ADMIN — ENOXAPARIN SODIUM 30 MG: 30 INJECTION SUBCUTANEOUS at 10:34

## 2021-03-23 RX ADMIN — GLYCOPYRROLATE AND FORMOTEROL FUMARATE 2 PUFF: 9; 4.8 AEROSOL, METERED RESPIRATORY (INHALATION) at 20:17

## 2021-03-23 RX ADMIN — Medication 2.5 MG: at 22:14

## 2021-03-23 RX ADMIN — Medication 50 MG: at 10:36

## 2021-03-23 RX ADMIN — FAMOTIDINE 20 MG: 20 TABLET, FILM COATED ORAL at 20:24

## 2021-03-23 RX ADMIN — AMLODIPINE BESYLATE 5 MG: 5 TABLET ORAL at 10:34

## 2021-03-23 RX ADMIN — ASPIRIN 81 MG: 81 TABLET, CHEWABLE ORAL at 10:34

## 2021-03-23 RX ADMIN — ATORVASTATIN CALCIUM 20 MG: 20 TABLET, FILM COATED ORAL at 20:24

## 2021-03-23 RX ADMIN — ACETAMINOPHEN 650 MG: 325 TABLET ORAL at 22:14

## 2021-03-23 RX ADMIN — POTASSIUM CHLORIDE 40 MEQ: 1500 TABLET, EXTENDED RELEASE ORAL at 10:36

## 2021-03-23 RX ADMIN — SODIUM CHLORIDE, PRESERVATIVE FREE 10 ML: 5 INJECTION INTRAVENOUS at 20:24

## 2021-03-23 ASSESSMENT — PAIN DESCRIPTION - FREQUENCY: FREQUENCY: INTERMITTENT

## 2021-03-23 ASSESSMENT — PAIN DESCRIPTION - ONSET: ONSET: UNABLE TO TELL

## 2021-03-23 ASSESSMENT — PAIN SCALES - GENERAL
PAINLEVEL_OUTOF10: 0
PAINLEVEL_OUTOF10: 3
PAINLEVEL_OUTOF10: 0

## 2021-03-23 NOTE — PROGRESS NOTES
[address] CCU 3A  Occupational Therapy  Daily Note  Time:   Time In: 0400  Time Out: 1210  Timed Code Treatment Minutes: 31 Minutes  Minutes: 31          Date: 3/23/2021  Patient Name: Francis Morillo,   Gender: female      Room: [facility]009-A  MRN: [id number]  : 1940  (80 y.o.)  Referring Practitioner: Dr. Owen Larry MD  Diagnosis: Acute Respiratory Failure  Additional Pertinent Hx: Per H&P: Pt presented to [address] [place] [address] with increased weakness for the past 3 days. Limited history provided by daughter-in-law who is in the room. Ms. Roger Carmichael received her covid vaccine on 3/1/2021. She did have some fatigue afterwards with nausea. However, she began having acutely worsening weakness and cough over the last 3 days with decreased oral intake. Family was unaware until her daughter-in-law went to check on her the day of admission. She is requiring 6 L supplemental O2 to maintain SPO2 greater than 90% and appears to have increased work of breathing value at rest. COVID +    Restrictions/Precautions:  Restrictions/Precautions: Isolation(droplet + precautions)  Position Activity Restriction  Other position/activity restrictions: 3/11: HFNC     SUBJECTIVE: Nurse ok'd session. Patient lying in bed upon arrival. Patient more alert this AM. Agreeable to OT session     PAIN: Denies    Vitals: Oxygen:   Patient on 40L 44% FiO2 HFNC upon arrival to room. Patient O2 sats at 92-93 %. Patient O2 saturations fluctuated between 84-93%. Pt requiring LENGTHY rest break(s) to recover. Encouraged deep breathing in through nose and out through mouth- patient maintaining around 88-89% on 40L 44% FiO2%. Respiratory therapy in at end of session to adjust high flow settings- patient at 92% at end of session      COGNITION: Decreased Insight, Decreased Problem Solving and Decreased Safety Awareness    ADL:   Grooming: Minimal Assistance.   For hair care seated EOB  Lower Extremity Dressing: Maximum Assistance. To don B socks seated EOB  Toileting: Maximum Assistance. For hygiene and clothing management, incontinent of urine. Completed in supine rolling side to side with increased time provided due to decrease in O2 saturations. BALANCE:  Sitting Balance:  Stand By Assistance. Seated EOB >10 minutes. Increased time for O2 sats to recover to 93% with education provided throughout on deep breathing in through nose and out through mouth  Standing Balance: Minimal Assistance. Slight unsteadiness noted. Patient complained of slight dizziness upon standing, subsided. BED MOBILITY:  Rolling to Left: Contact Guard Assistance - SBA with increased time and use of side rail  Rolling to Right: Contact Guard Assistance - SBA with increased time and use of side rail  Supine to Sit: Moderate Assistance - bringing shoulders to upright position, able to bring BLE over EOB with increased time and cueing for technique    TRANSFERS:  Sit to Stand: Moderate Assistance. From EOB with increased time and cueing provided for technique  Stand to Sit: Minimal Assistance. To bedside chair with increased time and cueing provided safe/proper technique  Stand Pivot: Moderate Assistance. - Rich from EOB to bedside chair with hand held assist. Mod vcs for safe technique. Assist with managing IV pole and high flow machine. ASSESSMENT:     Activity Tolerance:  Patient tolerance of  treatment: fair. Discharge Recommendations: Subacute/Skilled Nursing Facility(TCU)   Equipment Recommendations:  Other: will continue to monitor pending progress, discharge location  Plan: Times per week: 5x  Current Treatment Recommendations: Strengthening, Balance Training, Functional Mobility Training, Endurance Training, Safety Education & Training, Patient/Caregiver Education & Training, Equipment Evaluation, Education, & procurement, Self-Care / ADL    Patient Education  Patient Education: safety with transfers, ADL strategies, deep breathing Goals  Short term goals  Time Frame for Short term goals: by discharge  Short term goal 1: Pt will increase activity tolerance for functional mobility to/from [facility] or chair with CGA and Sp02 >=90% in prep for toileting tasks. Short term goal 2: Pt will tolerate dynamic standing X2 minutes with CGA and unilateral release in prep for hygiene/clothing management. Short term goal 3: Pt will complete BADL tasks with minimal assistance, incorporating ECT with minimal cues, to increase independence and ease with self care tasks. Short term goal 4: Pt will complete functional transfers with CGA in prep for BSC/toilet transfers. Following session, patient left in safe position with all fall risk precautions in place.

## 2021-03-23 NOTE — CARE COORDINATION
3/23/21, 10:42 AM EDT    DISCHARGE ON GOING EVALUATION    Alfie Arnold day: 15  Location: -09/009-A Reason for admit: Acute respiratory failure ([facility] [address] [J96.00]   Procedure:   3/9 Convalescent plasma x1   Barriers to Discharge: Remains on high flow O2, down to 40L and 45% FiO2. Sats 89-96%. CO2 17 today. Albumin 2.5. Hgb 11.1. Vit C, Vit D, Zinc. Lovenox. Bevespi. PT/OT. PCP: ERNESTO Levine CNP  Readmission Risk Score: 18%  Patient Goals/Plan/Treatment Preferences: From home with daughter.  TCU eval, will relook when off high flow O2. If unable to wean high flow,  consider LTACH. SW following for possible ECF need.

## 2021-03-23 NOTE — PLAN OF CARE
Patient continues and high flow and acapella tolerating well.   Will continue to monitor patients respiratory status

## 2021-03-23 NOTE — PROGRESS NOTES
Hospitalist Progress Note      Patient:  Tom Zhu    DPLM/WQC:1H-89/950-U  YOB: 1940  MRN: [id number]   Acct: [de-identified]   PCP: ERNESTO Owusu CNP  Date of Admission: 3/8/2021    Assessment and Plan:        Pneumonia secondary to COVID 19  · Patient got the 1st dose of COVID vaccine on 3/1,   · On Dexamethasone 6 mg QD (on H1 blocker) and Remdesivir  · Procal WNLs d/c'ed ABx  · Supportive therapy with vitamin C, D, zinc and lactobacillus (d/c'ed)  · LMWH 30mg BID for DVT prophylaxis; added ASA 81 QD as prothrombotic markers elevated  · IS/Acapella/PT to see  3/11: stable; SaO2 drops w/ exertion and also postural changes; CCM. Added chest physio  3/12: improving; encouraged CCM w/ aggressive weaning as tolerated to SaO2 greater than 90%  3/13: slow wean w/ short-lived episodes of worsening hypoxia which is positional. CCM. Reduce IVF to 50 cc/hr given inadequate PO intake. Repeat ABG non-contributroy  3/14: slowly weaning. CCM  3/16: Improving steadily. Now down to FiO2 45% at 45 lpm w/ SaO2 high 90s. CCM  3/17: down to HFO 40/40. Will try stepping down to NC at 6 lpm today as tolerated. 3/18: improving; still unable to wean down to NC; on minimal HFO parameters; CCM  3/19: as clinically improving, pt more mobile w/ desaturation w/ exertion. CCM. RN aware to slowly wean as tolerated  3/20: Continues to slowly wean down. Feeling well otherwise. +22L fluid balance. Will give a dose of Lasix today and see if that helps getting patient off HFNC. Repeat CXR tomorrow am. Check inflam markers. If D-dimer trending up consider CTA. Trial of Bipap.  3/21: D-dimer elevated. CTA negative for PE. Inflam markers trending up. Aggressive CPT. Consider more diuretics. Will hold off today due to contrast given. 3/22: After discussion with the nurse patient her usual is sleeping during the day and awake all night long, she was sleeping when I encountered her in the morning, she is arousable though. Continued to be on high flow nasal cannula oxygen very difficult to wean off currently, FiO2 45, flow rate 50 L/minute and satting 92% on high flow nasal cannula, CTA did not show any PE, although showed an inflammatory process due to current COVID-19 virus infection, slight leukocytosis, remained afebrile in the past 48 hours, procalcitonin normal, finished treatment for COVID-19 virus infection, last echocardiogram in September 2020 showed 60% EF otherwise normal, continue wean off oxygen as tolerated. 3/23: Continued on high flow nasal cannula, continue wean off, TCU versus LTAC in the process. Acute hypoxic respiratory failure secondary to above  · On HFO FiO2 80% and 60 lpm w/ SaO2 in high 90s  · Rn aware to wean supplemental O2 aggressively as tolerated to SpO2 > 90%  3/11: ABG negative for hypercapnia; managing as above  3/19: managing as above     History of COPD  · Quit smoking 7-10 years ago. 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 Plan was for patient to see GI outpatient. Is suppose to see GI associated OP with w/u including MRI of abd/pelvis. LFT's stable. Clinically, patient has no complaints. Physical debility: Patient would benefit from inpatient rehab versus LTAC as she is not progressing very well and very slow progression, monitor closely. PMH, PSH, SH, FHX reviewed in chart review snap shot in EPIC    CC: Acute hypoxic respiratory failure likely secondary to COVID-19 virus infection, physical debility    HPI: Initial admission HPI by admitting physician reviewed as below:  80 y.o. female who presented to [address] with increased weakness for the past 3 days. History is limited due to patient's drowsiness.   Limited history provided by daughter-in-law who is in the room.  Ms. Vaughn Samson received her covid vaccine on 3/1/2021. She did have some fatigue afterwards with nausea. However, she began having acutely worsening weakness and cough over the last 3 days with decreased oral intake. Family was unaware until her daughter-in-law went to check on her today. She denies any shortness of breath to me, but she is requiring 6 L supplemental O2 to maintain SPO2 greater than 90% and appears to have increased work of breathing value at rest.     Patient denies any pain, feelings of fever/chills, shortness of breath, nausea/vomiting, changes in bowel or bladder habits. Her only complaint is of weakness. For further details and updates please refer to assessment and plan at the beginning of the note. ROS (10 point review of systems completed. Pertinent positives noted. Otherwise ROS is negative) : Generalized weak  PMH:  Per HPI and reviewed in the chart  SHX: Reviewed in the chart, also the patient  FHX: Reviewed in the chart, also with the patient  Allergies: Reviewed in the chart  Medications:     sodium chloride      sodium chloride      sodium chloride 20 mL/hr at 03/22/21 0948    dextrose        potassium chloride  40 mEq Oral Daily    aspirin  81 mg Oral Daily    ascorbic acid  1,000 mg Oral Daily    zinc sulfate  50 mg Oral Daily    sodium chloride flush  10 mL Intravenous 2 times per day    enoxaparin  30 mg Subcutaneous BID    Vitamin D  2,000 Units Oral Daily    atorvastatin  20 mg Oral Daily    famotidine  20 mg Oral BID    glycopyrrolate-formoterol  2 puff Inhalation BID    amLODIPine  5 mg Oral Daily   Subjective 3/22/2021: Very weak, sleepy in bed, arousable, continue to be on high flow nasal cannula oxygen, she would benefit from TCU versus LTAC, monitor closely, nursing notes, labs, vitals reviewed. 3/23/2021: No changes since yesterday, TCU versus LTAC in process, continue wean off oxygen.   Vital Signs:   Vitals reviewed and compared with the for: [facility]    Urine culture: No results found for: LABURIN    Respiratory culture: No results found for: CULTRESP    Aerobic and Anaerobic :  No results found for: LABAERO  No results found for: LABANAE    Urinalysis:      Lab Results   Component Value Date    NITRU NEGATIVE 06/20/2018    BLOODU NEGATIVE 06/20/2018    GLUCOSEU NEGATIVE 06/20/2018       Radiology:  CTA CHEST W WO CONTRAST   Final Result    IMPRESSION:      1. No evidence of pulmonary emboli. 2. Abnormal density in the left lower lobe, left mid lung field and to a lesser extent right lung consistent with inflammatory process possibly representing Covid 19 infection. 3. Evidence for old granulomatous disease in left lower lobe. 4. Cardiomegaly. 5. Atherosclerotic calcification in the thoracic aorta. 6. Thoracic spondylosis. 7. Hiatal hernia. .               **This report has been created using voice recognition software. It may contain minor errors which are inherent in voice recognition technology. **      Final report electronically signed by DR Krissy Zacarias on 3/21/2021 10:58 AM      XR CHEST PORTABLE   Final Result   Decreased bilateral airspace disease and bibasilar consolidations. This document has been electronically signed by: Dain Lewis MD on    03/21/2021 04:20 AM      XR CHEST PORTABLE   Final Result      Multifocal interstitial and alveolar opacities the lungs intervally increased from prior exam concerning for infectious process. Cardiomegaly, stable from prior. **This report has been created using voice recognition software. It may contain minor errors which are inherent in voice recognition technology. **      Final report electronically signed by Dr. Salma Blount on 3/12/2021 12:35 PM      XR CHEST PORTABLE   Final Result   Mild cardiomegaly. Interstitial edema noted in the lower lobes with peribronchial cuffing this can be related to acute bronchitis, or atypical viral pneumonia.             **This report has been created using voice recognition software. It may contain minor errors which are inherent in voice recognition technology. **      Final report electronically signed by Dr. Nunu Rodriguez on 3/8/2021 12:19 PM      MRI ABDOMEN [address]    (Results Pending)     Xr Chest Portable    Result Date: 3/8/2021  PROCEDURE: XR CHEST PORTABLE CLINICAL INFORMATION: cough. congested. . COMPARISON: 9/22/2020 TECHNIQUE: Portable upright FINDINGS: Cardiomegaly. No mediastinal widening. Mild interstitial edema with a lower lobe predominance. Parabronchial cuffing can be seen. There is however no pleural effusion identified and no confluent airspace consolidation. Pulmonary vessels are not  congested     Mild cardiomegaly. Interstitial edema noted in the lower lobes with peribronchial cuffing this can be related to acute bronchitis, or atypical viral pneumonia. **This report has been created using voice recognition software. It may contain minor errors which are inherent in voice recognition technology. ** Final report electronically signed by Dr. Nunu Rodriguez on 3/8/2021 12:19 PM      Discussed plan with patient and nurse. Patient and nurse verbalized understanding and agree. All questions addressed with concerns. Please excuse my TYPOS!     Electronically signed by Jenni Loredo MD on 3/23/2021 at 7:33 AM

## 2021-03-23 NOTE — PROGRESS NOTES
increased to 60% by end of session, at lowest after laying supine: 83%, ranged between 86-92% througout  RR: 20S-50S    OBJECTIVE:  Bed Mobility:  Sit to Supine: Minimal Assistance- pt needed Julian from therapist to get B LEs all the way onto bed  Scooting: Stand By Assistance- back further onto bed     Transfers:  Sit to Stand: Minimal Assistance- from chair  [facility]  Pt impulsive to sit down    Ambulation:  Minimal Assistance, X 2- pt used therapists on each side for B UE support from chair to bed  Distance: 3 feet x1  Surface: Level Tile  Device:Hand-Held Assist x2  Gait Deviations: Forward Flexed Posture, Slow Arabella, Decreased Step Length Bilaterally, Decreased Gait Speed, Decreased Heel Strike Bilaterally and Unsteady Gait. Pt shuffled from chair to bed with therapists at each side for B UE support and for pt comfort . Balance:  Static Sitting Balance: in prep for ambulation from chair to bed  Dynamic Sitting Balance: Stand By Assistance- pt completed seated LE exercises    Exercise:  Patient was guided in 1 set(s) 10 reps of exercise to both lower extremities. Ankle pumps, Quad sets, Hip abduction/adduction and Straight leg raises. Exercises were completed for increased independence with functional mobility. Pt required max verbal and tactile cues for pt to stay on task and for correct exercise technique. Pt also required mod/maxA to complete ROM with all LE exercises. Instructed in deep breathing exercise to recover O2 sats. Functional Outcome Measures: Completed  -PAC Inpatient Mobility Raw Score : 12  -PAC Inpatient T-Scale Score : 35.33    ASSESSMENT:  Assessment: Patient progressing toward established goals. Activity Tolerance:  Patient tolerance of  treatment: poor. Pt not able to maintain O2 sats throughout session and dropped to 83% at one instance. Pt also not able to stay on task with any exercises and requires max verbal and tactile cues. Equipment Recommendations:Equipment Needed: (likely a rolling walker, continue to assess)  Discharge Recommendations:  Continue to assess pending progress(TCU)    Plan: Times per week: 5x GM  Times per day: Daily  Current Treatment Recommendations: Strengthening, Functional Mobility Training, Home Exercise Program, Endurance Training, Balance Training, Patient/Caregiver Education & Training, Transfer Training, Gait Training, Safety Education & Training    Patient Education  Patient Education: Plan of Care: pt educated in deep breathing exercise to recover O2 sats. Goals:  Patient goals : return home  Short term goals  Time Frame for Short term goals: by hospital discharge  Short term goal 1: Supine to/from sit with modified independence for ease of transfers at discharge site. Short term goal 2: Sit to/from stand with modified independence for ease of transfers at discharge site. Short term goal 3: Ambulate 21' with CGA x 1 and LRAD, maintaining O2 saturation >88%, for progression to home distance ambulation. Short term goal 4: Ascend/descend 3 steps with 1 HR and Rich for progression to safe entry into home. Long term goals  Time Frame for Long term goals : N/A due to short ELOS. Following session, patient left in safe position with all fall risk precautions in place.

## 2021-03-23 NOTE — PLAN OF CARE
Problem: Airway Clearance - Ineffective  Goal: Achieve or maintain patent airway  3/22/2021 2221 by Manish Talavera RN  Outcome: Ongoing  Note: Patient airway clear and intact. Will continue to monitor  3/22/2021 0901 by Albertina Thompson RN  Outcome: Ongoing  Note: Airway patent. Weaning HFO as tolerated. Problem: Gas Exchange - Impaired  Goal: Absence of hypoxia  3/22/2021 2221 by Manish Talavera RN  Outcome: Ongoing  Note: Patient currently on highflow at 45% 50L. O2 sats have remained great than 90 this shift. Will continue to monitor  3/22/2021 0901 by Albertina Thompson RN  Outcome: Ongoing  Note: On HFO at 50L and 50%, weaning as tolerated. Encouraging IS and Acapella. Goal: Promote optimal lung function  3/22/2021 2221 by Manish Talavera RN  Outcome: Ongoing  Note: Coughing and deep breathing every hour while awake  3/22/2021 0901 by Albertina Thompson RN  Outcome: Ongoing     Problem: Breathing Pattern - Ineffective  Goal: Ability to achieve and maintain a regular respiratory rate  3/22/2021 2221 by Manish Talavera RN  Outcome: Ongoing  Note: RR has remained normal this shift  3/22/2021 0901 by Albertina Thompson RN  Outcome: Ongoing     Problem: Body Temperature -  Risk of, Imbalanced  Goal: Ability to maintain a body temperature within defined limits  3/22/2021 2221 by Manish Talavera RN  Outcome: Ongoing  Note: No temperature noted this shift  3/22/2021 0901 by Albertina Thompson RN  Outcome: Ongoing  Note: Pt afebrile. Monitoring. Problem: Isolation Precautions - Risk of Spread of Infection  Goal: Prevent transmission of infection  3/22/2021 2221 by Manish Talavera RN  Outcome: Ongoing  Note: Alcohol caps applied to all lines  3/22/2021 0901 by Albertina Thompson RN  Outcome: Ongoing     Problem: Nutrition Deficits  Goal: Optimize nutritional status  3/22/2021 2221 by Manish Talavera RN  Outcome: Ongoing  Note: Patient eat po  3/22/2021 0901 by Albertina Thompson RN  Outcome: Ongoing  Note: Pt has poor appetite.  Encouraging PO intake. Problem: Patient Education: Go to Patient Education Activity  Goal: Patient/Family Education  3/22/2021 2221 by Amy Pacheco RN  Outcome: Ongoing  3/22/2021 0901 by Matt Carroll RN  Outcome: Ongoing     Problem: Falls - Risk of:  Goal: Will remain free from falls  Description: Will remain free from falls  3/22/2021 2221 by Amy Pacheco RN  Outcome: Ongoing  Note: Patient remained free from falls this shift. Bed is in low position with alarm on and siderails up x2. Patient ambulates with one assist. Education given on use of call light and patient voiced understanding. Patient uses call light appropriately. Call light and beside table within reach. Arm band and falling star in place. 3/22/2021 0901 by Matt Carroll RN  Outcome: Ongoing  Note: Call light in reach, bed in lowest position, and bed alarm activated. Education given on use of call light before ambulation and when in need of assistance. Patient expressed understanding. Hourly visual checks performed and charted. Toileting offered to patient. No falls this shift, at any time. Will continue to monitor. Goal: Absence of physical injury  Description: Absence of physical injury  3/22/2021 2221 by Amy Pacheco RN  Outcome: Ongoing  3/22/2021 0901 by Matt Carroll RN  Outcome: Ongoing     Problem: Skin Integrity:  Goal: Will show no infection signs and symptoms  Description: Will show no infection signs and symptoms  3/22/2021 2221 by Amy Pacheco RN  Outcome: Ongoing  Note: No signs of new skin breakdown noted with each assessment this shift. Skin warm, dry, and intact except where otherwise noted in head-to-toe assessment. Mucous membranes pink and moist. Assistance with turns/ambulation given as needed. 3/22/2021 0901 by Matt Carroll RN  Outcome: Ongoing  Note: No signs of new skin breakdown with each assessment. Skin remains warm, dry, intact. Mucous membranes pink & moist. Assisting pt with turning and repositioning. Goal: Absence of new skin breakdown  Description: Absence of new skin breakdown  3/22/2021 2221 by Ryan Evans RN  Outcome: Ongoing  3/22/2021 0901 by Asher Merida RN  Outcome: Ongoing     Problem: Impaired respiratory status  Goal: Clear lung sounds  Description: Clear lung sounds  3/22/2021 1208 by Drea Logan RCP  Outcome: Ongoing     Problem: Nutrition  Goal: Optimal nutrition therapy  3/22/2021 2221 by Ryan Evans RN  Outcome: Ongoing  3/22/2021 1106 by Jorge Harding RD, LD  Outcome: Ongoing  3/22/2021 0901 by Asher Merida RN  Outcome: Ongoing     Problem: PAIN  Goal: Patient's pain/discomfort is manageable  3/22/2021 2221 by Ryan Evans RN  Outcome: Ongoing  3/22/2021 0901 by Asher Merida RN  Outcome: Ongoing  Note: Patient able to use 0-10 pain scale. Denies pain at this time. Agreeable to take PRN pain medications. Problem: DISCHARGE BARRIERS  Goal: Patient's continuum of care needs are met  3/22/2021 2221 by Ryan Evans RN  Outcome: Ongoing  3/22/2021 0901 by Asher Merida RN  Outcome: Ongoing  Note: Discharge planning in process and discussed with patient/family. Social work consulted for any additional needs. Care manager aware of discharge needs. Problem: Pain:  Goal: Pain level will decrease  Description: Pain level will decrease  3/22/2021 2221 by Ryan Evans RN  Outcome: Ongoing  Note: Instructed patient on distraction, repositioning, and ambulation as non-pharmacological methods of pain relief. Patient voiced understanding. 3/22/2021 0901 by Asher Merida RN  Outcome: Ongoing  Goal: Control of acute pain  Description: Control of acute pain  3/22/2021 2221 by Ryan Evans RN  Outcome: Ongoing  3/22/2021 0901 by Asher Merida RN  Outcome: Ongoing  Goal: Control of chronic pain  Description: Control of chronic pain  3/22/2021 2221 by Ryan Evans RN  Outcome: Ongoing  3/22/2021 0901 by Asher Merida RN  Outcome: Ongoing   Care plan reviewed with patient. Patient verbalized understanding of the plan of care and contribute to goal setting.

## 2021-03-24 LAB
ALBUMIN SERPL-MCNC: 2.6 G/DL (ref 3.5–5.1)
ALP BLD-CCNC: 71 U/L (ref 38–126)
ALT SERPL-CCNC: 9 U/L (ref 11–66)
ANION GAP SERPL CALCULATED.3IONS-SCNC: 14 MEQ/L (ref 8–16)
AST SERPL-CCNC: 11 U/L (ref 5–40)
BASOPHILS # BLD: 0.5 %
BASOPHILS ABSOLUTE: 0 THOU/MM3 (ref 0–0.1)
BILIRUB SERPL-MCNC: 0.5 MG/DL (ref 0.3–1.2)
BUN BLDV-MCNC: 14 MG/DL (ref 7–22)
CALCIUM SERPL-MCNC: 9 MG/DL (ref 8.5–10.5)
CHLORIDE BLD-SCNC: 108 MEQ/L (ref 98–111)
CO2: 18 MEQ/L (ref 23–33)
CREAT SERPL-MCNC: 0.5 MG/DL (ref 0.4–1.2)
EOSINOPHIL # BLD: 5.1 %
EOSINOPHILS ABSOLUTE: 0.3 THOU/MM3 (ref 0–0.4)
ERYTHROCYTE [DISTWIDTH] IN BLOOD BY AUTOMATED COUNT: 17 % (ref 11.5–14.5)
ERYTHROCYTE [DISTWIDTH] IN BLOOD BY AUTOMATED COUNT: 55.3 FL (ref 35–45)
GFR SERPL CREATININE-BSD FRML MDRD: > 90 ML/MIN/1.73M2
GLUCOSE BLD-MCNC: 93 MG/DL (ref 70–108)
HCT VFR BLD CALC: 35.2 % (ref 37–47)
HEMOGLOBIN: 10.6 GM/DL (ref 12–16)
IMMATURE GRANS (ABS): 0.03 THOU/MM3 (ref 0–0.07)
IMMATURE GRANULOCYTES: 0.5 %
LYMPHOCYTES # BLD: 24.7 %
LYMPHOCYTES ABSOLUTE: 1.4 THOU/MM3 (ref 1–4.8)
MAGNESIUM: 2.2 MG/DL (ref 1.6–2.4)
MCH RBC QN AUTO: 27.2 PG (ref 26–33)
MCHC RBC AUTO-ENTMCNC: 30.1 GM/DL (ref 32.2–35.5)
MCV RBC AUTO: 90.5 FL (ref 81–99)
MONOCYTES # BLD: 14.1 %
MONOCYTES ABSOLUTE: 0.8 THOU/MM3 (ref 0.4–1.3)
NUCLEATED RED BLOOD CELLS: 0 /100 WBC
PLATELET # BLD: 277 THOU/MM3 (ref 130–400)
PMV BLD AUTO: 10.8 FL (ref 9.4–12.4)
POTASSIUM REFLEX MAGNESIUM: 3.5 MEQ/L (ref 3.5–5.2)
RBC # BLD: 3.89 MILL/MM3 (ref 4.2–5.4)
SEG NEUTROPHILS: 55.1 %
SEGMENTED NEUTROPHILS ABSOLUTE COUNT: 3.1 THOU/MM3 (ref 1.8–7.7)
SODIUM BLD-SCNC: 140 MEQ/L (ref 135–145)
TOTAL PROTEIN: 6.2 G/DL (ref 6.1–8)
WBC # BLD: 5.7 THOU/MM3 (ref 4.8–10.8)

## 2021-03-24 PROCEDURE — 6360000002 HC RX W HCPCS: Performed by: STUDENT IN AN ORGANIZED HEALTH CARE EDUCATION/TRAINING PROGRAM

## 2021-03-24 PROCEDURE — 6370000000 HC RX 637 (ALT 250 FOR IP): Performed by: FAMILY MEDICINE

## 2021-03-24 PROCEDURE — 97110 THERAPEUTIC EXERCISES: CPT

## 2021-03-24 PROCEDURE — 6370000000 HC RX 637 (ALT 250 FOR IP): Performed by: STUDENT IN AN ORGANIZED HEALTH CARE EDUCATION/TRAINING PROGRAM

## 2021-03-24 PROCEDURE — 2140000000 HC CCU INTERMEDIATE R&B

## 2021-03-24 PROCEDURE — 80053 COMPREHEN METABOLIC PANEL: CPT

## 2021-03-24 PROCEDURE — 97535 SELF CARE MNGMENT TRAINING: CPT

## 2021-03-24 PROCEDURE — 97530 THERAPEUTIC ACTIVITIES: CPT

## 2021-03-24 PROCEDURE — 94640 AIRWAY INHALATION TREATMENT: CPT

## 2021-03-24 PROCEDURE — 99231 SBSQ HOSP IP/OBS SF/LOW 25: CPT | Performed by: FAMILY MEDICINE

## 2021-03-24 PROCEDURE — 85025 COMPLETE CBC W/AUTO DIFF WBC: CPT

## 2021-03-24 PROCEDURE — 2580000003 HC RX 258: Performed by: STUDENT IN AN ORGANIZED HEALTH CARE EDUCATION/TRAINING PROGRAM

## 2021-03-24 PROCEDURE — 36415 COLL VENOUS BLD VENIPUNCTURE: CPT

## 2021-03-24 PROCEDURE — 83735 ASSAY OF MAGNESIUM: CPT

## 2021-03-24 PROCEDURE — 94668 MNPJ CHEST WALL SBSQ: CPT

## 2021-03-24 PROCEDURE — 94761 N-INVAS EAR/PLS OXIMETRY MLT: CPT

## 2021-03-24 PROCEDURE — 6370000000 HC RX 637 (ALT 250 FOR IP): Performed by: HOSPITALIST

## 2021-03-24 RX ADMIN — FAMOTIDINE 20 MG: 20 TABLET, FILM COATED ORAL at 10:32

## 2021-03-24 RX ADMIN — Medication 2000 UNITS: at 10:32

## 2021-03-24 RX ADMIN — ENOXAPARIN SODIUM 30 MG: 30 INJECTION SUBCUTANEOUS at 19:53

## 2021-03-24 RX ADMIN — POTASSIUM CHLORIDE 40 MEQ: 1500 TABLET, EXTENDED RELEASE ORAL at 10:32

## 2021-03-24 RX ADMIN — GLYCOPYRROLATE AND FORMOTEROL FUMARATE 2 PUFF: 9; 4.8 AEROSOL, METERED RESPIRATORY (INHALATION) at 09:48

## 2021-03-24 RX ADMIN — ATORVASTATIN CALCIUM 20 MG: 20 TABLET, FILM COATED ORAL at 19:53

## 2021-03-24 RX ADMIN — GLYCOPYRROLATE AND FORMOTEROL FUMARATE 2 PUFF: 9; 4.8 AEROSOL, METERED RESPIRATORY (INHALATION) at 20:10

## 2021-03-24 RX ADMIN — Medication 50 MG: at 10:32

## 2021-03-24 RX ADMIN — SODIUM CHLORIDE, PRESERVATIVE FREE 10 ML: 5 INJECTION INTRAVENOUS at 19:53

## 2021-03-24 RX ADMIN — ASPIRIN 81 MG: 81 TABLET, CHEWABLE ORAL at 10:32

## 2021-03-24 RX ADMIN — ENOXAPARIN SODIUM 30 MG: 30 INJECTION SUBCUTANEOUS at 10:32

## 2021-03-24 RX ADMIN — OXYCODONE HYDROCHLORIDE AND ACETAMINOPHEN 1000 MG: 500 TABLET ORAL at 10:32

## 2021-03-24 RX ADMIN — FAMOTIDINE 20 MG: 20 TABLET, FILM COATED ORAL at 19:53

## 2021-03-24 RX ADMIN — AMLODIPINE BESYLATE 5 MG: 5 TABLET ORAL at 10:32

## 2021-03-24 ASSESSMENT — PAIN SCALES - GENERAL
PAINLEVEL_OUTOF10: 0
PAINLEVEL_OUTOF10: 0

## 2021-03-24 NOTE — PROGRESS NOTES
Hospitalist Progress Note      Patient:  Laci Robison    NHTC/ZNX:[id number]  YOB: 1940  MRN: [id number]   Acct: [de-identified]   PCP: ERNESTO Stacy CNP  Date of Admission: 3/8/2021    Assessment and Plan:        Pneumonia secondary to COVID 19  · Patient got the 1st dose of COVID vaccine on 3/1,   · On Dexamethasone 6 mg QD (on H1 blocker) and Remdesivir  · Procal WNLs d/c'ed ABx  · Supportive therapy with vitamin C, D, zinc and lactobacillus (d/c'ed)  · LMWH 30mg BID for DVT prophylaxis; added ASA 81 QD as prothrombotic markers elevated  · IS/Acapella/PT to see  3/11: stable; SaO2 drops w/ exertion and also postural changes; CCM. Added chest physio  3/12: improving; encouraged CCM w/ aggressive weaning as tolerated to SaO2 greater than 90%  3/13: slow wean w/ short-lived episodes of worsening hypoxia which is positional. CCM. Reduce IVF to 50 cc/hr given inadequate PO intake. Repeat ABG non-contributroy  3/14: slowly weaning. CCM  3/16: Improving steadily. Now down to FiO2 45% at 45 lpm w/ SaO2 high 90s. CCM  3/17: down to HFO 40/40. Will try stepping down to NC at 6 lpm today as tolerated. 3/18: improving; still unable to wean down to NC; on minimal HFO parameters; CCM  3/19: as clinically improving, pt more mobile w/ desaturation w/ exertion. CCM. RN aware to slowly wean as tolerated  3/20: Continues to slowly wean down. Feeling well otherwise. +22L fluid balance. Will give a dose of Lasix today and see if that helps getting patient off HFNC. Repeat CXR tomorrow am. Check inflam markers. If D-dimer trending up consider CTA. Trial of Bipap.  3/21: D-dimer elevated. CTA negative for PE. Inflam markers trending up. Aggressive CPT. Consider more diuretics. Will hold off today due to contrast given. 3/22: After discussion with the nurse patient her usual is sleeping during the day and awake all night long, she was sleeping when I encountered her in the morning, she is arousable though. Continued to be on high flow nasal cannula oxygen very difficult to wean off currently, FiO2 45, flow rate 50 L/minute and satting 92% on high flow nasal cannula, CTA did not show any PE, although showed an inflammatory process due to current COVID-19 virus infection, slight leukocytosis, remained afebrile in the past 48 hours, procalcitonin normal, finished treatment for COVID-19 virus infection, last echocardiogram in September 2020 showed 60% EF otherwise normal, continue wean off oxygen as tolerated. 3/23: Continued on high flow nasal cannula, continue wean off, TCU versus LTAC in the process. 3/24/2021: Very slowly improving, continue to be on high flow nasal cannula, current FiO2 55, respiratory therapist helping daily. Continue wean off oxygen as tolerated and consider TCU, if unable may consider LTAC. We will monitor closely. Acute hypoxic respiratory failure secondary to above  · On HFO FiO2 80% and 60 lpm w/ SaO2 in high 90s  · Rn aware to wean supplemental O2 aggressively as tolerated to SpO2 > 90%  3/11: ABG negative for hypercapnia; managing as above  3/19: managing as above     History of COPD  · Quit smoking 7-10 years ago. 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 Plan was for patient to see GI outpatient. Is suppose to see GI associated OP with w/u including MRI of abd/pelvis. LFT's stable. Clinically, patient has no complaints. Physical debility: Patient would benefit from inpatient rehab versus LTAC as she is not progressing very well and very slow progression, monitor closely.   PMH, PSH, SH, FHX reviewed in chart review snap shot in EPIC    CC: Acute hypoxic respiratory failure likely secondary to COVID-19 virus infection, physical debility    HPI: Initial admission HPI by admitting physician reviewed as below:  80 y.o. female who presented to [address] [place] [address] with increased weakness for the past 3 days. History is limited due to patient's drowsiness. Limited history provided by daughter-in-law who is in the room. Ms. Cynthia Lopez received her covid vaccine on 3/1/2021. She did have some fatigue afterwards with nausea. However, she began having acutely worsening weakness and cough over the last 3 days with decreased oral intake. Family was unaware until her daughter-in-law went to check on her today. She denies any shortness of breath to me, but she is requiring 6 L supplemental O2 to maintain SPO2 greater than 90% and appears to have increased work of breathing value at rest.     Patient denies any pain, feelings of fever/chills, shortness of breath, nausea/vomiting, changes in bowel or bladder habits. Her only complaint is of weakness. For further details and updates please refer to assessment and plan at the beginning of the note. ROS (10 point review of systems completed. Pertinent positives noted.  Otherwise ROS is negative) : Generalized weak  PMH:  Per HPI and reviewed in the chart  SHX: Reviewed in the chart, also the patient  FHX: Reviewed in the chart, also with the patient  Allergies: Reviewed in the chart  Medications:     sodium chloride      sodium chloride      sodium chloride 20 mL/hr at 03/22/21 0948    dextrose        potassium chloride  40 mEq Oral Daily    aspirin  81 mg Oral Daily    ascorbic acid  1,000 mg Oral Daily    zinc sulfate  50 mg Oral Daily    sodium chloride flush  10 mL Intravenous 2 times per day    enoxaparin  30 mg Subcutaneous BID    Vitamin D  2,000 Units Oral Daily    atorvastatin  20 mg Oral Daily    famotidine  20 mg Oral BID    glycopyrrolate-formoterol  2 puff Inhalation BID    amLODIPine  5 mg Oral Daily   Subjective 3/22/2021: Very weak, sleepy in bed, arousable, continue to be on high flow nasal cannula oxygen, she would benefit from TCU versus LTAC, monitor closely, nursing notes, labs, vitals reviewed. 3/23/2021: No changes since yesterday, TCU versus LTAC in process, continue wean off oxygen. 3/24: Continue to be on high flow nasal cannula, continue wean off oxygen as tolerated, although FiO2 weaned off today to 55. Respiratory therapist following daily. Monitor closely. Vital Signs:   Vitals reviewed and compared with the previous ones  /69   Pulse 60   Temp 97.4 °F (36.3 °C) (Oral)   Resp 17   Ht 5' 2\" (1.575 m)   Wt 127 lb 6.8 oz (57.8 kg)   SpO2 97%   BMI 23.31 kg/m²      Intake/Output Summary (Last 24 hours) at 3/24/2021 0747  Last data filed at 3/24/2021 0500  Gross per 24 hour   Intake 689.4 ml   Output    Net 689.4 ml        General:   Chronically ill, sleepy in bed, on high flow nasal cannula oxygen  HEENT:  normocephalic and atraumatic. No scleral icterus. PERRLA. Neck: supple. No thyromegaly. Lungs: No wheeziness, decreased breath sounds bilaterally  Cardiac: S1, S2, RRR, OMAR appreciated, no JVD. Abdomen: soft. Increased abdominal girth, nontender. Bowel sounds positive. Extremities:  No clubbing, cyanosis, or edema in all extremities. Vasculature: capillary refill < 3 seconds. Pedal pulses intact bilaterally. Skin:  warm and dry. Not clammy. Psych:  Alert to person. Sleepy. Affect abnormal.  Lymph:  No supraclavicular ,and no anterior cervical lymphadenopathy.   Neurologic: Unable to examine due to general condition    Labs:   Recent Labs     03/22/21  0403 03/23/21  0424 03/24/21  0600   WBC 11.2* 6.6 5.7   HGB 11.9* 11.1* 10.6*   HCT 38.7 36.5* 35.2*    282 277     Recent Labs     03/22/21  0403 03/23/21  0424 03/24/21  0600   * 136 140   K 3.9 3.7 3.5    105 108   CO2 19* 17* 18*   BUN 17 16 14   CREATININE 0.6 0.5 0.5   CALCIUM 8.8 8.7 9.0     Recent Labs     03/22/21  0403 03/23/21  0424 03/24/21  0600   AST 10 12 11   ALT 8* 9* 9*   BILITOT 0.8 0.6 0.5 been created using voice recognition software. It may contain minor errors which are inherent in voice recognition technology. **      Final report electronically signed by Dr. Dawit Garcia on 3/12/2021 12:35 PM      XR CHEST PORTABLE   Final Result   Mild cardiomegaly. Interstitial edema noted in the lower lobes with peribronchial cuffing this can be related to acute bronchitis, or atypical viral pneumonia. **This report has been created using voice recognition software. It may contain minor errors which are inherent in voice recognition technology. **      Final report electronically signed by Dr. Víctor Garza on 3/8/2021 12:19 PM      MRI ABDOMEN [address]    (Results Pending)     Xr Chest Portable    Result Date: 3/8/2021  PROCEDURE: XR CHEST PORTABLE CLINICAL INFORMATION: cough. congested. . COMPARISON: 9/22/2020 TECHNIQUE: Portable upright FINDINGS: Cardiomegaly. No mediastinal widening. Mild interstitial edema with a lower lobe predominance. Parabronchial cuffing can be seen. There is however no pleural effusion identified and no confluent airspace consolidation. Pulmonary vessels are not  congested     Mild cardiomegaly. Interstitial edema noted in the lower lobes with peribronchial cuffing this can be related to acute bronchitis, or atypical viral pneumonia. **This report has been created using voice recognition software. It may contain minor errors which are inherent in voice recognition technology. ** Final report electronically signed by Dr. Víctor Garza on 3/8/2021 12:19 PM      Discussed plan with patient and nurse. Patient and nurse verbalized understanding and agree. All questions addressed with concerns. Please excuse my TYPOS!     Electronically signed by Larry Hernández MD on 3/24/2021 at 7:47 AM

## 2021-03-24 NOTE — PROGRESS NOTES
[address] CCU 3A  Occupational Therapy  Daily Note  Time:   Time In: 8209  Time Out: 8827  Timed Code Treatment Minutes: 30 Minutes  Minutes: 30          Date: 3/24/2021  Patient Name: Ezekiel Johnson,   Gender: female      Room: [facility]-A  MRN: [id number]  : 1940  (80 y.o.)  Referring Practitioner: Dr. Shar Stout MD  Diagnosis: Acute Respiratory Failure  Additional Pertinent Hx: Per H&P: Pt presented to [facility] with increased weakness for the past 3 days. Limited history provided by daughter-in-law who is in the room. Ms. Abiodun Rodriguez received her covid vaccine on 3/1/2021. She did have some fatigue afterwards with nausea. However, she began having acutely worsening weakness and cough over the last 3 days with decreased oral intake. Family was unaware until her daughter-in-law went to check on her the day of admission. She is requiring 6 L supplemental O2 to maintain SPO2 greater than 90% and appears to have increased work of breathing value at rest. COVID +    Restrictions/Precautions:  Restrictions/Precautions: Isolation(droplet + precautions)  Position Activity Restriction  Other position/activity restrictions: 3/11: HFNC     SUBJECTIVE: Nurse ok'd session. Patient seated in bedside chair upon arrival. Agreeable to OT session     PAIN: Denies    Vitals: Oxygen:   Patient on 60 L 40 FiO2 via High Flow  upon arrival to room. Patient O2 sats at 92 %. Upon activity patient flucatuting O2 between 88-92 %. Pt requiring rest break(s) to recover. Encouraged deep breathing throughout activity    COGNITION: Decreased Recall, Decreased Insight, Decreased Problem Solving and Decreased Safety Awareness    ADL:   Grooming: with set-up. To rinse mouth with mouthwash and complete hair care seated in chair  Toileting: Maximum Assistance. For hygiene and clothing management, incontinent of urine. BALANCE:  Sitting Balance:  Stand By Assistance.     Standing Balance: Minimal Assistance. With BUE support on walker  Static standing with BUE support on walker requiring Julian- CGA for balance, standing for approx 2 minutes before requiring seated rest break. O2 sats fluctuated between 88-92% with cueing provided on deep breathing. Completed to increase activity tolerance required for ADLs    BED MOBILITY:  Sit to Supine: Minimal Assistance - bringing BLE into bed with increased time and vcs for technique    TRANSFERS:  Sit to Stand:  Minimal Assistance. From bedside chair with cueing provided for hand placement and safe technique  Stand to Sit: Minimal Assistance. To bedside chair, EOB with cueing provided for hand placement    FUNCTIONAL MOBILITY:  Assistive Device: Rolling Walker  Assist Level:  Minimal Assistance. Distance: From bedside chair to EOB  Slow pace, unsteadiness noted at times. Required assist to maneuver walker and cues provided for advancement of B feet. Increased time required for completion of task      ASSESSMENT:     Activity Tolerance:  Patient tolerance of  treatment: fair. Discharge Recommendations: Subacute/Skilled Nursing Facility(TCU)   Equipment Recommendations: Other: will continue to monitor pending progress, discharge location  Plan: Times per week: 5x  Current Treatment Recommendations: Strengthening, Balance Training, Functional Mobility Training, Endurance Training, Safety Education & Training, Patient/Caregiver Education & Training, Equipment Evaluation, Education, & procurement, Self-Care / ADL    Patient Education  Patient Education: safety with transfers and mobility, ADL strateiges, deep breathign    Goals  Short term goals  Time Frame for Short term goals: by discharge  Short term goal 1: Pt will increase activity tolerance for functional mobility to/from [facility] or chair with CGA and Sp02 >=90% in prep for toileting tasks.   Short term goal 2: Pt will tolerate dynamic standing X2 minutes with CGA and unilateral release in prep for hygiene/clothing management. Short term goal 3: Pt will complete BADL tasks with minimal assistance, incorporating ECT with minimal cues, to increase independence and ease with self care tasks. Short term goal 4: Pt will complete functional transfers with CGA in prep for BSC/toilet transfers. Following session, patient left in safe position with all fall risk precautions in place.

## 2021-03-24 NOTE — PROGRESS NOTES
[address] [facility] PHYSICAL THERAPY  DAILY NOTE  [facility] CCU 3A - 3A-09/009-A    Time In: 2579  Time Out: 1218  Timed Code Treatment Minutes: 23 Minutes  Minutes: 23          Date: 3/24/2021  Patient Name: Spencer Loyd,  Gender:  female        MRN: [id number]  : 1940  (80 y.o.)     Referring Practitioner: Sreedhar Liu MD  Diagnosis: acute respiratory failure  Additional Pertinent Hx: Per HPI: \"80 y.o. female who presented to [address] [place] [address] with increased weakness for the past 3 days. History is limited due to patient's drowsiness. Limited history provided by daughter-in-law who is in the room. Ms. Leonia Lesches received her covid vaccine on 3/1/2021. She did have some fatigue afterwards with nausea. However, she began having acutely worsening weakness and cough over the last 3 days with decreased oral intake. Family was unaware until her daughter-in-law went to check on her today. She denies any shortness of breath to me, but she is requiring 6 L supplemental O2 to maintain SPO2 greater than 90% and appears to have increased work of breathing value at rest.\" Pt is positive for COVID-19. Prior Level of Function:  Lives With: Daughter  Type of Home: House  Home Layout: Multi-level  Home Access: Stairs to enter with rails  Entrance Stairs - Number of Steps: 3 steps + 7 to get to her bedroom/bathroom  Home Equipment: (no DME PTA)   Bathroom Equipment: Shower chair    ADL Assistance: Needs assistance  Homemaking Assistance: (daughter completes all)  Ambulation Assistance: Independent  Transfer Assistance: Independent  Active : Yes    Restrictions/Precautions:  Restrictions/Precautions: Isolation(droplet + precautions)  Position Activity Restriction  Other position/activity restrictions: 3/11: HFNC     SUBJECTIVE: pt in bed in arrival and agreeable to therapy with max encouragement. PAIN: pt states no pain, just aggravation.      Vitals: Oxygen: high flow 60L @41%, increased to 44% during session, at lowest O2: 70% d/t pt taking out NC to wipe nose, ranged from 88-97%, 85% after walking from bed to chair    OBJECTIVE:  Bed Mobility:  Rolling to Left: Moderate Assistance- use of bed rail and therapist to complete  Supine to Sit: Moderate Assistance- use of bed rail  Scooting: Maximum Assistance  Pt needing verbal and tactile cues for sequencing and increased time for completion. HOB elevated, use of bed rail. Transfers:  Sit to Stand: Minimal Assistance- from EOB  Stand to Sit:Minimal Assistance- to chair    Ambulation:  Moderate Assistance  Distance: 3 feet  Surface: Level Tile  Device:Hand-Held Assist  Gait Deviations: Forward Flexed Posture, Slow Arabella, Decreased Step Length Bilaterally, Decreased Gait Speed and Decreased Heel Strike Bilaterally. Pt shuffles and requires hand-held assist from therapist for UE support and safety. Balance:  Static Sitting Balance:  Stand By Assistance- in prep for gait, sat EOB ~5minutes to recover O2 sats and encourage pt for transfer. Dynamic Sitting Balance: Stand By Assistance- Pt completed some LE seated exercises. Exercise:  Patient was guided in 1 set(s) 10 reps of exercise to both lower extremities. Ankle pumps and Long arc quads. Exercises were completed for increased independence with functional mobility. Pt required verbal cues and demonstration for correct exercise technique and also cues to stay on task. Functional Outcome Measures: Completed       ASSESSMENT:  Assessment: Patient progressing toward established goals. Activity Tolerance:  Patient tolerance of  treatment: poor. Pt had difficulty staying on task this date and requires max verbal cues and encouragement, and took extended time to complete functional activities. Pt also cannot maintain O2 sats >90% during activity despite being on 60L high flow.       Equipment Recommendations:Equipment Needed: (likely a rolling walker, continue to assess)  Discharge Recommendations:  Continue to assess pending progress(TCU)    Plan: Times per week: 5x GM  Times per day: Daily  Current Treatment Recommendations: Strengthening, Functional Mobility Training, Home Exercise Program, Endurance Training, Balance Training, Patient/Caregiver Education & Training, Transfer Training, Gait Training, Safety Education & Training    Patient Education  Patient Education: Plan of Care: pt educated not to take NC out of nose d/t oxygen dropping too low. Goals:  Patient goals : return home  Short term goals  Time Frame for Short term goals: by hospital discharge  Short term goal 1: Supine to/from sit with modified independence for ease of transfers at discharge site. Short term goal 2: Sit to/from stand with modified independence for ease of transfers at discharge site. Short term goal 3: Ambulate 21' with CGA x 1 and LRAD, maintaining O2 saturation >88%, for progression to home distance ambulation. Short term goal 4: Ascend/descend 3 steps with 1 HR and Rich for progression to safe entry into home. Long term goals  Time Frame for Long term goals : N/A due to short ELOS. Following session, patient left in safe position with all fall risk precautions in place. Treatment completed by SUZE Do under the supervision and guidance of [place] [id number]. Cedric Rowley

## 2021-03-25 LAB
ALBUMIN SERPL-MCNC: 2.3 G/DL (ref 3.5–5.1)
ALP BLD-CCNC: 77 U/L (ref 38–126)
ALT SERPL-CCNC: 8 U/L (ref 11–66)
ANION GAP SERPL CALCULATED.3IONS-SCNC: 13 MEQ/L (ref 8–16)
AST SERPL-CCNC: 11 U/L (ref 5–40)
BASOPHILS # BLD: 0.4 %
BASOPHILS ABSOLUTE: 0 THOU/MM3 (ref 0–0.1)
BILIRUB SERPL-MCNC: 0.5 MG/DL (ref 0.3–1.2)
BUN BLDV-MCNC: 10 MG/DL (ref 7–22)
CALCIUM SERPL-MCNC: 9 MG/DL (ref 8.5–10.5)
CHLORIDE BLD-SCNC: 104 MEQ/L (ref 98–111)
CO2: 18 MEQ/L (ref 23–33)
CREAT SERPL-MCNC: 0.5 MG/DL (ref 0.4–1.2)
EOSINOPHIL # BLD: 4.5 %
EOSINOPHILS ABSOLUTE: 0.3 THOU/MM3 (ref 0–0.4)
ERYTHROCYTE [DISTWIDTH] IN BLOOD BY AUTOMATED COUNT: 16.4 % (ref 11.5–14.5)
ERYTHROCYTE [DISTWIDTH] IN BLOOD BY AUTOMATED COUNT: 52.1 FL (ref 35–45)
GFR SERPL CREATININE-BSD FRML MDRD: > 90 ML/MIN/1.73M2
GLUCOSE BLD-MCNC: 87 MG/DL (ref 70–108)
HCT VFR BLD CALC: 35 % (ref 37–47)
HEMOGLOBIN: 10.9 GM/DL (ref 12–16)
IMMATURE GRANS (ABS): 0.03 THOU/MM3 (ref 0–0.07)
IMMATURE GRANULOCYTES: 0.4 %
LYMPHOCYTES # BLD: 18.5 %
LYMPHOCYTES ABSOLUTE: 1.4 THOU/MM3 (ref 1–4.8)
MAGNESIUM: 2.1 MG/DL (ref 1.6–2.4)
MCH RBC QN AUTO: 27.4 PG (ref 26–33)
MCHC RBC AUTO-ENTMCNC: 31.1 GM/DL (ref 32.2–35.5)
MCV RBC AUTO: 87.9 FL (ref 81–99)
MONOCYTES # BLD: 10.2 %
MONOCYTES ABSOLUTE: 0.7 THOU/MM3 (ref 0.4–1.3)
NUCLEATED RED BLOOD CELLS: 0 /100 WBC
PLATELET # BLD: 304 THOU/MM3 (ref 130–400)
PMV BLD AUTO: 10.8 FL (ref 9.4–12.4)
POTASSIUM REFLEX MAGNESIUM: 3.5 MEQ/L (ref 3.5–5.2)
RBC # BLD: 3.98 MILL/MM3 (ref 4.2–5.4)
SEG NEUTROPHILS: 66 %
SEGMENTED NEUTROPHILS ABSOLUTE COUNT: 4.8 THOU/MM3 (ref 1.8–7.7)
SODIUM BLD-SCNC: 135 MEQ/L (ref 135–145)
TOTAL PROTEIN: 6.5 G/DL (ref 6.1–8)
WBC # BLD: 7.3 THOU/MM3 (ref 4.8–10.8)

## 2021-03-25 PROCEDURE — 85025 COMPLETE CBC W/AUTO DIFF WBC: CPT

## 2021-03-25 PROCEDURE — 99231 SBSQ HOSP IP/OBS SF/LOW 25: CPT | Performed by: FAMILY MEDICINE

## 2021-03-25 PROCEDURE — 97530 THERAPEUTIC ACTIVITIES: CPT

## 2021-03-25 PROCEDURE — 6360000002 HC RX W HCPCS: Performed by: STUDENT IN AN ORGANIZED HEALTH CARE EDUCATION/TRAINING PROGRAM

## 2021-03-25 PROCEDURE — 83735 ASSAY OF MAGNESIUM: CPT

## 2021-03-25 PROCEDURE — 2580000003 HC RX 258: Performed by: STUDENT IN AN ORGANIZED HEALTH CARE EDUCATION/TRAINING PROGRAM

## 2021-03-25 PROCEDURE — 94669 MECHANICAL CHEST WALL OSCILL: CPT

## 2021-03-25 PROCEDURE — 6370000000 HC RX 637 (ALT 250 FOR IP): Performed by: FAMILY MEDICINE

## 2021-03-25 PROCEDURE — 97110 THERAPEUTIC EXERCISES: CPT

## 2021-03-25 PROCEDURE — 80053 COMPREHEN METABOLIC PANEL: CPT

## 2021-03-25 PROCEDURE — 94640 AIRWAY INHALATION TREATMENT: CPT

## 2021-03-25 PROCEDURE — 6370000000 HC RX 637 (ALT 250 FOR IP): Performed by: STUDENT IN AN ORGANIZED HEALTH CARE EDUCATION/TRAINING PROGRAM

## 2021-03-25 PROCEDURE — 2140000000 HC CCU INTERMEDIATE R&B

## 2021-03-25 PROCEDURE — 94761 N-INVAS EAR/PLS OXIMETRY MLT: CPT

## 2021-03-25 PROCEDURE — 36415 COLL VENOUS BLD VENIPUNCTURE: CPT

## 2021-03-25 PROCEDURE — 97535 SELF CARE MNGMENT TRAINING: CPT

## 2021-03-25 PROCEDURE — 6370000000 HC RX 637 (ALT 250 FOR IP): Performed by: HOSPITALIST

## 2021-03-25 RX ADMIN — OXYCODONE HYDROCHLORIDE AND ACETAMINOPHEN 1000 MG: 500 TABLET ORAL at 08:35

## 2021-03-25 RX ADMIN — ATORVASTATIN CALCIUM 20 MG: 20 TABLET, FILM COATED ORAL at 20:35

## 2021-03-25 RX ADMIN — Medication 50 MG: at 08:34

## 2021-03-25 RX ADMIN — Medication 2000 UNITS: at 08:35

## 2021-03-25 RX ADMIN — GLYCOPYRROLATE AND FORMOTEROL FUMARATE 2 PUFF: 9; 4.8 AEROSOL, METERED RESPIRATORY (INHALATION) at 20:32

## 2021-03-25 RX ADMIN — SODIUM CHLORIDE, PRESERVATIVE FREE 10 ML: 5 INJECTION INTRAVENOUS at 20:35

## 2021-03-25 RX ADMIN — AMLODIPINE BESYLATE 5 MG: 5 TABLET ORAL at 08:35

## 2021-03-25 RX ADMIN — FAMOTIDINE 20 MG: 20 TABLET, FILM COATED ORAL at 20:35

## 2021-03-25 RX ADMIN — GLYCOPYRROLATE AND FORMOTEROL FUMARATE 2 PUFF: 9; 4.8 AEROSOL, METERED RESPIRATORY (INHALATION) at 09:08

## 2021-03-25 RX ADMIN — POTASSIUM CHLORIDE 40 MEQ: 1500 TABLET, EXTENDED RELEASE ORAL at 08:35

## 2021-03-25 RX ADMIN — SODIUM CHLORIDE, PRESERVATIVE FREE 10 ML: 5 INJECTION INTRAVENOUS at 08:34

## 2021-03-25 RX ADMIN — ENOXAPARIN SODIUM 30 MG: 30 INJECTION SUBCUTANEOUS at 20:35

## 2021-03-25 RX ADMIN — ASPIRIN 81 MG: 81 TABLET, CHEWABLE ORAL at 08:35

## 2021-03-25 RX ADMIN — ENOXAPARIN SODIUM 30 MG: 30 INJECTION SUBCUTANEOUS at 08:34

## 2021-03-25 RX ADMIN — FAMOTIDINE 20 MG: 20 TABLET, FILM COATED ORAL at 08:35

## 2021-03-25 ASSESSMENT — PAIN SCALES - GENERAL
PAINLEVEL_OUTOF10: 0

## 2021-03-25 NOTE — CARE COORDINATION
3/25/21, 7:25 AM EDT    DISCHARGE ON GOING [address] day: 17  Location: 3A-09/009-A Reason for admit: Acute respiratory failure ([facility] [address] [J96.00]   Procedure: 3/9 Convalescent plasma x1   Barriers to Discharge: Remains on high flow O2, 60L and 40% FiO2. Sats 87-96%. Albumin 2.3. CO2 18. Hgb 10.9. Norvasc, Vit C, Baby ASA, Lipitor, Lovenox bid, Pepcid, Bevespi, Vit D. Zinc. PT/OT. Palliative Care following. IS.   PCP: ERNESTO Martinez CNP  Readmission Risk Score: 18%  Patient Goals/Plan/Treatment Preferences: From home with daughter. TCU eval, will relook when off high flow O2. If unable to wean high flow, consider LTACH. Asking LTACH for quick look to see if pt would qualify. SW following for possible ECF need.     UPDATE: 11:32 AM EDT  Spoke with pt's daughter, Suleman Wu. Family is agreeable to [facility], [facility] referral. Order received from Dr. Valarie Mane. [facility] Maria M with [facility] made aware of referral. Pt will be out of isolation Sunday, possible transfer Monday.

## 2021-03-25 NOTE — PROGRESS NOTES
Hospitalist Progress Note      Patient:  Rah Diaz    EPZW/GDN:[id number]  YOB: 1940  MRN: [id number]   Acct: [de-identified]   PCP: ERNESTO Latif CNP  Date of Admission: 3/8/2021    Assessment and Plan:        Pneumonia secondary to COVID 19  · Patient got the 1st dose of COVID vaccine on 3/1,   · On Dexamethasone 6 mg QD (on H1 blocker) and Remdesivir  · Procal WNLs d/c'ed ABx  · Supportive therapy with vitamin C, D, zinc and lactobacillus (d/c'ed)  · LMWH 30mg BID for DVT prophylaxis; added ASA 81 QD as prothrombotic markers elevated  · IS/Acapella/PT to see  3/11: stable; SaO2 drops w/ exertion and also postural changes; CCM. Added chest physio  3/12: improving; encouraged CCM w/ aggressive weaning as tolerated to SaO2 greater than 90%  3/13: slow wean w/ short-lived episodes of worsening hypoxia which is positional. CCM. Reduce IVF to 50 cc/hr given inadequate PO intake. Repeat ABG non-contributroy  3/14: slowly weaning. CCM  3/16: Improving steadily. Now down to FiO2 45% at 45 lpm w/ SaO2 high 90s. CCM  3/17: down to HFO 40/40. Will try stepping down to NC at 6 lpm today as tolerated. 3/18: improving; still unable to wean down to NC; on minimal HFO parameters; CCM  3/19: as clinically improving, pt more mobile w/ desaturation w/ exertion. CCM. RN aware to slowly wean as tolerated  3/20: Continues to slowly wean down. Feeling well otherwise. +22L fluid balance. Will give a dose of Lasix today and see if that helps getting patient off HFNC. Repeat CXR tomorrow am. Check inflam markers. If D-dimer trending up consider CTA. Trial of Bipap.  3/21: D-dimer elevated. CTA negative for PE. Inflam markers trending up. Aggressive CPT. Consider more diuretics. Will hold off today due to contrast given. 3/22: After discussion with the nurse patient her usual is sleeping during the day and awake all night long, she was sleeping when I encountered her in the morning, she is arousable though. Continued to be on high flow nasal cannula oxygen very difficult to wean off currently, FiO2 45, flow rate 50 L/minute and satting 92% on high flow nasal cannula, CTA did not show any PE, although showed an inflammatory process due to current COVID-19 virus infection, slight leukocytosis, remained afebrile in the past 48 hours, procalcitonin normal, finished treatment for COVID-19 virus infection, last echocardiogram in September 2020 showed 60% EF otherwise normal, continue wean off oxygen as tolerated. 3/23: Continued on high flow nasal cannula, continue wean off, TCU versus LTAC in the process. 3/24/2021: Very slowly improving, continue to be on high flow nasal cannula, current FiO2 55, respiratory therapist helping daily. Continue wean off oxygen as tolerated and consider TCU, if unable may consider LTAC. We will monitor closely. 3/25/2021: Still on high flow, continue wean off, will give a trial today too, after talking to infectious disease they recommended transferring patient 3/28/2021 off the floor per CDC guidelines and hospital policy. Working on [employer]. Acute hypoxic respiratory failure secondary to above  · On HFO FiO2 80% and 60 lpm w/ SaO2 in high 90s  · Rn aware to wean supplemental O2 aggressively as tolerated to SpO2 > 90%  3/11: ABG negative for hypercapnia; managing as above  3/19: managing as above     History of COPD  · Quit smoking 7-10 years ago. 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 Plan was for patient to see GI outpatient. Is suppose to see GI associated OP with w/u including MRI of abd/pelvis. LFT's stable. Clinically, patient has no complaints.   Physical debility: Patient would benefit from inpatient rehab versus LTAC as she is not progressing very well and very slow progression, monitor closely. PMH, PSH, SH, FHX reviewed in chart review snap shot in EPIC    CC: Acute hypoxic respiratory failure likely secondary to COVID-19 virus infection, physical debility    HPI: Initial admission HPI by admitting physician reviewed as below:  80 y.o. female who presented to [address] [place] [address] with increased weakness for the past 3 days. History is limited due to patient's drowsiness. Limited history provided by daughter-in-law who is in the room. Ms. Elijah Gomez received her covid vaccine on 3/1/2021. She did have some fatigue afterwards with nausea. However, she began having acutely worsening weakness and cough over the last 3 days with decreased oral intake. Family was unaware until her daughter-in-law went to check on her today. She denies any shortness of breath to me, but she is requiring 6 L supplemental O2 to maintain SPO2 greater than 90% and appears to have increased work of breathing value at rest.     Patient denies any pain, feelings of fever/chills, shortness of breath, nausea/vomiting, changes in bowel or bladder habits. Her only complaint is of weakness. For further details and updates please refer to assessment and plan at the beginning of the note. ROS (10 point review of systems completed. Pertinent positives noted.  Otherwise ROS is negative) : Generalized weak  PMH:  Per HPI and reviewed in the chart  SHX: Reviewed in the chart, also the patient  FHX: Reviewed in the chart, also with the patient  Allergies: Reviewed in the chart  Medications:     sodium chloride      sodium chloride      sodium chloride 20 mL/hr at 03/22/21 0948    dextrose        potassium chloride  40 mEq Oral Daily    aspirin  81 mg Oral Daily    ascorbic acid  1,000 mg Oral Daily    zinc sulfate  50 mg Oral Daily    sodium chloride flush  10 mL Intravenous 2 times per day    enoxaparin  30 mg Subcutaneous BID    Vitamin D  2,000 Units Oral Daily    atorvastatin  20 mg Oral Daily    famotidine  20 mg Oral BID    glycopyrrolate-formoterol  2 puff Inhalation BID    amLODIPine  5 mg Oral Daily   Subjective 3/22/2021: Very weak, sleepy in bed, arousable, continue to be on high flow nasal cannula oxygen, she would benefit from TCU versus LTAC, monitor closely, nursing notes, labs, vitals reviewed. 3/23/2021: No changes since yesterday, TCU versus LTAC in process, continue wean off oxygen. 3/24: Continue to be on high flow nasal cannula, continue wean off oxygen as tolerated, although FiO2 weaned off today to 55. Respiratory therapist following daily. Monitor closely. 3/25: Working on [employer], no complaints today, talk to infectious disease and the recommendation to transfer out of the floor 3/28/2021. Vital Signs:   Vitals reviewed and compared with the previous ones  /86   Pulse 76   Temp 97.6 °F (36.4 °C) (Oral)   Resp 19   Ht 5' 2\" (1.575 m)   Wt 127 lb 6.8 oz (57.8 kg)   SpO2 95%   BMI 23.31 kg/m²      Intake/Output Summary (Last 24 hours) at 3/25/2021 0839  Last data filed at 3/25/2021 0327  Gross per 24 hour   Intake 440 ml   Output 0 ml   Net 440 ml        General:   Chronically ill, sleepy in bed, on high flow nasal cannula oxygen  HEENT:  normocephalic and atraumatic. No scleral icterus. PERRLA. Neck: supple. No thyromegaly. Lungs: No wheeziness, decreased breath sounds bilaterally  Cardiac: S1, S2, RRR, OMAR appreciated, no JVD. Abdomen: soft. Increased abdominal girth, nontender. Bowel sounds positive. Extremities:  No clubbing, cyanosis, or edema in all extremities. Vasculature: capillary refill < 3 seconds. Pedal pulses intact bilaterally. Skin:  warm and dry. Not clammy. Psych:  Alert to person. Sleepy. Affect abnormal.  Lymph:  No supraclavicular ,and no anterior cervical lymphadenopathy.   Neurologic: Unable to examine due to general condition    Labs:   Recent Labs     03/23/21  0424 03/24/21  0600 03/25/21  0342   WBC 6.6 5.7 7.3   HGB 11.1* 10.6* 10.9*   HCT 36.5* 35.2* 35.0*    277 304     Recent Labs     03/23/21  0424 03/24/21  0600 03/25/21  0342    140 135   K 3.7 3.5 3.5    108 104   CO2 17* 18* 18*   BUN 16 14 10   CREATININE 0.5 0.5 0.5   CALCIUM 8.7 9.0 9.0     Recent Labs     03/23/21  0424 03/24/21  0600 03/25/21  0342   AST 12 11 11   ALT 9* 9* 8*   BILITOT 0.6 0.5 0.5   ALKPHOS 75 71 77     No results for input(s): INR in the last 72 hours. No results for input(s): Lorayne Yanique in the last 72 hours. Microbiology:    Blood culture #1:   Lab Results   Component Value Date    BC No growth-preliminary  No growth   06/20/2018       Blood culture #2:No results found for: Cindy Blanca    Organism:No results found for: ORG    No results found for: LABGRAM    MRSA culture only:No results found for: [facility]    Urine culture: No results found for: LABURIN    Respiratory culture: No results found for: CULTRESP    Aerobic and Anaerobic :  No results found for: LABAERO  No results found for: LABANAE    Urinalysis:      Lab Results   Component Value Date    NITRU NEGATIVE 06/20/2018    BLOODU NEGATIVE 06/20/2018    GLUCOSEU NEGATIVE 06/20/2018       Radiology:  CTA CHEST W WO CONTRAST   Final Result    IMPRESSION:      1. No evidence of pulmonary emboli. 2. Abnormal density in the left lower lobe, left mid lung field and to a lesser extent right lung consistent with inflammatory process possibly representing Covid 19 infection. 3. Evidence for old granulomatous disease in left lower lobe. 4. Cardiomegaly. 5. Atherosclerotic calcification in the thoracic aorta. 6. Thoracic spondylosis. 7. Hiatal hernia. .               **This report has been created using voice recognition software. It may contain minor errors which are inherent in voice recognition technology. **      Final report electronically signed by DR Sherine Camp on 3/21/2021 10:58 AM      XR CHEST PORTABLE   Final Result   Decreased bilateral airspace disease and bibasilar consolidations. This document has been electronically signed by: Lissa Bailey MD on    03/21/2021 04:20 AM      XR CHEST PORTABLE   Final Result      Multifocal interstitial and alveolar opacities the lungs intervally increased from prior exam concerning for infectious process. Cardiomegaly, stable from prior. **This report has been created using voice recognition software. It may contain minor errors which are inherent in voice recognition technology. **      Final report electronically signed by Dr. Yuri Duffy on 3/12/2021 12:35 PM      XR CHEST PORTABLE   Final Result   Mild cardiomegaly. Interstitial edema noted in the lower lobes with peribronchial cuffing this can be related to acute bronchitis, or atypical viral pneumonia. **This report has been created using voice recognition software. It may contain minor errors which are inherent in voice recognition technology. **      Final report electronically signed by Dr. Lanie Melchor on 3/8/2021 12:19 PM      MRI ABDOMEN [address]    (Results Pending)     Xr Chest Portable    Result Date: 3/8/2021  PROCEDURE: XR CHEST PORTABLE CLINICAL INFORMATION: cough. congested. . COMPARISON: 9/22/2020 TECHNIQUE: Portable upright FINDINGS: Cardiomegaly. No mediastinal widening. Mild interstitial edema with a lower lobe predominance. Parabronchial cuffing can be seen. There is however no pleural effusion identified and no confluent airspace consolidation. Pulmonary vessels are not  congested     Mild cardiomegaly. Interstitial edema noted in the lower lobes with peribronchial cuffing this can be related to acute bronchitis, or atypical viral pneumonia. **This report has been created using voice recognition software. It may contain minor errors which are inherent in voice recognition technology. ** Final report electronically signed by Dr. Lanie Melchor on 3/8/2021 12:19 PM      Discussed plan with patient and nurse.  Patient and

## 2021-03-25 NOTE — PLAN OF CARE
Problem: Airway Clearance - Ineffective  Goal: Achieve or maintain patent airway  Outcome: Ongoing     Problem: Gas Exchange - Impaired  Goal: Absence of hypoxia  Outcome: Ongoing  Goal: Promote optimal lung function  Outcome: Ongoing     Problem: Breathing Pattern - Ineffective  Goal: Ability to achieve and maintain a regular respiratory rate  Outcome: Ongoing     Problem: Body Temperature -  Risk of, Imbalanced  Goal: Ability to maintain a body temperature within defined limits  Outcome: Ongoing     Problem: Isolation Precautions - Risk of Spread of Infection  Goal: Prevent transmission of infection  Outcome: Ongoing     Problem: Nutrition Deficits  Goal: Optimize nutritional status  Outcome: Ongoing     Problem: Patient Education: Go to Patient Education Activity  Goal: Patient/Family Education  Outcome: Ongoing     Problem: Falls - Risk of:  Goal: Will remain free from falls  Description: Will remain free from falls  Outcome: Ongoing  Goal: Absence of physical injury  Description: Absence of physical injury  Outcome: Ongoing     Problem: Skin Integrity:  Goal: Will show no infection signs and symptoms  Description: Will show no infection signs and symptoms  Outcome: Ongoing  Goal: Absence of new skin breakdown  Description: Absence of new skin breakdown  Outcome: Ongoing     Problem: Nutrition  Goal: Optimal nutrition therapy  Outcome: Ongoing     Problem: PAIN  Goal: Patient's pain/discomfort is manageable  Outcome: Ongoing     Problem: DISCHARGE BARRIERS  Goal: Patient's continuum of care needs are met  Outcome: Ongoing     Problem: Pain:  Description: Pain management should include both nonpharmacologic and pharmacologic interventions.   Goal: Pain level will decrease  Description: Pain level will decrease  Outcome: Ongoing  Goal: Control of acute pain  Description: Control of acute pain  Outcome: Ongoing  Goal: Control of chronic pain  Description: Control of chronic pain  Outcome: Ongoing

## 2021-03-25 NOTE — PROGRESS NOTES
[address] [facility] PHYSICAL THERAPY  DAILY NOTE  [facility] CCU 3A - 3A-09/009-A    Time In: 1350  Time Out: 1416  Timed Code Treatment Minutes: 26 Minutes  Minutes: 26          Date: 3/25/2021  Patient Name: Spencer Loyd,  Gender:  female        MRN: [id number]  : 1940  (80 y.o.)     Referring Practitioner: Sreedhar Liu MD  Diagnosis: acute respiratory failure  Additional Pertinent Hx: Per HPI: \"80 y.o. female who presented to [address] [place] [address] with increased weakness for the past 3 days. History is limited due to patient's drowsiness. Limited history provided by daughter-in-law who is in the room. Ms. Leonia Lesches received her covid vaccine on 3/1/2021. She did have some fatigue afterwards with nausea. However, she began having acutely worsening weakness and cough over the last 3 days with decreased oral intake. Family was unaware until her daughter-in-law went to check on her today. She denies any shortness of breath to me, but she is requiring 6 L supplemental O2 to maintain SPO2 greater than 90% and appears to have increased work of breathing value at rest.\" Pt is positive for COVID-19. Prior Level of Function:  Lives With: Daughter  Type of Home: House  Home Layout: Multi-level  Home Access: Stairs to enter with rails  Entrance Stairs - Number of Steps: 3 steps + 7 to get to her bedroom/bathroom  Home Equipment: (no DME PTA)   Bathroom Equipment: Shower chair    ADL Assistance: Needs assistance  Homemaking Assistance: (daughter completes all)  Ambulation Assistance: Independent  Transfer Assistance: Independent  Active : Yes    Restrictions/Precautions:  Restrictions/Precautions: Isolation(droplet + precautions)  Position Activity Restriction  Other position/activity restrictions: 3/11: HFNC     SUBJECTIVE: RN approved session. Patient in bathroom upon arrival and agreeable to therapy.      PAIN: denies    Vitals: Oxygen: high flow 40L at 40%, O2 sats ranged 86-88% OBJECTIVE:  Bed Mobility:  Not Tested    Transfers:  Sit to Stand: Minimal Assistance, to stand from low toilet seat  Stand to Sit:Contact Guard Assistance    Ambulation:  Contact Guard Assistance  Distance: ~10ft  Surface: Level Tile  Device:Rolling Walker  Gait Deviations: Forward Flexed Posture, Slow Arabella, Decreased Step Length Bilaterally, Decreased Gait Speed and Mild Path Deviations    Exercise:  Patient was guided in 1 set(s) 10 reps of exercise to both lower extremities. Ankle pumps, Glut sets, Heelslides, Hip abduction/adduction, Straight leg raises and Long arc quads. Exercises were completed for increased independence with functional mobility. Functional Outcome Measures: Completed  -PAC Inpatient Mobility Raw Score : 14  -PAC Inpatient T-Scale Score : 38.1    ASSESSMENT:  Assessment: Patient progressing toward established goals. Activity Tolerance:  Patient tolerance of  treatment: good. Equipment Recommendations:Equipment Needed: (likely a rolling walker, continue to assess)  Discharge Recommendations:  Continue to assess pending progress(TCU)    Plan: Times per week: 5x GM  Times per day: Daily  Current Treatment Recommendations: Strengthening, Functional Mobility Training, Home Exercise Program, Endurance Training, Balance Training, Patient/Caregiver Education & Training, Transfer Training, Gait Training, Safety Education & Training    Patient Education  Patient Education: Plan of Care, Transfers, Up in Chair for All Meals, Verbal Exercise Instruction    Goals:  Patient goals : return home  Short term goals  Time Frame for Short term goals: by hospital discharge  Short term goal 1: Supine to/from sit with modified independence for ease of transfers at discharge site. Short term goal 2: Sit to/from stand with modified independence for ease of transfers at discharge site.   Short term goal 3: Ambulate 21' with CGA x 1 and LRAD, maintaining O2 saturation >88%, for progression to home distance ambulation. Short term goal 4: Ascend/descend 3 steps with 1 HR and Rich for progression to safe entry into home. Long term goals  Time Frame for Long term goals : N/A due to short ELOS. Following session, patient left in safe position with all fall risk precautions in place.

## 2021-03-25 NOTE — PROGRESS NOTES
[address] CCU 3A  Occupational Therapy  Daily Note  Time:   Time In: 9454  Time Out: 4814  Timed Code Treatment Minutes: 25 Minutes  Minutes: 25          Date: 3/25/2021  Patient Name: Stefany Cruz,   Gender: female      Room: [facility]009-A  MRN: [id number]  : 1940  (80 y.o.)  Referring Practitioner: Dr. Enrique Kim MD  Diagnosis: Acute Respiratory Failure  Additional Pertinent Hx: Per H&P: Pt presented to [address] [place] [address] with increased weakness for the past 3 days. Limited history provided by daughter-in-law who is in the room. Ms. Kaden Vergara received her covid vaccine on 3/1/2021. She did have some fatigue afterwards with nausea. However, she began having acutely worsening weakness and cough over the last 3 days with decreased oral intake. Family was unaware until her daughter-in-law went to check on her the day of admission. She is requiring 6 L supplemental O2 to maintain SPO2 greater than 90% and appears to have increased work of breathing value at rest. COVID +    Restrictions/Precautions:  Restrictions/Precautions: Isolation(droplet + precautions)  Position Activity Restriction  Other position/activity restrictions: 3/11: HFNC     SUBJECTIVE: Nurse ok'd session. Patient lying in bed upon arrival. Agreeable to OT session     PAIN: Denies    Vitals: Oxygen:   Patient on 40L 40% FiO2 High Flow upon arrival to room. Patient O2 sats at 97 %. Upon activity patient dropping O2 at 84 %. Increased time to recover to low 90s with education provided on deep breathing. Pt requiring lengthy rest break(s) to recover. COGNITION: Decreased Recall, Decreased Insight, Decreased Problem Solving and Decreased Safety Awareness    ADL:   Grooming: with set-up. For hair care seated in chair  Toileting: Maximum Assistance. For hygiene and clothing management, incontinent of urine     BALANCE:  Sitting Balance:  Stand By Assistance. Seated EOB   Standing Balance: Minimal Assistance. Progressing to [address] with BUE support on walker for approx 3 minutes    BED MOBILITY:  Rolling to Left: Contact Guard Assistance - increased time, use of side rail, cueing provided for technique  Rolling to Right: Moderate Assistance - Julian with increased time and use of side rail  Supine to Sit: Moderate Assistance - bringing shoulders to upright position    TRANSFERS:  Sit to Stand:  Minimal Assistance. From EOB with cueing provided for hand placement  Stand to Sit: Minimal Assistance. To bedside chair with cueing provided for hand placement    FUNCTIONAL MOBILITY:  Assistive Device: Rolling Walker  Assist Level:  Minimal Assistance. Distance: From EOB to bedside chair  Slow pace, no LOB noted. Unsteadiness noted at times. Assist with managing multiple lines and high flow tubing. ASSESSMENT:     Activity Tolerance:  Patient tolerance of  treatment: fair. Discharge Recommendations: Subacute/Skilled Nursing Facility(TCU)   Equipment Recommendations: Other: will continue to monitor pending progress, discharge location  Plan: Times per week: 5x  Current Treatment Recommendations: Strengthening, Balance Training, Functional Mobility Training, Endurance Training, Safety Education & Training, Patient/Caregiver Education & Training, Equipment Evaluation, Education, & procurement, Self-Care / ADL    Patient Education  Patient Education: safety with transfers and mobility, ADL strategies    Goals  Short term goals  Time Frame for Short term goals: by discharge  Short term goal 1: Pt will increase activity tolerance for functional mobility to/from [facility] or chair with CGA and Sp02 >=90% in prep for toileting tasks. Short term goal 2: Pt will tolerate dynamic standing X2 minutes with CGA and unilateral release in prep for hygiene/clothing management. Short term goal 3: Pt will complete BADL tasks with minimal assistance, incorporating ECT with minimal cues, to increase independence and ease with self care tasks. Short term goal 4: Pt will complete functional transfers with CGA in prep for BSC/toilet transfers. Following session, patient left in safe position with all fall risk precautions in place.

## 2021-03-25 NOTE — PLAN OF CARE
Problem: Nutrition Deficits  Goal: Optimize nutritional status  3/25/2021 1341 by Jessica Gonzalez RD, LD  Outcome: Ongoing   Nutrition Problem #1: Inadequate oral intake  Intervention: Food and/or Nutrient Delivery: Continue Current Diet, Modify Oral Nutrition Supplement, Vitamin Supplement, Mineral Supplement  Nutritional Goals: Pt. will tolerate and consume 75% or more of meals to promote wound healign during LOS.

## 2021-03-25 NOTE — PROGRESS NOTES
edema)    · Usual Body Weight: (per EMR: 9/15/20: 165# 11.2 oz, 9/18/20: 159# 12.8 oz)     · Ideal Body Weight: 110 lbs;   · BMI: 23.2  · BMI Categories: Normal Weight (BMI 22.0 to 24.9) age over 72       Nutrition Diagnosis:   · Inadequate oral intake related to impaired respiratory function, inadequate protein-energy intake as evidenced by intake 26-50%(need for HFNC)      Nutrition Interventions:   Food and/or Nutrient Delivery:  Continue Current Diet, Modify Oral Nutrition Supplement, Vitamin Supplement, Mineral Supplement  Nutrition Education/Counseling:  Education initiated(encouraged intake at best effort, use of ONS)   Coordination of Nutrition Care:  Continue to monitor while inpatient, Interdisciplinary Rounds    Goals:  Pt. will tolerate and consume 75% or more of meals to promote wound healign during LOS. Nutrition Monitoring and Evaluation:   Behavioral-Environmental Outcomes:  None Identified   Food/Nutrient Intake Outcomes:  Diet Advancement/Tolerance, Food and Nutrient Intake, Supplement Intake  Physical Signs/Symptoms Outcomes:  Biochemical Data, GI Status, Fluid Status or Edema, Hemodynamic Status, Meal Time Behavior, Skin, Weight     Discharge Planning:     Too soon to determine     Electronically signed by Ishan Gutierrez RD, LD on 3/25/21 at 1:42 PM EDT    Contact: [phone]

## 2021-03-26 LAB
ALBUMIN SERPL-MCNC: 2.6 G/DL (ref 3.5–5.1)
ALP BLD-CCNC: 83 U/L (ref 38–126)
ALT SERPL-CCNC: 8 U/L (ref 11–66)
ANION GAP SERPL CALCULATED.3IONS-SCNC: 14 MEQ/L (ref 8–16)
AST SERPL-CCNC: 9 U/L (ref 5–40)
BASOPHILS # BLD: 0.7 %
BASOPHILS ABSOLUTE: 0 THOU/MM3 (ref 0–0.1)
BILIRUB SERPL-MCNC: 0.4 MG/DL (ref 0.3–1.2)
BUN BLDV-MCNC: 10 MG/DL (ref 7–22)
CALCIUM SERPL-MCNC: 9.4 MG/DL (ref 8.5–10.5)
CHLORIDE BLD-SCNC: 105 MEQ/L (ref 98–111)
CO2: 19 MEQ/L (ref 23–33)
CREAT SERPL-MCNC: 0.6 MG/DL (ref 0.4–1.2)
EOSINOPHIL # BLD: 5.1 %
EOSINOPHILS ABSOLUTE: 0.4 THOU/MM3 (ref 0–0.4)
ERYTHROCYTE [DISTWIDTH] IN BLOOD BY AUTOMATED COUNT: 16.7 % (ref 11.5–14.5)
ERYTHROCYTE [DISTWIDTH] IN BLOOD BY AUTOMATED COUNT: 52.7 FL (ref 35–45)
GFR SERPL CREATININE-BSD FRML MDRD: > 90 ML/MIN/1.73M2
GLUCOSE BLD-MCNC: 97 MG/DL (ref 70–108)
HCT VFR BLD CALC: 37.7 % (ref 37–47)
HEMOGLOBIN: 11.6 GM/DL (ref 12–16)
IMMATURE GRANS (ABS): 0.03 THOU/MM3 (ref 0–0.07)
IMMATURE GRANULOCYTES: 0.4 %
LYMPHOCYTES # BLD: 19.4 %
LYMPHOCYTES ABSOLUTE: 1.3 THOU/MM3 (ref 1–4.8)
MCH RBC QN AUTO: 27.1 PG (ref 26–33)
MCHC RBC AUTO-ENTMCNC: 30.8 GM/DL (ref 32.2–35.5)
MCV RBC AUTO: 88.1 FL (ref 81–99)
MONOCYTES # BLD: 9.3 %
MONOCYTES ABSOLUTE: 0.6 THOU/MM3 (ref 0.4–1.3)
NUCLEATED RED BLOOD CELLS: 0 /100 WBC
PLATELET # BLD: 329 THOU/MM3 (ref 130–400)
PMV BLD AUTO: 10.7 FL (ref 9.4–12.4)
POTASSIUM REFLEX MAGNESIUM: 3.7 MEQ/L (ref 3.5–5.2)
RBC # BLD: 4.28 MILL/MM3 (ref 4.2–5.4)
SEG NEUTROPHILS: 65.1 %
SEGMENTED NEUTROPHILS ABSOLUTE COUNT: 4.5 THOU/MM3 (ref 1.8–7.7)
SODIUM BLD-SCNC: 138 MEQ/L (ref 135–145)
TOTAL PROTEIN: 6.8 G/DL (ref 6.1–8)
WBC # BLD: 6.9 THOU/MM3 (ref 4.8–10.8)

## 2021-03-26 PROCEDURE — 6370000000 HC RX 637 (ALT 250 FOR IP): Performed by: PHYSICIAN ASSISTANT

## 2021-03-26 PROCEDURE — 6360000002 HC RX W HCPCS: Performed by: STUDENT IN AN ORGANIZED HEALTH CARE EDUCATION/TRAINING PROGRAM

## 2021-03-26 PROCEDURE — 80053 COMPREHEN METABOLIC PANEL: CPT

## 2021-03-26 PROCEDURE — 36415 COLL VENOUS BLD VENIPUNCTURE: CPT

## 2021-03-26 PROCEDURE — 6370000000 HC RX 637 (ALT 250 FOR IP): Performed by: HOSPITALIST

## 2021-03-26 PROCEDURE — 97535 SELF CARE MNGMENT TRAINING: CPT

## 2021-03-26 PROCEDURE — 94761 N-INVAS EAR/PLS OXIMETRY MLT: CPT

## 2021-03-26 PROCEDURE — 99231 SBSQ HOSP IP/OBS SF/LOW 25: CPT | Performed by: FAMILY MEDICINE

## 2021-03-26 PROCEDURE — 97110 THERAPEUTIC EXERCISES: CPT

## 2021-03-26 PROCEDURE — 2140000000 HC CCU INTERMEDIATE R&B

## 2021-03-26 PROCEDURE — 6370000000 HC RX 637 (ALT 250 FOR IP): Performed by: STUDENT IN AN ORGANIZED HEALTH CARE EDUCATION/TRAINING PROGRAM

## 2021-03-26 PROCEDURE — 94669 MECHANICAL CHEST WALL OSCILL: CPT

## 2021-03-26 PROCEDURE — 2700000000 HC OXYGEN THERAPY PER DAY

## 2021-03-26 PROCEDURE — 94640 AIRWAY INHALATION TREATMENT: CPT

## 2021-03-26 PROCEDURE — 2580000003 HC RX 258: Performed by: STUDENT IN AN ORGANIZED HEALTH CARE EDUCATION/TRAINING PROGRAM

## 2021-03-26 PROCEDURE — 85025 COMPLETE CBC W/AUTO DIFF WBC: CPT

## 2021-03-26 PROCEDURE — 97530 THERAPEUTIC ACTIVITIES: CPT

## 2021-03-26 PROCEDURE — 6370000000 HC RX 637 (ALT 250 FOR IP): Performed by: FAMILY MEDICINE

## 2021-03-26 RX ADMIN — OXYCODONE HYDROCHLORIDE AND ACETAMINOPHEN 1000 MG: 500 TABLET ORAL at 09:08

## 2021-03-26 RX ADMIN — FAMOTIDINE 20 MG: 20 TABLET, FILM COATED ORAL at 09:08

## 2021-03-26 RX ADMIN — GLYCOPYRROLATE AND FORMOTEROL FUMARATE 2 PUFF: 9; 4.8 AEROSOL, METERED RESPIRATORY (INHALATION) at 20:02

## 2021-03-26 RX ADMIN — SODIUM CHLORIDE, PRESERVATIVE FREE 10 ML: 5 INJECTION INTRAVENOUS at 09:09

## 2021-03-26 RX ADMIN — Medication 2.5 MG: at 20:49

## 2021-03-26 RX ADMIN — ENOXAPARIN SODIUM 30 MG: 30 INJECTION SUBCUTANEOUS at 09:08

## 2021-03-26 RX ADMIN — FAMOTIDINE 20 MG: 20 TABLET, FILM COATED ORAL at 20:47

## 2021-03-26 RX ADMIN — ASPIRIN 81 MG: 81 TABLET, CHEWABLE ORAL at 09:08

## 2021-03-26 RX ADMIN — AMLODIPINE BESYLATE 5 MG: 5 TABLET ORAL at 09:08

## 2021-03-26 RX ADMIN — GLYCOPYRROLATE AND FORMOTEROL FUMARATE 2 PUFF: 9; 4.8 AEROSOL, METERED RESPIRATORY (INHALATION) at 09:08

## 2021-03-26 RX ADMIN — ENOXAPARIN SODIUM 30 MG: 30 INJECTION SUBCUTANEOUS at 20:47

## 2021-03-26 RX ADMIN — SODIUM CHLORIDE, PRESERVATIVE FREE 10 ML: 5 INJECTION INTRAVENOUS at 20:48

## 2021-03-26 RX ADMIN — Medication 50 MG: at 09:08

## 2021-03-26 RX ADMIN — ATORVASTATIN CALCIUM 20 MG: 20 TABLET, FILM COATED ORAL at 20:48

## 2021-03-26 RX ADMIN — Medication 2000 UNITS: at 09:07

## 2021-03-26 RX ADMIN — POTASSIUM CHLORIDE 40 MEQ: 1500 TABLET, EXTENDED RELEASE ORAL at 09:08

## 2021-03-26 ASSESSMENT — PAIN SCALES - GENERAL
PAINLEVEL_OUTOF10: 0
PAINLEVEL_OUTOF10: 0

## 2021-03-26 NOTE — PROGRESS NOTES
Spoke with patient's daughter, Glory Adame and provided update. She voiced concerns about her being disoriented and asked if her urine had been checked. I told her I would bring her concerns to the physician. No other issues at this time.

## 2021-03-26 NOTE — PLAN OF CARE
Patient continues on inhaler; high flow and vest percussion; tolerating well.   Will continue to monitor patients respiratory status

## 2021-03-26 NOTE — CARE COORDINATION
3/26/21, 11:52 AM EDT    DISCHARGE ON GOING EVALUATION    [facility] day: 18  Location: 3A-09/009-A Reason for admit: Acute respiratory failure ([address] [J96.00]   Procedure:   3/9 Convalescent plasma x1   Barriers to Discharge: High flow weaned down to 20L and 40% FiO2. Sats 91% today. Norvasc, Vit C, Baby ASA, Lipitor, Lovenox bid, Pepcid, Bevespi, Vit D. Zinc. PT/OT. Palliative Care following. IS. Albumin 2.6. PCP: ERNESTO Carr CNP  Readmission Risk Score: 18%  Patient Goals/Plan/Treatment Preferences: From home with daughter. Jw brooks, possible discharge Monday when out of isolation if not off high flow O2.  SW following for possible ECF need.

## 2021-03-26 NOTE — PROGRESS NOTES
[address] CCU 3A  Occupational Therapy  Daily Note  Time:   Time In: 1130  Time Out: 1155  Timed Code Treatment Minutes: 25 Minutes  Minutes: 25          Date: 3/26/2021  Patient Name: Santiago Mijares,   Gender: female      Room: [facility]009-A  MRN: [id number]  : 1940  (80 y.o.)  Referring Practitioner: Dr. David Downing MD  Diagnosis: Acute Respiratory Failure  Additional Pertinent Hx: Per H&P: Pt presented to [facility] with increased weakness for the past 3 days. Limited history provided by daughter-in-law who is in the room. Ms. Bernard Hi received her covid vaccine on 3/1/2021. She did have some fatigue afterwards with nausea. However, she began having acutely worsening weakness and cough over the last 3 days with decreased oral intake. Family was unaware until her daughter-in-law went to check on her the day of admission. She is requiring 6 L supplemental O2 to maintain SPO2 greater than 90% and appears to have increased work of breathing value at rest. COVID +    Restrictions/Precautions:  Restrictions/Precautions: Isolation(droplet + precautions)  Position Activity Restriction  Other position/activity restrictions: 3/11: HFNC     SUBJECTIVE: Nurse ok'd session. Patient lying in bed upon arrival. Agreeable to OT session    PAIN: Denies    Vitals: Oxygen:   Patient on 20 L 35% FiO2 High Flow  upon arrival to room. Patient O2 sats at 91 %. Upon activity patient fluctuating between 88-92%. Pt requiring rest break(s) to recover. COGNITION: Decreased Recall, Decreased Insight, Decreased Problem Solving and Decreased Safety Awareness    ADL:   Grooming: with set-up. For hair care seated EOB  Toileting: Maximum Assistance. For hygiene and clothing management x3 trials, incontinent of bowel x3 trials. BALANCE:  Sitting Balance:  Stand By Assistance. Standing Balance: Contact Guard Assistance.  With BUE support on walker    BED MOBILITY:  Rolling to Left: Minimal Assistance    Rolling to Right: Minimal Assistance    Supine to Sit: Moderate Assistance  - increased time required with cueing provided for technique    TRANSFERS:  Sit to Stand:  Minimal Assistance. From EOB with cueing provided for hand placement  Stand to Sit: Minimal Assistance. To bedside chair with cueing provided for safe technique    FUNCTIONAL MOBILITY:  Assistive Device: Rolling Walker  Assist Level:  Minimal Assistance. Distance: Approx 5 ft within room  Slow pace, no LOB noted. Cueing provided to maneuver walker and advancement of B feet     ASSESSMENT:     Activity Tolerance:  Patient tolerance of  treatment: fair. Discharge Recommendations: Subacute/Skilled Nursing Facility, Continue to assess pending progress   Equipment Recommendations: Other: will continue to monitor pending progress, discharge location  Plan: Times per week: 5x  Current Treatment Recommendations: Strengthening, Balance Training, Functional Mobility Training, Endurance Training, Safety Education & Training, Patient/Caregiver Education & Training, Equipment Evaluation, Education, & procurement, Self-Care / ADL    Patient Education  Patient Education: safety with transfers and mobility, ADL strategies    Goals  Short term goals  Time Frame for Short term goals: by discharge  Short term goal 1: Pt will increase activity tolerance for functional mobility to/from [facility] or chair with CGA and Sp02 >=90% in prep for toileting tasks. Short term goal 2: Pt will tolerate dynamic standing X2 minutes with CGA and unilateral release in prep for hygiene/clothing management. Short term goal 3: Pt will complete BADL tasks with minimal assistance, incorporating ECT with minimal cues, to increase independence and ease with self care tasks. Short term goal 4: Pt will complete functional transfers with CGA in prep for BSC/toilet transfers. Following session, patient left in safe position with all fall risk precautions in place.

## 2021-03-26 NOTE — PROGRESS NOTES
Hospitalist Progress Note      Patient:  Lesli Zhao    JWL/MJ:3S-27/940-A  YOB: 1940  MRN: [id number]   Acct: [de-identified]   PCP: ERNESTO Mccarty CNP  Date of Admission: 3/8/2021    Assessment and Plan:        Pneumonia secondary to COVID 19  · Patient got the 1st dose of COVID vaccine on 3/1,   · On Dexamethasone 6 mg QD (on H1 blocker) and Remdesivir  · Procal WNLs d/c'ed ABx  · Supportive therapy with vitamin C, D, zinc and lactobacillus (d/c'ed)  · LMWH 30mg BID for DVT prophylaxis; added ASA 81 QD as prothrombotic markers elevated  · IS/Acapella/PT to see  3/11: stable; SaO2 drops w/ exertion and also postural changes; CCM. Added chest physio  3/12: improving; encouraged CCM w/ aggressive weaning as tolerated to SaO2 greater than 90%  3/13: slow wean w/ short-lived episodes of worsening hypoxia which is positional. CCM. Reduce IVF to 50 cc/hr given inadequate PO intake. Repeat ABG non-contributroy  3/14: slowly weaning. CCM  3/16: Improving steadily. Now down to FiO2 45% at 45 lpm w/ SaO2 high 90s. CCM  3/17: down to HFO 40/40. Will try stepping down to NC at 6 lpm today as tolerated. 3/18: improving; still unable to wean down to NC; on minimal HFO parameters; CCM  3/19: as clinically improving, pt more mobile w/ desaturation w/ exertion. CCM. RN aware to slowly wean as tolerated  3/20: Continues to slowly wean down. Feeling well otherwise. +22L fluid balance. Will give a dose of Lasix today and see if that helps getting patient off HFNC. Repeat CXR tomorrow am. Check inflam markers. If D-dimer trending up consider CTA. Trial of Bipap.  3/21: D-dimer elevated. CTA negative for PE. Inflam markers trending up. Aggressive CPT. Consider more diuretics. Will hold off today due to contrast given. 3/22: After discussion with the nurse patient her usual is sleeping during the day and awake all night long, she was sleeping when I encountered her in the morning, she is arousable though. Continued to be on high flow nasal cannula oxygen very difficult to wean off currently, FiO2 45, flow rate 50 L/minute and satting 92% on high flow nasal cannula, CTA did not show any PE, although showed an inflammatory process due to current COVID-19 virus infection, slight leukocytosis, remained afebrile in the past 48 hours, procalcitonin normal, finished treatment for COVID-19 virus infection, last echocardiogram in September 2020 showed 60% EF otherwise normal, continue wean off oxygen as tolerated. 3/23: Continued on high flow nasal cannula, continue wean off, TCU versus LTAC in the process. 3/24/2021: Very slowly improving, continue to be on high flow nasal cannula, current FiO2 55, respiratory therapist helping daily. Continue wean off oxygen as tolerated and consider TCU, if unable may consider LTAC. We will monitor closely. 3/25/2021: Still on high flow, continue wean off, will give a trial today too, after talking to infectious disease they recommended transferring patient 3/28/2021 off the floor per CDC guidelines and hospital policy. Working on [facility].  3/26/2021: Slightly weaned off high flow to 40 FiO2, still awaiting for LTAC. Acute hypoxic respiratory failure secondary to above  · On HFO FiO2 80% and 60 lpm w/ SaO2 in high 90s  · Rn aware to wean supplemental O2 aggressively as tolerated to SpO2 > 90%  3/11: ABG negative for hypercapnia; managing as above  3/19: managing as above     History of COPD  · Quit smoking 7-10 years ago. 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 Plan was for patient to see GI outpatient. Is suppose to see GI associated OP with w/u including MRI of abd/pelvis. LFT's stable. Clinically, patient has no complaints.   Physical debility: Patient would benefit Daily    atorvastatin  20 mg Oral Daily    famotidine  20 mg Oral BID    glycopyrrolate-formoterol  2 puff Inhalation BID    amLODIPine  5 mg Oral Daily   Subjective 3/22/2021: Very weak, sleepy in bed, arousable, continue to be on high flow nasal cannula oxygen, she would benefit from TCU versus LTAC, monitor closely, nursing notes, labs, vitals reviewed. 3/23/2021: No changes since yesterday, TCU versus LTAC in process, continue wean off oxygen. 3/24: Continue to be on high flow nasal cannula, continue wean off oxygen as tolerated, although FiO2 weaned off today to 55. Respiratory therapist following daily. Monitor closely. 3/25: Working on [employer], no complaints today, talk to infectious disease and the recommendation to transfer out of the floor 3/28/2021.  3/26: Slightly improving today, weaned off to 40 FiO2 high flow, awaiting for LTAC evaluation. Vital Signs:   Vitals reviewed and compared with the previous ones  /68   Pulse 73   Temp 98.8 °F (37.1 °C) (Oral)   Resp 24   Ht 5' 2\" (1.575 m)   Wt 127 lb 6.8 oz (57.8 kg)   SpO2 96%   BMI 23.31 kg/m²    No intake or output data in the 24 hours ending 03/26/21 0743     General:   Chronically ill, sleepy in bed, on high flow nasal cannula oxygen  HEENT:  normocephalic and atraumatic. No scleral icterus. PERRLA. Neck: supple. No thyromegaly. Lungs: No wheeziness, decreased breath sounds bilaterally  Cardiac: S1, S2, RRR, OMAR appreciated, no JVD. Abdomen: soft. Increased abdominal girth, nontender. Bowel sounds positive. Extremities:  No clubbing, cyanosis, or edema in all extremities. Vasculature: capillary refill < 3 seconds. Pedal pulses intact bilaterally. Skin:  warm and dry. Not clammy. Psych:  Alert to person. Sleepy. Affect abnormal.  Lymph:  No supraclavicular ,and no anterior cervical lymphadenopathy.   Neurologic: Unable to examine due to general condition    Labs:   Recent Labs     03/24/21  0600 03/25/21  0342 03/26/21 0421   WBC 5.7 7.3 6.9   HGB 10.6* 10.9* 11.6*   HCT 35.2* 35.0* 37.7    304 329     Recent Labs     03/24/21  0600 03/25/21 0342 03/26/21 0421    135 138   K 3.5 3.5 3.7    104 105   CO2 18* 18* 19*   BUN 14 10 10   CREATININE 0.5 0.5 0.6   CALCIUM 9.0 9.0 9.4     Recent Labs     03/24/21  0600 03/25/21 0342 03/26/21  0421   AST 11 11 9   ALT 9* 8* 8*   BILITOT 0.5 0.5 0.4   ALKPHOS 71 77 83     No results for input(s): INR in the last 72 hours. No results for input(s): Clinton Pippins in the last 72 hours. Microbiology:    Blood culture #1:   Lab Results   Component Value Date    BC No growth-preliminary  No growth   06/20/2018       Blood culture #2:No results found for: Gerry Pouch    Organism:No results found for: ORG    No results found for: LABGRAM    MRSA culture only:No results found for: [facility]    Urine culture: No results found for: LABURIN    Respiratory culture: No results found for: CULTRESP    Aerobic and Anaerobic :  No results found for: LABAERO  No results found for: LABANAE    Urinalysis:      Lab Results   Component Value Date    NITRU NEGATIVE 06/20/2018    BLOODU NEGATIVE 06/20/2018    GLUCOSEU NEGATIVE 06/20/2018       Radiology:  CTA CHEST W WO CONTRAST   Final Result    IMPRESSION:      1. No evidence of pulmonary emboli. 2. Abnormal density in the left lower lobe, left mid lung field and to a lesser extent right lung consistent with inflammatory process possibly representing Covid 19 infection. 3. Evidence for old granulomatous disease in left lower lobe. 4. Cardiomegaly. 5. Atherosclerotic calcification in the thoracic aorta. 6. Thoracic spondylosis. 7. Hiatal hernia. .               **This report has been created using voice recognition software. It may contain minor errors which are inherent in voice recognition technology. **      Final report electronically signed by DR Abdi Morley on 3/21/2021 10:58 AM      XR CHEST PORTABLE   Final Result plan with patient and nurse. Patient and nurse verbalized understanding and agree. All questions addressed with concerns. Please excuse my TYPOS!     Electronically signed by Dustin Ferrera MD on 3/26/2021 at 7:43 AM

## 2021-03-26 NOTE — PROGRESS NOTES
[address] [facility] PHYSICAL THERAPY  DAILY NOTE  [facility] CCU 3A - 3A-09/[id number]-A    Time In: 0785  Time Out: 1458  Timed Code Treatment Minutes: 24 Minutes  Minutes: 24          Date: 3/26/2021  Patient Name: Ezekiel Johnson,  Gender:  female        MRN: [id number]  : 1940  (80 y.o.)     Referring Practitioner: Coco Honeyuctt MD  Diagnosis: acute respiratory failure  Additional Pertinent Hx: Per HPI: \"80 y.o. female who presented to [address] [place] [address] with increased weakness for the past 3 days. History is limited due to patient's drowsiness. Limited history provided by daughter-in-law who is in the room. Ms. Abiodun Rodriguez received her covid vaccine on 3/1/2021. She did have some fatigue afterwards with nausea. However, she began having acutely worsening weakness and cough over the last 3 days with decreased oral intake. Family was unaware until her daughter-in-law went to check on her today. She denies any shortness of breath to me, but she is requiring 6 L supplemental O2 to maintain SPO2 greater than 90% and appears to have increased work of breathing value at rest.\" Pt is positive for COVID-19. Prior Level of Function:  Lives With: Daughter  Type of Home: House  Home Layout: Multi-level  Home Access: Stairs to enter with rails  Entrance Stairs - Number of Steps: 3 steps + 7 to get to her bedroom/bathroom  Home Equipment: (no DME PTA)   Bathroom Equipment: Shower chair    ADL Assistance: Needs assistance  Homemaking Assistance: (daughter completes all)  Ambulation Assistance: Independent  Transfer Assistance: Independent  Active : Yes    Restrictions/Precautions:  Restrictions/Precautions: Isolation(droplet + precautions)  Position Activity Restriction  Other position/activity restrictions: 3/11: HFNC     SUBJECTIVE: RN approved session. Patient resting in recliner chair upon arrival and agreeable to therapy.  Confusion noted throughout, patient initially agreed to transferring back to bed then upon sitting edge of chair declined transfer. PAIN: denies    Vitals: Oxygen: 6L, O2 sats 92-94%, lowest drop 87% when patient doffed nasal cannula to blow nose. OBJECTIVE:  Bed Mobility:  Not Tested    Transfers:  Not Tested    Ambulation:  Not Tested    Balance:  Static Sitting Balance:  Stand By Assistance, Contact Guard Assistance, X 1, sat edge of chair ~6min in preparation for transfer then ended up declining transfer, c/o dizziness upon sitting upright    Exercise:  Patient was guided in 1 set(s) 10 reps of exercise to both lower extremities. Ankle pumps, Heelslides, Hip abduction/adduction and Straight leg raises. Exercises were completed for increased independence with functional mobility. **required max encouragement to participate. Patient confused throughout. Required AAROM for all LE exercises. Functional Outcome Measures: Completed  AM-PAC Inpatient Mobility Raw Score : 14  AM-PAC Inpatient T-Scale Score : 38.1    ASSESSMENT:  Assessment: Limited by confusion, requires encouragement to participate. Activity Tolerance:  Patient tolerance of  treatment: fair.       Equipment Recommendations:Equipment Needed: (likely a rolling walker, continue to assess)  Discharge Recommendations:  Continue to assess pending progress, Subacute/Skilled Nursing Facility, LTACH    Plan: Times per week: 5x GM  Times per day: Daily  Current Treatment Recommendations: Strengthening, Functional Mobility Training, Home Exercise Program, Endurance Training, Balance Training, Patient/Caregiver Education & Training, Transfer Training, Gait Training, Safety Education & Training    Patient Education  Patient Education: Plan of Care, Verbal Exercise Instruction, Education Related to Prevention of Recurrence of Impairment/Illness/Injury, Health Promotion and Wellness Education    Goals:  Patient goals : return home  Short term goals  Time Frame for Short term goals: by hospital discharge  Short term goal 1: Supine to/from sit with modified independence for ease of transfers at discharge site. Short term goal 2: Sit to/from stand with modified independence for ease of transfers at discharge site. Short term goal 3: Ambulate 21' with CGA x 1 and LRAD, maintaining O2 saturation >88%, for progression to home distance ambulation. Short term goal 4: Ascend/descend 3 steps with 1 HR and Rich for progression to safe entry into home. Long term goals  Time Frame for Long term goals : N/A due to short ELOS. Following session, patient left in safe position with all fall risk precautions in place.

## 2021-03-26 NOTE — PLAN OF CARE
Patient continues on vest percussion and acapella; pt is  now on nasal cannula at 6L; tolerating well.   Will continue to monitor patients respiratory status

## 2021-03-26 NOTE — PLAN OF CARE
Problem: Gas Exchange - Impaired  Goal: Absence of hypoxia  Outcome: Met This Shift     Problem: Isolation Precautions - Risk of Spread of Infection  Goal: Prevent transmission of infection  Outcome: Met This Shift     Problem: Skin Integrity:  Goal: Will show no infection signs and symptoms  Description: Will show no infection signs and symptoms  Outcome: Met This Shift     Problem: Airway Clearance - Ineffective  Goal: Achieve or maintain patent airway  Outcome: Ongoing  Note: Airway patent, pt weaned from high flow to nasal cannula      Problem: Breathing Pattern - Ineffective  Goal: Ability to achieve and maintain a regular respiratory rate  Outcome: Ongoing  Note: Pt has some shortness of breath with activity     Problem: Nutrition Deficits  Goal: Optimize nutritional status  Outcome: Ongoing  Note: Pt needs much encouragement to eat, dietician following      Problem: Falls - Risk of:  Goal: Will remain free from falls  Description: Will remain free from falls  Outcome: Ongoing  Note: No falls this shift. Bed/chair alarm and hourly rounding in place     Problem: Skin Integrity:  Goal: Absence of new skin breakdown  Description: Absence of new skin breakdown  Outcome: Ongoing  Note: No new skin issues noted     Problem: Impaired respiratory status  Goal: Clear lung sounds  Description: Clear lung sounds  3/26/2021 1236 by Verdie Seats, RCP  Outcome: Ongoing  3/26/2021 0921 by Verdie Seats, RCP  Outcome: Ongoing     Problem: Pain:  Goal: Pain level will decrease  Description: Pain level will decrease  Outcome: Ongoing  Note: Pt denies pain this shift    Care plan reviewed with patient and family. Patient and family verbalize understanding of the plan of care and contribute to goal setting.

## 2021-03-27 LAB
ALBUMIN SERPL-MCNC: 2.8 G/DL (ref 3.5–5.1)
ALP BLD-CCNC: 81 U/L (ref 38–126)
ALT SERPL-CCNC: 8 U/L (ref 11–66)
ANION GAP SERPL CALCULATED.3IONS-SCNC: 11 MEQ/L (ref 8–16)
AST SERPL-CCNC: 9 U/L (ref 5–40)
BASOPHILS # BLD: 0.8 %
BASOPHILS ABSOLUTE: 0.1 THOU/MM3 (ref 0–0.1)
BILIRUB SERPL-MCNC: 0.3 MG/DL (ref 0.3–1.2)
BUN BLDV-MCNC: 14 MG/DL (ref 7–22)
CALCIUM SERPL-MCNC: 9.1 MG/DL (ref 8.5–10.5)
CHLORIDE BLD-SCNC: 106 MEQ/L (ref 98–111)
CO2: 19 MEQ/L (ref 23–33)
CREAT SERPL-MCNC: 0.7 MG/DL (ref 0.4–1.2)
EOSINOPHIL # BLD: 4.8 %
EOSINOPHILS ABSOLUTE: 0.3 THOU/MM3 (ref 0–0.4)
ERYTHROCYTE [DISTWIDTH] IN BLOOD BY AUTOMATED COUNT: 17 % (ref 11.5–14.5)
ERYTHROCYTE [DISTWIDTH] IN BLOOD BY AUTOMATED COUNT: 54.3 FL (ref 35–45)
GFR SERPL CREATININE-BSD FRML MDRD: 80 ML/MIN/1.73M2
GLUCOSE BLD-MCNC: 109 MG/DL (ref 70–108)
HCT VFR BLD CALC: 39.4 % (ref 37–47)
HEMOGLOBIN: 11.9 GM/DL (ref 12–16)
IMMATURE GRANS (ABS): 0.05 THOU/MM3 (ref 0–0.07)
IMMATURE GRANULOCYTES: 0.8 %
LYMPHOCYTES # BLD: 23.8 %
LYMPHOCYTES ABSOLUTE: 1.5 THOU/MM3 (ref 1–4.8)
MCH RBC QN AUTO: 27 PG (ref 26–33)
MCHC RBC AUTO-ENTMCNC: 30.2 GM/DL (ref 32.2–35.5)
MCV RBC AUTO: 89.5 FL (ref 81–99)
MONOCYTES # BLD: 9.9 %
MONOCYTES ABSOLUTE: 0.6 THOU/MM3 (ref 0.4–1.3)
NUCLEATED RED BLOOD CELLS: 0 /100 WBC
PLATELET # BLD: 339 THOU/MM3 (ref 130–400)
PMV BLD AUTO: 10.1 FL (ref 9.4–12.4)
POTASSIUM REFLEX MAGNESIUM: 4 MEQ/L (ref 3.5–5.2)
RBC # BLD: 4.4 MILL/MM3 (ref 4.2–5.4)
SEG NEUTROPHILS: 59.9 %
SEGMENTED NEUTROPHILS ABSOLUTE COUNT: 3.9 THOU/MM3 (ref 1.8–7.7)
SODIUM BLD-SCNC: 136 MEQ/L (ref 135–145)
TOTAL PROTEIN: 6.7 G/DL (ref 6.1–8)
WBC # BLD: 6.5 THOU/MM3 (ref 4.8–10.8)

## 2021-03-27 PROCEDURE — 2140000000 HC CCU INTERMEDIATE R&B

## 2021-03-27 PROCEDURE — 2700000000 HC OXYGEN THERAPY PER DAY

## 2021-03-27 PROCEDURE — 85025 COMPLETE CBC W/AUTO DIFF WBC: CPT

## 2021-03-27 PROCEDURE — 6360000002 HC RX W HCPCS: Performed by: FAMILY MEDICINE

## 2021-03-27 PROCEDURE — 36415 COLL VENOUS BLD VENIPUNCTURE: CPT

## 2021-03-27 PROCEDURE — 94640 AIRWAY INHALATION TREATMENT: CPT

## 2021-03-27 PROCEDURE — 6370000000 HC RX 637 (ALT 250 FOR IP): Performed by: STUDENT IN AN ORGANIZED HEALTH CARE EDUCATION/TRAINING PROGRAM

## 2021-03-27 PROCEDURE — 99232 SBSQ HOSP IP/OBS MODERATE 35: CPT | Performed by: FAMILY MEDICINE

## 2021-03-27 PROCEDURE — 80053 COMPREHEN METABOLIC PANEL: CPT

## 2021-03-27 PROCEDURE — 6370000000 HC RX 637 (ALT 250 FOR IP): Performed by: HOSPITALIST

## 2021-03-27 PROCEDURE — 94761 N-INVAS EAR/PLS OXIMETRY MLT: CPT

## 2021-03-27 PROCEDURE — 6370000000 HC RX 637 (ALT 250 FOR IP): Performed by: FAMILY MEDICINE

## 2021-03-27 PROCEDURE — 94669 MECHANICAL CHEST WALL OSCILL: CPT

## 2021-03-27 PROCEDURE — 2580000003 HC RX 258: Performed by: STUDENT IN AN ORGANIZED HEALTH CARE EDUCATION/TRAINING PROGRAM

## 2021-03-27 RX ADMIN — FAMOTIDINE 20 MG: 20 TABLET, FILM COATED ORAL at 09:46

## 2021-03-27 RX ADMIN — AMLODIPINE BESYLATE 5 MG: 5 TABLET ORAL at 09:46

## 2021-03-27 RX ADMIN — FAMOTIDINE 20 MG: 20 TABLET, FILM COATED ORAL at 19:24

## 2021-03-27 RX ADMIN — POTASSIUM CHLORIDE 40 MEQ: 1500 TABLET, EXTENDED RELEASE ORAL at 09:47

## 2021-03-27 RX ADMIN — SODIUM CHLORIDE, PRESERVATIVE FREE 10 ML: 5 INJECTION INTRAVENOUS at 09:59

## 2021-03-27 RX ADMIN — Medication 2000 UNITS: at 09:46

## 2021-03-27 RX ADMIN — Medication 50 MG: at 09:46

## 2021-03-27 RX ADMIN — OXYCODONE HYDROCHLORIDE AND ACETAMINOPHEN 1000 MG: 500 TABLET ORAL at 09:46

## 2021-03-27 RX ADMIN — ENOXAPARIN SODIUM 40 MG: 40 INJECTION SUBCUTANEOUS at 19:26

## 2021-03-27 RX ADMIN — SODIUM CHLORIDE, PRESERVATIVE FREE 10 ML: 5 INJECTION INTRAVENOUS at 19:27

## 2021-03-27 RX ADMIN — GLYCOPYRROLATE AND FORMOTEROL FUMARATE 2 PUFF: 9; 4.8 AEROSOL, METERED RESPIRATORY (INHALATION) at 09:44

## 2021-03-27 RX ADMIN — GLYCOPYRROLATE AND FORMOTEROL FUMARATE 2 PUFF: 9; 4.8 AEROSOL, METERED RESPIRATORY (INHALATION) at 20:02

## 2021-03-27 RX ADMIN — ASPIRIN 81 MG: 81 TABLET, CHEWABLE ORAL at 09:46

## 2021-03-27 RX ADMIN — ATORVASTATIN CALCIUM 20 MG: 20 TABLET, FILM COATED ORAL at 19:26

## 2021-03-27 ASSESSMENT — PAIN SCALES - GENERAL: PAINLEVEL_OUTOF10: 0

## 2021-03-27 NOTE — PROGRESS NOTES
Continued to be on high flow nasal cannula oxygen very difficult to wean off currently, FiO2 45, flow rate 50 L/minute and satting 92% on high flow nasal cannula, CTA did not show any PE, although showed an inflammatory process due to current COVID-19 virus infection, slight leukocytosis, remained afebrile in the past 48 hours, procalcitonin normal, finished treatment for COVID-19 virus infection, last echocardiogram in September 2020 showed 60% EF otherwise normal, continue wean off oxygen as tolerated. 3/23: Continued on high flow nasal cannula, continue wean off, TCU versus LTAC in the process. 3/24/2021: Very slowly improving, continue to be on high flow nasal cannula, current FiO2 55, respiratory therapist helping daily. Continue wean off oxygen as tolerated and consider TCU, if unable may consider LTAC. We will monitor closely. 3/25/2021: Still on high flow, continue wean off, will give a trial today too, after talking to infectious disease they recommended transferring patient 3/28/2021 off the floor per CDC guidelines and hospital policy. Working on [facility].  3/26/2021: Slightly weaned off high flow to 40 FiO2, still awaiting for LTAC.  3/27/2021: Respiratory therapist successfully weaned off the patient from high flow nasal cannula to currently 4 L nasal cannula oxygen satting 98%. Patient to continue evaluated by PT/OT and recommended subacute facility versus SNF, or TCU closed currently, we will work on SNF with the help of .   Acute hypoxic respiratory failure secondary to above  · On HFO FiO2 80% and 60 lpm w/ SaO2 in high 90s  · Rn aware to wean supplemental O2 aggressively as tolerated to SpO2 > 90%  3/11: ABG negative for hypercapnia; managing as above  3/19: managing as above     History of COPD  · Quit smoking 7-10 years ago. 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 Plan was for patient to see GI outpatient. Is suppose to see GI associated OP with w/u including MRI of abd/pelvis. LFT's stable. Clinically, patient has no complaints. Physical debility: Patient would benefit from inpatient rehab versus LTAC as she is not progressing very well and very slow progression, monitor closely. PMH, PSH, SH, FHX reviewed in chart review snap shot in EPIC    CC: Acute hypoxic respiratory failure likely secondary to COVID-19 virus infection, physical debility    HPI: Initial admission HPI by admitting physician reviewed as below:  80 y.o. female who presented to [facility] with increased weakness for the past 3 days. History is limited due to patient's drowsiness. Limited history provided by daughter-in-law who is in the room. Ms. Nona Howard received her covid vaccine on 3/1/2021. She did have some fatigue afterwards with nausea. However, she began having acutely worsening weakness and cough over the last 3 days with decreased oral intake. Family was unaware until her daughter-in-law went to check on her today. She denies any shortness of breath to me, but she is requiring 6 L supplemental O2 to maintain SPO2 greater than 90% and appears to have increased work of breathing value at rest.     Patient denies any pain, feelings of fever/chills, shortness of breath, nausea/vomiting, changes in bowel or bladder habits. Her only complaint is of weakness. For further details and updates please refer to assessment and plan at the beginning of the note. ROS (10 point review of systems completed. Pertinent positives noted.  Otherwise ROS is negative) : Generalized weak  PMH:  Per HPI and reviewed in the chart  SHX: Reviewed in the chart, also the patient  FHX: Reviewed in the chart, also with the patient  Allergies: Reviewed in the chart  Medications:     sodium chloride      sodium chloride      dextrose        potassium chloride  40 mEq Oral Daily    aspirin  81 mg Oral Daily    ascorbic acid  1,000 mg Oral Daily    zinc sulfate  50 mg Oral Daily    sodium chloride flush  10 mL Intravenous 2 times per day    enoxaparin  30 mg Subcutaneous BID    Vitamin D  2,000 Units Oral Daily    atorvastatin  20 mg Oral Daily    famotidine  20 mg Oral BID    glycopyrrolate-formoterol  2 puff Inhalation BID    amLODIPine  5 mg Oral Daily   Subjective 3/22/2021: Very weak, sleepy in bed, arousable, continue to be on high flow nasal cannula oxygen, she would benefit from TCU versus LTAC, monitor closely, nursing notes, labs, vitals reviewed. 3/23/2021: No changes since yesterday, TCU versus LTAC in process, continue wean off oxygen. 3/24: Continue to be on high flow nasal cannula, continue wean off oxygen as tolerated, although FiO2 weaned off today to 55. Respiratory therapist following daily. Monitor closely. 3/25: Working on [facility], no complaints today, talk to infectious disease and the recommendation to transfer out of the floor 3/28/2021.  3/26: Slightly improving today, weaned off to 40 FiO2 high flow, awaiting for LTAC evaluation. 3/27: Improved, not on high flow anymore, on 4 L nasal cannula oxygen satting 98%, continue wean off as tolerated, need help from  for placement to SNF, de-escalate Lovenox to once daily instead of twice daily as the patient improved. Vital Signs:   Vitals reviewed and compared with the previous ones  /85   Pulse 80   Temp 97.7 °F (36.5 °C) (Axillary)   Resp 19   Ht 5' 2\" (1.575 m)   Wt 127 lb 6.8 oz (57.8 kg)   SpO2 98%   BMI 23.31 kg/m²      Intake/Output Summary (Last 24 hours) at 3/27/2021 8050  Last data filed at 3/26/2021 2041  Gross per 24 hour   Intake 440 ml   Output    Net 440 ml        General:   Chronically ill, sleepy in bed, on 4L NC o2  HEENT:  normocephalic and atraumatic. No scleral icterus. PERRLA.   Neck: in the left lower lobe, left mid lung field and to a lesser extent right lung consistent with inflammatory process possibly representing Covid 19 infection. 3. Evidence for old granulomatous disease in left lower lobe. 4. Cardiomegaly. 5. Atherosclerotic calcification in the thoracic aorta. 6. Thoracic spondylosis. 7. Hiatal hernia. .               **This report has been created using voice recognition software. It may contain minor errors which are inherent in voice recognition technology. **      Final report electronically signed by DR Krissy Zacarias on 3/21/2021 10:58 AM      XR CHEST PORTABLE   Final Result   Decreased bilateral airspace disease and bibasilar consolidations. This document has been electronically signed by: Dain Lewis MD on    03/21/2021 04:20 AM      XR CHEST PORTABLE   Final Result      Multifocal interstitial and alveolar opacities the lungs intervally increased from prior exam concerning for infectious process. Cardiomegaly, stable from prior. **This report has been created using voice recognition software. It may contain minor errors which are inherent in voice recognition technology. **      Final report electronically signed by Dr. Salma Blount on 3/12/2021 12:35 PM      XR CHEST PORTABLE   Final Result   Mild cardiomegaly. Interstitial edema noted in the lower lobes with peribronchial cuffing this can be related to acute bronchitis, or atypical viral pneumonia. **This report has been created using voice recognition software. It may contain minor errors which are inherent in voice recognition technology. **      Final report electronically signed by Dr. Cesar Cope on 3/8/2021 12:19 PM      MRI ABDOMEN [address]    (Results Pending)     Xr Chest Portable    Result Date: 3/8/2021  PROCEDURE: XR CHEST PORTABLE CLINICAL INFORMATION: cough. congested. . COMPARISON: 9/22/2020 TECHNIQUE: Portable upright FINDINGS: Cardiomegaly.  No mediastinal widening. Mild interstitial edema with a lower lobe predominance. Parabronchial cuffing can be seen. There is however no pleural effusion identified and no confluent airspace consolidation. Pulmonary vessels are not  congested     Mild cardiomegaly. Interstitial edema noted in the lower lobes with peribronchial cuffing this can be related to acute bronchitis, or atypical viral pneumonia. **This report has been created using voice recognition software. It may contain minor errors which are inherent in voice recognition technology. ** Final report electronically signed by Dr. Bryan Shah on 3/8/2021 12:19 PM      Discussed plan with patient and nurse. Patient and nurse verbalized understanding and agree. All questions addressed with concerns. Please excuse my TYPOS!     Electronically signed by Rah Gutierrez MD on 3/27/2021 at 7:22 AM

## 2021-03-28 PROCEDURE — 94640 AIRWAY INHALATION TREATMENT: CPT

## 2021-03-28 PROCEDURE — 2700000000 HC OXYGEN THERAPY PER DAY

## 2021-03-28 PROCEDURE — 2580000003 HC RX 258: Performed by: STUDENT IN AN ORGANIZED HEALTH CARE EDUCATION/TRAINING PROGRAM

## 2021-03-28 PROCEDURE — 6370000000 HC RX 637 (ALT 250 FOR IP): Performed by: STUDENT IN AN ORGANIZED HEALTH CARE EDUCATION/TRAINING PROGRAM

## 2021-03-28 PROCEDURE — 6370000000 HC RX 637 (ALT 250 FOR IP): Performed by: FAMILY MEDICINE

## 2021-03-28 PROCEDURE — 99232 SBSQ HOSP IP/OBS MODERATE 35: CPT | Performed by: FAMILY MEDICINE

## 2021-03-28 PROCEDURE — 6370000000 HC RX 637 (ALT 250 FOR IP): Performed by: HOSPITALIST

## 2021-03-28 PROCEDURE — 6360000002 HC RX W HCPCS: Performed by: FAMILY MEDICINE

## 2021-03-28 PROCEDURE — 94761 N-INVAS EAR/PLS OXIMETRY MLT: CPT

## 2021-03-28 PROCEDURE — 1200000003 HC TELEMETRY R&B

## 2021-03-28 RX ADMIN — ASPIRIN 81 MG: 81 TABLET, CHEWABLE ORAL at 09:07

## 2021-03-28 RX ADMIN — GLYCOPYRROLATE AND FORMOTEROL FUMARATE 2 PUFF: 9; 4.8 AEROSOL, METERED RESPIRATORY (INHALATION) at 20:50

## 2021-03-28 RX ADMIN — SODIUM CHLORIDE, PRESERVATIVE FREE 10 ML: 5 INJECTION INTRAVENOUS at 09:08

## 2021-03-28 RX ADMIN — ENOXAPARIN SODIUM 40 MG: 40 INJECTION SUBCUTANEOUS at 19:57

## 2021-03-28 RX ADMIN — FAMOTIDINE 20 MG: 20 TABLET, FILM COATED ORAL at 19:57

## 2021-03-28 RX ADMIN — Medication 2000 UNITS: at 09:07

## 2021-03-28 RX ADMIN — OXYCODONE HYDROCHLORIDE AND ACETAMINOPHEN 1000 MG: 500 TABLET ORAL at 09:06

## 2021-03-28 RX ADMIN — SODIUM CHLORIDE, PRESERVATIVE FREE 10 ML: 5 INJECTION INTRAVENOUS at 19:58

## 2021-03-28 RX ADMIN — POTASSIUM CHLORIDE 40 MEQ: 1500 TABLET, EXTENDED RELEASE ORAL at 09:07

## 2021-03-28 RX ADMIN — FAMOTIDINE 20 MG: 20 TABLET, FILM COATED ORAL at 09:07

## 2021-03-28 RX ADMIN — AMLODIPINE BESYLATE 5 MG: 5 TABLET ORAL at 09:07

## 2021-03-28 RX ADMIN — GLYCOPYRROLATE AND FORMOTEROL FUMARATE 2 PUFF: 9; 4.8 AEROSOL, METERED RESPIRATORY (INHALATION) at 10:19

## 2021-03-28 RX ADMIN — Medication 50 MG: at 09:06

## 2021-03-28 RX ADMIN — ATORVASTATIN CALCIUM 20 MG: 20 TABLET, FILM COATED ORAL at 21:32

## 2021-03-28 ASSESSMENT — PAIN SCALES - GENERAL: PAINLEVEL_OUTOF10: 0

## 2021-03-28 NOTE — PROGRESS NOTES
Hospitalist Progress Note      Patient:  Dayana Calderon    CVXC/ZFJ:[id number]  YOB: 1940  MRN: [id number]   Acct: [de-identified]   PCP: ERNESTO Franklin CNP  Date of Admission: 3/8/2021    Assessment and Plan:        Pneumonia secondary to COVID 19  · Patient got the 1st dose of COVID vaccine on 3/1,   · On Dexamethasone 6 mg QD (on H1 blocker) and Remdesivir  · Procal WNLs d/c'ed ABx  · Supportive therapy with vitamin C, D, zinc and lactobacillus (d/c'ed)  · LMWH 30mg BID for DVT prophylaxis; added ASA 81 QD as prothrombotic markers elevated  · IS/Acapella/PT to see  3/11: stable; SaO2 drops w/ exertion and also postural changes; CCM. Added chest physio  3/12: improving; encouraged CCM w/ aggressive weaning as tolerated to SaO2 greater than 90%  3/13: slow wean w/ short-lived episodes of worsening hypoxia which is positional. CCM. Reduce IVF to 50 cc/hr given inadequate PO intake. Repeat ABG non-contributroy  3/14: slowly weaning. CCM  3/16: Improving steadily. Now down to FiO2 45% at 45 lpm w/ SaO2 high 90s. CCM  3/17: down to HFO 40/40. Will try stepping down to NC at 6 lpm today as tolerated. 3/18: improving; still unable to wean down to NC; on minimal HFO parameters; CCM  3/19: as clinically improving, pt more mobile w/ desaturation w/ exertion. CCM. RN aware to slowly wean as tolerated  3/20: Continues to slowly wean down. Feeling well otherwise. +22L fluid balance. Will give a dose of Lasix today and see if that helps getting patient off HFNC. Repeat CXR tomorrow am. Check inflam markers. If D-dimer trending up consider CTA. Trial of Bipap.  3/21: D-dimer elevated. CTA negative for PE. Inflam markers trending up. Aggressive CPT. Consider more diuretics. Will hold off today due to contrast given. 3/22: After discussion with the nurse patient her usual is sleeping during the day and awake all night long, she was sleeping when I encountered her in the morning, she is arousable though. Continued to be on high flow nasal cannula oxygen very difficult to wean off currently, FiO2 45, flow rate 50 L/minute and satting 92% on high flow nasal cannula, CTA did not show any PE, although showed an inflammatory process due to current COVID-19 virus infection, slight leukocytosis, remained afebrile in the past 48 hours, procalcitonin normal, finished treatment for COVID-19 virus infection, last echocardiogram in September 2020 showed 60% EF otherwise normal, continue wean off oxygen as tolerated. 3/23: Continued on high flow nasal cannula, continue wean off, TCU versus LTAC in the process. 3/24/2021: Very slowly improving, continue to be on high flow nasal cannula, current FiO2 55, respiratory therapist helping daily. Continue wean off oxygen as tolerated and consider TCU, if unable may consider LTAC. We will monitor closely. 3/25/2021: Still on high flow, continue wean off, will give a trial today too, after talking to infectious disease they recommended transferring patient 3/28/2021 off the floor per CDC guidelines and hospital policy. Working on [place] 19.  3/26/2021: Slightly weaned off high flow to 40 FiO2, still awaiting for LTAC.  3/27/2021: Respiratory therapist successfully weaned off the patient from high flow nasal cannula to currently 4 L nasal cannula oxygen satting 98%. Patient to continue evaluated by PT/OT and recommended subacute facility versus SNF, or TCU closed currently, we will work on SNF with the help of .   Acute hypoxic respiratory failure secondary to above  · On HFO FiO2 80% and 60 lpm w/ SaO2 in high 90s  · Rn aware to wean supplemental O2 aggressively as tolerated to SpO2 > 90%  3/11: ABG negative for hypercapnia; managing as above  3/19: managing as above     History of COPD  · Quit smoking 7-10 years ago. 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 Plan was for patient to see GI outpatient. Is suppose to see GI associated OP with w/u including MRI of abd/pelvis. LFT's stable. Clinically, patient has no complaints. Physical debility: Patient would benefit from inpatient rehab versus LTAC as she is not progressing very well and very slow progression, monitor closely. 3/28/2021; weaned off to 2 L nasal cannula oxygen, tolerating very well, she is very weak need nursing facility, awaiting  to reevaluate tomorrow to initiate nursing facility referral.  PMH, PSH, SH, [address] [facility],[address] reviewed in chart review snap shot in EPIC    CC: Acute hypoxic respiratory failure likely secondary to COVID-19 virus infection, physical debility    HPI: Initial admission HPI by admitting physician reviewed as below:  80 y.o. female who presented to [address] [place] [address] with increased weakness for the past 3 days. History is limited due to patient's drowsiness. Limited history provided by daughter-in-law who is in the room. Ms. Lesly Muñiz received her covid vaccine on 3/1/2021. She did have some fatigue afterwards with nausea. However, she began having acutely worsening weakness and cough over the last 3 days with decreased oral intake. Family was unaware until her daughter-in-law went to check on her today. She denies any shortness of breath to me, but she is requiring 6 L supplemental O2 to maintain SPO2 greater than 90% and appears to have increased work of breathing value at rest.     Patient denies any pain, feelings of fever/chills, shortness of breath, nausea/vomiting, changes in bowel or bladder habits. Her only complaint is of weakness. For further details and updates please refer to assessment and plan at the beginning of the note. ROS (10 point review of systems completed. Pertinent positives noted.  Otherwise ROS is negative) : Generalized weak  PMH:  Per HPI 97.7 °F (36.5 °C) (Axillary)   Resp 22   Ht 5' 2\" (1.575 m)   Wt 136 lb 6.4 oz (61.9 kg)   SpO2 92%   BMI 24.95 kg/m²    No intake or output data in the 24 hours ending 03/28/21 0828     General:   Chronically ill, communicating well, on 2 L nasal cannula oxygen  HEENT:  normocephalic and atraumatic. No scleral icterus. PERRLA. Neck: supple. No thyromegaly. Lungs: No wheeziness, decreased breath sounds bilaterally  Cardiac: S1, S2, RRR, OMAR appreciated, no JVD. Abdomen: soft. Increased abdominal girth, nontender. Bowel sounds positive. Extremities:  No clubbing, cyanosis, or edema in all extremities. Vasculature: capillary refill < 3 seconds. Pedal pulses intact bilaterally. Skin:  warm and dry. Not clammy. Psych:  Communicating well, fully oriented  Lymph:  No supraclavicular ,and no anterior cervical lymphadenopathy. Neurologic: Unable to examine due to general condition    Labs:   Recent Labs     03/26/21 0421 03/27/21  0323   WBC 6.9 6.5   HGB 11.6* 11.9*   HCT 37.7 39.4    339     Recent Labs     03/26/21 0421 03/27/21  0323    136   K 3.7 4.0    106   CO2 19* 19*   BUN 10 14   CREATININE 0.6 0.7   CALCIUM 9.4 9.1     Recent Labs     03/26/21 0421 03/27/21  0323   AST 9 9   ALT 8* 8*   BILITOT 0.4 0.3   ALKPHOS 83 81     No results for input(s): INR in the last 72 hours. No results for input(s): [place] in the last 72 hours.     Microbiology:    Blood culture #1:   Lab Results   Component Value Date    BC No growth-preliminary  No growth   06/20/2018       Blood culture #2:No results found for: Janeth Conroy    Organism:No results found for: ORG    No results found for: LABGRAM    MRSA culture only:No results found for: [facility]    Urine culture: No results found for: LABURIN    Respiratory culture: No results found for: CULTRESP    Aerobic and Anaerobic :  No results found for: LABAERO  No results found for: LABANAE    Urinalysis:      Lab Results   Component Value Date    NITRU NEGATIVE 06/20/2018    BLOODU NEGATIVE 06/20/2018    GLUCOSEU NEGATIVE 06/20/2018       Radiology:  CTA CHEST W WO CONTRAST   Final Result    IMPRESSION:      1. No evidence of pulmonary emboli. 2. Abnormal density in the left lower lobe, left mid lung field and to a lesser extent right lung consistent with inflammatory process possibly representing Covid 19 infection. 3. Evidence for old granulomatous disease in left lower lobe. 4. Cardiomegaly. 5. Atherosclerotic calcification in the thoracic aorta. 6. Thoracic spondylosis. 7. Hiatal hernia. .               **This report has been created using voice recognition software. It may contain minor errors which are inherent in voice recognition technology. **      Final report electronically signed by DR Roma Bryan on 3/21/2021 10:58 AM      XR CHEST PORTABLE   Final Result   Decreased bilateral airspace disease and bibasilar consolidations. This document has been electronically signed by: Josiah Louie MD on    03/21/2021 04:20 AM      XR CHEST PORTABLE   Final Result      Multifocal interstitial and alveolar opacities the lungs intervally increased from prior exam concerning for infectious process. Cardiomegaly, stable from prior. **This report has been created using voice recognition software. It may contain minor errors which are inherent in voice recognition technology. **      Final report electronically signed by Dr. Rupert Chaudhry on 3/12/2021 12:35 PM      XR CHEST PORTABLE   Final Result   Mild cardiomegaly. Interstitial edema noted in the lower lobes with peribronchial cuffing this can be related to acute bronchitis, or atypical viral pneumonia. **This report has been created using voice recognition software. It may contain minor errors which are inherent in voice recognition technology. **      Final report electronically signed by Dr. Jennifer Nash on 3/8/2021 12:19 PM      [address] (Results Pending)     Xr Chest Portable    Result Date: 3/8/2021  PROCEDURE: XR CHEST PORTABLE CLINICAL INFORMATION: cough. congested. . COMPARISON: 9/22/2020 TECHNIQUE: Portable upright FINDINGS: Cardiomegaly. No mediastinal widening. Mild interstitial edema with a lower lobe predominance. Parabronchial cuffing can be seen. There is however no pleural effusion identified and no confluent airspace consolidation. Pulmonary vessels are not  congested     Mild cardiomegaly. Interstitial edema noted in the lower lobes with peribronchial cuffing this can be related to acute bronchitis, or atypical viral pneumonia. **This report has been created using voice recognition software. It may contain minor errors which are inherent in voice recognition technology. ** Final report electronically signed by Dr. Art Dawson on 3/8/2021 12:19 PM      Discussed plan with patient and nurse. Patient and nurse verbalized understanding and agree. All questions addressed with concerns. Please excuse my TYPOS!     Electronically signed by Jennie Arellano MD on 3/28/2021 at 8:28 AM

## 2021-03-28 NOTE — PLAN OF CARE
Problem: Gas Exchange - Impaired  Goal: Absence of hypoxia  Outcome: Ongoing   Patient has maintained about 93% SpO2 on 2 L of O2 via NC since arriving to 5K this shift. No sxs cyanosis/hypoxia, respirations even and unlabored. Lungs clear to auscultation and diminished. Non-productive cough present, occasional.  Problem: Body Temperature -  Risk of, Imbalanced  Goal: Ability to maintain a body temperature within defined limits  Outcome: Ongoing   Patient has remained afebrile since arriving this shift, and denies feeling warm or chilled. Problem: Nutrition Deficits  Goal: Optimize nutritional status  Outcome: Ongoing   Patient ate half of a fish sandwich that daughter brought in for her, and declined dinner from the hospital. Daughter states that patient's appetite is typically poor, outside of illness/hospitalization. Encouraged patient to eat nutrient-dense foods when she is able to eat, and supplements are provided with meals and at bedside. Patient verbalized understanding. Care plan reviewed with patient. Patient verbalizes understanding of the plan of care and contributes to goal setting.

## 2021-03-28 NOTE — PROGRESS NOTES
Pt admitted to  5K26 by bed from 3A/B. IV site free of s/s of infection or infiltration. Vital signs obtained. Assessment complete. All questions answered with no further questions at this time, no current complaints/denies pain. Two nurse skin assessment performed by Sarita Caruso and Ghada Aparicio RN. Oriented to room. Policies and procedures for 5 explained. A Fall prevention and safety brochure discussed with patient. Bed alarm on. Call light in reach.

## 2021-03-29 ENCOUNTER — TELEPHONE (OUTPATIENT)
Dept: FAMILY MEDICINE CLINIC | Age: 81
End: 2021-03-29

## 2021-03-29 ENCOUNTER — PATIENT MESSAGE (OUTPATIENT)
Dept: RHEUMATOLOGY | Age: 81
End: 2021-03-29

## 2021-03-29 PROBLEM — E44.0 MODERATE MALNUTRITION (HCC): Status: ACTIVE | Noted: 2021-03-29

## 2021-03-29 PROCEDURE — 99231 SBSQ HOSP IP/OBS SF/LOW 25: CPT | Performed by: FAMILY MEDICINE

## 2021-03-29 PROCEDURE — 2580000003 HC RX 258: Performed by: STUDENT IN AN ORGANIZED HEALTH CARE EDUCATION/TRAINING PROGRAM

## 2021-03-29 PROCEDURE — 97530 THERAPEUTIC ACTIVITIES: CPT

## 2021-03-29 PROCEDURE — 6370000000 HC RX 637 (ALT 250 FOR IP): Performed by: HOSPITALIST

## 2021-03-29 PROCEDURE — 97110 THERAPEUTIC EXERCISES: CPT

## 2021-03-29 PROCEDURE — 6370000000 HC RX 637 (ALT 250 FOR IP): Performed by: STUDENT IN AN ORGANIZED HEALTH CARE EDUCATION/TRAINING PROGRAM

## 2021-03-29 PROCEDURE — 2700000000 HC OXYGEN THERAPY PER DAY

## 2021-03-29 PROCEDURE — 6370000000 HC RX 637 (ALT 250 FOR IP): Performed by: FAMILY MEDICINE

## 2021-03-29 PROCEDURE — 94640 AIRWAY INHALATION TREATMENT: CPT

## 2021-03-29 PROCEDURE — 1200000003 HC TELEMETRY R&B

## 2021-03-29 PROCEDURE — 94760 N-INVAS EAR/PLS OXIMETRY 1: CPT

## 2021-03-29 PROCEDURE — 6360000002 HC RX W HCPCS: Performed by: FAMILY MEDICINE

## 2021-03-29 RX ADMIN — FAMOTIDINE 20 MG: 20 TABLET, FILM COATED ORAL at 20:40

## 2021-03-29 RX ADMIN — ASPIRIN 81 MG: 81 TABLET, CHEWABLE ORAL at 09:14

## 2021-03-29 RX ADMIN — ENOXAPARIN SODIUM 40 MG: 40 INJECTION SUBCUTANEOUS at 20:40

## 2021-03-29 RX ADMIN — AMLODIPINE BESYLATE 5 MG: 5 TABLET ORAL at 09:15

## 2021-03-29 RX ADMIN — ATORVASTATIN CALCIUM 20 MG: 20 TABLET, FILM COATED ORAL at 20:40

## 2021-03-29 RX ADMIN — FAMOTIDINE 20 MG: 20 TABLET, FILM COATED ORAL at 09:14

## 2021-03-29 RX ADMIN — SODIUM CHLORIDE, PRESERVATIVE FREE 10 ML: 5 INJECTION INTRAVENOUS at 20:40

## 2021-03-29 RX ADMIN — SODIUM CHLORIDE, PRESERVATIVE FREE 10 ML: 5 INJECTION INTRAVENOUS at 09:15

## 2021-03-29 RX ADMIN — GLYCOPYRROLATE AND FORMOTEROL FUMARATE 2 PUFF: 9; 4.8 AEROSOL, METERED RESPIRATORY (INHALATION) at 17:45

## 2021-03-29 RX ADMIN — POTASSIUM CHLORIDE 40 MEQ: 1500 TABLET, EXTENDED RELEASE ORAL at 09:14

## 2021-03-29 RX ADMIN — OXYCODONE HYDROCHLORIDE AND ACETAMINOPHEN 1000 MG: 500 TABLET ORAL at 09:14

## 2021-03-29 RX ADMIN — GLYCOPYRROLATE AND FORMOTEROL FUMARATE 2 PUFF: 9; 4.8 AEROSOL, METERED RESPIRATORY (INHALATION) at 07:38

## 2021-03-29 RX ADMIN — Medication 50 MG: at 09:14

## 2021-03-29 RX ADMIN — Medication 2000 UNITS: at 09:14

## 2021-03-29 ASSESSMENT — PAIN SCALES - GENERAL: PAINLEVEL_OUTOF10: 0

## 2021-03-29 NOTE — PROGRESS NOTES
Hospitalist Progress Note      Patient:  Marlon Torres    RDTZ/AXW:[id number]  YOB: 1940  MRN: [id number]   Acct: [de-identified]   PCP: ERNESTO Marvin CNP  Date of Admission: 3/8/2021    Assessment and Plan:        Pneumonia secondary to COVID 19  · Patient got the 1st dose of COVID vaccine on 3/1,   · On Dexamethasone 6 mg QD (on H1 blocker) and Remdesivir  · Procal WNLs d/c'ed ABx  · Supportive therapy with vitamin C, D, zinc and lactobacillus (d/c'ed)  · LMWH 30mg BID for DVT prophylaxis; added ASA 81 QD as prothrombotic markers elevated  · IS/Acapella/PT to see  3/11: stable; SaO2 drops w/ exertion and also postural changes; CCM. Added chest physio  3/12: improving; encouraged CCM w/ aggressive weaning as tolerated to SaO2 greater than 90%  3/13: slow wean w/ short-lived episodes of worsening hypoxia which is positional. CCM. Reduce IVF to 50 cc/hr given inadequate PO intake. Repeat ABG non-contributroy  3/14: slowly weaning. CCM  3/16: Improving steadily. Now down to FiO2 45% at 45 lpm w/ SaO2 high 90s. CCM  3/17: down to HFO 40/40. Will try stepping down to NC at 6 lpm today as tolerated. 3/18: improving; still unable to wean down to NC; on minimal HFO parameters; CCM  3/19: as clinically improving, pt more mobile w/ desaturation w/ exertion. CCM. RN aware to slowly wean as tolerated  3/20: Continues to slowly wean down. Feeling well otherwise. +22L fluid balance. Will give a dose of Lasix today and see if that helps getting patient off HFNC. Repeat CXR tomorrow am. Check inflam markers. If D-dimer trending up consider CTA. Trial of Bipap.  3/21: D-dimer elevated. CTA negative for PE. Inflam markers trending up. Aggressive CPT. Consider more diuretics. Will hold off today due to contrast given. 3/22: After discussion with the nurse patient her usual is sleeping during the day and awake all night long, she was sleeping when I encountered her in the morning, she is arousable though. remdesivir; INR WNLs  Follow up as outpatient for consideration of Tx     3/20: Recently dx on 2/8. Plan was for patient to see GI outpatient. Is suppose to see GI associated OP with w/u including MRI of abd/pelvis. LFT's stable. Clinically, patient has no complaints. Physical debility: Patient would benefit from inpatient rehab versus LTAC as she is not progressing very well and very slow progression, monitor closely. 3/28/2021; weaned off to 2 L nasal cannula oxygen, tolerating very well, she is very weak need nursing facility, awaiting  to reevaluate tomorrow to initiate nursing facility referral.  3/29/2021: Currently on 2 L nasal cannula oxygen satting 94%, need nursing facility, working with PT/OT,  to coordinate. PMH, PSH, SH, FHX reviewed in chart review snap shot in EPIC    CC: Acute hypoxic respiratory failure likely secondary to COVID-19 virus infection, physical debility    HPI: Initial admission HPI by admitting physician reviewed as below:  80 y.o. female who presented to [address] [place] [address] with increased weakness for the past 3 days. History is limited due to patient's drowsiness. Limited history provided by daughter-in-law who is in the room. Ms. Joanne Burks received her covid vaccine on 3/1/2021. She did have some fatigue afterwards with nausea. However, she began having acutely worsening weakness and cough over the last 3 days with decreased oral intake. Family was unaware until her daughter-in-law went to check on her today. She denies any shortness of breath to me, but she is requiring 6 L supplemental O2 to maintain SPO2 greater than 90% and appears to have increased work of breathing value at rest.     Patient denies any pain, feelings of fever/chills, shortness of breath, nausea/vomiting, changes in bowel or bladder habits. Her only complaint is of weakness. For further details and updates please refer to assessment and plan at the beginning of the note. and remove isolation precautions. 3/29;  to coordinate for nursing facility. Monitor closely. Vital Signs:   Vitals reviewed and compared with the previous ones  /81   Pulse 87   Temp 97.6 °F (36.4 °C) (Oral)   Resp 16   Ht 5' 2\" (1.575 m)   Wt 136 lb 6.4 oz (61.9 kg)   SpO2 92%   BMI 24.95 kg/m²    No intake or output data in the 24 hours ending 03/29/21 0714     General:   Chronically ill, communicating well, on 2 L nasal cannula oxygen  HEENT:  normocephalic and atraumatic. No scleral icterus. PERRLA. Neck: supple. No thyromegaly. Lungs: No wheeziness, decreased breath sounds bilaterally  Cardiac: S1, S2, RRR, OMAR appreciated, no JVD. Abdomen: soft. Increased abdominal girth, nontender. Bowel sounds positive. Extremities:  No clubbing, cyanosis, or edema in all extremities. Vasculature: capillary refill < 3 seconds. Pedal pulses intact bilaterally. Skin:  warm and dry. Not clammy. Psych:  Communicating well, fully oriented  Lymph:  No supraclavicular ,and no anterior cervical lymphadenopathy. Neurologic: Unable to examine due to general condition    Labs:   Recent Labs     03/27/21  0323   WBC 6.5   HGB 11.9*   HCT 39.4        Recent Labs     03/27/21  0323      K 4.0      CO2 19*   BUN 14   CREATININE 0.7   CALCIUM 9.1     Recent Labs     03/27/21  0323   AST 9   ALT 8*   BILITOT 0.3   ALKPHOS 81     No results for input(s): INR in the last 72 hours. No results for input(s): [place] in the last 72 hours.     Microbiology:    Blood culture #1:   Lab Results   Component Value Date    BC No growth-preliminary  No growth   06/20/2018       Blood culture #2:No results found for: Dimas Gannon    Organism:No results found for: ORG    No results found for: LABGRAM    MRSA culture only:No results found for: [facility]    Urine culture: No results found for: LABURIN    Respiratory culture: No results found for: CULTRESP    Aerobic and Anaerobic :  No results found for: LABAERO  No results found for: LABANAE    Urinalysis:      Lab Results   Component Value Date    NITRU NEGATIVE 06/20/2018    BLOODU NEGATIVE 06/20/2018    GLUCOSEU NEGATIVE 06/20/2018       Radiology:  CTA CHEST W WO CONTRAST   Final Result    IMPRESSION:      1. No evidence of pulmonary emboli. 2. Abnormal density in the left lower lobe, left mid lung field and to a lesser extent right lung consistent with inflammatory process possibly representing Covid 19 infection. 3. Evidence for old granulomatous disease in left lower lobe. 4. Cardiomegaly. 5. Atherosclerotic calcification in the thoracic aorta. 6. Thoracic spondylosis. 7. Hiatal hernia. .               **This report has been created using voice recognition software. It may contain minor errors which are inherent in voice recognition technology. **      Final report electronically signed by DR Joelle Espinal on 3/21/2021 10:58 AM      XR CHEST PORTABLE   Final Result   Decreased bilateral airspace disease and bibasilar consolidations. This document has been electronically signed by: Acey Schirmer, MD on    03/21/2021 04:20 AM      XR CHEST PORTABLE   Final Result      Multifocal interstitial and alveolar opacities the lungs intervally increased from prior exam concerning for infectious process. Cardiomegaly, stable from prior. **This report has been created using voice recognition software. It may contain minor errors which are inherent in voice recognition technology. **      Final report electronically signed by Dr. Gwen Bhandari on 3/12/2021 12:35 PM      XR CHEST PORTABLE   Final Result   Mild cardiomegaly. Interstitial edema noted in the lower lobes with peribronchial cuffing this can be related to acute bronchitis, or atypical viral pneumonia. **This report has been created using voice recognition software. It may contain minor errors which are inherent in voice recognition technology. **      Final report electronically signed by Dr. Oneill Goes on 3/8/2021 12:19 PM      MRI ABDOMEN W [address]    (Results Pending)     Xr Chest Portable    Result Date: 3/8/2021  PROCEDURE: XR CHEST PORTABLE CLINICAL INFORMATION: cough. congested. . COMPARISON: 9/22/2020 TECHNIQUE: Portable upright FINDINGS: Cardiomegaly. No mediastinal widening. Mild interstitial edema with a lower lobe predominance. Parabronchial cuffing can be seen. There is however no pleural effusion identified and no confluent airspace consolidation. Pulmonary vessels are not  congested     Mild cardiomegaly. Interstitial edema noted in the lower lobes with peribronchial cuffing this can be related to acute bronchitis, or atypical viral pneumonia. **This report has been created using voice recognition software. It may contain minor errors which are inherent in voice recognition technology. ** Final report electronically signed by Dr. Oneill Goes on 3/8/2021 12:19 PM      Discussed plan with patient and nurse. Patient and nurse verbalized understanding and agree. All questions addressed with concerns. Please excuse my TYPOS!     Electronically signed by Angelica Quintana MD on 3/29/2021 at 7:14 AM

## 2021-03-29 NOTE — PLAN OF CARE
Problem: Impaired respiratory status  Goal: Clear lung sounds  Description: Clear lung sounds  Outcome: Ongoing  Note:  Patient lung sounds are considered normal for their current lung condition. No signs of distress noted.  Current treatment regimen appropriate

## 2021-03-29 NOTE — PROGRESS NOTES
[address]  Occupational Therapy  Daily Note  Time:    Time In: 4  Time Out: 1406  Timed Code Treatment Minutes: 24 Minutes  Minutes: 24          Date: 3/29/2021  Patient Name: Michelle Kan,   Gender: female      Room: -26/026-A  MRN: [id number]  : 1940  (80 y.o.)  Referring Practitioner: Dr. Jessica Eddy MD  Diagnosis: Acute Respiratory Failure  Additional Pertinent Hx: Per H&P: Pt presented to [address] [place] [address] with increased weakness for the past 3 days. Limited history provided by daughter-in-law who is in the room. Ms. Esther Rosas received her covid vaccine on 3/1/2021. She did have some fatigue afterwards with nausea. However, she began having acutely worsening weakness and cough over the last 3 days with decreased oral intake. Family was unaware until her daughter-in-law went to check on her the day of admission. She is requiring 6 L supplemental O2 to maintain SPO2 greater than 90% and appears to have increased work of breathing value at rest. COVID +    Restrictions/Precautions:  Restrictions/Precautions: Isolation(droplet + precautions)  Position Activity Restriction  Other position/activity restrictions: 3/11: HFNC O2 nasal cannula 3/29    SUBJECTIVE: Pt sitting up in bedside chair stating she was ok. Pt stating she vomited while she was in bathroom earlier this date. Prior to entering room, RN reporting pt did get dizzy when she was up to bathroom. RN approving session. PAIN: 0/10:      Vitals: Vitals not assessed per clinical judgement, see nursing flowsheet    COGNITION: Decreased Insight, Decreased Problem Solving and Decreased Safety Awareness    ADL:   Grooming: Stand By Assistance. seated in bedside chair  Lower Extremity Dressing: Maximum Assistance. pt wanted to and did attempt to lean forward for LB dressing with noted increased dizziness when doing so. BALANCE:  Sitting Balance:  Stand By Assistance.  seated EOC  Standing Balance: Contact Guard Assistance. with bilateral UE support on walker. Pt with no complaints of increased dizziness upon standing or during the 1 min of standing. When asked to take steps forward pt stating \"I don't want to push it. \"     BED MOBILITY:  Not Tested    TRANSFERS:  Sit to Stand:  Contact Guard Assistance. from chair   Stand to Sit: [employer]. into chair with cue to step backwards to chair prior to sitting     FUNCTIONAL MOBILITY:  Therapist encouraged pt to attempt to step forward with pt stating \"I don't want to push it. \" d/t getting dizzy earlier when she was up with RN.     ADDITIONAL ACTIVITIES:  Completed bilateral UE AROM ex to increase endurance and strength for ADL tasks. Pt completed 10 reps of each ex with increased noted SOB during requiring rest breaks throughout. Pt educated on breathing tech as well throughout. Pt on O2 nasal cannula throughout . ASSESSMENT:     Activity Tolerance:  Patient tolerance of  treatment: fair. Increased SOB during requiring increased need for rest breaks       Discharge Recommendations: [address] [facility], Continue to assess pending progress    Equipment Recommendations: Other: will continue to monitor pending progress, discharge location  Plan: Times per week: 5x  Current Treatment Recommendations: Strengthening, Balance Training, Functional Mobility Training, Endurance Training, Safety Education & Training, Patient/Caregiver Education & Training, Equipment Evaluation, Education, & procurement, Self-Care / ADL    Patient Education  Patient Education: breathing tech when increased SOB     Goals  Short term goals  Time Frame for Short term goals: by discharge  Short term goal 1: Pt will increase activity tolerance for functional mobility to/from [facility] or chair with CGA and Sp02 >=90% in prep for toileting tasks.   Short term goal 2: Pt will tolerate dynamic standing X2 minutes with CGA and unilateral release in prep for hygiene/clothing management. Short term goal 3: Pt will complete BADL tasks with minimal assistance, incorporating ECT with minimal cues, to increase independence and ease with self care tasks. Short term goal 4: Pt will complete functional transfers with CGA in prep for BSC/toilet transfers. Following session, patient left in safe position with all fall risk precautions in place.

## 2021-03-29 NOTE — DISCHARGE SUMMARY
Hospitalist Discharge Summary        Patient: Marlon Torres  YOB: 1940  MRN: [id number]   Acct: [de-identified]    Primary Care Physician: ERNESTO Marvin CNP    Admit date  3/8/2021    Discharge date: 3/30/2021 at 11:09 AM  Discharge Diagnoses: Active Hospital Problems    Diagnosis Date Noted    Moderate malnutrition ([facility] [address] [E44.0] 03/29/2021     Class: Acute    Pneumonia due to COVID-19 virus [U07.1, J12.82]     Acute respiratory failure ([facility] [address] [J96.00] 03/08/2021       Code Status:  Limited     Chief Complaint on presentation :-Increased shortness of breath      Initial admission HPI as below:-  80 y.o. female who presented to [address] [place] [address] with increased weakness for the past 3 days.  History is limited due to patient's drowsiness.  Limited history provided by daughter-in-law who is in the room.  Ms. Mable Muniz received her covid vaccine on 3/1/2021.  She did have some fatigue afterwards with nausea.  However, she began having acutely worsening weakness and cough over the last 3 days with decreased oral intake.  Family was unaware until her daughter-in-law went to check on her today. Juancho Casas denies any shortness of breath to me, but she is requiring 6 L supplemental O2 to maintain SPO2 greater than 90% and appears to have increased work of breathing value at rest.     Patient denies any pain, feelings of fever/chills, shortness of breath, nausea/vomiting, changes in bowel or bladder habits. Andrés Fortune only complaint is of weakness. Hospital problem list with assessment and plan updates:-  Pneumonia secondary to COVID 19  · Patient got the 1st dose of COVID vaccine on 3/1,   · On Dexamethasone 6 mg QD (on H1 blocker) and Remdesivir  · Procal WNLs d/c'ed ABx  · Supportive therapy with vitamin C, D, zinc and lactobacillus (d/c'ed)  · LMWH 30mg BID for DVT prophylaxis; added ASA 81 QD as prothrombotic markers elevated  · IS/Acapella/PT to see  3/11: stable;  SaO2 drops w/ exertion and also postural changes; CCM. Added chest physio  3/12: improving; encouraged CCM w/ aggressive weaning as tolerated to SaO2 greater than 90%  3/13: slow wean w/ short-lived episodes of worsening hypoxia which is positional. CCM. Reduce IVF to 50 cc/hr given inadequate PO intake. Repeat ABG non-contributroy  3/14: slowly weaning. CCM  3/16: Improving steadily. Now down to FiO2 45% at 45 lpm w/ SaO2 high 90s. CCM  3/17: down to HFO 40/40. Will try stepping down to NC at 6 lpm today as tolerated. 3/18: improving; still unable to wean down to NC; on minimal HFO parameters; CCM  3/19: as clinically improving, pt more mobile w/ desaturation w/ exertion. CCM. RN aware to slowly wean as tolerated  3/20: Continues to slowly wean down. Feeling well otherwise. +22L fluid balance. Will give a dose of Lasix today and see if that helps getting patient off HFNC. Repeat CXR tomorrow am. Check inflam markers. If D-dimer trending up consider CTA. Trial of Bipap.  3/21: D-dimer elevated. CTA negative for PE. Inflam markers trending up. Aggressive CPT. Consider more diuretics. Will hold off today due to contrast given.   3/22: After discussion with the nurse patient her usual is sleeping during the day and awake all night long, she was sleeping when I encountered her in the morning, she is arousable though. Continued to be on high flow nasal cannula oxygen very difficult to wean off currently, FiO2 45, flow rate 50 L/minute and satting 92% on high flow nasal cannula, CTA did not show any PE, although showed an inflammatory process due to current COVID-19 virus infection, slight leukocytosis, remained afebrile in the past 48 hours, procalcitonin normal, finished treatment for COVID-19 virus infection, last echocardiogram in September 2020 showed 60% EF otherwise normal, continue wean off oxygen as tolerated. 3/23: Continued on high flow nasal cannula, continue wean off, TCU versus LTAC in the process.   3/24/2021: Very slowly improving, continue to be on high flow nasal cannula, current FiO2 55, respiratory therapist helping daily. Continue wean off oxygen as tolerated and consider TCU, if unable may consider LTAC. We will monitor closely. 3/25/2021: Still on high flow, continue wean off, will give a trial today too, after talking to infectious disease they recommended transferring patient 3/28/2021 off the floor per CDC guidelines and hospital policy. Working on [employer].  3/26/2021: Slightly weaned off high flow to 40 FiO2, still awaiting for LTAC.  3/27/2021: Respiratory therapist successfully weaned off the patient from high flow nasal cannula to currently 4 L nasal cannula oxygen satting 98%. Patient to continue evaluated by PT/OT and recommended subacute facility versus SNF, or TCU closed currently, we will work on SNF with the help of . Acute hypoxic respiratory failure secondary to above  · On HFO FiO2 80% and 60 lpm w/ SaO2 in high 90s  · Rn aware to wean supplemental O2 aggressively as tolerated to SpO2 > 90%  3/11: ABG negative for hypercapnia; managing as above  3/19: managing as above     History of COPD  · Quit smoking 7-10 years ago. 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 Plan was for patient to see GI outpatient. Is suppose to see GI associated OP with w/u including MRI of abd/pelvis. LFT's stable. Clinically, patient has no complaints. Physical debility: Patient would benefit from inpatient rehab versus LTAC as she is not progressing very well and very slow progression, monitor closely.   3/28/2021; weaned off to 2 L nasal cannula oxygen, tolerating very well, she is very weak need nursing facility, awaiting  to reevaluate tomorrow to initiate nursing facility referral.    PMH, PSH, SH, FHX reviewed in chart review snap shot in [facility]    Additional discharge final recommendations as below:-  Patient kept improving day after day until we transferred her to the regular unit with no isolation precautions per Infectious disease control recommendations. The patient kept on 2 L nasal cannula oxygen while resting. The patient opted to go home with home health PT/OT only without any other requests, the patient be evaluated for the requirement of home oxygen and she was eligible for 2 L while resting and 6 L while walking. The patient did not tolerate working with respiratory therapist the day before discharge for that we kept her 1 more night. At the day of discharge the patient tolerated well working with the respiratory therapist and respiratory therapist Shalonda Eckert RCP) recommendations as below:  Patient was placed on room air for 20 minutes. SpO2 was 88 % on room air at rest. Patients SpO2 was below 89% and qualified for home oxygen. Oxygen was applied at 2 lpm via nasal cannula to maintain a SpO2 between 90-92% while at rest. Actual SpO2 was 93 %. Patient can ambulate for exercise flow rate. Patients was ambulated, SpO2 was 92% on 6 lpm to maintain SpO2 between 90-92% while exercising.     If oxygen need is greater than 4 lpm the SpO2 on 4 lpm was 88.      Note: For any SpO2 at 37% see policy and procedure for possible qualifications. Patient was evaluated today for the diagnosis of Acute hypoxic respiratory failure secondary to COVID-19 virus infection. I entered a DME order for home oxygen because the diagnosis and testing requires the patient to have supplemental oxygen. Condition will improve or be benefited by oxygen use. The patient is  able to perform good mobility in a home setting and therefore does require the use of a portable oxygen system. The need for this equipment was discussed with the patient and she understands and is in agreement.   Patient kept refusing going to nursing facility as this has been discussed in length with her with increased risk of hospital readmissions and ER visits, she continue decline and she wanted to go to home with home health care orders which been ordered. Here to mention that patient has increased risk of hospital readmission because of current comorbidities. All appointments obtained for the patient at the day of discharge with other specialities including PCP in one week. All questions and concerns addressed. Patient verbalized understandings and was agreeable with discharge planning. Physical Exam:-  Vitals:   Patient Vitals for the past 24 hrs:   BP Temp Temp src Pulse Resp SpO2   03/29/21 0900 137/71 98.2 °F (36.8 °C) Oral 97 18 90 %   03/29/21 0825     16 93 %   03/29/21 0740      91 %   03/29/21 0430 127/81 97.6 °F (36.4 °C) Oral 87 16 92 %   03/28/21 2051     16 92 %   03/28/21 1945 (!) 142/77 97.8 °F (36.6 °C) Oral 85 18 94 %   03/28/21 1500 125/76 97.5 °F (36.4 °C) Oral 85 22 93 %     Weight:   Weight: 136 lb 6.4 oz (61.9 kg)   24 hour intake/output:     Intake/Output Summary (Last 24 hours) at 3/29/2021 1153  Last data filed at 3/29/2021 0912  Gross per 24 hour   Intake 200 ml   Output    Net 200 ml       1. General appearance: No apparent distress, appears stated age and cooperative. On 2 L nasal cannula oxygen  2. HEENT: Normal cephalic, atraumatic without obvious deformity. Pupils equal, round, and reactive to light. Extra ocular muscles intact. Slight erythematous conjunctivae  3. Neck: Supple, with full range of motion. No jugular venous distention. Trachea midline. 4. Respiratory:  Normal respiratory effort. Clear to auscultation, bilaterally without Rales/Wheezes/Rhonchi. 5. Cardiovascular: Regular rate and rhythm with normal S1/S2 without murmurs, rubs or gallops. 6. Abdomen: Soft, increased abdominal girth, non-tender, non-distended with normal bowel sounds.   7. Musculoskeletal:  No Results (from the past 72 hour(s))   CBC Auto Differential    Collection Time: 03/27/21  3:23 AM   Result Value Ref Range    WBC 6.5 4.8 - 10.8 thou/mm3    RBC 4.40 4.20 - 5.40 mill/mm3    Hemoglobin 11.9 (L) 12.0 - 16.0 gm/dl    Hematocrit 39.4 37.0 - 47.0 %    MCV 89.5 81.0 - 99.0 fL    MCH 27.0 26.0 - 33.0 pg    MCHC 30.2 (L) 32.2 - 35.5 gm/dl    RDW-CV 17.0 (H) 11.5 - 14.5 %    RDW-SD 54.3 (H) 35.0 - 45.0 fL    Platelets 146 120 - 559 thou/mm3    MPV 10.1 9.4 - 12.4 fL    Seg Neutrophils 59.9 %    Lymphocytes 23.8 %    Monocytes 9.9 %    Eosinophils 4.8 %    Basophils 0.8 %    Immature Granulocytes 0.8 %    Segs Absolute 3.9 1 - 7 thou/mm3    Lymphocytes Absolute 1.5 1.0 - 4.8 thou/mm3    Monocytes Absolute 0.6 0.4 - 1.3 thou/mm3    Eosinophils Absolute 0.3 0.0 - 0.4 thou/mm3    Basophils Absolute 0.1 0.0 - 0.1 thou/mm3    Immature Grans (Abs) 0.05 0.00 - 0.07 thou/mm3    nRBC 0 /100 wbc   Comprehensive Metabolic Panel w/ Reflex to MG    Collection Time: 03/27/21  3:23 AM   Result Value Ref Range    Glucose 109 (H) 70 - 108 mg/dL    CREATININE 0.7 0.4 - 1.2 mg/dL    BUN 14 7 - 22 mg/dL    Sodium 136 135 - 145 meq/L    Potassium reflex Magnesium 4.0 3.5 - 5.2 meq/L    Chloride 106 98 - 111 meq/L    CO2 19 (L) 23 - 33 meq/L    Calcium 9.1 8.5 - 10.5 mg/dL    AST 9 5 - 40 U/L    Alkaline Phosphatase 81 38 - 126 U/L    Total Protein 6.7 6.1 - 8.0 g/dL    Albumin 2.8 (L) 3.5 - 5.1 g/dL    Total Bilirubin 0.3 0.3 - 1.2 mg/dL    ALT 8 (L) 11 - 66 U/L   Anion Gap    Collection Time: 03/27/21  3:23 AM   Result Value Ref Range    Anion Gap 11.0 8.0 - 16.0 meq/L   Glomerular Filtration Rate, Estimated    Collection Time: 03/27/21  3:23 AM   Result Value Ref Range    Est, Glom Filt Rate 80 (A) ml/min/1.73m2        Microbiology:    Blood culture #1:   Lab Results   Component Value Date    BC No growth-preliminary  No growth   06/20/2018       Blood culture #2:No results found for: BLOODCULT2    Organism:  No results found albuterol (PROVENTIL) (2.5 MG/3ML) 0.083% nebulizer solution  Take 3 mLs by nebulization every 6 hours as needed for Wheezing or Shortness of Breath             albuterol sulfate HFA (PROVENTIL HFA) 108 (90 BASE) MCG/ACT inhaler  Inhale 2 puffs into the lungs every 6 hours as needed for Wheezing             amLODIPine (NORVASC) 5 MG tablet  TAKE 1 TABLET BY MOUTH DAILY             atorvastatin (LIPITOR) 20 MG tablet  TAKE 1 TABLET BY MOUTH ONE TIME A DAY              lisinopril (PRINIVIL;ZESTRIL) 20 MG tablet  Take 20 mg by mouth daily             ondansetron (ZOFRAN) 4 MG tablet  Take 1 tablet by mouth 3 times daily as needed for Nausea or Vomiting             predniSONE (DELTASONE) 5 MG tablet  Take 1 tablet by mouth daily             sulfaSALAzine (AZULFIDINE) 500 MG tablet  Take 1 tablet twice daily for 7 days the increase to 2 tablets twice daily             umeclidinium-vilanterol (ANORO ELLIPTA) 62.5-25 MCG/INH AEPB inhaler  Inhale 1 puff into the lungs daily                      Time Spent:- 50 minutes    Electronically signed by Pooja Solomon MD on 3/30/2021 at [address] [place] [address]

## 2021-03-29 NOTE — PROGRESS NOTES
Comprehensive Nutrition Assessment    Type and Reason for Visit:  Reassess(PO Monitor)    Nutrition Recommendations/Plan:   *Recommend a Multivitamin w/minerals daily. *Discontinued Magic Cup TID per pt request. Pt declines all other ONS during LOS. *Continue current diet. Nutrition Assessment: Pt. moderately malnourished AEB criteria as listed below. At risk for further nutrition compromise r/t admit w/acute respiratory failure, recent COVID with pneumonia, and underlying medical condition (Hx COPD, ESLD, RA, HLD). Nutrition recommendations/interventions as per above. Malnutrition Assessment:  Malnutrition Status: Moderate malnutrition    Context:  Acute Illness     Findings of the 6 clinical characteristics of malnutrition:  Energy Intake:  1 - 75% or less of estimated energy requirements for 7 or more days  Weight Loss:  (-17.5% weight loss in 6 months)     Body Fat Loss:  No significant body fat loss     Muscle Mass Loss:  1 - Mild muscle mass loss Temples (temporalis)  Fluid Accumulation:  No significant fluid accumulation Extremities   Strength:  Not Performed    Estimated Daily Nutrient Needs:  Energy (kcal):  0671-8338 (25-35 kcals/kg) - late phase; Weight Used for Energy Requirements:  Current(63kg bedscale weight on 3/18/21)     Protein (g):   gm (1.2-2); Weight Used for Protein Requirements:  Ideal(50 kgm)        Fluid (ml/day):  per MD; Method Used for Fluid Requirements:  (n/a)      Nutrition Related Findings: COVID dx on 3/8; received first vaccine on 3/1; pt seen; she reports good appetite eating \"too good\", but when asked how much she is eating of her meals pt reports \"a little less than 50%\"; pt states she no longer wants the magic cups with meals and would like them discontinued; pt dislikes the Ensure and does not want any other ONS or routine snacks sent; pt denies N/V; last BM x1 on 3/28.  Rx includes: Vitamin C, Lipitor, Vitamin D and Zinc      Wounds:  Stage I(buttocks)

## 2021-03-29 NOTE — PLAN OF CARE
Problem: Nutrition  Goal: Optimal nutrition therapy  3/29/2021 1031 by Markos Ricardo RD, LD  Outcome: Ongoing   Nutrition Problem #1: Moderate malnutrition, In context of acute illness or injury  Intervention: Food and/or Nutrient Delivery: Continue Current Diet, Discontinue Oral Nutrition Supplement, Vitamin Supplement  Nutritional Goals: Pt. will tolerate and consume 75% or more of meals to promote wound healign during LOS.

## 2021-03-29 NOTE — PLAN OF CARE
Problem: Airway Clearance - Ineffective  Goal: Achieve or maintain patent airway  Outcome: Met This Shift     Problem: Gas Exchange - Impaired  Goal: Absence of hypoxia  3/29/2021 0413 by Trev Henry RN  Outcome: Met This Shift  3/28/2021 1954 by Rocio Chun RN  Outcome: Ongoing  Goal: Promote optimal lung function  Outcome: Met This Shift     Problem: Breathing Pattern - Ineffective  Goal: Ability to achieve and maintain a regular respiratory rate  Outcome: Met This Shift     Problem:  Body Temperature -  Risk of, Imbalanced  Goal: Ability to maintain a body temperature within defined limits  3/29/2021 0413 by Trev Henry RN  Outcome: Met This Shift  3/28/2021 1954 by Rocio Chun RN  Outcome: Ongoing     Problem: Falls - Risk of:  Goal: Will remain free from falls  Description: Will remain free from falls  Outcome: Met This Shift  Goal: Absence of physical injury  Description: Absence of physical injury  Outcome: Met This Shift     Problem: Skin Integrity:  Goal: Will show no infection signs and symptoms  Description: Will show no infection signs and symptoms  Outcome: Met This Shift  Goal: Absence of new skin breakdown  Description: Absence of new skin breakdown  Outcome: Met This Shift     Problem: PAIN  Goal: Patient's pain/discomfort is manageable  Outcome: Met This Shift     Problem: DISCHARGE BARRIERS  Goal: Patient's continuum of care needs are met  Outcome: Met This Shift     Problem: Pain:  Goal: Pain level will decrease  Description: Pain level will decrease  Outcome: Met This Shift  Goal: Control of acute pain  Description: Control of acute pain  Outcome: Met This Shift  Goal: Control of chronic pain  Description: Control of chronic pain  Outcome: Met This Shift     Problem: Nutrition Deficits  Goal: Optimize nutritional status  3/29/2021 0413 by Trev Henry RN  Outcome: Ongoing  3/28/2021 1954 by Rocio Chun RN  Outcome: Ongoing     Problem: Patient Education: Go to Patient Education Activity  Goal: Patient/Family Education  Outcome: Ongoing     Problem: Nutrition  Goal: Optimal nutrition therapy  Outcome: Ongoing     Problem: Isolation Precautions - Risk of Spread of Infection  Goal: Prevent transmission of infection  Outcome: Completed

## 2021-03-29 NOTE — PROGRESS NOTES
[facility]  INPATIENT PHYSICAL THERAPY  DAILY NOTE  [facility] ONC MED 5K - [id number]    Time In: 412  Time Out: 8690  Timed Code Treatment Minutes: 45 Minutes  Minutes: 38          Date: 3/29/2021  Patient Name: Mar Webster,  Gender:  female        MRN: [id number]  : 1940  (80 y.o.)     Referring Practitioner: Sophia Almendarez MD  Diagnosis: acute respiratory failure  Additional Pertinent Hx: Per HPI: \"80 y.o. female who presented to [facility] with increased weakness for the past 3 days. History is limited due to patient's drowsiness. Limited history provided by daughter-in-law who is in the room. Ms. Sara Page received her covid vaccine on 3/1/2021. She did have some fatigue afterwards with nausea. However, she began having acutely worsening weakness and cough over the last 3 days with decreased oral intake. Family was unaware until her daughter-in-law went to check on her today. She denies any shortness of breath to me, but she is requiring 6 L supplemental O2 to maintain SPO2 greater than 90% and appears to have increased work of breathing value at rest.\" Pt is positive for COVID-19. Prior Level of Function:  Lives With: Daughter  Type of Home: House  Home Layout: Multi-level  Home Access: Stairs to enter with rails  Entrance Stairs - Number of Steps: 3 steps + 7 to get to her bedroom/bathroom  Home Equipment: (no DME PTA)   Bathroom Equipment: Shower chair    ADL Assistance: Needs assistance  Homemaking Assistance: (daughter completes all)  Ambulation Assistance: Independent  Transfer Assistance: Independent  Active : Yes    Restrictions/Precautions:  Restrictions/Precautions: Isolation(droplet + precautions)  Position Activity Restriction  Other position/activity restrictions: 3/11: HFNC     SUBJECTIVE: RN approved session. Patient laying in bed upon arrival and agreeable to therapy. Patient requested to use restroom. Extra time to prepare for mobility.      PAIN: denies    Vitals: Oxygen: 2L nasal cannula upon arrival, increased to 3L once in bathroom due to increased SOB. O2 sats 91% at end of session, with cues for pursed lip breathing, patient oftne doffs nasal cannula to blow nose and requires reminders to don back on    OBJECTIVE:  Bed Mobility:  Supine to Sit: Stand By Assistance, with head of bed raised, with increased time for completion  Scooting: Stand By Assistance, to edge of bed   **reports dizziness upon sitting edge of bed, extra time allowed for dizziness to resolve    Transfers:  Sit to Stand: Contact Guard Assistance, Minimal Assistance, X 1, from bed and toilet  Stand to Sit:Contact Guard Assistance    Balance:  Static Sitting Balance:  Stand By Assistance, edge of bed ~4-5min in preparation for transfer and ambulation to bathroom. Ambulation:  Contact Guard Assistance  Distance: ~15ft x2  Surface: Level Tile  Device:Rolling Walker  Gait Deviations: Forward Flexed Posture, Slow Arabella, Decreased Step Length Bilaterally, Decreased Gait Speed, Decreased Heel Strike Bilaterally and Mild Path Deviations    Exercise:  None this session due to patient declining initially then breakfast arrival.     Functional Outcome Measures: Completed  AM-PAC Inpatient Mobility Raw Score : 16  AM-PAC Inpatient T-Scale Score : 40.78    ASSESSMENT:  Assessment: Patient progressing toward established goals. Activity Tolerance:  Patient tolerance of  treatment: fair.       Equipment Recommendations:Equipment Needed: (likely a rolling walker, continue to assess)  Discharge Recommendations:  Continue to assess pending progress, Subacute/Skilled Nursing Facility, LTACH    Plan: Times per week: 5x GM  Times per day: Daily  Current Treatment Recommendations: Strengthening, Functional Mobility Training, Home Exercise Program, Endurance Training, Balance Training, Patient/Caregiver Education & Training, Transfer Training, Gait Training, Safety Education & Training    Patient Education  Patient Education: Plan of Care, Bed Mobility, Transfers, Gait, Up in Chair for All Meals    Goals:  Patient goals : return home  Short term goals  Time Frame for Short term goals: by hospital discharge  Short term goal 1: Supine to/from sit with modified independence for ease of transfers at discharge site. Short term goal 2: Sit to/from stand with modified independence for ease of transfers at discharge site. Short term goal 3: Ambulate 21' with CGA x 1 and LRAD, maintaining O2 saturation >88%, for progression to home distance ambulation. Short term goal 4: Ascend/descend 3 steps with 1 HR and Rich for progression to safe entry into home. Long term goals  Time Frame for Long term goals : N/A due to short ELOS. Following session, patient left in safe position with all fall risk precautions in place.

## 2021-03-29 NOTE — CARE COORDINATION
3/29/21, 10:13 AM EDT    DISCHARGE PLANNING EVALUATION    SW attempted to speak with patient about ECF when discharged. Patient stated that it wasn't needed and that she would be going home with her daughter Vinod Matias. Patient agreeable to SW contacting daughter. Call to daughter Nasrin-left message for her to contact SW.     10:46 AM received call from daughter Vinod Matias. Discharge plans discussed. Explained to her that [address] are no longer options. Advised her that RIAN discussed ECF for rehab with patient and she declined stating that she would be staying with her when she is discharge. Nasrin advised that patient resides with her and her spouse. She continues to work full time and her spouse is retired. She stated that she did not want patient to go to an ECF and that she would be returning home with her. She would like home health for nursing, therapy and aide services. She states that patient has a walker and a bedside commode if needed. She also stated that patient has her own room and prefers to spend most of her time there. She states that she makes sure that there are food items prepared for patient before she goes to work. She is comfortable with plan for patient to return home when discharged. Patient has used [facility] in the past and Nasrin would prefer to use them again since they are familiar with patient. SW will update physician and let her know when discharge is planned. Perfect serve message sent to Dr Cristy Abraham. Full assessment deferred.

## 2021-03-29 NOTE — CARE COORDINATION
Pt transferred to 5K26. Handoff report given to Rebekah Werner CM.     Electronically signed by Alyssia Ayala RN on 3/29/2021 at 7:09 AM

## 2021-03-29 NOTE — TELEPHONE ENCOUNTER
Simeon Mckeon, daughter, stopped by the office. She had questions concerning meds but will ask at discharge tomorrow. If she has any questions post-d/c, she will MyChart our office.

## 2021-03-29 NOTE — TELEPHONE ENCOUNTER
From: Araceli Tamayo  To: ERNESTO Navarro - CNP  Sent: 3/29/2021 3:45 PM EDT  Subject: Prescription Question    Hello [place]! This is Jennifer Yun's daughter. Mom was admitted to [facility] on March 8th 3 days after receiving the COVID shot 1st dose. She tested positive for covid She is supposed to be released tomorrow at some point. My question to you is. How do I restart her medication. For the last 20 days she has not taken her prednisone or her Sulfadiazine . She was on a strong steroid ( Decadron I think) I just donot really no how to start her meds.  Thanks so much

## 2021-03-29 NOTE — CARE COORDINATION
3/29/21, 1:38 PM EDT    DISCHARGE PLANNING EVALUATION    RN Stepan Palmer has advised that patient became dizzy while doing home 02 eval. Dr. Katarzyna Parsons is not comfortable with patient returning home. SW advised that daughter was adamant that patient not go to ECF at discharge and suggested to RN that physician may want to speak with daughter directly. Plan is to keep patient overnight. Call to daughter and left message for her that discharge is anticipated for tomorrow.

## 2021-03-29 NOTE — PLAN OF CARE
Problem: Airway Clearance - Ineffective  Goal: Achieve or maintain patent airway  3/29/2021 1218 by Travis Fernandez RN  Outcome: Ongoing  Note: Patient O2 saturation remains above 90% on 2 L nasal cannula throughout shift. Home oxygen eval order in for patient discharge. Problem: Gas Exchange - Impaired  Goal: Absence of hypoxia  3/29/2021 1218 by Travis Fernandez RN  Outcome: Ongoing  Note: Patient remains above 90% this shift. Denies shortness of breath. Problem: Breathing Pattern - Ineffective  Goal: Ability to achieve and maintain a regular respiratory rate  3/29/2021 1218 by Travis Fernandez RN  Outcome: Ongoing  Note: Patient's respiratory rate remains regular throughout shift. Problem: Body Temperature -  Risk of, Imbalanced  Goal: Ability to maintain a body temperature within defined limits  3/29/2021 1218 by Travis Fernandez RN  Outcome: Ongoing  Note: Patient remains afebrile throughout shift. Problem: Nutrition Deficits  Goal: Optimize nutritional status  3/29/2021 1218 by Travis Fernandez RN  Outcome: Ongoing  Note: Patient tolerating ordered diet. Problem: Falls - Risk of:  Goal: Will remain free from falls  Description: Will remain free from falls  3/29/2021 1218 by Travis Fernandez RN  Outcome: Ongoing  Note: Patient free from falls this shift. Patient uses call light appropriately, bed in lowest position, nonskid socks on, bed rails up x2, fall sign posted and call light in reach. Patient at risk due to generalized  weakness. Problem: Skin Integrity:  Goal: Will show no infection signs and symptoms  Description: Will show no infection signs and symptoms  3/29/2021 1218 by Travis Fernandez RN  Outcome: Ongoing  Note: No signs or symptoms of infection this shift. Repositioning every 2 hours while in bed. Patient up to chair this shift with physical therapy.      Problem: PAIN  Goal: Patient's pain/discomfort is manageable  3/29/2021 1218 by Travis Fernandez RN Outcome: Ongoing  Note: Patient reporting pain 0/10 with a goal of 0/10. Patient satisfied with current interventions including rest and repositioning. Pain assessment ongoing. Problem: DISCHARGE BARRIERS  Goal: Patient's continuum of care needs are met  3/29/2021 1218 by Mercy Brice RN  Outcome: Ongoing  Note: Discharge plans to return home with daughter discussed with patient. Care plan reviewed with patient and family. Patient and family verbalize understanding of the plan of care and contribute to goal setting.

## 2021-03-30 VITALS
OXYGEN SATURATION: 95 % | DIASTOLIC BLOOD PRESSURE: 75 MMHG | HEART RATE: 72 BPM | WEIGHT: 136.4 LBS | HEIGHT: 62 IN | TEMPERATURE: 98.6 F | SYSTOLIC BLOOD PRESSURE: 127 MMHG | RESPIRATION RATE: 18 BRPM | BODY MASS INDEX: 25.1 KG/M2

## 2021-03-30 PROCEDURE — 6370000000 HC RX 637 (ALT 250 FOR IP): Performed by: HOSPITALIST

## 2021-03-30 PROCEDURE — 6370000000 HC RX 637 (ALT 250 FOR IP): Performed by: STUDENT IN AN ORGANIZED HEALTH CARE EDUCATION/TRAINING PROGRAM

## 2021-03-30 PROCEDURE — 94761 N-INVAS EAR/PLS OXIMETRY MLT: CPT

## 2021-03-30 PROCEDURE — 97116 GAIT TRAINING THERAPY: CPT

## 2021-03-30 PROCEDURE — 97535 SELF CARE MNGMENT TRAINING: CPT

## 2021-03-30 PROCEDURE — 99239 HOSP IP/OBS DSCHRG MGMT >30: CPT | Performed by: FAMILY MEDICINE

## 2021-03-30 PROCEDURE — 6370000000 HC RX 637 (ALT 250 FOR IP): Performed by: FAMILY MEDICINE

## 2021-03-30 PROCEDURE — 97530 THERAPEUTIC ACTIVITIES: CPT

## 2021-03-30 PROCEDURE — 94640 AIRWAY INHALATION TREATMENT: CPT

## 2021-03-30 PROCEDURE — 2580000003 HC RX 258: Performed by: STUDENT IN AN ORGANIZED HEALTH CARE EDUCATION/TRAINING PROGRAM

## 2021-03-30 RX ORDER — POLYETHYLENE GLYCOL 3350 17 G/17G
17 POWDER, FOR SOLUTION ORAL DAILY PRN
Qty: 527 G | Refills: 0 | Status: SHIPPED | OUTPATIENT
Start: 2021-03-30 | End: 2021-04-22

## 2021-03-30 RX ORDER — PREDNISONE 1 MG/1
5 TABLET ORAL DAILY
Qty: 30 TABLET | Refills: 1 | Status: SHIPPED | OUTPATIENT
Start: 2021-03-30 | End: 2021-04-22 | Stop reason: SDUPTHER

## 2021-03-30 RX ORDER — FAMOTIDINE 20 MG/1
20 TABLET, FILM COATED ORAL DAILY
Qty: 14 TABLET | Refills: 0 | Status: SHIPPED | OUTPATIENT
Start: 2021-03-30 | End: 2021-05-05

## 2021-03-30 RX ADMIN — Medication 2000 UNITS: at 10:26

## 2021-03-30 RX ADMIN — AMLODIPINE BESYLATE 5 MG: 5 TABLET ORAL at 10:26

## 2021-03-30 RX ADMIN — SODIUM CHLORIDE, PRESERVATIVE FREE 10 ML: 5 INJECTION INTRAVENOUS at 10:26

## 2021-03-30 RX ADMIN — POTASSIUM CHLORIDE 40 MEQ: 1500 TABLET, EXTENDED RELEASE ORAL at 10:26

## 2021-03-30 RX ADMIN — GLYCOPYRROLATE AND FORMOTEROL FUMARATE 2 PUFF: 9; 4.8 AEROSOL, METERED RESPIRATORY (INHALATION) at 08:49

## 2021-03-30 RX ADMIN — Medication 50 MG: at 10:26

## 2021-03-30 RX ADMIN — FAMOTIDINE 20 MG: 20 TABLET, FILM COATED ORAL at 10:25

## 2021-03-30 RX ADMIN — OXYCODONE HYDROCHLORIDE AND ACETAMINOPHEN 1000 MG: 500 TABLET ORAL at 10:26

## 2021-03-30 RX ADMIN — ASPIRIN 81 MG: 81 TABLET, CHEWABLE ORAL at 10:26

## 2021-03-30 ASSESSMENT — PAIN SCALES - GENERAL
PAINLEVEL_OUTOF10: 0
PAINLEVEL_OUTOF10: 0

## 2021-03-30 NOTE — PROGRESS NOTES
A home oxygen evaluation has been completed. [x]Patient is an inpatient. It is expected that the patient will be discharged within the next 48 hours. Qualified provider to write order for home prescription if patient qualifies. Social service/care managers will arrange for home oxygen. If patient is active, arrange for Home Medical supplier to assess for Oxygen Conserving Device per pulse oximetry. []Patient is an outpatient. Results will be faxed to the ordering provider. Qualified provider to write order for home prescription if patient qualifies and arranges for home oxygen. Patient was placed on room air for 20 minutes. SpO2 was 88 % on room air at rest. Patients SpO2 was below 89% and qualified for home oxygen. Oxygen was applied at 2 lpm via nasal cannula to maintain a SpO2 between 90-92% while at rest. Actual SpO2 was 93 %. Patient can ambulate for exercise flow rate. Patients was ambulated, SpO2 was 92% on 6 lpm to maintain SpO2 between 90-92% while exercising. If oxygen need is greater than 4 lpm the SpO2 on 4 lpm was 88. Note: For any SpO2 at 93% see policy and procedure for possible qualifications.

## 2021-03-30 NOTE — CARE COORDINATION
3/30/21, 11:55 AM EDT    DISCHARGE PLANNING EVALUATION    Patient is to be discharged today. Order placed for home health. Call placed to [facility] (kendra)-she advised that they just discharged patient in February. Referral information provided. Staff can access all information including discharge instructions from [facility]. Call to daughter Nasrin-left message re: discharge and that home health has been arranged. RN Librado Loredo updated. No other needs at this time. Update: received call from daughter confirming that she received SW message. Either her or sister will be up to transport patient home. 3/30/21, 11:59 AM EDT    Patient goals/plan/ treatment preferences discussed by  and . Patient goals/plan/ treatment preferences reviewed with patient/ family. Patient/ family verbalize understanding of discharge plan and are in agreement with goal/plan/treatment preferences. Understanding was demonstrated using the teach back method. AVS provided by RN at time of discharge, which includes all necessary medical information pertaining to the patients current course of illness, treatment, post-discharge goals of care, and treatment preferences.     Services After Discharge  Services At/After Discharge: Aide Services, PT, OT, Nursing Services([facility] HME for 02)   IMM Letter  IMM Letter given to Patient/Family/Significant other/Guardian/POA/by[de-identified] DORA  IMM Letter date given[de-identified] 03/29/21  IMM Letter time given[de-identified] [phone]

## 2021-03-30 NOTE — PLAN OF CARE
Problem: Airway Clearance - Ineffective  Goal: Achieve or maintain patent airway  3/29/2021 1218 by Robert Valladares RN  Outcome: Ongoing  Note: Patient O2 saturation remains above 90% on 3 L nasal cannula throughout shift. Home oxygen eval order in for patient discharge. Problem: Gas Exchange - Impaired  Goal: Absence of hypoxia  3/29/2021 1218 by Robert Valladares RN  Outcome: Ongoing  Note: Patient remains above 90% this shift. Denies shortness of breath. Problem: Breathing Pattern - Ineffective  Goal: Ability to achieve and maintain a regular respiratory rate  3/29/2021 1218 by Robert Valladares RN  Outcome: Ongoing  Note: Patient's respiratory rate remains regular throughout shift. Problem: Body Temperature -  Risk of, Imbalanced  Goal: Ability to maintain a body temperature within defined limits  3/29/2021 1218 by Robert Valladares RN  Outcome: Ongoing  Note: Patient remains afebrile throughout shift. Problem: Nutrition Deficits  Goal: Optimize nutritional status  3/29/2021 1218 by Robert Valladares RN  Outcome: Ongoing  Note: Patient tolerating ordered diet. Problem: Falls - Risk of:  Goal: Will remain free from falls  Description: Will remain free from falls  3/29/2021 1218 by Robert Valladares RN  Outcome: Ongoing  Note: Patient free from falls this shift. Patient uses call light appropriately, bed in lowest position, nonskid socks on, bed rails up x2, fall sign posted and call light in reach. Patient at risk due to generalized  weakness. Problem: Skin Integrity:  Goal: Will show no infection signs and symptoms  Description: Will show no infection signs and symptoms  3/29/2021 1218 by Robert Valladares RN  Outcome: Ongoing  Note: No signs or symptoms of infection this shift. Repositioning every 2 hours while in bed. Patient up to chair this shift with physical therapy.      Problem: PAIN  Goal: Patient's pain/discomfort is manageable  3/29/2021 1218 by Robert Valladares RN Outcome: Ongoing  Note: Patient reporting pain 0/10 with a goal of 0/10. Patient satisfied with current interventions including rest and repositioning. Pain assessment ongoing. Problem: DISCHARGE BARRIERS  Goal: Patient's continuum of care needs are met  3/29/2021 1218 by Mercy Brice RN  Outcome: Ongoing  Note: Discharge plans to return home with daughter discussed with patient. Care plan reviewed with patient and family. Patient and family verbalize understanding of the plan of care and contribute to goal setting.

## 2021-03-30 NOTE — PLAN OF CARE
Problem: Airway Clearance - Ineffective  Goal: Achieve or maintain patent airway  3/30/2021 0050 by Lesli Vázquez RN  Outcome: Ongoing  Note: Patient remains above 90% on 3L nasal cannula. Lung sounds clear. Problem: Breathing Pattern - Ineffective  Goal: Ability to achieve and maintain a regular respiratory rate  3/30/2021 0050 by Lesli Vázquez RN  Outcome: Ongoing  Note: RR normal and unlabored. Dyspnea with exertion noted. Problem: Body Temperature -  Risk of, Imbalanced  Goal: Ability to maintain a body temperature within defined limits  3/30/2021 0050 by Lesli Vázquez RN  Outcome: Ongoing  Note: Patient remains afebrile. Problem: Falls - Risk of:  Goal: Will remain free from falls  Description: Will remain free from falls  3/30/2021 0048 by Lesli Vázquez RN  Outcome: Ongoing  Note: Patient remains free from falls this shift. Fall risk assessment complete. Fall sign posted. Non skid socks on. Bed in lowest position, 2/4 siderails up. Bed alarm on. Call light and all possessions within reach. Ambulates with walker and assist. Rounding hourly       Problem: Skin Integrity:  Goal: Will show no infection signs and symptoms  Description: Will show no infection signs and symptoms  3/30/2021 0048 by Lesli Vázquez RN  Outcome: Ongoing  Note: No s/s of infection noted this shift. Problem: Skin Integrity:  Goal: Absence of new skin breakdown  Description: Absence of new skin breakdown  3/30/2021 0048 by Lesli Vázquez RN  Outcome: Ongoing  Note: No new skin breakdown. Patient educated on importance of repositioning q2h to prevent breakdown, however patient is refusing at this time. Problem: Nutrition  Goal: Optimal nutrition therapy  3/30/2021 0048 by Lesli Vázquez RN  Outcome: Ongoing  Note: Patient tolerating diet well.  No complaints of n/v     Problem: PAIN  Goal: Patient's pain/discomfort is manageable  3/30/2021 0048 by Lesli Vázquez RN  Outcome: Ongoing  Note: Pain Assessment: 0-10  Pain Level: 0 Patient's Stated Pain Goal: No pain   Is pain goal met at this time? Yes     Non-Pharmaceutical Pain Intervention(s): Rest, Repositioned, Distraction, Cold applied     Care plan reviewed with patient. Patient verbalize understanding of the plan of care and contribute to goal setting.

## 2021-03-30 NOTE — PROGRESS NOTES
Patient received oxygen for home. IV removed. All belongings collected and given to patient and daughter. Discharge instructions explained with no further questions from patient or daughter. Patient chart broken down and put in  Yellow bin. Patient brought to patient discharge to be taken home by daughter.

## 2021-03-30 NOTE — CARE COORDINATION
Discharge orders on chart and orders for home oxygen. Met with Gume Keller for her preference of agency. Gume Dears states her preference is to use [facility]) DME. Referral made, spoke to Jacoby Gallo. He will review order and process. Electronically signed by Lexie Goldstein RN on 3/30/21 at 11:28 AM EDT    Gricelda Jacob. Notified of her discharge today. Phone message was left. Electronically signed by Lexie Goldstein RN on 3/30/21 at 11:59 AM EDT

## 2021-03-30 NOTE — PROGRESS NOTES
[facility]  INPATIENT PHYSICAL THERAPY  DAILY NOTE  [facility] ONC MED 5K - [id number]    Time In: 3594  Time Out: 1005  Timed Code Treatment Minutes: 26 Minutes  Minutes: 26          Date: 3/30/2021  Patient Name: Sean Dietz,  Gender:  female        MRN: [id number]  : 1940  (80 y.o.)     Referring Practitioner: Donnell Ha MD  Diagnosis: acute respiratory failure  Additional Pertinent Hx: Per HPI: \"80 y.o. female who presented to [facility] with increased weakness for the past 3 days. History is limited due to patient's drowsiness. Limited history provided by daughter-in-law who is in the room. Ms. Brandon Cervantes received her covid vaccine on 3/1/2021. She did have some fatigue afterwards with nausea. However, she began having acutely worsening weakness and cough over the last 3 days with decreased oral intake. Family was unaware until her daughter-in-law went to check on her today. She denies any shortness of breath to me, but she is requiring 6 L supplemental O2 to maintain SPO2 greater than 90% and appears to have increased work of breathing value at rest.\" Pt is positive for COVID-19. Prior Level of Function:  Lives With: Daughter  Type of Home: House  Home Layout: Multi-level  Home Access: Stairs to enter with rails  Entrance Stairs - Number of Steps: 3 steps + 7 to get to her bedroom/bathroom  Home Equipment: (no DME PTA)   Bathroom Equipment: Shower chair    ADL Assistance: Needs assistance  Homemaking Assistance: (daughter completes all)  Ambulation Assistance: Independent  Transfer Assistance: Independent  Active : Yes    Restrictions/Precautions:  Restrictions/Precautions: Isolation(droplet + precautions)  Position Activity Restriction  Other position/activity restrictions: 3/11: HFNC     SUBJECTIVE: RN approved session.  Patient sitting edge of bed upon arrival. Patient agreeable to physical therapy session and home O2 eval with Respiratory Therapist. Patient requested to use restroom during session. PAIN: denies    Vitals: Oxygen: room air dropped to 85-87% ambulating to bathroom, per Respiratory Therapist patient requires 6L with activity, O2 sats 90-91%    OBJECTIVE:  Bed Mobility:  Not Tested   **patient sitting edge of bed upon arrival    Transfers:  Sit to Stand: Stand By Assistance, Contact Guard Assistance  Stand to Sit:Stand By Assistance, Contact Guard Assistance    Ambulation:  Contact Guard Assistance  Distance: ~15ft, ~50ft, 45ft, 30ft, 15ft, 40ft   Surface: Level Tile  Device:Rolling Walker  Gait Deviations: Forward Flexed Posture, Slow Arabella, Decreased Step Length Bilaterally, Decreased Gait Speed, Decreased Heel Strike Bilaterally, Mild Path Deviations and patient bumps RW into walls/door frames/objects in hallway, slightly impulsive    Stairs:  Minimal Assistance  Number of Steps: 3  Height: 6\" step with Bilateral Handrails   **non-reciprocal    Exercise:  None this session. Functional Outcome Measures: Completed  AM-PAC Inpatient Mobility Raw Score : 18  AM-PAC Inpatient T-Scale Score : 43.63    ASSESSMENT:  Assessment: Patient progressing toward established goals. Activity Tolerance:  Patient tolerance of  treatment: fair. Fatigues easily.      Equipment Recommendations:Equipment Needed: (likely a rolling walker, continue to assess)  Discharge Recommendations:  Patient would benefit from continued therapy after discharge, 24 hour supervision or assist, Home with Home health PT    Plan: Times per week: 5x GM  Times per day: Daily  Current Treatment Recommendations: Strengthening, Functional Mobility Training, Home Exercise Program, Endurance Training, Balance Training, Patient/Caregiver Education & Training, Transfer Training, Gait Training, Safety Education & Training    Patient Education  Patient Education: Plan of Care, Precautions/Restrictions, Transfers, Gait, Stairs, Up in Chair for All Meals    Goals:  Patient goals : return home Short term goals  Time Frame for Short term goals: by hospital discharge  Short term goal 1: Supine to/from sit with modified independence for ease of transfers at discharge site. Short term goal 2: Sit to/from stand with modified independence for ease of transfers at discharge site. Short term goal 3: Ambulate 21' with CGA x 1 and LRAD, maintaining O2 saturation >88%, for progression to home distance ambulation. Short term goal 4: Ascend/descend 3 steps with 1 HR and Rich for progression to safe entry into home. Long term goals  Time Frame for Long term goals : N/A due to short ELOS. Following session, patient left in safe position with all fall risk precautions in place.

## 2021-03-30 NOTE — PROGRESS NOTES
[address]  Occupational Therapy  Daily Note  Time:   Time In: 7637  Time Out: 1208  Timed Code Treatment Minutes: 25 Minutes  Minutes: 25          Date: 3/30/2021  Patient Name: Delano Earl,   Gender: female      Room: Pending sale to [facility]26/026-A  MRN: [id number]  : 1940  (80 y.o.)  Referring Practitioner: Dr. Martinez Person MD  Diagnosis: Acute Respiratory Failure  Additional Pertinent Hx: Per H&P: Pt presented to [address] [place] [address] with increased weakness for the past 3 days. Limited history provided by daughter-in-law who is in the room. Ms. Isma Obando received her covid vaccine on 3/1/2021. She did have some fatigue afterwards with nausea. However, she began having acutely worsening weakness and cough over the last 3 days with decreased oral intake. Family was unaware until her daughter-in-law went to check on her the day of admission. She is requiring 6 L supplemental O2 to maintain SPO2 greater than 90% and appears to have increased work of breathing value at rest. COVID +    Restrictions/Precautions:  Restrictions/Precautions: Up as Tolerated  Position Activity Restriction  Other position/activity restrictions: 3/11: HFNC 3/30NC at 2L     SUBJECTIVE: Nurse Edwin DOLL'd session, Up in chair upon arrival, agreeable to OT session, cooperative    PAIN: 0/10:     Vitals: Oxygen: 2L02 at 97-94% patient appears SOB but able to maintain pulse ox    COGNITION: Confusion noted, impulsive, poor awareness of 02 Tubing    ADL:   Grooming: Stand By Assistance. washed hands standing sinkside and combed hair, patient attempted to use soap as hand   Upper Extremity Dressing: with set-up.  donned sitting in bedside chair  Lower Extremity Dressing: Stand By Assistance and with increased time for completion. donned sitting in bedside chair  Toileting: Stand By Assistance. for clothing management  Toilet Transfer: Stand By Assistance. STS, used grab bar.     BALANCE:  Standing Balance: Stand By Assistance. greater than 2 minutes with 0 UE support    BED MOBILITY:  Not Tested    TRANSFERS:  Sit to Stand:  Contact Guard Assistance. bedside chair  Stand to Sit: [employer]. FUNCTIONAL MOBILITY:  Assistive Device: Rolling Walker  Assist Level:  Contact Guard Assistance. Distance: To and from bathroom  Poor walker safety with 02 tubing     ASSESSMENT:     Activity Tolerance:  Patient tolerance of  treatment: good. Discharge Recommendations: Subacute/Skilled Nursing Facility, Continue to assess pending progress   Equipment Recommendations: Other: will continue to monitor pending progress, discharge location  Plan: Times per week: 5x  Current Treatment Recommendations: Strengthening, Balance Training, Functional Mobility Training, Endurance Training, Safety Education & Training, Patient/Caregiver Education & Training, Equipment Evaluation, Education, & procurement, Self-Care / ADL    Patient Education  Patient Education: ADL's    Goals  Short term goals  Time Frame for Short term goals: by discharge  Short term goal 1: Pt will increase activity tolerance for functional mobility to/from [facility] or chair with CGA and Sp02 >=90% in prep for toileting tasks. Short term goal 2: Pt will tolerate dynamic standing X2 minutes with CGA and unilateral release in prep for hygiene/clothing management. Short term goal 3: Pt will complete BADL tasks with minimal assistance, incorporating ECT with minimal cues, to increase independence and ease with self care tasks. Short term goal 4: Pt will complete functional transfers with CGA in prep for BSC/toilet transfers. Following session, patient left in safe position with all fall risk precautions in place.

## 2021-03-31 ENCOUNTER — TELEPHONE (OUTPATIENT)
Dept: FAMILY MEDICINE CLINIC | Age: 81
End: 2021-03-31

## 2021-03-31 NOTE — TELEPHONE ENCOUNTER
[facility] Transitions Initial Follow Up Call    Outreach made within 2 business days of discharge: Yes    Patient: Rey Davey Patient : 1940   MRN: [id number]  Reason for Admission: There are no discharge diagnoses documented for the most recent discharge. Discharge Date: 3/30/21       Spoke with: Rubi , pt's daughter (on HIPAA)    Discharge department/facility: [facility] Interactive Patient Contact:  Was patient able to fill all prescriptions: Yes  Was patient instructed to bring all medications to the follow-up visit: Yes  Is patient taking all medications as directed in the discharge summary?  Yes  Does patient understand their discharge instructions: Yes  Does patient have questions or concerns that need addressed prior to 7-14 day follow up office visit: no    Approved for home health, PT/OT    Scheduled appointment with PCP within 7-14 days    Follow Up  Future Appointments   Date Time Provider Bhupendra Shipley   2021  1:40 PM Rosalio Luna APRN - [id number] [facility] [address] [facility] - [place]   2021  2:20 PM ERNESTO Holliday - CNP [id number]   2021  4:00 PM Rosalio Luna APRN - [id number] [facility] [address] [facility] [place]   2021 11:00 AM STR PULMONARY FUNCTION ROOM 1 [facility]   2021 12:40 PM STR CT IMAGING RM1  OP [facility] STRZ OUT EXP STR Radiolog   2021  1:40 PM DO PORETR Jiang SRPX Rheum [facility] - [place]   2021  9:30 AM ERNSETO Woodall CNP N Pulm Med [address]       Crissy Peterson CMA ([facility])

## 2021-03-31 NOTE — PROGRESS NOTES
Physician Progress Note      PATIENT:               Светлана Vickers  [facility] #:                  [id number]  :                       1940  ADMIT DATE:       3/8/2021 11:46 AM  DISCH DATE:        3/30/2021 2:27 PM  RESPONDING  PROVIDER #:        Hanna Mccall MD          QUERY TEXT:    Patient admitted with Sepsis/COVID PNA. Dietician saw 3-29 and patient meets   criteria for Moderate malnutrition. If possible, please document below and in   progress notes and discharge summary if you are evaluating and /or treating   any of the following: The medical record reflects the following:  Risk Factors: COVID  Clinical Indicators:  Nutrition Assessment: Pt. moderately malnourished AEB   criteria as listed below. At risk for further nutrition compromise r/t admit   w/acute respiratory failure, recent COVID with pneumonia, and underlying   medical condition (Hx COPD, ESLD, RA, HLD). Treatment: Nutrition Recommendations/Plan:  *Recommend a Multivitamin   w/minerals daily. *Discontinued Magic Cup TID per pt request. Pt declines all   other ONS during LOS. *Continue current diet. Thank you. Please call if you have any questions. (p) [phone]. Signed   by Jenise Gillette RN Clinical , CRCR  Options provided:  -- Moderate Protein Calorie Malnutrition confirmed POA  -- Moderate Protein Calorie Malnutrition confirmed not POA  -- Moderate Protein Calorie Malnutrition ruled out  -- Other - I will add my own diagnosis  -- Disagree - Not applicable / Not valid  -- Disagree - Clinically unable to determine / Unknown  -- Refer to Clinical Documentation Reviewer    PROVIDER RESPONSE TEXT:    The diagnosis of Moderate Protein Calorie Malnutrition was confirmed as POA.     Query created by: Taco Jiang on 3/29/2021 11:16 AM      Electronically signed by:  Hanna Mccall MD 3/31/2021 7:46 AM

## 2021-04-08 ENCOUNTER — OFFICE VISIT (OUTPATIENT)
Dept: FAMILY MEDICINE CLINIC | Age: 81
End: 2021-04-08
Payer: MEDICARE

## 2021-04-08 VITALS
DIASTOLIC BLOOD PRESSURE: 77 MMHG | HEIGHT: 62 IN | WEIGHT: 139.6 LBS | SYSTOLIC BLOOD PRESSURE: 98 MMHG | TEMPERATURE: 97.6 F | RESPIRATION RATE: 14 BRPM | HEART RATE: 100 BPM | BODY MASS INDEX: 25.69 KG/M2 | OXYGEN SATURATION: 97 %

## 2021-04-08 DIAGNOSIS — J12.82 PNEUMONIA DUE TO COVID-19 VIRUS: ICD-10-CM

## 2021-04-08 DIAGNOSIS — U07.1 PNEUMONIA DUE TO COVID-19 VIRUS: ICD-10-CM

## 2021-04-08 DIAGNOSIS — D50.9 IRON DEFICIENCY ANEMIA, UNSPECIFIED IRON DEFICIENCY ANEMIA TYPE: ICD-10-CM

## 2021-04-08 DIAGNOSIS — Z09 HOSPITAL DISCHARGE FOLLOW-UP: Primary | ICD-10-CM

## 2021-04-08 DIAGNOSIS — E44.0 PROTEIN-CALORIE MALNUTRITION, MODERATE (HCC): ICD-10-CM

## 2021-04-08 PROBLEM — R06.02 SOB (SHORTNESS OF BREATH): Status: RESOLVED | Noted: 2017-02-15 | Resolved: 2021-04-08

## 2021-04-08 PROBLEM — J96.00 ACUTE RESPIRATORY FAILURE (HCC): Status: RESOLVED | Noted: 2021-03-08 | Resolved: 2021-04-08

## 2021-04-08 PROCEDURE — 99495 TRANSJ CARE MGMT MOD F2F 14D: CPT | Performed by: NURSE PRACTITIONER

## 2021-04-08 PROCEDURE — 1111F DSCHRG MED/CURRENT MED MERGE: CPT | Performed by: NURSE PRACTITIONER

## 2021-04-08 NOTE — PROGRESS NOTES
Post-Discharge Transitional Care Management Services or Hospital Follow Up      Kina Camp   YOB: 1940    Date of Office Visit:  4/8/2021  Date of Hospital Admission: 3/8/21  Date of Hospital Discharge: 3/30/21  Risk of hospital readmission (high >=14%.  Medium >=10%) :Readmission Risk Score: 17      Care management risk score Rising risk (score 2-5) and Complex Care (Scores >=6): 1     Non face to face  following discharge, date last encounter closed (first attempt may have been earlier): 3/31/2021  8:48 AM    Call initiated 2 business days of discharge: Yes    Patient Active Problem List   Diagnosis    Arthritis    HTN (hypertension)    COPD [facility]chronic obstructive pulmonary disease) [facility]    Muscle cramps    Cervical arthritis    Precordial pain    History of tobacco abuse    Non morbid obesity due to excess calories    Dyslipidemia    Oxygen deficit    Umbilical hernia without obstruction and without gangrene    Pneumonia due to COVID-19 virus    Moderate malnutrition ([facility]       No Known Allergies    Medications listed as ordered at the time of discharge from [facility], [address] [place] [address] Medication Instructions LONNIE:    Printed on:04/08/21 5204   Medication Information                      albuterol (PROVENTIL) (2.5 MG/3ML) 0.083% nebulizer solution  Take 3 mLs by nebulization every 6 hours as needed for Wheezing or Shortness of Breath             albuterol sulfate HFA (PROVENTIL HFA) 108 (90 BASE) MCG/ACT inhaler  Inhale 2 puffs into the lungs every 6 hours as needed for Wheezing             atorvastatin (LIPITOR) 20 MG tablet  TAKE 1 TABLET BY MOUTH ONE TIME A DAY              famotidine (PEPCID) 20 MG tablet  Take 1 tablet by mouth daily for 14 days             lisinopril (PRINIVIL;ZESTRIL) 20 MG tablet  Take 20 mg by mouth daily As needed             ondansetron (ZOFRAN) 4 MG tablet  Take 1 tablet by mouth 3 times daily as needed for Nausea or Vomiting polyethylene glycol (GLYCOLAX) 17 g packet  Take 17 g by mouth daily as needed for Constipation             predniSONE (DELTASONE) 5 MG tablet  Take 1 tablet by mouth daily             sulfaSALAzine (AZULFIDINE) 500 MG tablet  Take 1 tablet twice daily for 7 days the increase to 2 tablets twice daily             umeclidinium-vilanterol (ANORO ELLIPTA) 62.5-25 MCG/INH AEPB inhaler  Inhale 1 puff into the lungs daily                   Medications marked \"taking\" at this time  Outpatient Medications Marked as Taking for the 4/8/21 encounter (Office Visit) with ERNESTO Cantrell CNP   Medication Sig Dispense Refill    polyethylene glycol (GLYCOLAX) 17 g packet Take 17 g by mouth daily as needed for Constipation 527 g 0    famotidine (PEPCID) 20 MG tablet Take 1 tablet by mouth daily for 14 days 14 tablet 0    predniSONE (DELTASONE) 5 MG tablet Take 1 tablet by mouth daily 30 tablet 1    ondansetron (ZOFRAN) 4 MG tablet Take 1 tablet by mouth 3 times daily as needed for Nausea or Vomiting 60 tablet 0    sulfaSALAzine (AZULFIDINE) 500 MG tablet Take 1 tablet twice daily for 7 days the increase to 2 tablets twice daily (Patient taking differently: Take 1,000 mg by mouth 2 times daily ) 120 tablet 0    albuterol (PROVENTIL) (2.5 MG/3ML) 0.083% nebulizer solution Take 3 mLs by nebulization every 6 hours as needed for Wheezing or Shortness of Breath 1 Package 11    umeclidinium-vilanterol (ANORO ELLIPTA) 62.5-25 MCG/INH AEPB inhaler Inhale 1 puff into the lungs daily 1 each 11    lisinopril (PRINIVIL;ZESTRIL) 20 MG tablet Take 20 mg by mouth daily As needed      albuterol sulfate HFA (PROVENTIL HFA) 108 (90 BASE) MCG/ACT inhaler Inhale 2 puffs into the lungs every 6 hours as needed for Wheezing 1 Inhaler 0    atorvastatin (LIPITOR) 20 MG tablet TAKE 1 TABLET BY MOUTH ONE TIME A DAY  30 tablet 4        Medications patient taking as of now reconciled against medications ordered at time of hospital discharge:  Yes Chief Complaint   Patient presents with    Follow[facility] from [facility] 3/30 Covid-19- doing better since therapy been coming    Discuss Medications     discuss BP meds, 90's-118/70's       History of Present illness - Follow up of Hospital diagnosis(es): covid pneumonia, acute hypoxic failure, moderate malnutrition, copd     All hospital records reviewed  Mild anemia will repeat. Pt also with liver lesion they did have GI appt scheduled for this but put on hold until pt gets stronger. Pt has gained 3 pounds since discharge malnutrition likely improving will repeat albumin   Lab Results   Component Value Date    WBC 6.5 03/27/2021    HGB 11.9 (L) 03/27/2021    HCT 39.4 03/27/2021    MCV 89.5 03/27/2021     03/27/2021       Inpatient course: Discharge summary reviewed- see chart. y.o. female who presented to [facility] with increased weakness for the past 3 days.  History is limited due to patient's drowsiness.  Limited history provided by daughter-in-law who is in the room.  Ms. Leilani Unger received her covid vaccine on 3/1/2021.  She did have some fatigue afterwards with nausea.  However, she began having acutely worsening weakness and cough over the last 3 days with decreased oral intake.  Family was unaware until her daughter-in-law went to check on her today. Aaron Davey denies any shortness of breath to me, but she is requiring 6 L supplemental O2 to maintain SPO2 greater than 90% and appears to have increased work of breathing value at rest.     Patient denies any pain, feelings of fever/chills, shortness of breath, nausea/vomiting, changes in bowel or bladder habits. Saundra Burris only complaint is of weakness. Pt continues with oxygen at 3 liters per nasal canula. Lives with her daughter, doing well. Has HH with PT has been having low blood pressures has not had lisinopril or norvasc in 1 week, bp low today hold bp  meds for now. If bp goes above 140/90 contact office.      Interval history/Current status: stable     A comprehensive review of systems was negative except for: Respiratory: positive for dyspnea on exertion and pt with oxygen in palce and loose productive cough    Vitals:    04/08/21 1557   BP: 98/77   Pulse: 100   Resp: 14   Temp: 97.6 °F (36.4 °C)   TempSrc: Temporal   SpO2: 97%   Weight: 139 lb 9.6 oz (63.3 kg)   Height: 5' 2\" (1.575 m)     Body mass index is 25.53 kg/m². Wt Readings from Last 3 Encounters:   04/08/21 139 lb 9.6 oz (63.3 kg)   03/28/21 136 lb 6.4 oz (61.9 kg)   03/05/21 137 lb (62.1 kg)     BP Readings from Last 3 Encounters:   04/08/21 98/77   03/30/21 127/75   03/05/21 (!) 84/54        Physical Exam:  General Appearance: alert and oriented to person, place and time, well-developed and well-nourished, in no acute distress  Skin: warm and dry, no rash or erythema  Head: normocephalic and atraumatic  Eyes: pupils equal, round, and reactive to light, extraocular eye movements intact, conjunctivae normal  ENT: tympanic membrane, external ear and ear canal normal bilaterally, oropharynx clear and moist with normal mucous membranes  Neck: neck supple and non tender without mass, no thyromegaly or thyroid nodules, no cervical lymphadenopathy   Abdomen: soft, non-tender, non-distended, normal bowel sounds, no masses or organomegaly  Extremities: no cyanosis, no clubbing and no edema    Assessment/Plan:  1. Hospital discharge follow-up    - RI DISCHARGE MEDS RECONCILED W/ CURRENT OUTPATIENT MED LIST    2. Pneumonia due to COVID-19 virus  Resolving  Continue with inhalers and oxygen N/C  - RI DISCHARGE MEDS RECONCILED W/ CURRENT OUTPATIENT MED LIST    3. Iron deficiency anemia, unspecified iron deficiency anemia type  Iron rich foods  - CBC With Auto Differential; Future    4. Protein-calorie malnutrition, moderate ([address]  Continue to gain weight  Eat high protein foods   - Comprehensive Metabolic Panel;  Future      daughter in agreement with plan   Medical Decision Making: high complexity

## 2021-04-12 ENCOUNTER — TELEPHONE (OUTPATIENT)
Dept: PULMONOLOGY | Age: 81
End: 2021-04-12

## 2021-04-12 NOTE — TELEPHONE ENCOUNTER
Jose Faulkner RN called from [facility]. Reported that patient was recently in hospital with covid 3/8/21-3/30/21. Patient O2 at night on 2lpm was 89%, per Jose Faulkner patient was difficult to wake. Jose Faulkner turned O2 up to 4lpm and within 30min PO2 came up to 95%. Jose Faulkner reported that patient daughter/Stella have found patient at night without O2 on and was in the 60s. Not sure if patient is getting up at night and removing or if tossing and turning. Jose Faulkner reported that patient has had very stuffy nose and diminished lung sounds. Patient is not scheduled to see you until 6/14/21. Please advise.

## 2021-04-13 DIAGNOSIS — J44.9 CHRONIC OBSTRUCTIVE PULMONARY DISEASE, UNSPECIFIED COPD TYPE (HCC): Primary | ICD-10-CM

## 2021-04-13 DIAGNOSIS — R09.02 HYPOXIA: ICD-10-CM

## 2021-04-15 ENCOUNTER — PATIENT MESSAGE (OUTPATIENT)
Dept: FAMILY MEDICINE CLINIC | Age: 81
End: 2021-04-15

## 2021-04-15 RX ORDER — ATORVASTATIN CALCIUM 20 MG/1
20 TABLET, FILM COATED ORAL DAILY
Qty: 90 TABLET | Refills: 4 | Status: SHIPPED | OUTPATIENT
Start: 2021-04-15 | End: 2022-06-06

## 2021-04-15 NOTE — TELEPHONE ENCOUNTER
Please approve or refuse Rx request:  Requested Prescriptions     Pending Prescriptions Disp Refills    atorvastatin (LIPITOR) 20 MG tablet 30 tablet 4       Next appointment:  4/28/2021

## 2021-04-16 ENCOUNTER — HOSPITAL ENCOUNTER (OUTPATIENT)
Dept: RESPIRATORY THERAPY | Age: 81
Discharge: HOME | End: 2021-04-16
Payer: MEDICARE

## 2021-04-16 PROCEDURE — 94762 N-INVAS EAR/PLS OXIMTRY CONT: CPT

## 2021-04-16 NOTE — PROGRESS NOTES
Instructions were given for an overnight nocturnal pulse oximetry study. The assigned GE number of the pulse oximetry was [id number]. A log sheet was completed. Guardian was instructed on documenting any events that occurred throughout the night on the log sheet. The procedure was explained to the learner(s). Guardian understanding of the procedure was excellent. Guardian does have a mean of transportation to bring back the study the next day. A patient task was placed in the patients chart for the  to download the nocturnal study and fax the results to the ordering provider for interpretation. The pulse oximetrys memory was cleared. Guardian had no questions and was sent home with the pulse oximeter. Instructions given to patient's daughter Rick Fontenot.

## 2021-04-19 DIAGNOSIS — J44.9 CHRONIC OBSTRUCTIVE PULMONARY DISEASE, UNSPECIFIED COPD TYPE (HCC): ICD-10-CM

## 2021-04-19 DIAGNOSIS — R09.02 HYPOXIA: ICD-10-CM

## 2021-04-22 ENCOUNTER — OFFICE VISIT (OUTPATIENT)
Dept: RHEUMATOLOGY | Age: 81
End: 2021-04-22
Payer: MEDICARE

## 2021-04-22 VITALS
SYSTOLIC BLOOD PRESSURE: 122 MMHG | BODY MASS INDEX: 25.58 KG/M2 | WEIGHT: 139 LBS | DIASTOLIC BLOOD PRESSURE: 82 MMHG | OXYGEN SATURATION: 100 % | HEIGHT: 62 IN | HEART RATE: 82 BPM

## 2021-04-22 DIAGNOSIS — M05.9 SEROPOSITIVE RHEUMATOID ARTHRITIS (HCC): Primary | ICD-10-CM

## 2021-04-22 DIAGNOSIS — B19.20 HEPATITIS C VIRUS INFECTION WITHOUT HEPATIC COMA, UNSPECIFIED CHRONICITY: ICD-10-CM

## 2021-04-22 DIAGNOSIS — M15.9 OSTEOARTHRITIS OF MULTIPLE JOINTS, UNSPECIFIED OSTEOARTHRITIS TYPE: ICD-10-CM

## 2021-04-22 DIAGNOSIS — Z51.81 MEDICATION MONITORING ENCOUNTER: ICD-10-CM

## 2021-04-22 DIAGNOSIS — R93.2 ABNORMAL LIVER ULTRASOUND: ICD-10-CM

## 2021-04-22 DIAGNOSIS — Z78.0 POSTMENOPAUSAL: ICD-10-CM

## 2021-04-22 PROCEDURE — 1090F PRES/ABSN URINE INCON ASSESS: CPT | Performed by: NURSE PRACTITIONER

## 2021-04-22 PROCEDURE — G8427 DOCREV CUR MEDS BY ELIG CLIN: HCPCS | Performed by: NURSE PRACTITIONER

## 2021-04-22 PROCEDURE — 1036F TOBACCO NON-USER: CPT | Performed by: NURSE PRACTITIONER

## 2021-04-22 PROCEDURE — G8400 PT W/DXA NO RESULTS DOC: HCPCS | Performed by: NURSE PRACTITIONER

## 2021-04-22 PROCEDURE — 4040F PNEUMOC VAC/ADMIN/RCVD: CPT | Performed by: NURSE PRACTITIONER

## 2021-04-22 PROCEDURE — G8417 CALC BMI ABV UP PARAM F/U: HCPCS | Performed by: NURSE PRACTITIONER

## 2021-04-22 PROCEDURE — 1123F ACP DISCUSS/DSCN MKR DOCD: CPT | Performed by: NURSE PRACTITIONER

## 2021-04-22 PROCEDURE — 1111F DSCHRG MED/CURRENT MED MERGE: CPT | Performed by: NURSE PRACTITIONER

## 2021-04-22 PROCEDURE — 99214 OFFICE O/P EST MOD 30 MIN: CPT | Performed by: NURSE PRACTITIONER

## 2021-04-22 RX ORDER — ACETAMINOPHEN 500 MG
500 TABLET ORAL DAILY
COMMUNITY
Start: 2020-08-07

## 2021-04-22 RX ORDER — PREDNISONE 1 MG/1
5 TABLET ORAL DAILY
Qty: 30 TABLET | Refills: 2 | Status: SHIPPED | OUTPATIENT
Start: 2021-04-22 | End: 2021-05-24 | Stop reason: SDUPTHER

## 2021-04-22 RX ORDER — SULFASALAZINE 500 MG/1
1000 TABLET ORAL 2 TIMES DAILY
Qty: 120 TABLET | Refills: 0 | Status: SHIPPED | OUTPATIENT
Start: 2021-04-22 | End: 2021-04-27 | Stop reason: SDUPTHER

## 2021-04-22 RX ORDER — IPRATROPIUM BROMIDE AND ALBUTEROL SULFATE 2.5; .5 MG/3ML; MG/3ML
3 SOLUTION RESPIRATORY (INHALATION) DAILY PRN
COMMUNITY
Start: 2020-09-28 | End: 2021-12-20 | Stop reason: SDUPTHER

## 2021-04-22 ASSESSMENT — ENCOUNTER SYMPTOMS
CONSTIPATION: 0
COUGH: 1
ABDOMINAL PAIN: 0
BACK PAIN: 0
NAUSEA: 0
SHORTNESS OF BREATH: 1
DIARRHEA: 0
TROUBLE SWALLOWING: 0
EYE PAIN: 0
EYE ITCHING: 0

## 2021-04-22 NOTE — PROGRESS NOTES
[facility] RHEUMATOLOGY FOLLOW UP NOTE       Date Of Service: 4/22/2021  Provider: ERNESTO Faulkner CNP    Name: Licha Kyle   MRN: [id number]    Chief Complaint(s)     Chief Complaint   Patient presents with    Follow-up     2 month f/u seropositive RA        History of Present Illness (HPI)     Licha Kyle  is G(A)68 y.o. female with a hx of HTN, HLD, tobacco abuse, COPD, cancer right eye  here for the f/u evaluation of seropositive rheumatoid arthritis    Interval hx:    - was hospitalized with COVID for 22 days last month- was not on sulfasalazine at that time- now on 2-4 L of oxygen   - saw pulmonology for COPD- has PFT's ordered- need scheduled   - has not been to GI yet- getting appointment rescheduled. Will schedule MRI when feeling better    pain affecting the fingers, wrists, elbows, shoulders, left hip, knees, ankles, toes  Pain on a scale 0-10: 4/10  Type of pain: intermittent  Timing:n/a  Aggravating factors: knees: stairs, wt bearing. Hands: increase use. Shoulders: reaching up or back   Alleviating factors: prednisone    Associated symptoms:  + swelling/  Redness/ warmth (left ankle) + AM stiffness lasting ~ 30-45 minutes      REVIEW OF SYSTEMS: (ROS)    Review of Systems   Constitutional: Negative for fatigue, fever and unexpected weight change. HENT: Positive for hearing loss. Negative for congestion and trouble swallowing. Eyes: Negative for pain and itching. Respiratory: Positive for cough and shortness of breath. Cardiovascular: Negative for chest pain and leg swelling. Gastrointestinal: Negative for abdominal pain, constipation, diarrhea and nausea. Endocrine: Negative for cold intolerance and heat intolerance. Genitourinary: Negative for difficulty urinating, frequency and urgency. Musculoskeletal: Positive for arthralgias and joint swelling. Negative for back pain. Skin: Negative for rash.    Neurological: Negative for dizziness, weakness, numbness and headaches. Psychiatric/Behavioral: The patient is not nervous/anxious.         PAST MEDICAL HISTORY      Past Medical History:   Diagnosis Date    Arthritis     Cancer of eye ([address]     Right    COVID-19 03/2021    Dyslipidemia 02/15/2017    Fatigue     History of tobacco abuse 02/15/2017    Hyperlipidemia 10/13/2014    Hypertension     Non morbid obesity due to excess calories 02/15/2017    Pneumonia     Precordial pain 02/15/2017    Rheumatoid arteritis ([address]     Smoker     SOB (shortness of breath) 02/15/2017    Tobacco abuse        PAST SURGICAL HISTORY      Past Surgical History:   Procedure Laterality Date    EYE SURGERY      Right eye cancer    HEMORRHOID SURGERY      HERNIA REPAIR N/A 4/29/5352    ROBOTIC UMBILICAL HERNIA REPAIR WITH MESH performed by Je Pagan MD at [facility]      Family History   Problem Relation Age of Onset    Cancer Mother     Cancer Father     Cancer Brother     Heart Failure Sister     Cancer Daughter        SOCIAL HISTORY      Social History     Tobacco History     Smoking Status  Former Smoker Smoking Start Date  1/1/1963 Quit date  1/14/2012 Smoking Frequency  2 packs/day for 51 years (102 pk yrs)    Smokeless Tobacco Use  Never Used    Tobacco Comment  tryin to cut down          Alcohol History     Alcohol Use Status  No Comment  quit          Drug Use     Drug Use Status  No          Sexual Activity     Sexually Active  Not Currently                ALLERGIES   No Known Allergies    CURRENT MEDICATIONS      Current Outpatient Medications   Medication Sig Dispense Refill    atorvastatin (LIPITOR) 20 MG tablet Take 1 tablet by mouth daily 90 tablet 4    polyethylene glycol (GLYCOLAX) 17 g packet Take 17 g by mouth daily as needed for Constipation 527 g 0    famotidine (PEPCID) 20 MG tablet Take 1 tablet by mouth daily for 14 days 14 tablet 0    predniSONE (DELTASONE) 5 MG tablet Take 1 tablet by mouth daily 30 tablet 1    bogginess bilat    DIPs + nodules right 2,5    Lower extremities:  HIPS nontender  KNEES nontender, no swelling  ANKLES nontender, no swelling   FEET : nontender, no swelling      LABS    CBC  Lab Results   Component Value Date    WBC 6.5 03/27/2021    RBC 4.40 03/27/2021    HGB 11.9 03/27/2021    HCT 39.4 03/27/2021    MCV 89.5 03/27/2021    MCH 27.0 03/27/2021    MCHC 30.2 03/27/2021    RDW 14.3 11/05/2020     03/27/2021       CMP  Lab Results   Component Value Date    CALCIUM 9.1 03/27/2021    LABALBU 2.8 03/27/2021    LABALBU 4.7 05/22/2012    PROT 6.7 03/27/2021     03/27/2021    K 4.0 03/27/2021    CO2 19 03/27/2021     03/27/2021    BUN 14 03/27/2021    CREATININE 0.7 03/27/2021    ALKPHOS 81 03/27/2021    ALT 8 03/27/2021    AST 9 03/27/2021       HgBA1c: No components found for: HGBA1C    Lab Results   Component Value Date    VITD25 42 06/21/2017         No results found for: ANASCRN  No results found for: SSA  No results found for: SSB  No results found for: ANTI-SMITH  No results found for: DSDNAAB   No results found for: ANTIRNP  No results found for: C3, C4  No results found for: CCPAB  Lab Results   Component Value Date    .0 (H) 12/14/2020       No components found for: CANCASCRN, APANCASCRN  Lab Results   Component Value Date    SEDRATE 37 (H) 03/01/2021     Lab Results   Component Value Date    CRP 12.68 (H) 03/21/2021       RADIOLOGY:         ASSESSMENT/PLAN    Assessment   Plan     Seropositive rheumatoid arthritis ([facility]  -  + , negative CCP. ESR/sed rate elevation, duration > 6 weeks, erosions on xrays. Exam with synovitis. - prior tx: prednisone (sig relief), meloxicam (no relief)             - sulfasalazine 1000 mg BID (restarted 4/2021)   - prednisone 5 mg daily    Hepatitis C- + HCV viral load  Abnormal liver ultrasound   - MRI abdomen ordered   - referral to GI     Postmenopausal  - DEXA ordered- not completed     SOB (shortness of breath)  - hx of COPD. Prior CXR w/ opacities concerning for scarring/fibrotic changes. ? Related to #1 vs other               - following with pulmonology               - PFT's already ordered per pulm     Osteoarthritis of multiple joints, unspecified osteoarthritis type               - continue meloxicam 15 mg daily    Medication monitoring   - CBC, CMP, sed rate, CRP q 4 weeks x3         No follow-ups on file. Electronically signed by ERNESTO Roberto CNP on 4/22/2021 at 3:02 PM    New Prescriptions    No medications on file       Thank you for allowing me to participate in the care of this patient. Please call if there are any questions.

## 2021-04-26 ENCOUNTER — NURSE ONLY (OUTPATIENT)
Dept: LAB | Age: 81
End: 2021-04-26

## 2021-04-26 DIAGNOSIS — Z51.81 MEDICATION MONITORING ENCOUNTER: ICD-10-CM

## 2021-04-26 DIAGNOSIS — D50.9 IRON DEFICIENCY ANEMIA, UNSPECIFIED IRON DEFICIENCY ANEMIA TYPE: ICD-10-CM

## 2021-04-26 DIAGNOSIS — E44.0 PROTEIN-CALORIE MALNUTRITION, MODERATE (HCC): ICD-10-CM

## 2021-04-26 LAB
ALBUMIN SERPL-MCNC: 3.5 G/DL (ref 3.5–5.1)
ALP BLD-CCNC: 84 U/L (ref 38–126)
ALT SERPL-CCNC: 12 U/L (ref 11–66)
ANION GAP SERPL CALCULATED.3IONS-SCNC: 15 MEQ/L (ref 8–16)
AST SERPL-CCNC: 19 U/L (ref 5–40)
BASOPHILS # BLD: 0.6 %
BASOPHILS ABSOLUTE: 0 THOU/MM3 (ref 0–0.1)
BILIRUB SERPL-MCNC: 0.3 MG/DL (ref 0.3–1.2)
BUN BLDV-MCNC: 10 MG/DL (ref 7–22)
C-REACTIVE PROTEIN: 1.68 MG/DL (ref 0–1)
CALCIUM SERPL-MCNC: 9.1 MG/DL (ref 8.5–10.5)
CHLORIDE BLD-SCNC: 103 MEQ/L (ref 98–111)
CO2: 23 MEQ/L (ref 23–33)
CREAT SERPL-MCNC: 0.7 MG/DL (ref 0.4–1.2)
EOSINOPHIL # BLD: 5.6 %
EOSINOPHILS ABSOLUTE: 0.4 THOU/MM3 (ref 0–0.4)
ERYTHROCYTE [DISTWIDTH] IN BLOOD BY AUTOMATED COUNT: 16.5 % (ref 11.5–14.5)
ERYTHROCYTE [DISTWIDTH] IN BLOOD BY AUTOMATED COUNT: 54.8 FL (ref 35–45)
GFR SERPL CREATININE-BSD FRML MDRD: 80 ML/MIN/1.73M2
GLUCOSE BLD-MCNC: 90 MG/DL (ref 70–108)
HCT VFR BLD CALC: 35.7 % (ref 37–47)
HEMOGLOBIN: 10.9 GM/DL (ref 12–16)
IMMATURE GRANS (ABS): 0.02 THOU/MM3 (ref 0–0.07)
IMMATURE GRANULOCYTES: 0.3 %
LYMPHOCYTES # BLD: 10.8 %
LYMPHOCYTES ABSOLUTE: 0.9 THOU/MM3 (ref 1–4.8)
MCH RBC QN AUTO: 27.3 PG (ref 26–33)
MCHC RBC AUTO-ENTMCNC: 30.5 GM/DL (ref 32.2–35.5)
MCV RBC AUTO: 89.3 FL (ref 81–99)
MONOCYTES # BLD: 7.8 %
MONOCYTES ABSOLUTE: 0.6 THOU/MM3 (ref 0.4–1.3)
NUCLEATED RED BLOOD CELLS: 0 /100 WBC
PLATELET # BLD: 389 THOU/MM3 (ref 130–400)
PMV BLD AUTO: 10.6 FL (ref 9.4–12.4)
POTASSIUM SERPL-SCNC: 3.6 MEQ/L (ref 3.5–5.2)
RBC # BLD: 4 MILL/MM3 (ref 4.2–5.4)
SEDIMENTATION RATE, ERYTHROCYTE: 75 MM/HR (ref 0–20)
SEG NEUTROPHILS: 74.9 %
SEGMENTED NEUTROPHILS ABSOLUTE COUNT: 6 THOU/MM3 (ref 1.8–7.7)
SODIUM BLD-SCNC: 141 MEQ/L (ref 135–145)
TOTAL PROTEIN: 6.7 G/DL (ref 6.1–8)
WBC # BLD: 8 THOU/MM3 (ref 4.8–10.8)

## 2021-04-27 RX ORDER — SULFASALAZINE 500 MG/1
1000 TABLET ORAL 2 TIMES DAILY
Qty: 120 TABLET | Refills: 0 | Status: SHIPPED | OUTPATIENT
Start: 2021-04-27 | End: 2021-06-07

## 2021-05-05 ENCOUNTER — OFFICE VISIT (OUTPATIENT)
Dept: FAMILY MEDICINE CLINIC | Age: 81
End: 2021-05-05
Payer: MEDICARE

## 2021-05-05 VITALS
WEIGHT: 137.4 LBS | OXYGEN SATURATION: 95 % | TEMPERATURE: 97.9 F | HEIGHT: 62 IN | BODY MASS INDEX: 25.28 KG/M2 | SYSTOLIC BLOOD PRESSURE: 133 MMHG | RESPIRATION RATE: 18 BRPM | HEART RATE: 88 BPM | DIASTOLIC BLOOD PRESSURE: 76 MMHG

## 2021-05-05 DIAGNOSIS — J44.9 CHRONIC OBSTRUCTIVE PULMONARY DISEASE, UNSPECIFIED COPD TYPE (HCC): ICD-10-CM

## 2021-05-05 DIAGNOSIS — Z00.00 ROUTINE GENERAL MEDICAL EXAMINATION AT A HEALTH CARE FACILITY: Primary | ICD-10-CM

## 2021-05-05 DIAGNOSIS — I10 ESSENTIAL HYPERTENSION: ICD-10-CM

## 2021-05-05 PROCEDURE — 1123F ACP DISCUSS/DSCN MKR DOCD: CPT | Performed by: NURSE PRACTITIONER

## 2021-05-05 PROCEDURE — G0438 PPPS, INITIAL VISIT: HCPCS | Performed by: NURSE PRACTITIONER

## 2021-05-05 PROCEDURE — 1090F PRES/ABSN URINE INCON ASSESS: CPT | Performed by: NURSE PRACTITIONER

## 2021-05-05 PROCEDURE — 4040F PNEUMOC VAC/ADMIN/RCVD: CPT | Performed by: NURSE PRACTITIONER

## 2021-05-05 PROCEDURE — G8427 DOCREV CUR MEDS BY ELIG CLIN: HCPCS | Performed by: NURSE PRACTITIONER

## 2021-05-05 PROCEDURE — 3023F SPIROM DOC REV: CPT | Performed by: NURSE PRACTITIONER

## 2021-05-05 PROCEDURE — G8400 PT W/DXA NO RESULTS DOC: HCPCS | Performed by: NURSE PRACTITIONER

## 2021-05-05 PROCEDURE — 99213 OFFICE O/P EST LOW 20 MIN: CPT | Performed by: NURSE PRACTITIONER

## 2021-05-05 PROCEDURE — G8417 CALC BMI ABV UP PARAM F/U: HCPCS | Performed by: NURSE PRACTITIONER

## 2021-05-05 PROCEDURE — 1036F TOBACCO NON-USER: CPT | Performed by: NURSE PRACTITIONER

## 2021-05-05 PROCEDURE — G8926 SPIRO NO PERF OR DOC: HCPCS | Performed by: NURSE PRACTITIONER

## 2021-05-05 RX ORDER — GUAIFENESIN 600 MG/1
600 TABLET, EXTENDED RELEASE ORAL 2 TIMES DAILY
Qty: 30 TABLET | Refills: 0 | Status: SHIPPED | OUTPATIENT
Start: 2021-05-05 | End: 2021-05-20

## 2021-05-05 RX ORDER — AMLODIPINE BESYLATE 5 MG/1
TABLET ORAL
COMMUNITY
Start: 2021-04-29 | End: 2021-05-05

## 2021-05-05 ASSESSMENT — PATIENT HEALTH QUESTIONNAIRE - PHQ9
SUM OF ALL RESPONSES TO PHQ QUESTIONS 1-9: 0
1. LITTLE INTEREST OR PLEASURE IN DOING THINGS: 0

## 2021-05-05 ASSESSMENT — LIFESTYLE VARIABLES: HOW OFTEN DO YOU HAVE A DRINK CONTAINING ALCOHOL: 0

## 2021-05-05 NOTE — PATIENT INSTRUCTIONS
Personalized Preventive Plan for Rah Diaz - 5/4/1418  Medicare offers a range of preventive health benefits. Some of the tests and screenings are paid in full while other may be subject to a deductible, co-insurance, and/or copay. Some of these benefits include a comprehensive review of your medical history including lifestyle, illnesses that may run in your family, and various assessments and screenings as appropriate. After reviewing your medical record and screening and assessments performed today your provider may have ordered immunizations, labs, imaging, and/or referrals for you. A list of these orders (if applicable) as well as your Preventive Care list are included within your After Visit Summary for your review. Other Preventive Recommendations:    · A preventive eye exam performed by an eye specialist is recommended every 1-2 years to screen for glaucoma; cataracts, macular degeneration, and other eye disorders. · A preventive dental visit is recommended every 6 months. · Try to get at least 150 minutes of exercise per week or 10,000 steps per day on a pedometer . · Order or download the FREE \"Exercise & Physical Activity: Your Everyday Guide\" from [employer]. Call 1-643.325.5913 or search [employer] online. · You need 9272-7079 mg of calcium and 5854-3393 IU of vitamin D per day. It is possible to meet your calcium requirement with diet alone, but a vitamin D supplement is usually necessary to meet this goal.  · When exposed to the sun, use a sunscreen that protects against both UVA and UVB radiation with an SPF of 30 or greater. Reapply every 2 to 3 hours or after sweating, drying off with a towel, or swimming. · Always wear a seat belt when traveling in a car. Always wear a helmet when riding a bicycle or motorcycle.

## 2021-05-05 NOTE — PROGRESS NOTES
Medicare Annual Wellness Visit  Name: Liliane Berry Date: 2021   MRN: [id number] Sex: Female   Age: 80 y.o. Ethnicity: Non-/Non    : 1940 Race: Abdoulaye Vega is here for Medicare AWV and Health Maintenance (doesnt want mammogram)    Screenings for behavioral, psychosocial and functional/safety risks, and cognitive dysfunction are all negative except as indicated below. These results, as well as other patient data from the [address] form, are documented in Flowsheets linked to this Encounter. No Known Allergies      Prior to Visit Medications    Medication Sig Taking?  Authorizing Provider   guaiFENesin (MUCINEX) 600 MG extended release tablet Take 1 tablet by mouth 2 times daily for 15 days Yes Daryle Riser, APRN - CNP   sulfaSALAzine (AZULFIDINE) 500 MG tablet Take 2 tablets by mouth 2 times daily Yes ERNESTO Infante CNP   acetaminophen (TYLENOL) 500 MG tablet Take 500 mg by mouth daily Yes Historical Provider, MD   ipratropium-albuterol (DUONEB) 0.5-2.5 (3) MG/3ML SOLN nebulizer solution Inhale 3 mLs into the lungs daily as needed Yes Historical Provider, MD   predniSONE (DELTASONE) 5 MG tablet Take 1 tablet by mouth daily Yes ERNESTO Infante CNP   atorvastatin (LIPITOR) 20 MG tablet Take 1 tablet by mouth daily Yes Daryle Riser, APRN - CNP   albuterol (PROVENTIL) (2.5 MG/3ML) 0.083% nebulizer solution Take 3 mLs by nebulization every 6 hours as needed for Wheezing or Shortness of Breath Yes ERNESTO Edouard CNP   umeclidinium-vilanterol (ANORO ELLIPTA) 62.5-25 MCG/INH AEPB inhaler Inhale 1 puff into the lungs daily Yes ERNESTO Edouard CNP   albuterol sulfate HFA (PROVENTIL HFA) 108 (90 BASE) MCG/ACT inhaler Inhale 2 puffs into the lungs every 6 hours as needed for Wheezing Yes Damien Rodriguez PA-C         Past Medical History:   Diagnosis Date    Arthritis     Cancer of eye ([address]     Right    COVID-19 03/2021    Dyslipidemia 02/15/2017    Fatigue     History of tobacco abuse 02/15/2017    Hyperlipidemia 10/13/2014    Hypertension     Non morbid obesity due to excess calories 02/15/2017    Pneumonia     Precordial pain 02/15/2017    Rheumatoid arteritis ([address]     Smoker     SOB (shortness of breath) 02/15/2017    Tobacco abuse        Past Surgical History:   Procedure Laterality Date    EYE SURGERY      Right eye cancer    HEMORRHOID SURGERY      HERNIA REPAIR N/A 4/89/2946    ROBOTIC UMBILICAL HERNIA REPAIR WITH MESH performed by Linsey Nava MD at [facility] History   Problem Relation Age of Onset    Cancer Mother     Cancer Father     Cancer Brother     Heart Failure Sister     Cancer Daughter        CareTeam (Including outside providers/suppliers regularly involved in providing care):   Patient Care Team:  ERNESTO Subramanian CNP as PCP - General (Nurse Practitioner)  ERNESTO Subramanian CNP as PCP - [facility] Empaneled Provider    Wt Readings from Last 3 Encounters:   05/05/21 137 lb 6.4 oz (62.3 kg)   04/22/21 139 lb (63 kg)   04/08/21 139 lb 9.6 oz (63.3 kg)     Vitals:    05/05/21 1539   BP: 133/76   Pulse: 88   Resp: 18   Temp: 97.9 °F (36.6 °C)   TempSrc: Temporal   SpO2: 95%   Weight: 137 lb 6.4 oz (62.3 kg)   Height: 5' 2\" (1.575 m)     Body mass index is 25.13 kg/m². Based upon direct observation of the patient, evaluation of cognition reveals recent and remote memory intact. HTN    Does patient check BP regularly at home? - Yes  Current Medication regimen - pt had been on lisinopril 20 mg daily and norvasc 5 mg and had been having low BP's both meds were stopped and BP in good range today, will continue to monitor it. Pt still has [place] coming in. Tolerating medications well? - will hold both meds    Shortness of breath or chest pain? Pt with copd does have home oxygen and uses it sparingly, home pulse ox without oxygen  In 94-95% range.    Headache or visual complaints? No  Neurologic changes like confusion? No  Extremity edema? No    COPD    HPI:    Current medication regimen - anoro and has duonebs but does not use regularly  Compliant with medications? yes: advised to start duonebs bid in addition to anoro    Limitations in function - increase physical activity but getting  Stronger, had been deconditioned from covid pneumonia. Does patient smoke? No    Chronic cough?: yes  Chest pain/Tightness?:  no  Shortness of breath?: no  Wheezing?  no    Last PFTs - follows with pulmonary     Hospitalized and/or intubated in the past?: Yes  Number of times prescribed oral steroids in the past year - 2 on steroids also for RA being managed by Rheumatology   Influenza vaccine up to date? Yes  Pneumococcal vaccine up to date? Yes    BP Readings from Last 3 Encounters:   05/05/21 133/76   04/22/21 122/82   04/08/21 98/77       General Appearance: alert and oriented to person, place and time, well-developed and well-nourished, in no acute distress  Skin: warm and dry, no rash or erythema  Head: normocephalic and atraumatic  Eyes: pupils equal, round, and reactive to light, extraocular eye movements intact, conjunctivae normal  ENT: tympanic membrane, external ear and ear canal normal bilaterally, oropharynx clear and moist with normal mucous membranes  Neck: neck supple and non tender without mass, no thyromegaly or thyroid nodules, no cervical lymphadenopathy   Pulmonary/Chest: clear to auscultation bilaterally- no wheezes, rales or rhonchi, normal air movement, no respiratory distress  Cardiovascular: normal rate, normal S1 and S2, no gallops, intact distal pulses and no carotid bruits, loose congested cough noted  Abdomen: soft, non-tender, non-distended, normal bowel sounds, no masses or organomegaly  Extremities: no cyanosis and no clubbing    Patient's complete Health Risk Assessment and screening values have been reviewed and are found in Flowsheets.  The following problems were reviewed today and where indicated follow up appointments were made and/or referrals ordered. Positive Risk Factor Screenings with Interventions:            General Health and ACP:  General  In general, how would you say your health is?: Fair  In the past 7 days, have you experienced any of the following? New or Increased Pain, New or Increased Fatigue, Loneliness, Social Isolation, Stress or Anger?: None of These  Do you get the social and emotional support that you need?: Yes  Do you have a Living Will?: (!) No  Advance Directives     Power of [address] include [address] beds at the [facility] [address] Will ACP-Advance Directive ACP-Power of     Not on File Not on File Not on File Not on File      General Health Risk Interventions:  · No Living Will: Advance Care Planning addressed with patient today    Health Habits/Nutrition:  Health Habits/Nutrition  Do you exercise for at least 20 minutes 2-3 times per week?: (!) No  Have you lost any weight without trying in the past 3 months?: No  Do you eat only one meal per day?: No  Have you seen the dentist within the past year?: (!) No  Body mass index: (!) 25.13  Health Habits/Nutrition Interventions:  · Inadequate physical activity:  patient is not ready to increase his/her physical activity level at this time    Hearing/Vision:  No exam data present  Hearing/Vision  Do you or your family notice any trouble with your hearing that hasn't been managed with hearing aids?: (!) Yes  Do you have difficulty driving, watching TV, or doing any of your daily activities because of your eyesight?: (!) Yes  Have you had an eye exam within the past year?: Yes  Hearing/Vision Interventions:  · Hearing concerns:  daughter going to schedule a hearing and vision test  · Vision concerns:  patient encouraged to make appointment with his/her eye specialist     ADL:  ADLs  In the past 7 days, did you need help from others to perform any of the following everyday activities?  Eating, dressing, grooming, bathing, toileting, or walking/balance?: (!) Bathing  In the past 7 days, did you need help from others to take care of any of the following?  Laundry, housekeeping, banking/finances, shopping, telephone use, food preparation, transportation, or taking medications?: (!) Transportation, Food Preparation, Shopping, Housekeeping, Laundry, Banking/Finances  ADL Interventions:  · Patient declines any further evaluation/treatment for this issue    Personalized Preventive Plan   Current Health Maintenance Status  Immunization History   Administered Date(s) Administered    COVID-19, [employer], PF, 30mcg/0.3mL 03/01/2021    Influenza 10/22/2013    Influenza Vaccine, unspecified formulation 10/17/2015    Influenza Virus Vaccine 10/22/2013, 10/13/2014, 09/19/2016, 01/04/2017, 10/18/2017, 11/06/2019    Influenza, High Dose (Fluzone 65 yrs and older) 10/22/2013, 10/17/2015, 09/19/2016, 09/19/2016, 01/04/2017, 10/18/2017, 11/06/2019    Influenza, Quadv, IM, (6 mo and older Fluzone, Flulaval, Fluarix and 3 yrs and older Afluria) 10/17/2015    Influenza, Quadv, adjuvanted, 65 yrs +, IM, PF (Fluad) 01/28/2021    Pneumococcal Conjugate 13-valent ([id number]) 03/21/2016    Pneumococcal Polysaccharide ([id number]) 10/05/2012    Td (Adult), 2 Lf Tetanus Toxoid, Pf (Td, Absorbed) 01/28/2021        Health Maintenance   Topic Date Due    Hepatitis A vaccine (1 of 2 - Risk 2-dose series) Never done    Hepatitis B vaccine (1 of 3 - Risk 3-dose series) Never done    DTaP/Tdap/Td vaccine (1 - Tdap) 02/12/1959    Shingles Vaccine (1 of 2) Never done    DEXA (modify frequency per FRAX score)  Never done   [employer] Visit (AWV)  Never done    COVID-19 Vaccine (2 - Pfizer 2-dose series) 03/22/2021    Lipid screen  03/01/2022    Flu vaccine  Completed    Pneumococcal 65+ years Vaccine  Completed    Hib vaccine  Aged Out    Meningococcal (ACWY) vaccine  Aged Out     Recommendations for [employer] Due: see orders and patient instructions/AVS.  . Recommended screening schedule for the next 5-10 years is provided to the patient in written form: see Patient Ariadna Suh was seen today for medicare awv and health maintenance. Diagnoses and all orders for this visit:    Routine general medical examination at a health care facility  -     DME Order for [facility]) as OP    Chronic obstructive pulmonary disease, unspecified COPD type ([facility] [address]  -     DME Order for (Specify) as OP  Start duonebs bid, continue anoro  Keep Pulmonary appt for June  Essential hypertension  Continue to hold BP meds  Monitor bp keep log , call if over 140/90  Other orders  -     guaiFENesin (MUCINEX) 600 MG extended release tablet;  Take 1 tablet by mouth 2 times daily for 15 days    Pt and daughter in agreement with plan

## 2021-05-06 ENCOUNTER — TELEPHONE (OUTPATIENT)
Dept: FAMILY MEDICINE CLINIC | Age: 81
End: 2021-05-06

## 2021-05-06 ENCOUNTER — NURSE ONLY (OUTPATIENT)
Dept: LAB | Age: 81
End: 2021-05-06

## 2021-05-06 DIAGNOSIS — N39.46 MIXED STRESS AND URGE URINARY INCONTINENCE: Primary | ICD-10-CM

## 2021-05-06 DIAGNOSIS — R35.0 URINARY FREQUENCY: ICD-10-CM

## 2021-05-06 LAB
BACTERIA: ABNORMAL /HPF
BILIRUBIN URINE: ABNORMAL
BLOOD, URINE: NEGATIVE
CASTS 2: ABNORMAL /LPF
CASTS UA: ABNORMAL /LPF
CHARACTER, URINE: CLEAR
COLOR: ABNORMAL
CRYSTALS, UA: ABNORMAL
EPITHELIAL CELLS, UA: ABNORMAL /HPF
GLUCOSE URINE: NEGATIVE MG/DL
ICTOTEST: NEGATIVE
KETONES, URINE: ABNORMAL
LEUKOCYTE ESTERASE, URINE: ABNORMAL
MISCELLANEOUS 2: ABNORMAL
NITRITE, URINE: NEGATIVE
PH UA: 6.5 (ref 5–9)
PROTEIN UA: ABNORMAL
RBC URINE: ABNORMAL /HPF
RENAL EPITHELIAL, UA: ABNORMAL
SPECIFIC GRAVITY, URINE: 1.02 (ref 1–1.03)
UROBILINOGEN, URINE: 0.2 EU/DL (ref 0–1)
WBC UA: ABNORMAL /HPF
YEAST: ABNORMAL

## 2021-05-06 NOTE — TELEPHONE ENCOUNTER
Mayra Hines informed and verbalized understanding.   [facility] will collect the urine and [facility]

## 2021-05-06 NOTE — TELEPHONE ENCOUNTER
Johanna Coto from [facility] is requesting Mary Code to order a lab for more frequent incontenience episodes, been using more pull ups and unable to make it to the bathroom in time and daughter says this is not normal. Please advise        Phone carline- [phone]

## 2021-05-06 NOTE — TELEPHONE ENCOUNTER
/a ordered and in computer, they can go to any [facility] facility to have it done or if [facility] can get it that is fine also

## 2021-05-08 LAB
ORGANISM: ABNORMAL
URINE CULTURE REFLEX: ABNORMAL

## 2021-05-10 ENCOUNTER — TELEPHONE (OUTPATIENT)
Dept: FAMILY MEDICINE CLINIC | Age: 81
End: 2021-05-10

## 2021-05-10 RX ORDER — NITROFURANTOIN 25; 75 MG/1; MG/1
100 CAPSULE ORAL 2 TIMES DAILY
Qty: 20 CAPSULE | Refills: 0 | Status: SHIPPED | OUTPATIENT
Start: 2021-05-10 | End: 2021-05-20

## 2021-05-10 NOTE — TELEPHONE ENCOUNTER
----- Message from ERNESTO Norwood CNP sent at 5/10/2021  7:46 AM EDT -----  Let pt know her urine is growing a bacteria and will be sensitive to macrobid. I will send macrobid to her pharmacy let me know if they have any questions.

## 2021-05-25 ENCOUNTER — TELEPHONE (OUTPATIENT)
Dept: FAMILY MEDICINE CLINIC | Age: 81
End: 2021-05-25

## 2021-05-25 RX ORDER — PREDNISONE 1 MG/1
5 TABLET ORAL DAILY
Qty: 30 TABLET | Refills: 2 | Status: SHIPPED | OUTPATIENT
Start: 2021-05-25 | Stop reason: SDUPTHER

## 2021-05-25 RX ORDER — SULFASALAZINE 500 MG/1
1000 TABLET ORAL 2 TIMES DAILY
Qty: 120 TABLET | Refills: 0 | Status: CANCELLED | OUTPATIENT
Start: 2021-05-25

## 2021-05-25 NOTE — TELEPHONE ENCOUNTER
Patient's daughter Jacky No (on hipaa) is calling regarding this refill request. She stated that after requesting the refills, the pharmacy advised her that there are refills there that did not get picked up in April. HOWEVER, the pharmacy stated that they are out of stock for the one prescription, the sulfaSALAzine (AZULFIDINE) 500 MG tablet [[id number]], and are not able to fill it and they checked several other local pharmacies and they are out of stock as well. She is not sure what to do. * Also, brandon advised that the patient has had recent labs done.

## 2021-05-25 NOTE — TELEPHONE ENCOUNTER
Nasrin stated that they had not picked up the last prescription because the patient still had doses left from when she was in the hospital. She said she is now out of the prescription. I contacted the [address] [place] [address] and they stated that they do have that medication in stock. Nasrin is going to check with [facility] to see if they can transfer the current script that they have for her to that pharmacy. Nasrni said that the [facility] nurse did blood work on Friday 05/21/2021 but she asked about the standing orders at [facility] for her labs. I verified that the patient does have current orders for labs and Nasrin stated she would take her this week to get those labs done.

## 2021-05-25 NOTE — TELEPHONE ENCOUNTER
[facility] received a call from:    Name of Caller: Sherry     Relationship to patient: home health    Organization name:     Best contact number: [phone]    Reason for call: Notify PCP that she is discharging pt from home health care. OT and PT have discharged her, and she doesn't use oxygen anymore. Pt had people  the oxygen concentrator. When Sherry had pt walk from bedroom to bathroom and back pulse ox is 92%, hanging around 96% 97% sitting.

## 2021-06-01 ENCOUNTER — TELEPHONE (OUTPATIENT)
Dept: FAMILY MEDICINE CLINIC | Age: 81
End: 2021-06-01

## 2021-06-01 DIAGNOSIS — Z01.812 ENCOUNTER FOR PREOPERATIVE SCREENING LABORATORY TESTING FOR COVID-19 VIRUS: Primary | ICD-10-CM

## 2021-06-01 DIAGNOSIS — Z20.822 ENCOUNTER FOR PREOPERATIVE SCREENING LABORATORY TESTING FOR COVID-19 VIRUS: Primary | ICD-10-CM

## 2021-06-01 NOTE — TELEPHONE ENCOUNTER
----- Message from Edwardo Castillo sent at 6/1/2021  4:01 PM EDT -----  Subject: Message to Provider    QUESTIONS  Information for Provider? Pt is required to get a covid test (no symptoms)   before her pulmonary function test 6/7/21. Please call justo Sal on   HIPAA) and let her know when the order is in the chart so they can go to   the [facility]  ---------------------------------------------------------------------------  --------------  [address]  What is the best way for the office to contact you? OK to leave message on   voicemail  Preferred Call Back Phone Number? [phone]  ---------------------------------------------------------------------------  --------------  SCRIPT ANSWERS  Relationship to Patient? Other  Representative Name? Nasrin  Is the Representative on the appropriate HIPAA document in Epic?  Yes

## 2021-06-02 NOTE — TELEPHONE ENCOUNTER
Pt's daughter informed the covid order has been placed  Pt has only had one covid vaccine,so she will need the covid test done.

## 2021-06-03 ENCOUNTER — HOSPITAL ENCOUNTER (OUTPATIENT)
Age: 81
Discharge: HOME | End: 2021-06-03
Payer: MEDICARE

## 2021-06-03 ENCOUNTER — NURSE ONLY (OUTPATIENT)
Dept: LAB | Age: 81
End: 2021-06-03

## 2021-06-03 ENCOUNTER — TELEPHONE (OUTPATIENT)
Dept: FAMILY MEDICINE CLINIC | Age: 81
End: 2021-06-03

## 2021-06-03 DIAGNOSIS — Z51.81 MEDICATION MONITORING ENCOUNTER: ICD-10-CM

## 2021-06-03 DIAGNOSIS — Z01.818 PRE-OP TESTING: ICD-10-CM

## 2021-06-03 LAB
ALBUMIN SERPL-MCNC: 3.6 G/DL (ref 3.5–5.1)
ALP BLD-CCNC: 89 U/L (ref 38–126)
ALT SERPL-CCNC: 15 U/L (ref 11–66)
ANION GAP SERPL CALCULATED.3IONS-SCNC: 14 MEQ/L (ref 8–16)
AST SERPL-CCNC: 26 U/L (ref 5–40)
BASOPHILS # BLD: 0.8 %
BASOPHILS ABSOLUTE: 0.1 THOU/MM3 (ref 0–0.1)
BILIRUB SERPL-MCNC: 0.4 MG/DL (ref 0.3–1.2)
BUN BLDV-MCNC: 10 MG/DL (ref 7–22)
C-REACTIVE PROTEIN: < 0.3 MG/DL (ref 0–1)
CALCIUM SERPL-MCNC: 9 MG/DL (ref 8.5–10.5)
CHLORIDE BLD-SCNC: 104 MEQ/L (ref 98–111)
CO2: 24 MEQ/L (ref 23–33)
CREAT SERPL-MCNC: 0.8 MG/DL (ref 0.4–1.2)
EOSINOPHIL # BLD: 3.1 %
EOSINOPHILS ABSOLUTE: 0.3 THOU/MM3 (ref 0–0.4)
ERYTHROCYTE [DISTWIDTH] IN BLOOD BY AUTOMATED COUNT: 16.4 % (ref 11.5–14.5)
ERYTHROCYTE [DISTWIDTH] IN BLOOD BY AUTOMATED COUNT: 54.8 FL (ref 35–45)
GFR SERPL CREATININE-BSD FRML MDRD: 69 ML/MIN/1.73M2
GLUCOSE BLD-MCNC: 106 MG/DL (ref 70–108)
HCT VFR BLD CALC: 39.4 % (ref 37–47)
HEMOGLOBIN: 11.7 GM/DL (ref 12–16)
IMMATURE GRANS (ABS): 0.02 THOU/MM3 (ref 0–0.07)
IMMATURE GRANULOCYTES: 0.2 %
INFLUENZA A: NOT DETECTED
INFLUENZA B: NOT DETECTED
LYMPHOCYTES # BLD: 14.5 %
LYMPHOCYTES ABSOLUTE: 1.2 THOU/MM3 (ref 1–4.8)
MCH RBC QN AUTO: 27.3 PG (ref 26–33)
MCHC RBC AUTO-ENTMCNC: 29.7 GM/DL (ref 32.2–35.5)
MCV RBC AUTO: 91.8 FL (ref 81–99)
MONOCYTES # BLD: 7.9 %
MONOCYTES ABSOLUTE: 0.7 THOU/MM3 (ref 0.4–1.3)
NUCLEATED RED BLOOD CELLS: 0 /100 WBC
PLATELET # BLD: 406 THOU/MM3 (ref 130–400)
PMV BLD AUTO: 10 FL (ref 9.4–12.4)
POTASSIUM SERPL-SCNC: 2.8 MEQ/L (ref 3.5–5.2)
RBC # BLD: 4.29 MILL/MM3 (ref 4.2–5.4)
SARS-COV-2 RNA, RT PCR: NOT DETECTED
SEDIMENTATION RATE, ERYTHROCYTE: 62 MM/HR (ref 0–20)
SEG NEUTROPHILS: 73.5 %
SEGMENTED NEUTROPHILS ABSOLUTE COUNT: 6.1 THOU/MM3 (ref 1.8–7.7)
SODIUM BLD-SCNC: 142 MEQ/L (ref 135–145)
TOTAL PROTEIN: 7 G/DL (ref 6.1–8)
WBC # BLD: 8.3 THOU/MM3 (ref 4.8–10.8)

## 2021-06-03 PROCEDURE — 87636 SARSCOV2 & INF A&B AMP PRB: CPT

## 2021-06-03 NOTE — TELEPHONE ENCOUNTER
----- Message from ERNESTO Mccarty CNP sent at 6/3/2021  5:06 PM EDT -----  Let pt know her covid is negative

## 2021-06-07 ENCOUNTER — OFFICE VISIT (OUTPATIENT)
Dept: RHEUMATOLOGY | Age: 81
End: 2021-06-07
Payer: MEDICARE

## 2021-06-07 ENCOUNTER — HOSPITAL ENCOUNTER (OUTPATIENT)
Dept: CT IMAGING | Age: 81
Discharge: HOME | End: 2021-06-07
Payer: MEDICARE

## 2021-06-07 ENCOUNTER — HOSPITAL ENCOUNTER (OUTPATIENT)
Dept: PULMONOLOGY | Age: 81
Discharge: HOME | End: 2021-06-07
Payer: MEDICARE

## 2021-06-07 VITALS
HEIGHT: 62 IN | WEIGHT: 137 LBS | OXYGEN SATURATION: 94 % | HEART RATE: 85 BPM | SYSTOLIC BLOOD PRESSURE: 128 MMHG | BODY MASS INDEX: 25.21 KG/M2 | DIASTOLIC BLOOD PRESSURE: 78 MMHG

## 2021-06-07 DIAGNOSIS — B19.20 HEPATITIS C VIRUS INFECTION WITHOUT HEPATIC COMA, UNSPECIFIED CHRONICITY: ICD-10-CM

## 2021-06-07 DIAGNOSIS — Z87.891 PERSONAL HISTORY OF TOBACCO USE: ICD-10-CM

## 2021-06-07 DIAGNOSIS — J44.9 CHRONIC OBSTRUCTIVE PULMONARY DISEASE, UNSPECIFIED COPD TYPE (HCC): ICD-10-CM

## 2021-06-07 DIAGNOSIS — R93.2 ABNORMAL LIVER ULTRASOUND: ICD-10-CM

## 2021-06-07 DIAGNOSIS — M15.9 OSTEOARTHRITIS OF MULTIPLE JOINTS, UNSPECIFIED OSTEOARTHRITIS TYPE: ICD-10-CM

## 2021-06-07 DIAGNOSIS — M05.9 SEROPOSITIVE RHEUMATOID ARTHRITIS (HCC): Primary | ICD-10-CM

## 2021-06-07 PROCEDURE — 1036F TOBACCO NON-USER: CPT | Performed by: INTERNAL MEDICINE

## 2021-06-07 PROCEDURE — 94060 EVALUATION OF WHEEZING: CPT

## 2021-06-07 PROCEDURE — 1123F ACP DISCUSS/DSCN MKR DOCD: CPT | Performed by: INTERNAL MEDICINE

## 2021-06-07 PROCEDURE — 4040F PNEUMOC VAC/ADMIN/RCVD: CPT | Performed by: INTERNAL MEDICINE

## 2021-06-07 PROCEDURE — 94729 DIFFUSING CAPACITY: CPT

## 2021-06-07 PROCEDURE — 99214 OFFICE O/P EST MOD 30 MIN: CPT | Performed by: INTERNAL MEDICINE

## 2021-06-07 PROCEDURE — G8427 DOCREV CUR MEDS BY ELIG CLIN: HCPCS | Performed by: INTERNAL MEDICINE

## 2021-06-07 PROCEDURE — G8417 CALC BMI ABV UP PARAM F/U: HCPCS | Performed by: INTERNAL MEDICINE

## 2021-06-07 PROCEDURE — G8400 PT W/DXA NO RESULTS DOC: HCPCS | Performed by: INTERNAL MEDICINE

## 2021-06-07 PROCEDURE — 1090F PRES/ABSN URINE INCON ASSESS: CPT | Performed by: INTERNAL MEDICINE

## 2021-06-07 PROCEDURE — 94726 PLETHYSMOGRAPHY LUNG VOLUMES: CPT

## 2021-06-07 PROCEDURE — 71271 CT THORAX LUNG CANCER SCR C-: CPT

## 2021-06-07 RX ORDER — SULFASALAZINE 500 MG/1
1500 TABLET ORAL 2 TIMES DAILY
Qty: 180 TABLET | Refills: 0 | Status: SHIPPED | OUTPATIENT
Start: 2021-06-07 | End: 2021-07-20 | Stop reason: SDUPTHER

## 2021-06-07 ASSESSMENT — ENCOUNTER SYMPTOMS
TROUBLE SWALLOWING: 0
BACK PAIN: 0
ABDOMINAL PAIN: 0
NAUSEA: 0
EYE PAIN: 0
DIARRHEA: 0
EYE ITCHING: 0
SHORTNESS OF BREATH: 1
CONSTIPATION: 0
COUGH: 1

## 2021-06-07 NOTE — PROGRESS NOTES
[facility] RHEUMATOLOGY FOLLOW UP NOTE       Date Of Service: 6/7/2021  Provider: Fatmata Neal DO    Name: Sima Jasso   MRN: [id number]    Chief Complaint(s)     Chief Complaint   Patient presents with    Follow-up     2 month f/u seropositive RA         History of Present Illness (HPI)     Sima Jasso  is Q(M)40 y.o. female with a hx of HTN, HLD, tobacco abuse, COPD, cancer right eye  here for the f/u evaluation of seropositive rheumatoid arthritis    Interval hx:  Was off the Sulfasalazine for \"3-4 days \" per daughter  --  Has restarted the Sulfasalazine 5/25/2021, prednisone 5mg daily. Rheumatoid arthritis improved since the last evaluation. Currently w/o some localized intermittent pain in the bilateral knees. Aggravating: stairs, knees. Alleviating factors: prednisone. Denies joint swelling. + AM stiffness lasting < 30 min.  minutes. Dizziness with getting up from seated positiong. REVIEW OF SYSTEMS: (ROS)    Review of Systems   Constitutional: Negative for fatigue, fever and unexpected weight change. HENT: Positive for hearing loss. Negative for congestion and trouble swallowing. Eyes: Negative for pain and itching. Respiratory: Positive for cough and shortness of breath. Cardiovascular: Negative for chest pain and leg swelling. Gastrointestinal: Negative for abdominal pain, constipation, diarrhea and nausea. Endocrine: Negative for cold intolerance and heat intolerance. Genitourinary: Negative for difficulty urinating, frequency and urgency. Musculoskeletal: Positive for arthralgias and joint swelling. Negative for back pain. Skin: Negative for rash. Neurological: Negative for dizziness, weakness, numbness and headaches. Psychiatric/Behavioral: The patient is not nervous/anxious.         PAST MEDICAL HISTORY      Past Medical History:   Diagnosis Date    Arthritis     Cancer of [facility] ([facility] [address]     Right    COVID-19 03/2021    Dyslipidemia 02/15/2017    Fatigue     History of tobacco abuse 02/15/2017    Hyperlipidemia 10/13/2014    Hypertension     Non morbid obesity due to excess calories 02/15/2017    Pneumonia     Precordial pain 02/15/2017    Rheumatoid arteritis ([address]     Smoker     SOB (shortness of breath) 02/15/2017    Tobacco abuse        PAST SURGICAL HISTORY      Past Surgical History:   Procedure Laterality Date    EYE SURGERY      Right eye cancer    HEMORRHOID SURGERY      HERNIA REPAIR N/A 4/34/8968    ROBOTIC UMBILICAL HERNIA REPAIR WITH MESH performed by Addie Balbuena MD at [address] [place] [address] History   Problem Relation Age of Onset    Cancer Mother     Cancer Father     Cancer Brother     Heart Failure Sister     Cancer Daughter        SOCIAL HISTORY      Social History     Tobacco History     Smoking Status  Former Smoker Smoking Start Date  1/1/1963 Quit date  1/14/2012 Smoking Frequency  2 packs/day for 51 years (102 pk yrs)    Smokeless Tobacco Use  Never Used    Tobacco Comment  tryin to cut down          Alcohol History     Alcohol Use Status  No Comment  quit          Drug Use     Drug Use Status  No          Sexual Activity     Sexually Active  Not Currently                ALLERGIES   No Known Allergies    CURRENT MEDICATIONS      Current Outpatient Medications   Medication Sig Dispense Refill    predniSONE (DELTASONE) 5 MG tablet Take 1 tablet by mouth daily 30 tablet 2    sulfaSALAzine (AZULFIDINE) 500 MG tablet Take 2 tablets by mouth 2 times daily 120 tablet 0    acetaminophen (TYLENOL) 500 MG tablet Take 500 mg by mouth daily      ipratropium-albuterol (DUONEB) 0.5-2.5 (3) MG/3ML SOLN nebulizer solution Inhale 3 mLs into the lungs daily as needed      atorvastatin (LIPITOR) 20 MG tablet Take 1 tablet by mouth daily 90 tablet 4    albuterol (PROVENTIL) (2.5 MG/3ML) 0.083% nebulizer solution Take 3 mLs by nebulization every 6 hours as needed for Wheezing or Shortness of Breath 1 Package 11    umeclidinium-vilanterol (ANORO ELLIPTA) 62.5-25 MCG/INH AEPB inhaler Inhale 1 puff into the lungs daily 1 each 11    albuterol sulfate HFA (PROVENTIL HFA) 108 (90 BASE) MCG/ACT inhaler Inhale 2 puffs into the lungs every 6 hours as needed for Wheezing 1 Inhaler 0     No current facility-administered medications for this visit. PHYSICAL EXAMINATION / OBJECTIVE   Objective: There were no vitals taken for this visit. Physical Exam  Vitals reviewed. Constitutional:       Appearance: She is well-developed. Cardiovascular:      Rate and Rhythm: Normal rate and regular rhythm. Pulmonary:      Effort: Pulmonary effort is normal.      Breath sounds: Normal breath sounds. Abdominal:      Palpations: Abdomen is soft. Tenderness: There is no abdominal tenderness. Musculoskeletal:      Cervical back: Normal range of motion and neck supple. Skin:     General: Skin is warm and dry. Findings: No rash. Neurological:      Mental Status: She is alert and oriented to person, place, and time. Deep Tendon Reflexes: Reflexes are normal and symmetric. Psychiatric:         Thought Content:  Thought content normal.       Upper extremities:    SHOULDERS + tender, no swelling ,   ELBOWS nontender, no swelling  WRISTS swelling  left > right   HANDS/FINGERS   MCPs + bogginess left 2,3    DIPs + nodules right hand     Lower extremities:  HIPS nontender  KNEES nontender, no swelling  ANKLES nontender, no swelling   FEET : nontender, no swelling      LABS    CBC  Lab Results   Component Value Date    WBC 8.3 06/03/2021    RBC 4.29 06/03/2021    HGB 11.7 06/03/2021    HCT 39.4 06/03/2021    MCV 91.8 06/03/2021    MCH 27.3 06/03/2021    MCHC 29.7 06/03/2021    RDW 14.3 11/05/2020     06/03/2021       CMP  Lab Results   Component Value Date    CALCIUM 9.0 06/03/2021    LABALBU 3.6 06/03/2021    LABALBU 4.7 05/22/2012    PROT 7.0 06/03/2021     06/03/2021    K 2.8 06/03/2021    K 4.0 03/27/2021 CO2 24 06/03/2021     06/03/2021    BUN 10 06/03/2021    CREATININE 0.8 06/03/2021    ALKPHOS 89 06/03/2021    ALT 15 06/03/2021    AST 26 06/03/2021       HgBA1c: No components found for: HGBA1C    Lab Results   Component Value Date    VITD25 42 06/21/2017         No results found for: ANASCRN  No results found for: SSA  No results found for: SSB  No results found for: ANTI-SMITH  No results found for: DSDNAAB   No results found for: ANTIRNP  No results found for: C3, C4  No results found for: CCPAB  Lab Results   Component Value Date    .0 (H) 12/14/2020       No components found for: CANCASCRN, APANCASCRN  Lab Results   Component Value Date    SEDRATE 58 (H) 06/03/2021     Lab Results   Component Value Date    CRP < 0.30 06/03/2021       RADIOLOGY:         ASSESSMENT/PLAN    Assessment   Plan     Seropositive rheumatoid arthritis ([facility] [address]  -  + , negative CCP. ESR/sed rate elevation, duration > 6 weeks, erosions on xrays. Exam with synovitis. - prior tx: prednisone (sig relief), meloxicam (no relief)   - increase the Sulfasalazine to 1500mg twice daily. (restarted 4/2021)   - prednisone 5 mg daily    Hepatitis C- + HCV viral load  Abnormal liver ultrasound   - MRI abdomen ordered   - referral to GI previously placed.      Postmenopausal - waiting for  DEXA to be completed. Discussed with patient and daughter.      SOB (shortness of breath)  - hx of COPD. Prior CXR w/ opacities concerning for scarring/fibrotic changes. ? Related to #1 vs other               - following with pulmonology               - PFT's and CT chest performed today 6/7/2021 - awaiting results.      Osteoarthritis of multiple joints, unspecified osteoarthritis type               - continue meloxicam 15 mg daily    Medication monitoring   - CBC, CMP, sed rate, CRP q 4 weeks x3 w/ Sulfasalazine titration          No follow-ups on file.         Electronically signed by Fatmata Neal DO on 6/7/2021 at 1:17 PM    New Prescriptions No medications on file       Thank you for allowing me to participate in the care of this patient. Please call if there are any questions.

## 2021-06-14 ENCOUNTER — OFFICE VISIT (OUTPATIENT)
Dept: PULMONOLOGY | Age: 81
End: 2021-06-14
Payer: MEDICARE

## 2021-06-14 VITALS
DIASTOLIC BLOOD PRESSURE: 66 MMHG | HEART RATE: 68 BPM | OXYGEN SATURATION: 95 % | SYSTOLIC BLOOD PRESSURE: 126 MMHG | BODY MASS INDEX: 25.65 KG/M2 | WEIGHT: 139.4 LBS | TEMPERATURE: 97.8 F | HEIGHT: 62 IN

## 2021-06-14 DIAGNOSIS — M05.9 RHEUMATOID ARTHRITIS WITH POSITIVE RHEUMATOID FACTOR, INVOLVING UNSPECIFIED SITE (HCC): ICD-10-CM

## 2021-06-14 DIAGNOSIS — J98.4 RESTRICTIVE LUNG DISEASE: Primary | ICD-10-CM

## 2021-06-14 PROCEDURE — 1123F ACP DISCUSS/DSCN MKR DOCD: CPT | Performed by: NURSE PRACTITIONER

## 2021-06-14 PROCEDURE — 4040F PNEUMOC VAC/ADMIN/RCVD: CPT | Performed by: NURSE PRACTITIONER

## 2021-06-14 PROCEDURE — 1036F TOBACCO NON-USER: CPT | Performed by: NURSE PRACTITIONER

## 2021-06-14 PROCEDURE — G8417 CALC BMI ABV UP PARAM F/U: HCPCS | Performed by: NURSE PRACTITIONER

## 2021-06-14 PROCEDURE — G8427 DOCREV CUR MEDS BY ELIG CLIN: HCPCS | Performed by: NURSE PRACTITIONER

## 2021-06-14 PROCEDURE — 99214 OFFICE O/P EST MOD 30 MIN: CPT | Performed by: NURSE PRACTITIONER

## 2021-06-14 PROCEDURE — 1090F PRES/ABSN URINE INCON ASSESS: CPT | Performed by: NURSE PRACTITIONER

## 2021-06-14 PROCEDURE — G8400 PT W/DXA NO RESULTS DOC: HCPCS | Performed by: NURSE PRACTITIONER

## 2021-06-14 ASSESSMENT — ENCOUNTER SYMPTOMS
ALLERGIC/IMMUNOLOGIC NEGATIVE: 1
COUGH: 1
WHEEZING: 0
SHORTNESS OF BREATH: 0
EYES NEGATIVE: 1
GASTROINTESTINAL NEGATIVE: 1

## 2021-06-14 NOTE — PROGRESS NOTES
[facility]    Patient: Roni Glaser, 80 y.o.   : 1940      Subjective     Chief Complaint   Patient presents with    Follow-up     COPD 4 month pulm follow up with PFT and Ct on 2466        HPI  Judy Tisha is here for follow up for questionable COPD with LDCT and PFT to review . PFT done 21 with no obstruction seen but noted restriction and decreased DLCO  Dry coughing \"fits\" at times- mostly non-productive   Using Anoro but not really seeing much improvement  Former smoker 2PPD quit in   Follows with Dr. Fidel Merchant for RA treatment - recently seen on 21 increased Sulfasalazine to 1,500 mg  BID and on Prednisone 5 mg PO daily   Covid vaccination done x 1 dose- then patient became ill and was hospitalized for 22 days no second dose ever received   No SOB except with a lot of exertion     Progress History:   Since last visit any new medical issues? No- still being worked up for Hepatitis C - GI office   New ER or hospital visits? No  Any new or changes in medicines? No  Using inhalers? Yes Anoro somedays  Are they helpful?  No  Flu vaccine UTD  Pneumonia vaccine UTD  Past Medical hx   PMH:  Past Medical History:   Diagnosis Date    Arthritis     Cancer of eye ([address]     Right    COVID-19 2021    Dyslipidemia 02/15/2017    Fatigue     History of tobacco abuse 02/15/2017    Hyperlipidemia 10/13/2014    Hypertension     Non morbid obesity due to excess calories 02/15/2017    Pneumonia     Precordial pain 02/15/2017    Rheumatoid arteritis ([address]     Smoker     SOB (shortness of breath) 02/15/2017    Tobacco abuse      SURGICAL HISTORY:  Past Surgical History:   Procedure Laterality Date    EYE SURGERY      Right eye cancer    HEMORRHOID SURGERY      HERNIA REPAIR N/A 3/31/0765    ROBOTIC UMBILICAL HERNIA REPAIR WITH MESH performed by Latasha Covington MD at [address] [place] [address] Road:  Social History     Tobacco Use    Smoking status: Former Smoker     Packs/day: 2.00     Years: 51.00     Pack years: 102.00     Start date: 1963     Quit date: 2012     Years since quittin.4    Smokeless tobacco: Never Used    Tobacco comment: tryin to cut down   Vaping Use    Vaping Use: Never used   Substance Use Topics    Alcohol use: No     Comment: quit    Drug use: No     ALLERGIES:No Known Allergies  FAMILY HISTORY:  Family History   Problem Relation Age of Onset    Cancer Mother     Cancer Father     Cancer Brother     Heart Failure Sister     Cancer Daughter      CURRENT MEDICATIONS:  Current Outpatient Medications   Medication Sig Dispense Refill    sulfaSALAzine (AZULFIDINE) 500 MG tablet Take 3 tablets by mouth 2 times daily 180 tablet 0    predniSONE (DELTASONE) 5 MG tablet Take 1 tablet by mouth daily 30 tablet 2    acetaminophen (TYLENOL) 500 MG tablet Take 500 mg by mouth daily      ipratropium-albuterol (DUONEB) 0.5-2.5 (3) MG/3ML SOLN nebulizer solution Inhale 3 mLs into the lungs daily as needed      atorvastatin (LIPITOR) 20 MG tablet Take 1 tablet by mouth daily 90 tablet 4    albuterol (PROVENTIL) (2.5 MG/3ML) 0.083% nebulizer solution Take 3 mLs by nebulization every 6 hours as needed for Wheezing or Shortness of Breath 1 Package 11    umeclidinium-vilanterol (ANORO ELLIPTA) 62.5-25 MCG/INH AEPB inhaler Inhale 1 puff into the lungs daily 1 each 11    albuterol sulfate HFA (PROVENTIL HFA) 108 (90 BASE) MCG/ACT inhaler Inhale 2 puffs into the lungs every 6 hours as needed for Wheezing 1 Inhaler 0     No current facility-administered medications for this visit. ROS   Review of Systems   Constitutional: Negative. Negative for chills and fever. HENT: Negative. Negative for congestion. Eyes: Negative. Respiratory: Positive for cough (dry and intermittent ). Negative for shortness of breath and wheezing. Cardiovascular: Negative. Negative for chest pain and leg swelling.    Gastrointestinal: [x] Completed  The USPSTF recommends annual screening for lung cancer with low-dose computed tomography (LDCT) in adults aged 48 to [de-identified] years who have a 30 pack-year smoking history and currently smoke or have quit within the past 20 years. Screening should be discontinued once a person has not smoked for 20 years or develops a health problem that substantially limits life expectancy or the ability or willingness to have curative lung surgery. 6/8/2021   NONCONTRAST SCREENING CT CHEST:   FINDINGS: LUNGS NODULES: 1. There are no suspicious masses or nodules. 2. There is marked interstitial fibrotic changes seen most marked within the lower lobes. LYMPHADENOPATHY: 1. There are no pathologically enlarged lymph nodes. OTHER (LUNGS/MEDIASTINUM/MUSCULOSKELETAL/ABDOMEN): The ureters. 2 sclerotic and there is a calcified coronary arteries. Within the lower right lobe of the liver is a 4.2 x 2.7 cm hypodensity increased in size from a study done on August 31, 2020 where this measured 2 cm x 1 cm. 1. There are no suspicious masses or nodules within the lung fields. 2. There are no pathologically enlarged lymph nodes. 3. Lung-RADS assessment category 2.   4. There is marked interstitial fibrotic changes in the lower lung fields. Compared to the prior study of the interstitial infiltrate is cleared. Assessment      Diagnosis Orders   1. Restrictive lung disease     2.  Rheumatoid arthritis with positive rheumatoid factor, involving unspecified site [facility])           Plan   -PFT reviewed with RLD findings no obstruction seen but severe decreased DLCO  -LDCT reviewed with no worrisome findings   -Possible need for HRCT  -May stop Anoro at this time and just use Albuterol PRN for SOB/wheezing   -Advised to maintain pneumonia vaccine with PCP and to take flu vaccine this coming season.  -Advised patient to call office with any changes, questions, or concerns regarding respiratory status    Will see Kobi Pham Kianna back in: 6 months    [facility]  6/14/2021

## 2021-06-28 ENCOUNTER — HOSPITAL ENCOUNTER (OUTPATIENT)
Dept: WOMENS IMAGING | Age: 81
Discharge: HOME | End: 2021-06-28
Payer: MEDICARE

## 2021-06-28 DIAGNOSIS — Z78.0 POSTMENOPAUSAL: ICD-10-CM

## 2021-06-28 PROCEDURE — 77080 DXA BONE DENSITY AXIAL: CPT

## 2021-07-15 ENCOUNTER — NURSE ONLY (OUTPATIENT)
Dept: LAB | Age: 81
End: 2021-07-15

## 2021-07-19 ENCOUNTER — NURSE ONLY (OUTPATIENT)
Dept: LAB | Age: 81
End: 2021-07-19

## 2021-07-19 DIAGNOSIS — Z51.81 MEDICATION MONITORING ENCOUNTER: ICD-10-CM

## 2021-07-19 LAB
ALBUMIN SERPL-MCNC: 3.8 G/DL (ref 3.5–5.1)
ALP BLD-CCNC: 114 U/L (ref 38–126)
ALT SERPL-CCNC: 16 U/L (ref 11–66)
ANION GAP SERPL CALCULATED.3IONS-SCNC: 12 MEQ/L (ref 8–16)
AST SERPL-CCNC: 23 U/L (ref 5–40)
BASOPHILS # BLD: 0.8 %
BASOPHILS ABSOLUTE: 0.1 THOU/MM3 (ref 0–0.1)
BILIRUB SERPL-MCNC: 0.3 MG/DL (ref 0.3–1.2)
BUN BLDV-MCNC: 12 MG/DL (ref 7–22)
C-REACTIVE PROTEIN: 0.43 MG/DL (ref 0–1)
CALCIUM SERPL-MCNC: 9 MG/DL (ref 8.5–10.5)
CHLORIDE BLD-SCNC: 105 MEQ/L (ref 98–111)
CO2: 24 MEQ/L (ref 23–33)
CREAT SERPL-MCNC: 0.8 MG/DL (ref 0.4–1.2)
EOSINOPHIL # BLD: 2.9 %
EOSINOPHILS ABSOLUTE: 0.2 THOU/MM3 (ref 0–0.4)
ERYTHROCYTE [DISTWIDTH] IN BLOOD BY AUTOMATED COUNT: 16.6 % (ref 11.5–14.5)
ERYTHROCYTE [DISTWIDTH] IN BLOOD BY AUTOMATED COUNT: 55.2 FL (ref 35–45)
GFR SERPL CREATININE-BSD FRML MDRD: 69 ML/MIN/1.73M2
GLUCOSE BLD-MCNC: 97 MG/DL (ref 70–108)
HCT VFR BLD CALC: 37.6 % (ref 37–47)
HEMOGLOBIN: 11.6 GM/DL (ref 12–16)
IMMATURE GRANS (ABS): 0.02 THOU/MM3 (ref 0–0.07)
IMMATURE GRANULOCYTES: 0.3 %
LYMPHOCYTES # BLD: 15.6 %
LYMPHOCYTES ABSOLUTE: 1.1 THOU/MM3 (ref 1–4.8)
MCH RBC QN AUTO: 27.8 PG (ref 26–33)
MCHC RBC AUTO-ENTMCNC: 30.9 GM/DL (ref 32.2–35.5)
MCV RBC AUTO: 90.2 FL (ref 81–99)
MONOCYTES # BLD: 9.7 %
MONOCYTES ABSOLUTE: 0.7 THOU/MM3 (ref 0.4–1.3)
NUCLEATED RED BLOOD CELLS: 0 /100 WBC
PLATELET # BLD: 367 THOU/MM3 (ref 130–400)
PMV BLD AUTO: 10.3 FL (ref 9.4–12.4)
POTASSIUM SERPL-SCNC: 3.3 MEQ/L (ref 3.5–5.2)
RBC # BLD: 4.17 MILL/MM3 (ref 4.2–5.4)
SEDIMENTATION RATE, ERYTHROCYTE: 50 MM/HR (ref 0–20)
SEG NEUTROPHILS: 70.7 %
SEGMENTED NEUTROPHILS ABSOLUTE COUNT: 5.2 THOU/MM3 (ref 1.8–7.7)
SODIUM BLD-SCNC: 141 MEQ/L (ref 135–145)
TOTAL PROTEIN: 6.8 G/DL (ref 6.1–8)
WBC # BLD: 7.3 THOU/MM3 (ref 4.8–10.8)

## 2021-07-20 DIAGNOSIS — M05.9 SEROPOSITIVE RHEUMATOID ARTHRITIS (HCC): ICD-10-CM

## 2021-07-20 RX ORDER — SULFASALAZINE 500 MG/1
1500 TABLET ORAL 2 TIMES DAILY
Qty: 180 TABLET | Refills: 0 | Status: SHIPPED | OUTPATIENT
Start: 2021-07-20 | End: 2021-09-01 | Stop reason: SDUPTHER

## 2021-08-31 ENCOUNTER — NURSE ONLY (OUTPATIENT)
Dept: LAB | Age: 81
End: 2021-08-31

## 2021-08-31 DIAGNOSIS — Z51.81 MEDICATION MONITORING ENCOUNTER: ICD-10-CM

## 2021-08-31 LAB
ALBUMIN SERPL-MCNC: 4.1 G/DL (ref 3.5–5.1)
ALP BLD-CCNC: 141 U/L (ref 38–126)
ALT SERPL-CCNC: 22 U/L (ref 11–66)
ANION GAP SERPL CALCULATED.3IONS-SCNC: 12 MEQ/L (ref 8–16)
AST SERPL-CCNC: 28 U/L (ref 5–40)
BASOPHILS # BLD: 0.7 %
BASOPHILS ABSOLUTE: 0.1 THOU/MM3 (ref 0–0.1)
BILIRUB SERPL-MCNC: 0.3 MG/DL (ref 0.3–1.2)
BUN BLDV-MCNC: 13 MG/DL (ref 7–22)
C-REACTIVE PROTEIN: < 0.3 MG/DL (ref 0–1)
CALCIUM SERPL-MCNC: 9.2 MG/DL (ref 8.5–10.5)
CHLORIDE BLD-SCNC: 103 MEQ/L (ref 98–111)
CO2: 25 MEQ/L (ref 23–33)
CREAT SERPL-MCNC: 0.7 MG/DL (ref 0.4–1.2)
EOSINOPHIL # BLD: 1.4 %
EOSINOPHILS ABSOLUTE: 0.1 THOU/MM3 (ref 0–0.4)
ERYTHROCYTE [DISTWIDTH] IN BLOOD BY AUTOMATED COUNT: 15.6 % (ref 11.5–14.5)
ERYTHROCYTE [DISTWIDTH] IN BLOOD BY AUTOMATED COUNT: 53.1 FL (ref 35–45)
GFR SERPL CREATININE-BSD FRML MDRD: 80 ML/MIN/1.73M2
GLUCOSE BLD-MCNC: 116 MG/DL (ref 70–108)
HCT VFR BLD CALC: 38.4 % (ref 37–47)
HEMOGLOBIN: 11.7 GM/DL (ref 12–16)
IMMATURE GRANS (ABS): 0.02 THOU/MM3 (ref 0–0.07)
IMMATURE GRANULOCYTES: 0.3 %
LYMPHOCYTES # BLD: 15.2 %
LYMPHOCYTES ABSOLUTE: 1.1 THOU/MM3 (ref 1–4.8)
MCH RBC QN AUTO: 28.5 PG (ref 26–33)
MCHC RBC AUTO-ENTMCNC: 30.5 GM/DL (ref 32.2–35.5)
MCV RBC AUTO: 93.4 FL (ref 81–99)
MONOCYTES # BLD: 7.6 %
MONOCYTES ABSOLUTE: 0.6 THOU/MM3 (ref 0.4–1.3)
NUCLEATED RED BLOOD CELLS: 0 /100 WBC
PLATELET # BLD: 394 THOU/MM3 (ref 130–400)
PMV BLD AUTO: 10 FL (ref 9.4–12.4)
POTASSIUM SERPL-SCNC: 4.1 MEQ/L (ref 3.5–5.2)
RBC # BLD: 4.11 MILL/MM3 (ref 4.2–5.4)
SEDIMENTATION RATE, ERYTHROCYTE: 76 MM/HR (ref 0–20)
SEG NEUTROPHILS: 74.8 %
SEGMENTED NEUTROPHILS ABSOLUTE COUNT: 5.5 THOU/MM3 (ref 1.8–7.7)
SODIUM BLD-SCNC: 140 MEQ/L (ref 135–145)
TOTAL PROTEIN: 7.1 G/DL (ref 6.1–8)
WBC # BLD: 7.4 THOU/MM3 (ref 4.8–10.8)

## 2021-09-01 DIAGNOSIS — M05.9 SEROPOSITIVE RHEUMATOID ARTHRITIS (HCC): ICD-10-CM

## 2021-09-01 RX ORDER — SULFASALAZINE 500 MG/1
1500 TABLET ORAL 2 TIMES DAILY
Qty: 180 TABLET | Refills: 0 | Status: SHIPPED | OUTPATIENT
Start: 2021-09-01 | End: 2021-10-15 | Stop reason: SDUPTHER

## 2021-09-02 RX ORDER — SULFASALAZINE 500 MG/1
TABLET ORAL
Qty: 540 TABLET | OUTPATIENT
Start: 2021-09-02

## 2021-09-13 ENCOUNTER — OFFICE VISIT (OUTPATIENT)
Dept: RHEUMATOLOGY | Age: 81
End: 2021-09-13
Payer: MEDICARE

## 2021-09-13 VITALS
HEIGHT: 62 IN | BODY MASS INDEX: 26.13 KG/M2 | OXYGEN SATURATION: 96 % | WEIGHT: 142 LBS | SYSTOLIC BLOOD PRESSURE: 124 MMHG | HEART RATE: 86 BPM | DIASTOLIC BLOOD PRESSURE: 68 MMHG

## 2021-09-13 DIAGNOSIS — M81.0 AGE RELATED OSTEOPOROSIS, UNSPECIFIED PATHOLOGICAL FRACTURE PRESENCE: ICD-10-CM

## 2021-09-13 DIAGNOSIS — M05.9 SEROPOSITIVE RHEUMATOID ARTHRITIS (HCC): Primary | ICD-10-CM

## 2021-09-13 DIAGNOSIS — Z51.81 MEDICATION MONITORING ENCOUNTER: ICD-10-CM

## 2021-09-13 DIAGNOSIS — B19.20 HEPATITIS C VIRUS INFECTION WITHOUT HEPATIC COMA, UNSPECIFIED CHRONICITY: ICD-10-CM

## 2021-09-13 DIAGNOSIS — M15.9 OSTEOARTHRITIS OF MULTIPLE JOINTS, UNSPECIFIED OSTEOARTHRITIS TYPE: ICD-10-CM

## 2021-09-13 PROBLEM — C80.1 MALIGNANT NEOPLASM (HCC): Status: ACTIVE | Noted: 2017-02-15

## 2021-09-13 PROCEDURE — 1123F ACP DISCUSS/DSCN MKR DOCD: CPT | Performed by: INTERNAL MEDICINE

## 2021-09-13 PROCEDURE — 1090F PRES/ABSN URINE INCON ASSESS: CPT | Performed by: INTERNAL MEDICINE

## 2021-09-13 PROCEDURE — 1036F TOBACCO NON-USER: CPT | Performed by: INTERNAL MEDICINE

## 2021-09-13 PROCEDURE — G8417 CALC BMI ABV UP PARAM F/U: HCPCS | Performed by: INTERNAL MEDICINE

## 2021-09-13 PROCEDURE — 4040F PNEUMOC VAC/ADMIN/RCVD: CPT | Performed by: INTERNAL MEDICINE

## 2021-09-13 PROCEDURE — G8427 DOCREV CUR MEDS BY ELIG CLIN: HCPCS | Performed by: INTERNAL MEDICINE

## 2021-09-13 PROCEDURE — G8399 PT W/DXA RESULTS DOCUMENT: HCPCS | Performed by: INTERNAL MEDICINE

## 2021-09-13 PROCEDURE — 99214 OFFICE O/P EST MOD 30 MIN: CPT | Performed by: INTERNAL MEDICINE

## 2021-09-13 ASSESSMENT — ENCOUNTER SYMPTOMS
ABDOMINAL PAIN: 0
EYE PAIN: 0
DIARRHEA: 0
EYE ITCHING: 0
NAUSEA: 0
COUGH: 1
BACK PAIN: 0
CONSTIPATION: 0
SHORTNESS OF BREATH: 1
TROUBLE SWALLOWING: 0

## 2021-09-13 NOTE — PROGRESS NOTES
[facility] RHEUMATOLOGY FOLLOW UP NOTE       Date Of Service: 9/13/2021  Provider: Ciara Silva DO    Name: Cyndy Valdez   MRN: [id number]    Chief Complaint(s)     Chief Complaint   Patient presents with    Follow-up     3 month f/u seropositive RA        History of Present Illness (HPI)     Cyndy Valdez  is V(R)11 y.o. female with a hx of HTN, HLD, tobacco abuse, COPD, cancer right eye  here for the f/u evaluation of seropositive rheumatoid arthritis    Interval hx: Denies joint pains at this time. Sulfasalazine 1500mg twice daily, prednisone 5mg daily. Currently w/o some localized intermittent pain in the bilateral knees. Aggravating: stairs, knees. Alleviating factors: prednisone. + AM stiffness lasting ~ 30 min.  minutes. Mild stiffness after getting up from seated positiong. REVIEW OF SYSTEMS: (ROS)    Review of Systems   Constitutional: Negative for fatigue, fever and unexpected weight change. HENT: Positive for hearing loss. Negative for congestion and trouble swallowing. Eyes: Negative for pain and itching. Respiratory: Positive for cough and shortness of breath. Cardiovascular: Negative for chest pain and leg swelling. Gastrointestinal: Negative for abdominal pain, constipation, diarrhea and nausea. Endocrine: Negative for cold intolerance and heat intolerance. Genitourinary: Negative for difficulty urinating, frequency and urgency. Musculoskeletal: Positive for arthralgias and joint swelling. Negative for back pain. Skin: Negative for rash. Neurological: Negative for dizziness, weakness, numbness and headaches. Psychiatric/Behavioral: The patient is not nervous/anxious.         PAST MEDICAL HISTORY      Past Medical History:   Diagnosis Date    Arthritis     Cancer of eye ([address]     Right    COVID-19 03/2021    Dyslipidemia 02/15/2017    Fatigue     History of tobacco abuse 02/15/2017    Hyperlipidemia 10/13/2014    Hypertension     Non morbid obesity due to excess calories 02/15/2017    Pneumonia     Precordial pain 02/15/2017    [facility] [address]     Smoker     SOB (shortness of breath) 02/15/2017    Tobacco abuse        PAST SURGICAL HISTORY      Past Surgical History:   Procedure Laterality Date    EYE SURGERY      Right eye cancer    HEMORRHOID SURGERY      HERNIA REPAIR N/A 8/10/3333    ROBOTIC UMBILICAL HERNIA REPAIR WITH MESH performed by Kika Andre MD at [address] [facility]      Family History   Problem Relation Age of Onset    Cancer Mother     Cancer Father     Cancer Brother     Heart Failure Sister     Cancer Daughter        SOCIAL HISTORY      Social History     Tobacco History     Smoking Status  Former Smoker Smoking Start Date  1/1/1963 Quit date  1/14/2012 Smoking Frequency  2 packs/day for 51 years (102 pk yrs)    Smokeless Tobacco Use  Never Used    Tobacco Comment  tryin to cut down          Alcohol History     Alcohol Use Status  No Comment  quit          Drug Use     Drug Use Status  No          Sexual Activity     Sexually Active  Not Currently                ALLERGIES   No Known Allergies    CURRENT MEDICATIONS      Current Outpatient Medications   Medication Sig Dispense Refill    sulfaSALAzine (AZULFIDINE) 500 MG tablet Take 3 tablets by mouth 2 times daily 180 tablet 0    predniSONE (DELTASONE) 5 MG tablet Take 1 tablet by mouth daily 30 tablet 2    acetaminophen (TYLENOL) 500 MG tablet Take 500 mg by mouth daily      ipratropium-albuterol (DUONEB) 0.5-2.5 (3) MG/3ML SOLN nebulizer solution Inhale 3 mLs into the lungs daily as needed      atorvastatin (LIPITOR) 20 MG tablet Take 1 tablet by mouth daily 90 tablet 4    albuterol (PROVENTIL) (2.5 MG/3ML) 0.083% nebulizer solution Take 3 mLs by nebulization every 6 hours as needed for Wheezing or Shortness of Breath 1 Package 11    umeclidinium-vilanterol (ANORO ELLIPTA) 62.5-25 MCG/INH AEPB inhaler Inhale 1 puff into the lungs daily 1 each 11    albuterol sulfate HFA (PROVENTIL HFA) 108 (90 BASE) MCG/ACT inhaler Inhale 2 puffs into the lungs every 6 hours as needed for Wheezing 1 Inhaler 0     No current facility-administered medications for this visit. PHYSICAL EXAMINATION / OBJECTIVE   Objective:  /68 (Site: Left Upper Arm, Position: Sitting, Cuff Size: Medium Adult)   Pulse 86   Ht 5' 2.01\" (1.575 m)   Wt 142 lb (64.4 kg)   SpO2 96%   BMI 25.97 kg/m²     Physical Exam  Vitals reviewed. Constitutional:       Appearance: She is well-developed. Cardiovascular:      Rate and Rhythm: Normal rate and regular rhythm. Pulmonary:      Effort: Pulmonary effort is normal.      Breath sounds: Rales (inspiratory basilar right > left. ) present. Abdominal:      Palpations: Abdomen is soft. Tenderness: There is no abdominal tenderness. Musculoskeletal:      Cervical back: Normal range of motion and neck supple. Skin:     General: Skin is warm and dry. Findings: No rash. Neurological:      Mental Status: She is alert and oriented to person, place, and time. Deep Tendon Reflexes: Reflexes are normal and symmetric. Psychiatric:         Thought Content: Thought content normal.       Upper extremities:    SHOULDERS Non-tender,   ELBOWS nontender, no swelling  WRISTS mild swelling left. Non-tender. HANDS/FINGERS MCP subluxation, Rigth w/ ulnar deviation. No synovitis.  + nodules right DIPs.      Lower extremities:  HIPS nontender  KNEES nontender, no swelling  ANKLES nontender, no swelling   FEET : nontender, no swelling      LABS    CBC  Lab Results   Component Value Date    WBC 7.4 08/31/2021    RBC 4.11 08/31/2021    HGB 11.7 08/31/2021    HCT 38.4 08/31/2021    MCV 93.4 08/31/2021    MCH 28.5 08/31/2021    MCHC 30.5 08/31/2021    RDW 14.3 11/05/2020     08/31/2021       CMP  Lab Results   Component Value Date    CALCIUM 9.2 08/31/2021    LABALBU 4.1 08/31/2021    LABALBU 4.7 05/22/2012    PROT 7.1 08/31/2021    NA 140 08/31/2021    K 4.1 08/31/2021    K 4.0 03/27/2021    CO2 25 08/31/2021     08/31/2021    BUN 13 08/31/2021    CREATININE 0.7 08/31/2021    ALKPHOS 141 08/31/2021    ALT 22 08/31/2021    AST 28 08/31/2021       HgBA1c: No components found for: HGBA1C    Lab Results   Component Value Date    VITD25 42 06/21/2017         No results found for: ANASCRN  No results found for: SSA  No results found for: SSB  No results found for: ANTI-SMITH  No results found for: DSDNAAB   No results found for: ANTIRNP  No results found for: C3, C4  No results found for: CCPAB  Lab Results   Component Value Date    .0 (H) 12/14/2020       No components found for: CANCASCRN, APANCASCRN  Lab Results   Component Value Date    SEDRATE 68 (H) 08/31/2021     Lab Results   Component Value Date    CRP < 0.30 08/31/2021       RADIOLOGY:         PROCEDURE: CTA CHEST W WO CONTRAST       CLINICAL INFORMATION: Shortness of breath, COVID, r/o PE.       COMPARISON: Chest x-ray obtained on the same day. .       TECHNIQUE: 3 mm axial images were obtained through the chest after the administration of IV contrast.  A non-contrast localizer was obtained.  3D reconstructions were performed on the scanner to include MIP coronal and sagittal images through the chest.    Isovue was the intravenous contrast utilized.       All CT scans at this facility use dose modulation, iterative reconstruction, and/or weight-based dosing when appropriate to reduce radiation dose to as low as reasonably achievable.       FINDINGS:           There is adequate opacification of the pulmonary arterial system. No pulmonary emboli are present. Elizabeth Hillman is atherosclerotic calcification within the thoracic aorta.           There is mild cardiomegaly.  There is no pericardial or pleural effusion. Elizabeth Hillman is no mediastinal, axillary or hilar adenopathy.       There is abnormal density in the left lower lobe, left mid lung field and to lesser extent right lung consistent with inflammatory process, possibly Covid 19 infection. There is evidence for granulomatous disease in the left lower lobe There is thoracic    spondylosis. Cherelle Kimball is a hiatal hernia           Impression    IMPRESSION:       1. No evidence of pulmonary emboli. 2. Abnormal density in the left lower lobe, left mid lung field and to a lesser extent right lung consistent with inflammatory process possibly representing Covid 19 infection. 3. Evidence for old granulomatous disease in left lower lobe. 4. Cardiomegaly. 5. Atherosclerotic calcification in the thoracic aorta. 6. Thoracic spondylosis. 7. Hiatal hernia. .               EXAM: DEXA BONE DENSITY AXIAL SKELETON        HISTORY: 80 years, Female, Postmenopausal       COMPARISON: There are no prior exams for comparison.       FINDINGS:   Bone densitometry has been performed using a Hologic bone densitometer. The routine evaluation includes the lumbar spine and both hips.       Evaluation of the L1-L4 of the lumbar spine demonstrates an average bone mineral density measurement of 0.860g/cm2 which corresponds to a T-score of -1.7 and a Z-score of 1.0. This qualifies as osteopenia.       The calculated bone density in the left femoral neck is 0.545 g/cm2 which corresponds to a T-score of  -2.7 and a Z-score of -0.4. This qualifies as osteoporosis.       The calculated bone density in the right femoral neck  is 0.481 g/cm2 which corresponds to a T-score of  -3.3 and a Z-score of -1.0.  This qualifies as osteoporosis.       The patient does not qualify for a FRAX assessment because there is a T score at or below -2.5. ASSESSMENT/PLAN    Assessment   Plan     Seropositive rheumatoid arthritis (HCC)  -  + , negative CCP. ESR/sed rate elevation, duration > 6 weeks, erosions on xrays. Exam with synovitis. - prior tx: prednisone (sig relief), meloxicam (no relief)   - Sulfasalazine to 1500mg twice daily.  (restarted 4/2021)   - prednisone 5 mg daily   - ? Start of arava if infalmmatory markers remain elevated and concern for RA ILD. Hepatitis C- + HCV viral load  Abnormal liver ultrasound   - MRI abdomen ordered   - referral to GI previously placed.      Osteoporosis - T-score of -3.3 in the right femoral neck. - start alendronate 70mg once weekly. -9-       Restrictive lung disease. -- suspected related to Rheumatoid arthritis. COPD. Prior CXR w/ opacities concerning for scarring/fibrotic changes.   - PFT's - restrictive lung disease - concern for RA related. - CT chest performed today 6/7/2021 - results as above.      Osteoarthritis of multiple joints, unspecified osteoarthritis type               - continue meloxicam 15 mg daily    Medication monitoring   - CBC, CMP, sed rate, CRP q 4 weeks x 1 then q 8 weeks.          No follow-ups on file. Electronically signed by Kateryna Reddy DO on 9/13/2021 at 3:06 PM    New Prescriptions    No medications on file       Thank you for allowing me to participate in the care of this patient. Please call if there are any questions.

## 2021-10-12 RX ORDER — PREDNISONE 1 MG/1
5 TABLET ORAL DAILY
Qty: 30 TABLET | Refills: 2 | Status: SHIPPED | OUTPATIENT
Start: 2021-10-12 | End: 2022-01-07

## 2021-10-14 DIAGNOSIS — M05.9 SEROPOSITIVE RHEUMATOID ARTHRITIS (HCC): ICD-10-CM

## 2021-10-14 RX ORDER — SULFASALAZINE 500 MG/1
1500 TABLET ORAL 2 TIMES DAILY
Qty: 180 TABLET | Refills: 0 | OUTPATIENT
Start: 2021-10-14

## 2021-10-15 ENCOUNTER — NURSE ONLY (OUTPATIENT)
Dept: LAB | Age: 81
End: 2021-10-15

## 2021-10-15 DIAGNOSIS — R79.89 LFT ELEVATION: Primary | ICD-10-CM

## 2021-10-15 DIAGNOSIS — Z51.81 MEDICATION MONITORING ENCOUNTER: ICD-10-CM

## 2021-10-15 DIAGNOSIS — M05.9 SEROPOSITIVE RHEUMATOID ARTHRITIS (HCC): ICD-10-CM

## 2021-10-15 LAB
ALBUMIN SERPL-MCNC: 4.1 G/DL (ref 3.5–5.1)
ALP BLD-CCNC: 140 U/L (ref 38–126)
ALT SERPL-CCNC: 35 U/L (ref 11–66)
ANION GAP SERPL CALCULATED.3IONS-SCNC: 13 MEQ/L (ref 8–16)
AST SERPL-CCNC: 51 U/L (ref 5–40)
BASOPHILS # BLD: 1 %
BASOPHILS ABSOLUTE: 0.1 THOU/MM3 (ref 0–0.1)
BILIRUB SERPL-MCNC: 0.5 MG/DL (ref 0.3–1.2)
BUN BLDV-MCNC: 16 MG/DL (ref 7–22)
C-REACTIVE PROTEIN: 0.44 MG/DL (ref 0–1)
CALCIUM SERPL-MCNC: 9.1 MG/DL (ref 8.5–10.5)
CHLORIDE BLD-SCNC: 105 MEQ/L (ref 98–111)
CO2: 23 MEQ/L (ref 23–33)
CREAT SERPL-MCNC: 0.8 MG/DL (ref 0.4–1.2)
EOSINOPHIL # BLD: 4.1 %
EOSINOPHILS ABSOLUTE: 0.3 THOU/MM3 (ref 0–0.4)
ERYTHROCYTE [DISTWIDTH] IN BLOOD BY AUTOMATED COUNT: 15.4 % (ref 11.5–14.5)
ERYTHROCYTE [DISTWIDTH] IN BLOOD BY AUTOMATED COUNT: 50.2 FL (ref 35–45)
GFR SERPL CREATININE-BSD FRML MDRD: 69 ML/MIN/1.73M2
GLUCOSE BLD-MCNC: 100 MG/DL (ref 70–108)
HCT VFR BLD CALC: 38 % (ref 37–47)
HEMOGLOBIN: 11.7 GM/DL (ref 12–16)
IMMATURE GRANS (ABS): 0.02 THOU/MM3 (ref 0–0.07)
IMMATURE GRANULOCYTES: 0.3 %
LYMPHOCYTES # BLD: 23.9 %
LYMPHOCYTES ABSOLUTE: 1.5 THOU/MM3 (ref 1–4.8)
MCH RBC QN AUTO: 27.7 PG (ref 26–33)
MCHC RBC AUTO-ENTMCNC: 30.8 GM/DL (ref 32.2–35.5)
MCV RBC AUTO: 89.8 FL (ref 81–99)
MONOCYTES # BLD: 12.5 %
MONOCYTES ABSOLUTE: 0.8 THOU/MM3 (ref 0.4–1.3)
NUCLEATED RED BLOOD CELLS: 0 /100 WBC
PLATELET # BLD: 350 THOU/MM3 (ref 130–400)
PMV BLD AUTO: 10.5 FL (ref 9.4–12.4)
POTASSIUM SERPL-SCNC: 4.1 MEQ/L (ref 3.5–5.2)
RBC # BLD: 4.23 MILL/MM3 (ref 4.2–5.4)
SEDIMENTATION RATE, ERYTHROCYTE: 44 MM/HR (ref 0–20)
SEG NEUTROPHILS: 58.2 %
SEGMENTED NEUTROPHILS ABSOLUTE COUNT: 3.6 THOU/MM3 (ref 1.8–7.7)
SODIUM BLD-SCNC: 141 MEQ/L (ref 135–145)
TOTAL PROTEIN: 6.8 G/DL (ref 6.1–8)
WBC # BLD: 6.2 THOU/MM3 (ref 4.8–10.8)

## 2021-10-15 RX ORDER — SULFASALAZINE 500 MG/1
1000 TABLET ORAL 2 TIMES DAILY
Qty: 120 TABLET | Refills: 0 | Status: SHIPPED | OUTPATIENT
Start: 2021-10-15 | End: 2021-10-18

## 2021-10-18 ENCOUNTER — PATIENT MESSAGE (OUTPATIENT)
Dept: FAMILY MEDICINE CLINIC | Age: 81
End: 2021-10-18

## 2021-10-18 RX ORDER — SULFASALAZINE 500 MG/1
TABLET ORAL
Qty: 120 TABLET | Refills: 0 | Status: SHIPPED | OUTPATIENT
Start: 2021-10-18 | End: 2022-07-11

## 2021-10-19 RX ORDER — AMLODIPINE BESYLATE 5 MG/1
5 TABLET ORAL DAILY
Qty: 30 TABLET | Refills: 0 | Status: SHIPPED | OUTPATIENT
Start: 2021-10-19 | End: 2021-10-19

## 2021-10-19 NOTE — TELEPHONE ENCOUNTER
From: Arnold Bel  To: ERNESTO Palacios - CNP  Sent: 10/18/2021 5:30 PM EDT  Subject: Prescription Question    Mom needs a refill. I am attaching pictures. Thanks so much.

## 2021-10-19 NOTE — TELEPHONE ENCOUNTER
Last visit- 5/5/2021  Next visit- 11/9/2021    Requested Prescriptions     Pending Prescriptions Disp Refills    amLODIPine (NORVASC) 5 MG tablet 30 tablet 0

## 2021-10-21 ENCOUNTER — APPOINTMENT (OUTPATIENT)
Dept: GENERAL RADIOLOGY | Age: 81
End: 2021-10-21
Payer: MEDICARE

## 2021-10-21 ENCOUNTER — HOSPITAL ENCOUNTER (EMERGENCY)
Age: 81
Discharge: HOME | End: 2021-10-21
Payer: MEDICARE

## 2021-10-21 VITALS
OXYGEN SATURATION: 92 % | DIASTOLIC BLOOD PRESSURE: 95 MMHG | SYSTOLIC BLOOD PRESSURE: 167 MMHG | WEIGHT: 137 LBS | TEMPERATURE: 97.1 F | HEART RATE: 95 BPM | BODY MASS INDEX: 27.62 KG/M2 | RESPIRATION RATE: 18 BRPM | HEIGHT: 59 IN

## 2021-10-21 DIAGNOSIS — S80.811A ABRASION OF RIGHT LOWER EXTREMITY, INITIAL ENCOUNTER: ICD-10-CM

## 2021-10-21 DIAGNOSIS — S80.11XA CONTUSION OF RIGHT LOWER LEG, INITIAL ENCOUNTER: ICD-10-CM

## 2021-10-21 DIAGNOSIS — S80.12XA CONTUSION OF LEFT LOWER LEG, INITIAL ENCOUNTER: Primary | ICD-10-CM

## 2021-10-21 PROCEDURE — 73590 X-RAY EXAM OF LOWER LEG: CPT

## 2021-10-21 PROCEDURE — 99213 OFFICE O/P EST LOW 20 MIN: CPT | Performed by: NURSE PRACTITIONER

## 2021-10-21 PROCEDURE — 99213 OFFICE O/P EST LOW 20 MIN: CPT

## 2021-10-21 ASSESSMENT — PAIN SCALES - GENERAL: PAINLEVEL_OUTOF10: 8

## 2021-10-21 ASSESSMENT — PAIN DESCRIPTION - LOCATION: LOCATION: KNEE

## 2021-10-21 ASSESSMENT — PAIN DESCRIPTION - ORIENTATION: ORIENTATION: RIGHT;LEFT

## 2021-10-21 ASSESSMENT — PAIN DESCRIPTION - ONSET: ONSET: ON-GOING

## 2021-10-21 ASSESSMENT — PAIN DESCRIPTION - PROGRESSION: CLINICAL_PROGRESSION: NOT CHANGED

## 2021-10-21 ASSESSMENT — PAIN DESCRIPTION - DESCRIPTORS: DESCRIPTORS: ACHING

## 2021-10-21 ASSESSMENT — PAIN DESCRIPTION - PAIN TYPE: TYPE: ACUTE PAIN

## 2021-10-21 ASSESSMENT — PAIN DESCRIPTION - FREQUENCY: FREQUENCY: CONTINUOUS

## 2021-10-21 NOTE — ED TRIAGE NOTES
To room with c/o fall down steps. \"My legs gave out\". She reports bilateral knee pain. Unable to bear weight since injury. She presents in wheelchair. She lives with daughter.

## 2021-10-21 NOTE — ED PROVIDER NOTES
[id number]       Chief Complaint   Patient presents with    Fall    Knee Pain       Nurses Notes reviewed and I agree except as noted in the HPI. HISTORY OF PRESENT ILLNESS   Annika Youngblood is a 80 y.o. female who presents with daughter for complaints of bilateral lower leg pain. Symptom onset after patient fell to bilateral legs. No head injury or loss of consciousness. Patient's daughter states that she is having problems with weightbearing. Pain is aching, continuous. Rates 8/10. No loss of bowel or bladder control. She also sustained an abrasion of the right lower leg. She presents with dressing to abrasion. No fever. No additional treatment prior to arrival.    REVIEW OF SYSTEMS     Review of Systems    PAST MEDICAL HISTORY         Diagnosis Date    Arthritis     Cancer [facility] [address] ([facility] [address]     Right    COVID-19 03/2021    Dyslipidemia 02/15/2017    Fatigue     History of tobacco abuse 02/15/2017    Hyperlipidemia 10/13/2014    Hypertension     Non morbid obesity due to excess calories 02/15/2017    Pneumonia     Precordial pain 02/15/2017    Rheumatoid arteritis [address] [address]     Smoker     SOB (shortness of breath) 02/15/2017    Tobacco abuse        SURGICAL HISTORY     Patient  has a past surgical history that includes eye surgery; Hemorrhoid surgery; and hernia repair (N/A, 9/18/2020).     CURRENT MEDICATIONS       Discharge Medication List as of 10/21/2021  1:00 PM      CONTINUE these medications which have NOT CHANGED    Details   amLODIPine (NORVASC) 5 MG tablet TAKE 1 TABLET BY MOUTH DAILY, Disp-90 tablet, R-3**Patient requests 90 days supply**Normal      sulfaSALAzine (AZULFIDINE) 500 MG tablet TAKE 2 TABLETS BY MOUTH TWICE DAILY, Disp-120 tablet, R-0**Patient requests 90 days supply**Normal      predniSONE (DELTASONE) 5 MG tablet TAKE 1 TABLET BY MOUTH DAILY, Disp-30 tablet, R-2Normal      acetaminophen (TYLENOL) 500 MG tablet Take 500 mg by mouth dailyHistorical Med      ipratropium-albuterol (DUONEB) 0.5-2.5 (3) MG/3ML SOLN nebulizer solution Inhale 3 mLs into the lungs daily as neededHistorical Med      atorvastatin (LIPITOR) 20 MG tablet Take 1 tablet by mouth daily, Disp-90 tablet, R-4Normal      albuterol (PROVENTIL) (2.5 MG/3ML) 0.083% nebulizer solution Take 3 mLs by nebulization every 6 hours as needed for Wheezing or Shortness of Breath, Disp-1 Package,R-11Normal      albuterol sulfate HFA (PROVENTIL HFA) 108 (90 BASE) MCG/ACT inhaler Inhale 2 puffs into the lungs every 6 hours as needed for Wheezing, Disp-1 Inhaler, R-0      umeclidinium-vilanterol (ANORO ELLIPTA) 62.5-25 MCG/INH AEPB inhaler Inhale 1 puff into the lungs daily, Disp-1 each,R-11Normal             ALLERGIES     Patient is has No Known Allergies. FAMILY HISTORY     Patient'sfamily history includes Cancer in her brother, daughter, father, and mother; Heart Failure in her sister. SOCIAL HISTORY     Patient  reports that she quit smoking about 9 years ago. She started smoking about 58 years ago. She has a 102.00 pack-year smoking history. She has never used smokeless tobacco. She reports that she does not drink alcohol and does not use drugs. PHYSICAL EXAM     ED TRIAGE VITALS  BP: (!) 167/95, Temp: 97.1 °F (36.2 °C), Pulse: 95, Resp: 18, SpO2: 92 %  Physical Exam  Vitals and nursing note reviewed. Constitutional:       General: She is not in acute distress. Appearance: Normal appearance. HENT:      Head: Normocephalic and atraumatic. Right Ear: External ear normal.      Left Ear: External ear normal.      Nose: No congestion. Eyes:      General: No scleral icterus. Conjunctiva/sclera: Conjunctivae normal.   Cardiovascular:      Pulses:           Posterior tibial pulses are 2+ on the right side and 2+ on the left side. Pulmonary:      Effort: Pulmonary effort is normal. No respiratory distress.    Musculoskeletal: Cervical back: Normal range of motion. Right lower leg: Tenderness (mid shaft) present. Left lower leg: Tenderness (mid shaft) present. Skin:     General: Skin is warm and dry. Capillary Refill: Capillary refill takes less than 2 seconds. Coloration: Skin is not jaundiced. Findings: Abrasion (right lower leg with intact dressing.  ) present. No rash. Neurological:      Mental Status: She is alert and oriented to person, place, and time. Sensory: Sensation is intact. Psychiatric:         Mood and Affect: Mood normal.         Behavior: Behavior normal. Behavior is cooperative. DIAGNOSTIC RESULTS   Labs: No results found for this visit on 10/21/21. IMAGING:  XR TIBIA FIBULA RIGHT (2 VIEWS)   Final Result   1. Mild soft tissue swelling left tibiotalar joint anteriorly. 2. Study otherwise unremarkable. No fracture seen. **This report has been created using voice recognition software. It may contain minor errors which are inherent in voice recognition technology. **      Final report electronically signed by Dr. Gian Almanzar on 10/21/2021 12:48 PM      XR TIBIA FIBULA LEFT (2 VIEWS)   Final Result   1. Mild soft tissue swelling left tibiotalar joint anteriorly. 2. Study otherwise unremarkable. No fracture seen. **This report has been created using voice recognition software. It may contain minor errors which are inherent in voice recognition technology. **      Final report electronically signed by Dr. Gian Almanzar on 10/21/2021 12:48 PM        URGENT CARE COURSE:     Vitals:    10/21/21 1226   BP: (!) 167/95   Pulse: 95   Resp: 18   Temp: 97.1 °F (36.2 °C)   SpO2: 92%   Weight: 137 lb (62.1 kg)   Height: 4' 11\" (1.499 m)       Medications - No data to display  PROCEDURES:  None  FINALIMPRESSION      1. Contusion of left lower leg, initial encounter    2. Contusion of right lower leg, initial encounter    3.  Abrasion of right lower extremity, initial encounter        DISPOSITION/PLAN   DISPOSITION Decision To Discharge 10/21/2021 12:59:02 PM  X-ray negative for fracture. Exam consistent with contusion of the lateral lower leg. She also presents with abrasion, dressing intact. Monitor for infection. Over-the-counter medicine for pain. Activity as tolerated. If symptoms worsen go to ER. PATIENT REFERRED TO:  Iline Phalen, APRN - CNP  [address]   [place]   [phone]      Follow-up as needed. Keep abrasion clean and dry. Over-the-counter medicine for pain. If symptoms worsen go to ER.     DISCHARGE MEDICATIONS:  Discharge Medication List as of 10/21/2021  1:00 PM        Discharge Medication List as of 10/21/2021  1:00 PM          [address], APRN - CNP  10/21/21 0045

## 2021-11-08 ENCOUNTER — NURSE ONLY (OUTPATIENT)
Dept: LAB | Age: 81
End: 2021-11-08

## 2021-11-08 DIAGNOSIS — Z51.81 MEDICATION MONITORING ENCOUNTER: ICD-10-CM

## 2021-11-08 DIAGNOSIS — R79.89 LFT ELEVATION: ICD-10-CM

## 2021-11-08 LAB
ALBUMIN SERPL-MCNC: 4.2 G/DL (ref 3.5–5.1)
ALP BLD-CCNC: 191 U/L (ref 38–126)
ALT SERPL-CCNC: 49 U/L (ref 11–66)
ANION GAP SERPL CALCULATED.3IONS-SCNC: 13 MEQ/L (ref 8–16)
AST SERPL-CCNC: 66 U/L (ref 5–40)
BASOPHILS # BLD: 0.9 %
BASOPHILS ABSOLUTE: 0.1 THOU/MM3 (ref 0–0.1)
BILIRUB SERPL-MCNC: 0.3 MG/DL (ref 0.3–1.2)
BILIRUBIN DIRECT: < 0.2 MG/DL (ref 0–0.3)
BUN BLDV-MCNC: 17 MG/DL (ref 7–22)
C-REACTIVE PROTEIN: < 0.3 MG/DL (ref 0–1)
CALCIUM SERPL-MCNC: 9.1 MG/DL (ref 8.5–10.5)
CHLORIDE BLD-SCNC: 102 MEQ/L (ref 98–111)
CO2: 23 MEQ/L (ref 23–33)
CREAT SERPL-MCNC: 0.7 MG/DL (ref 0.4–1.2)
EOSINOPHIL # BLD: 1.6 %
EOSINOPHILS ABSOLUTE: 0.1 THOU/MM3 (ref 0–0.4)
ERYTHROCYTE [DISTWIDTH] IN BLOOD BY AUTOMATED COUNT: 15.9 % (ref 11.5–14.5)
ERYTHROCYTE [DISTWIDTH] IN BLOOD BY AUTOMATED COUNT: 51.5 FL (ref 35–45)
GFR SERPL CREATININE-BSD FRML MDRD: 80 ML/MIN/1.73M2
GLUCOSE BLD-MCNC: 91 MG/DL (ref 70–108)
HCT VFR BLD CALC: 39.4 % (ref 37–47)
HEMOGLOBIN: 12.2 GM/DL (ref 12–16)
IMMATURE GRANS (ABS): 0.03 THOU/MM3 (ref 0–0.07)
IMMATURE GRANULOCYTES: 0.4 %
LYMPHOCYTES # BLD: 25.3 %
LYMPHOCYTES ABSOLUTE: 1.7 THOU/MM3 (ref 1–4.8)
MCH RBC QN AUTO: 27.4 PG (ref 26–33)
MCHC RBC AUTO-ENTMCNC: 31 GM/DL (ref 32.2–35.5)
MCV RBC AUTO: 88.5 FL (ref 81–99)
MONOCYTES # BLD: 12 %
MONOCYTES ABSOLUTE: 0.8 THOU/MM3 (ref 0.4–1.3)
NUCLEATED RED BLOOD CELLS: 0 /100 WBC
PLATELET # BLD: 438 THOU/MM3 (ref 130–400)
PMV BLD AUTO: 10.7 FL (ref 9.4–12.4)
POTASSIUM SERPL-SCNC: 4.5 MEQ/L (ref 3.5–5.2)
RBC # BLD: 4.45 MILL/MM3 (ref 4.2–5.4)
SEDIMENTATION RATE, ERYTHROCYTE: 25 MM/HR (ref 0–20)
SEG NEUTROPHILS: 59.8 %
SEGMENTED NEUTROPHILS ABSOLUTE COUNT: 4.1 THOU/MM3 (ref 1.8–7.7)
SODIUM BLD-SCNC: 138 MEQ/L (ref 135–145)
TOTAL PROTEIN: 7.4 G/DL (ref 6.1–8)
WBC # BLD: 6.9 THOU/MM3 (ref 4.8–10.8)

## 2021-11-09 ENCOUNTER — OFFICE VISIT (OUTPATIENT)
Dept: FAMILY MEDICINE CLINIC | Age: 81
End: 2021-11-09
Payer: MEDICARE

## 2021-11-09 VITALS
RESPIRATION RATE: 14 BRPM | HEIGHT: 60 IN | SYSTOLIC BLOOD PRESSURE: 130 MMHG | WEIGHT: 146.4 LBS | BODY MASS INDEX: 28.74 KG/M2 | DIASTOLIC BLOOD PRESSURE: 86 MMHG | TEMPERATURE: 97.8 F | HEART RATE: 75 BPM | OXYGEN SATURATION: 94 %

## 2021-11-09 DIAGNOSIS — M25.562 ACUTE PAIN OF LEFT KNEE: ICD-10-CM

## 2021-11-09 DIAGNOSIS — Z23 NEEDS FLU SHOT: ICD-10-CM

## 2021-11-09 DIAGNOSIS — J44.9 CHRONIC OBSTRUCTIVE PULMONARY DISEASE, UNSPECIFIED COPD TYPE (HCC): Primary | ICD-10-CM
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
## 2021-11-09 DIAGNOSIS — R29.898 LEFT LEG WEAKNESS: ICD-10-CM

## 2021-11-09 DIAGNOSIS — I10 PRIMARY HYPERTENSION: ICD-10-CM

## 2021-11-09 PROBLEM — R09.02: Status: RESOLVED | Noted: 2020-09-18 | Resolved: 2021-11-09

## 2021-11-09 PROBLEM — E44.0 MODERATE MALNUTRITION (HCC): Status: RESOLVED | Noted: 2021-03-29 | Resolved: 2021-11-09

## 2021-11-09 PROBLEM — R07.2 PRECORDIAL PAIN: Status: RESOLVED | Noted: 2017-02-15 | Resolved: 2021-11-09

## 2021-11-09 PROCEDURE — 4040F PNEUMOC VAC/ADMIN/RCVD: CPT | Performed by: NURSE PRACTITIONER

## 2021-11-09 PROCEDURE — G8926 SPIRO NO PERF OR DOC: HCPCS | Performed by: NURSE PRACTITIONER

## 2021-11-09 PROCEDURE — 1036F TOBACCO NON-USER: CPT | Performed by: NURSE PRACTITIONER

## 2021-11-09 PROCEDURE — 90694 VACC AIIV4 NO PRSRV 0.5ML IM: CPT | Performed by: NURSE PRACTITIONER

## 2021-11-09 PROCEDURE — G8484 FLU IMMUNIZE NO ADMIN: HCPCS | Performed by: NURSE PRACTITIONER

## 2021-11-09 PROCEDURE — 99214 OFFICE O/P EST MOD 30 MIN: CPT | Performed by: NURSE PRACTITIONER

## 2021-11-09 PROCEDURE — G8417 CALC BMI ABV UP PARAM F/U: HCPCS | Performed by: NURSE PRACTITIONER

## 2021-11-09 PROCEDURE — 1123F ACP DISCUSS/DSCN MKR DOCD: CPT | Performed by: NURSE PRACTITIONER

## 2021-11-09 PROCEDURE — G8427 DOCREV CUR MEDS BY ELIG CLIN: HCPCS | Performed by: NURSE PRACTITIONER

## 2021-11-09 PROCEDURE — 3023F SPIROM DOC REV: CPT | Performed by: NURSE PRACTITIONER

## 2021-11-09 PROCEDURE — G0008 ADMIN INFLUENZA VIRUS VAC: HCPCS | Performed by: NURSE PRACTITIONER

## 2021-11-09 PROCEDURE — 1090F PRES/ABSN URINE INCON ASSESS: CPT | Performed by: NURSE PRACTITIONER

## 2021-11-09 PROCEDURE — G8399 PT W/DXA RESULTS DOCUMENT: HCPCS | Performed by: NURSE PRACTITIONER

## 2021-11-09 ASSESSMENT — ENCOUNTER SYMPTOMS
SINUS PRESSURE: 0
COUGH: 1
SORE THROAT: 0
RHINORRHEA: 0
ABDOMINAL PAIN: 0
CHEST TIGHTNESS: 0
SHORTNESS OF BREATH: 0
SINUS PAIN: 0
ABDOMINAL DISTENTION: 0
CHOKING: 0
WHEEZING: 0

## 2021-11-09 NOTE — PROGRESS NOTES
Immunization(s) given during visit:    Immunizations Administered     Name Date Dose Route    Influenza, Quadv, adjuvanted, 65 yrs +, IM, PF (Fluad) 11/9/2021 0.5 mL Intramuscular    Site: Deltoid- Right    Lot: [id number]    NDC: 84211-300-46          Most recent Vaccine Information Sheet dated 08/06/21 given to pt

## 2021-11-09 NOTE — PROGRESS NOTES
[facility] MEDICINE  [address] [place] [address]  Dept: [phone]  Dept Fax: [phone]  Loc: [phone]    Annika Youngblood is a 80 y.o. femalewho presents today for her medical conditions/complaints as noted below. Jose Lynch c/o of 6 Month Follow-Up (pt fell 3 weeks ago and her left knee will give out sometimes )      HPI:   Pt accompanied by daughter in law today    Pt here for a 6 month f/u. Pt states since she was seen last she did suffer a fall down her home stairs 2 weeks ago. Pt lives with her daughter and moser shave 5 stairs to go to her room. She denies LO at the time of fall. She did go to ER at the time of fall and had negative x-rays of left LE and knee for a fracture. She has tori wearing a knee brace on her left knee for support. Has also been ambulating with a walker. States her movement in her left knee has been getting better over the next 2 weeks, pt offered PT and MRI and declined. HTN    Does patient check BP regularly at home? - No  Current Medication regimen - norvasc 5 mg daily   Tolerating medications well? - yes    Shortness of breath or chest pain? No  Headache or visual complaints? No  Neurologic changes like confusion? No  Extremity edema? No    BP Readings from Last 3 Encounters:  11/09/21 : 130/86  10/21/21 : (!) 167/95  09/13/21 : 124/68    Pt with copd and follows with pulmonary is taking anoro and duonebs and albuterol prn, has a chronic loose congested cough also takes mucinex denies sob or wheezing.      Lab Results       Component                Value               Date                       CHOL                     177                 03/01/2021                 CHOL                     154                 04/13/2015                 CHOL                     247 (H)             04/22/2014            Lab Results       Component                Value               Date                       TRIG 137                 03/01/2021                 TRIG                     98                  04/13/2015                 TRIG                     237 (H)             04/22/2014            Lab Results       Component                Value               Date                       HDL                      56                  03/01/2021                 HDL                      64                  04/13/2015                 HDL                      59                  04/22/2014            Lab Results       Component                Value               Date                       LDLCALC                  94                  03/01/2021                 LDLCALC                  70                  04/13/2015                 LDLCALC                  141                 04/22/2014            No results found for: LABVLDL, VLDL  No results found for: CHOLHDLRATIO  Lab Results       Component                Value               Date                       NA                       138                 11/08/2021                 K                        4.5                 11/08/2021                 K                        4.0                 03/27/2021                 CL                       102                 11/08/2021                 CO2                      23                  11/08/2021                 BUN                      17                  11/08/2021                 CREATININE               0.7                 11/08/2021                 GLUCOSE                  91                  11/08/2021                 GLUCOSE                  93                  05/22/2012                 CALCIUM                  9.1                 11/08/2021             Follows with rheumatology and labs reviewed.           Current Outpatient Medications   Medication Sig Dispense Refill    amLODIPine (NORVASC) 5 MG tablet TAKE 1 TABLET BY MOUTH DAILY 90 tablet 3    sulfaSALAzine (AZULFIDINE) 500 MG tablet TAKE 2 TABLETS BY MOUTH TWICE tobacco: Never Used    Tobacco comment: tryin to cut down   Substance Use Topics    Alcohol use: No     Comment: quit        No Known Allergies    Health Maintenance   Topic Date Due    Hepatitis A vaccine (1 of 2 - Risk 2-dose series) Never done    Hepatitis B vaccine (1 of 3 - Risk 3-dose series) Never done    Shingles Vaccine (1 of 2) Never done    DTaP/Tdap/Td vaccine (1 - Tdap) 01/29/2021    COVID-19 Vaccine (2 - Pfizer 3-dose booster series) 03/22/2021    Flu vaccine (1) 09/01/2021    Lipid screen  03/01/2022    Annual Wellness Visit (AWV)  05/06/2022    DEXA (modify frequency per FRAX score)  Completed    Pneumococcal 65+ years Vaccine  Completed    Hib vaccine  Aged Out    Meningococcal (ACWY) vaccine  Aged Out       Subjective:      Review of Systems   Constitutional: Negative for chills, fatigue and fever. HENT: Positive for congestion (chest). Negative for postnasal drip, rhinorrhea, sinus pressure, sinus pain, sneezing and sore throat. Eyes: Negative for visual disturbance. Respiratory: Positive for cough. Negative for choking, chest tightness, shortness of breath and wheezing. Cardiovascular: Negative. Gastrointestinal: Negative for abdominal distention and abdominal pain. Genitourinary: Negative for difficulty urinating and dysuria. Does wear depends for feal and urinary incontinence    Musculoskeletal: Positive for arthralgias, gait problem and myalgias. Negative for joint swelling, neck pain and neck stiffness. Skin: Negative. Neurological: Negative for dizziness, facial asymmetry and headaches. Psychiatric/Behavioral: Negative for self-injury, sleep disturbance and suicidal ideas. Objective:      /86   Pulse 75   Temp 97.8 °F (36.6 °C) (Oral)   Resp 14   Ht 5' (1.524 m)   Wt 146 lb 6.4 oz (66.4 kg)   SpO2 94%   BMI 28.59 kg/m²      Physical Exam  Vitals and nursing note reviewed.    Constitutional:       Appearance: She is not ill-appearing. HENT:      Right Ear: Tympanic membrane, ear canal and external ear normal.      Left Ear: Tympanic membrane, ear canal and external ear normal.      Nose: Congestion and rhinorrhea present. Mouth/Throat:      Mouth: Mucous membranes are moist.   Cardiovascular:      Rate and Rhythm: Normal rate and regular rhythm. Pulses: Normal pulses. Heart sounds: Normal heart sounds. No murmur heard. Pulmonary:      Effort: Pulmonary effort is normal. No respiratory distress. Breath sounds: Normal breath sounds. No wheezing. Comments: Loose congested cough no wheezing or sob   Abdominal:      General: Abdomen is flat. Bowel sounds are normal. There is no distension. Palpations: Abdomen is soft. Tenderness: There is no abdominal tenderness. Musculoskeletal:      Right knee: Normal.      Left knee: No swelling, deformity, effusion, erythema or bony tenderness. Normal range of motion. Tenderness present. Right lower leg: No edema. Left lower leg: No edema. Comments: Left knee brace in place left pedal push and pull strong, cap refill 2+ negative lachmans, pt did ambulate without assistance but gait was unsteady. Skin:     General: Skin is warm and dry. Capillary Refill: Capillary refill takes less than 2 seconds. Neurological:      Mental Status: She is alert and oriented to person, place, and time. Psychiatric:         Mood and Affect: Mood normal.         Behavior: Behavior normal.         Thought Content: Thought content normal.         Judgment: Judgment normal.          Assessment/Plan:           1. Chronic obstructive pulmonary disease, unspecified COPD type ([facility] [address]    Continue with f/u with pulmonary   2. Primary hypertension    controlled  3. Fall, subsequent encounter  Monitor  Did advise to not use stairs    4. Acute pain of left knee  Offered pt declined  Tylenol as needed for pain     5. Needs flu shot      6.  Left leg weakness  Offered PT and they declined,  Offered MRI to evaluate for tendon damage due to fall and they declined. No orders of the defined types were placed in this encounter. Kristin Ferrari will require the following home care treatments or therapies: PT/OT. Home care will be necessary because of decondition and leaving home requires a lot of assistance requireing a wheelchair. The patient is in agreement to receiving home care. Return if symptoms worsen or fail to improve. Reccommended tobaccocessation options including pharmacologic methods, counseled great than 3 minutesduring this visit:  Yes[]  No  []       Patient given educational materials -see patient instructions. Discussed use, benefit, and side effects of prescribedmedications. All patient questions answered. Pt voiced understanding. Reviewedhealth maintenance. Instructed to continue current medications, diet and exercise. Patient agreed with treatment plan. Follow up as directed.        Electronicallysigned by ERNESTO Eden CNP on 11/9/2021 at 4:14 PM

## 2021-11-13 DIAGNOSIS — M05.9 SEROPOSITIVE RHEUMATOID ARTHRITIS (HCC): ICD-10-CM

## 2021-11-15 RX ORDER — SULFASALAZINE 500 MG/1
TABLET ORAL
Qty: 360 TABLET | OUTPATIENT
Start: 2021-11-15

## 2021-11-22 ENCOUNTER — NURSE ONLY (OUTPATIENT)
Dept: LAB | Age: 81
End: 2021-11-22

## 2021-11-22 DIAGNOSIS — Z51.81 MEDICATION MONITORING ENCOUNTER: ICD-10-CM

## 2021-11-22 LAB
ALBUMIN SERPL-MCNC: 4 G/DL (ref 3.5–5.1)
ALP BLD-CCNC: 171 U/L (ref 38–126)
ALT SERPL-CCNC: 54 U/L (ref 11–66)
ANION GAP SERPL CALCULATED.3IONS-SCNC: 13 MEQ/L (ref 8–16)
AST SERPL-CCNC: 71 U/L (ref 5–40)
BASOPHILS # BLD: 0.9 %
BASOPHILS ABSOLUTE: 0.1 THOU/MM3 (ref 0–0.1)
BILIRUB SERPL-MCNC: 0.3 MG/DL (ref 0.3–1.2)
BUN BLDV-MCNC: 17 MG/DL (ref 7–22)
C-REACTIVE PROTEIN: < 0.3 MG/DL (ref 0–1)
CALCIUM SERPL-MCNC: 9.2 MG/DL (ref 8.5–10.5)
CHLORIDE BLD-SCNC: 107 MEQ/L (ref 98–111)
CO2: 23 MEQ/L (ref 23–33)
CREAT SERPL-MCNC: 0.8 MG/DL (ref 0.4–1.2)
EOSINOPHIL # BLD: 4.4 %
EOSINOPHILS ABSOLUTE: 0.3 THOU/MM3 (ref 0–0.4)
ERYTHROCYTE [DISTWIDTH] IN BLOOD BY AUTOMATED COUNT: 15.6 % (ref 11.5–14.5)
ERYTHROCYTE [DISTWIDTH] IN BLOOD BY AUTOMATED COUNT: 51.4 FL (ref 35–45)
GFR SERPL CREATININE-BSD FRML MDRD: 69 ML/MIN/1.73M2
GLUCOSE BLD-MCNC: 107 MG/DL (ref 70–108)
HCT VFR BLD CALC: 40.2 % (ref 37–47)
HEMOGLOBIN: 12.1 GM/DL (ref 12–16)
IMMATURE GRANS (ABS): 0.02 THOU/MM3 (ref 0–0.07)
IMMATURE GRANULOCYTES: 0.3 %
LYMPHOCYTES # BLD: 22.3 %
LYMPHOCYTES ABSOLUTE: 1.8 THOU/MM3 (ref 1–4.8)
MCH RBC QN AUTO: 26.9 PG (ref 26–33)
MCHC RBC AUTO-ENTMCNC: 30.1 GM/DL (ref 32.2–35.5)
MCV RBC AUTO: 89.3 FL (ref 81–99)
MONOCYTES # BLD: 12.8 %
MONOCYTES ABSOLUTE: 1 THOU/MM3 (ref 0.4–1.3)
NUCLEATED RED BLOOD CELLS: 0 /100 WBC
PLATELET # BLD: 439 THOU/MM3 (ref 130–400)
PMV BLD AUTO: 10.8 FL (ref 9.4–12.4)
POTASSIUM SERPL-SCNC: 4.7 MEQ/L (ref 3.5–5.2)
RBC # BLD: 4.5 MILL/MM3 (ref 4.2–5.4)
SEDIMENTATION RATE, ERYTHROCYTE: 25 MM/HR (ref 0–20)
SEG NEUTROPHILS: 59.3 %
SEGMENTED NEUTROPHILS ABSOLUTE COUNT: 4.7 THOU/MM3 (ref 1.8–7.7)
SODIUM BLD-SCNC: 143 MEQ/L (ref 135–145)
TOTAL PROTEIN: 6.9 G/DL (ref 6.1–8)
WBC # BLD: 7.9 THOU/MM3 (ref 4.8–10.8)

## 2021-11-29 ENCOUNTER — NURSE ONLY (OUTPATIENT)
Dept: LAB | Age: 81
End: 2021-11-29

## 2021-11-29 LAB
FERRITIN: 19 NG/ML (ref 10–291)
HBV SURFACE AB TITR SER: NEGATIVE {TITER}
HEPATITIS B SURF AG,XHBAGS: NEGATIVE
HEPATITIS C ANTIBODY: ABNORMAL
INR BLD: 0.98 (ref 0.85–1.13)
IRON: 26 UG/DL (ref 50–170)
TOTAL IRON BINDING CAPACITY: 405 UG/DL (ref 171–450)

## 2021-11-30 LAB — ALPHA-1 ANTITRYPSIN: 156 MG/DL (ref 90–200)

## 2021-12-01 LAB
AFP-TUMOR MARKER: 5.6 UG/L
HEPATITIS B CORE TOTAL ANTIBODY: REACTIVE
IGA: 298 MG/DL (ref 70–400)
MITOCHONDRIAL M2 AB, IGG: 1.3 U/ML (ref 0–4)
TISSUE TRANSGLUTAMINASE IGA: 0.9 U/ML
TTG, IGG: 1.3 U/ML

## 2021-12-02 LAB
ANA SCREEN: NORMAL
CERULOPLASMIN: 30 MG/DL (ref 17–54)
F-ACTIN AB IGG: 10 UNITS (ref 0–19)

## 2021-12-03 LAB
HCV LOAD REFLEX TO GENOTYPE: NORMAL
MYELOPEROXIDASE AB, IGG: 4 AU/ML (ref 0–19)
SERINE PROTEASE 3, IGG: 1 AU/ML (ref 0–19)

## 2021-12-06 LAB — HEPATITIS C GENOTYPE: NORMAL

## 2021-12-07 ENCOUNTER — NURSE ONLY (OUTPATIENT)
Dept: LAB | Age: 81
End: 2021-12-07

## 2021-12-07 ENCOUNTER — TELEPHONE (OUTPATIENT)
Dept: RHEUMATOLOGY | Age: 81
End: 2021-12-07

## 2021-12-07 DIAGNOSIS — R79.89 LFT ELEVATION: ICD-10-CM

## 2021-12-07 DIAGNOSIS — Z51.81 MEDICATION MONITORING ENCOUNTER: Primary | ICD-10-CM

## 2021-12-07 LAB
ALBUMIN SERPL-MCNC: 4.1 G/DL (ref 3.5–5.1)
ALP BLD-CCNC: 132 U/L (ref 38–126)
ALT SERPL-CCNC: 40 U/L (ref 11–66)
ANION GAP SERPL CALCULATED.3IONS-SCNC: 11 MEQ/L (ref 8–16)
AST SERPL-CCNC: 47 U/L (ref 5–40)
BASOPHILS # BLD: 0.2 %
BASOPHILS ABSOLUTE: 0 THOU/MM3 (ref 0–0.1)
BILIRUB SERPL-MCNC: 0.2 MG/DL (ref 0.3–1.2)
BILIRUBIN DIRECT: < 0.2 MG/DL (ref 0–0.3)
BUN BLDV-MCNC: 14 MG/DL (ref 7–22)
C-REACTIVE PROTEIN: 0.61 MG/DL (ref 0–1)
CALCIUM SERPL-MCNC: 8.7 MG/DL (ref 8.5–10.5)
CHLORIDE BLD-SCNC: 104 MEQ/L (ref 98–111)
CO2: 24 MEQ/L (ref 23–33)
CREAT SERPL-MCNC: 0.8 MG/DL (ref 0.4–1.2)
EOSINOPHIL # BLD: 1.1 %
EOSINOPHILS ABSOLUTE: 0.1 THOU/MM3 (ref 0–0.4)
ERYTHROCYTE [DISTWIDTH] IN BLOOD BY AUTOMATED COUNT: 15.1 % (ref 11.5–14.5)
ERYTHROCYTE [DISTWIDTH] IN BLOOD BY AUTOMATED COUNT: 48.6 FL (ref 35–45)
GFR SERPL CREATININE-BSD FRML MDRD: 69 ML/MIN/1.73M2
GLUCOSE BLD-MCNC: 113 MG/DL (ref 70–108)
HCT VFR BLD CALC: 38 % (ref 37–47)
HEMOGLOBIN: 11.4 GM/DL (ref 12–16)
IMMATURE GRANS (ABS): 0.05 THOU/MM3 (ref 0–0.07)
IMMATURE GRANULOCYTES: 0.4 %
LYMPHOCYTES # BLD: 13 %
LYMPHOCYTES ABSOLUTE: 1.6 THOU/MM3 (ref 1–4.8)
MCH RBC QN AUTO: 26.2 PG (ref 26–33)
MCHC RBC AUTO-ENTMCNC: 30 GM/DL (ref 32.2–35.5)
MCV RBC AUTO: 87.4 FL (ref 81–99)
MONOCYTES # BLD: 6.4 %
MONOCYTES ABSOLUTE: 0.8 THOU/MM3 (ref 0.4–1.3)
NUCLEATED RED BLOOD CELLS: 0 /100 WBC
PLATELET # BLD: 445 THOU/MM3 (ref 130–400)
PMV BLD AUTO: 10.5 FL (ref 9.4–12.4)
POTASSIUM SERPL-SCNC: 4.7 MEQ/L (ref 3.5–5.2)
RBC # BLD: 4.35 MILL/MM3 (ref 4.2–5.4)
SEDIMENTATION RATE, ERYTHROCYTE: 34 MM/HR (ref 0–20)
SEG NEUTROPHILS: 78.9 %
SEGMENTED NEUTROPHILS ABSOLUTE COUNT: 9.7 THOU/MM3 (ref 1.8–7.7)
SODIUM BLD-SCNC: 139 MEQ/L (ref 135–145)
TOTAL PROTEIN: 6.9 G/DL (ref 6.1–8)
WBC # BLD: 12.3 THOU/MM3 (ref 4.8–10.8)

## 2021-12-08 LAB — MISC. #1 REFERENCE GROUP TEST: NORMAL

## 2021-12-17 ENCOUNTER — HOSPITAL ENCOUNTER (OUTPATIENT)
Dept: MRI IMAGING | Age: 81
Discharge: HOME | End: 2021-12-17
Payer: MEDICARE

## 2021-12-17 DIAGNOSIS — R93.2 ABNORMAL LIVER SCAN: ICD-10-CM

## 2021-12-17 DIAGNOSIS — R74.9 NONSPECIFIC ABNORMAL SERUM ENZYME LEVELS: ICD-10-CM

## 2021-12-17 PROCEDURE — 74183 MRI ABD W/O CNTR FLWD CNTR: CPT

## 2021-12-17 PROCEDURE — 6360000004 HC RX CONTRAST MEDICATION: Performed by: NURSE PRACTITIONER

## 2021-12-17 PROCEDURE — A9579 GAD-BASE MR CONTRAST NOS,1ML: HCPCS | Performed by: NURSE PRACTITIONER

## 2021-12-17 RX ADMIN — GADOTERIDOL 15 ML: 279.3 INJECTION, SOLUTION INTRAVENOUS at 09:20

## 2021-12-20 ENCOUNTER — OFFICE VISIT (OUTPATIENT)
Dept: PULMONOLOGY | Age: 81
End: 2021-12-20
Payer: MEDICARE

## 2021-12-20 VITALS
HEART RATE: 85 BPM | BODY MASS INDEX: 31.85 KG/M2 | HEIGHT: 59 IN | DIASTOLIC BLOOD PRESSURE: 80 MMHG | WEIGHT: 158 LBS | TEMPERATURE: 98.1 F | SYSTOLIC BLOOD PRESSURE: 128 MMHG | OXYGEN SATURATION: 96 %

## 2021-12-20 DIAGNOSIS — R06.02 SOB (SHORTNESS OF BREATH): ICD-10-CM

## 2021-12-20 DIAGNOSIS — J44.9 COPD, MILD (HCC): ICD-10-CM

## 2021-12-20 DIAGNOSIS — J84.10 PULMONARY FIBROSIS (HCC): ICD-10-CM

## 2021-12-20 DIAGNOSIS — J98.4 RESTRICTIVE LUNG DISEASE: Primary | ICD-10-CM

## 2021-12-20 PROCEDURE — G8417 CALC BMI ABV UP PARAM F/U: HCPCS | Performed by: NURSE PRACTITIONER

## 2021-12-20 PROCEDURE — G8427 DOCREV CUR MEDS BY ELIG CLIN: HCPCS | Performed by: NURSE PRACTITIONER

## 2021-12-20 PROCEDURE — 1123F ACP DISCUSS/DSCN MKR DOCD: CPT | Performed by: NURSE PRACTITIONER

## 2021-12-20 PROCEDURE — G8484 FLU IMMUNIZE NO ADMIN: HCPCS | Performed by: NURSE PRACTITIONER

## 2021-12-20 PROCEDURE — 99214 OFFICE O/P EST MOD 30 MIN: CPT | Performed by: NURSE PRACTITIONER

## 2021-12-20 PROCEDURE — 1036F TOBACCO NON-USER: CPT | Performed by: NURSE PRACTITIONER

## 2021-12-20 PROCEDURE — G8399 PT W/DXA RESULTS DOCUMENT: HCPCS | Performed by: NURSE PRACTITIONER

## 2021-12-20 PROCEDURE — 1090F PRES/ABSN URINE INCON ASSESS: CPT | Performed by: NURSE PRACTITIONER

## 2021-12-20 PROCEDURE — 4040F PNEUMOC VAC/ADMIN/RCVD: CPT | Performed by: NURSE PRACTITIONER

## 2021-12-20 RX ORDER — IPRATROPIUM BROMIDE AND ALBUTEROL SULFATE 2.5; .5 MG/3ML; MG/3ML
3 SOLUTION RESPIRATORY (INHALATION) EVERY 4 HOURS PRN
Qty: 360 ML | Refills: 5 | Status: SHIPPED | OUTPATIENT
Start: 2021-12-20 | End: 2021-12-21

## 2021-12-20 ASSESSMENT — ENCOUNTER SYMPTOMS
WHEEZING: 0
COUGH: 1
EYES NEGATIVE: 1
ALLERGIC/IMMUNOLOGIC NEGATIVE: 1
SHORTNESS OF BREATH: 1
GASTROINTESTINAL NEGATIVE: 1

## 2021-12-20 NOTE — PROGRESS NOTES
[facility]    Patient: Larissa Cabrera, 80 y.o.   : 2021      Subjective     Chief Complaint   Patient presents with    Follow-up     RLD 6 mo f/u with no test        HPI  Orijose Choudhary is here for follow up for RLD and very mild COPD. Patient follows with Dr. Sara De for her RA  SOB not a big problem now- patient walked in from parking lot got slightly winded   Patient only uses JEWEL PRN and rarely uses   Anoro DC last visit. No home oxygen use   Former smoker quit  2PPD x 51 years   Clifford Shoulder couple months ago- knee gave out; since recovered   No Covid vaccinations yet     Progress History:   Since last visit any new medical issues? No  New ER or hospital visits? No  Any new or changes in medicines? No  Using inhalers? Yes JEWEL just PRN   Are they helpful?  Yes   Flu vaccine- UTD  Pneumonia vaccine UTD  Past Medical hx   PMH:  Past Medical History:   Diagnosis Date    Arthritis     Cancer of eye ([address]     Right    COVID-19 2021    Dyslipidemia 02/15/2017    Fatigue     History of tobacco abuse 02/15/2017    Hyperlipidemia 10/13/2014    Hypertension     Non morbid obesity due to excess calories 02/15/2017    Pneumonia     Precordial pain 02/15/2017    Rheumatoid arteritis ([address]     Smoker     SOB (shortness of breath) 02/15/2017    Tobacco abuse      SURGICAL HISTORY:  Past Surgical History:   Procedure Laterality Date    EYE SURGERY      Right eye cancer    HEMORRHOID SURGERY      HERNIA REPAIR N/A 2/10/3311    ROBOTIC UMBILICAL HERNIA REPAIR WITH MESH performed by Spencer Dwyer MD at [address] [place] [address] Road:  Social History     Tobacco Use    Smoking status: Former Smoker     Packs/day: 2.00     Years: 51.00     Pack years: 102.00     Start date: 1963     Quit date: 2012     Years since quittin.9    Smokeless tobacco: Never Used    Tobacco comment: tryin to cut down   Vaping Use    Vaping Use: Never used   Substance disturbance. Physical exam   /80 (Site: Left Upper Arm, Position: Sitting, Cuff Size: Small Adult)   Pulse 85   Temp 98.1 °F (36.7 °C)   Ht 4' 11\" (1.499 m)   Wt 158 lb (71.7 kg)   SpO2 96% Comment: on r/a  BMI 31.91 kg/m²    Wt Readings from Last 3 Encounters:   12/21/21 161 lb 6.4 oz (73.2 kg)   12/20/21 158 lb (71.7 kg)   11/09/21 146 lb 6.4 oz (66.4 kg)       Physical Exam  Vitals and nursing note reviewed. Constitutional:       Appearance: She is well-developed. HENT:      Head: Normocephalic and atraumatic. Eyes:      Conjunctiva/sclera: Conjunctivae normal.      Pupils: Pupils are equal, round, and reactive to light. Neck:      Vascular: No JVD. Cardiovascular:      Rate and Rhythm: Normal rate and regular rhythm. Heart sounds: Normal heart sounds. No murmur heard. No friction rub. No gallop. Pulmonary:      Effort: Pulmonary effort is normal. No respiratory distress. Breath sounds: Normal breath sounds. No wheezing or rales. Abdominal:      General: Bowel sounds are normal.      Palpations: Abdomen is soft. Musculoskeletal:         General: Swelling and tenderness present. Normal range of motion. Cervical back: Normal range of motion and neck supple. Skin:     General: Skin is warm and dry. Capillary Refill: Capillary refill takes less than 2 seconds. Neurological:      Mental Status: She is alert and oriented to person, place, and time. Psychiatric:         Behavior: Behavior normal.         Thought Content: Thought content normal.         Judgment: Judgment normal.          results   Lung Nodule Screening     [x] Qualifies    [] Does not qualify   [] Declined    [x] Completed  The USPSTF recommends annual screening for lung cancer with low-dose computed tomography (LDCT) in adults aged 48 to [de-identified] years who have a 30 pack-year smoking history and currently smoke or have quit within the past 20 years.  Screening should be discontinued once a person has not smoked for 20 years or develops a health problem that substantially limits life expectancy or the ability or willingness to have curative lung surgery.     -No pulmonary testing to review today     Assessment      Diagnosis Orders   1. Restrictive lung disease  CT CHEST HIGH RESOLUTION    ipratropium-albuterol (DUONEB) 0.5-2.5 (3) MG/3ML SOLN nebulizer solution    DISCONTINUED: ipratropium-albuterol (DUONEB) 0.5-2.5 (3) MG/3ML SOLN nebulizer solution   2. SOB (shortness of breath)  ipratropium-albuterol (DUONEB) 0.5-2.5 (3) MG/3ML SOLN nebulizer solution    DISCONTINUED: ipratropium-albuterol (DUONEB) 0.5-2.5 (3) MG/3ML SOLN nebulizer solution   3. Pulmonary fibrosis (HCC)  CT CHEST HIGH RESOLUTION   4. COPD, mild ([address]  ipratropium-albuterol (DUONEB) 0.5-2.5 (3) MG/3ML SOLN nebulizer solution         Plan   -RTC 6 months with HRCT to be done prior to appt   -HRCT in 6 months   -Dr. Basim Orona note reviewed.    -Continue Duonebs every 4-6 hours PRN for SOB/wheezing   -Advised to maintain pneumonia vaccine with PCP and to take flu vaccine this coming season.  -Advised patient to call office with any changes, questions, or concerns regarding respiratory status  -No Anoro currently   -Discussed Acapella device and use - RX done for device     Will see Ashanti Earing back in: 6 months HRCT prior     Debbie Paz, [facility]  12/20/2021

## 2021-12-21 ENCOUNTER — OFFICE VISIT (OUTPATIENT)
Dept: RHEUMATOLOGY | Age: 81
End: 2021-12-21
Payer: MEDICARE

## 2021-12-21 VITALS
HEIGHT: 59 IN | WEIGHT: 161.4 LBS | HEART RATE: 79 BPM | SYSTOLIC BLOOD PRESSURE: 136 MMHG | OXYGEN SATURATION: 95 % | DIASTOLIC BLOOD PRESSURE: 80 MMHG | BODY MASS INDEX: 32.54 KG/M2

## 2021-12-21 DIAGNOSIS — Z51.81 MEDICATION MONITORING ENCOUNTER: ICD-10-CM

## 2021-12-21 DIAGNOSIS — M81.0 AGE RELATED OSTEOPOROSIS, UNSPECIFIED PATHOLOGICAL FRACTURE PRESENCE: ICD-10-CM

## 2021-12-21 DIAGNOSIS — B19.20 HEPATITIS C VIRUS INFECTION WITHOUT HEPATIC COMA, UNSPECIFIED CHRONICITY: ICD-10-CM

## 2021-12-21 DIAGNOSIS — M15.9 OSTEOARTHRITIS OF MULTIPLE JOINTS, UNSPECIFIED OSTEOARTHRITIS TYPE: ICD-10-CM

## 2021-12-21 DIAGNOSIS — M05.9 SEROPOSITIVE RHEUMATOID ARTHRITIS (HCC): Primary | ICD-10-CM

## 2021-12-21 PROCEDURE — 1090F PRES/ABSN URINE INCON ASSESS: CPT | Performed by: INTERNAL MEDICINE

## 2021-12-21 PROCEDURE — G8484 FLU IMMUNIZE NO ADMIN: HCPCS | Performed by: INTERNAL MEDICINE

## 2021-12-21 PROCEDURE — 1123F ACP DISCUSS/DSCN MKR DOCD: CPT | Performed by: INTERNAL MEDICINE

## 2021-12-21 PROCEDURE — 1036F TOBACCO NON-USER: CPT | Performed by: INTERNAL MEDICINE

## 2021-12-21 PROCEDURE — 99214 OFFICE O/P EST MOD 30 MIN: CPT | Performed by: INTERNAL MEDICINE

## 2021-12-21 PROCEDURE — 4040F PNEUMOC VAC/ADMIN/RCVD: CPT | Performed by: INTERNAL MEDICINE

## 2021-12-21 PROCEDURE — G8417 CALC BMI ABV UP PARAM F/U: HCPCS | Performed by: INTERNAL MEDICINE

## 2021-12-21 PROCEDURE — G8427 DOCREV CUR MEDS BY ELIG CLIN: HCPCS | Performed by: INTERNAL MEDICINE

## 2021-12-21 PROCEDURE — G8399 PT W/DXA RESULTS DOCUMENT: HCPCS | Performed by: INTERNAL MEDICINE

## 2021-12-21 RX ORDER — IPRATROPIUM BROMIDE AND ALBUTEROL SULFATE 2.5; .5 MG/3ML; MG/3ML
3 SOLUTION RESPIRATORY (INHALATION) EVERY 4 HOURS PRN
Qty: 360 ML | Refills: 5 | Status: SHIPPED | OUTPATIENT
Start: 2021-12-21

## 2021-12-21 ASSESSMENT — ENCOUNTER SYMPTOMS
VOMITING: 0
NAUSEA: 0
DIARRHEA: 1
COUGH: 0
WHEEZING: 0
SHORTNESS OF BREATH: 0
EYE REDNESS: 0
CONSTIPATION: 0
EYE PAIN: 0

## 2021-12-21 NOTE — PROGRESS NOTES
[facility] RHEUMATOLOGY FOLLOW UP NOTE     Date Of Service: 12/21/2021  Provider: Dhruv Faulkner DO ,   PCP: ERNESTO Jeronimo - CNP   Name: Letitia Keith   MRN: [id number]    ASSESSMENT/PLAN:   1. Seropositive rheumatoid arthritis ([facility] [address]  2. Hepatitis C virus infection without hepatic coma, unspecified chronicity  3. Age related osteoporosis, unspecified pathological fracture presence  4. Osteoarthritis of multiple joints, unspecified osteoarthritis type  5. Medication monitoring encounter    Seropositive rheumatoid arthritis (HCC)  -  + , negative CCP. ESR/sed rate elevation, duration > 6 weeks, erosions on xrays. Exam with synovitis. - prior tx: prednisone (sig relief), meloxicam (no relief)  - d/c Sulfasalazine 1000mg twice daily and 1500 twice daily b/c lft elevation (restarted 4/2021 - nov. 2021.)                - prednisone 5 mg daily   -  Plaquenil 200mg daily    - ? arava vs Mycophenolate if there is evidence of ILD on CT chest    Restrictive lung disease - pft's as below from juen 2021. Awaiting HRCT chest scheduled for June. Respiratory medication changes 12/21/2021. Hepatitis C- + HCV viral load  Abnormal liver ultrasound               - MRI abdomen as below                - GI f/u evaluation scheduled for 12/22/2021. Osteoporosis - T-score of -3.3 in the right femoral neck. - alendronate 70mg once weekly. -9-        Restrictive lung disease. -- suspected related to Rheumatoid arthritis. COPD. Prior CXR w/ opacities concerning for scarring/fibrotic changes.   - PFT's - restrictive lung disease - concern for RA related. - CT chest performed today 6/7/2021 - results as above. Osteoarthritis of multiple joints, unspecified osteoarthritis type               - continue meloxicam 15 mg daily     Medication monitoring               - CBC, CMP, sed rate, CRP  q 12 weeks. - need plaquenil eye exam feb 2021. No follow-ups on file.   New Prescriptions    No medications on file     History of Present Illness (HPI)     Chief Complaint   Patient presents with    3 Month Follow-Up        Priyanka Hermosillo  is S(P)1 y.o. female with a hx of  has a past medical history of Arthritis, Cancer of eye ([facility] [address] COVID-19, Dyslipidemia, Fatigue, History of tobacco abuse, Hyperlipidemia, Hypertension, Non morbid obesity due to excess calories, Pneumonia, Precordial pain, Rheumatoid arteritis ([address] Smoker, SOB (shortness of breath), and Tobacco abuse. here for the f/u evaluation of  Seropositive rheumatoid arthritis, osteoarthritsi, medication monitoring       Rheumatoid arthritis/oa - stopped Sulfasalazine b/c LFT elevation nov. 2021. cont prednisone 5mg daily. , Plaquenil started 12/2021. Shannon Bloodgood Pain up to 6.5/10 , intermittent in the knees, Right shoulder. Aggravating: stairs, knees. Alleviating factors: prednisone. + AM stiffness lasting ~ 30 min. Denies joint swelling, redness/warmth. osteoporosis - tolerating alendronate started June 2021 - 1 fall nov 2021 w/o fractures. REVIEW OF SYSTEMS: (ROS)    Review of Systems   Constitutional: Positive for fatigue. Negative for diaphoresis and fever. HENT: Negative for congestion, hearing loss and nosebleeds. Eyes: Positive for visual disturbance (blurred sometimes). Negative for pain and redness. Dryness   Respiratory: Negative for cough, shortness of breath and wheezing. Cardiovascular: Negative. Negative for chest pain. Gastrointestinal: Positive for diarrhea (\"once in a while\"). Negative for constipation, nausea and vomiting. Genitourinary: Negative for difficulty urinating, frequency and hematuria. Musculoskeletal: Positive for arthralgias and myalgias. Skin: Negative for rash. Neurological: Negative for dizziness, weakness and headaches. Hematological: Does not bruise/bleed easily. Psychiatric/Behavioral: Negative for sleep disturbance. The patient is not nervous/anxious. PmHx:  has a past medical history of Arthritis, Cancer of eye ([facility] [address] COVID-19, Dyslipidemia, Fatigue, History of tobacco abuse, Hyperlipidemia, Hypertension, Non morbid obesity due to excess calories, Pneumonia, Precordial pain, Rheumatoid arteritis ([facility] [address] Smoker, SOB (shortness of breath), and Tobacco abuse. Social History:  reports that she quit smoking about 9 years ago. She started smoking about 59 years ago. She has a 102.00 pack-year smoking history. She has never used smokeless tobacco. She reports that she does not drink alcohol and does not use drugs.     ALLERGIES   No Known Allergies    CURRENT MEDICATIONS      Current Outpatient Medications:     ALENDRONATE SODIUM PO, Take by mouth, Disp: , Rfl:     ipratropium-albuterol (DUONEB) 0.5-2.5 (3) MG/3ML SOLN nebulizer solution, Inhale 3 mLs into the lungs every 4 hours as needed for Shortness of Breath or Other (wheezing), Disp: 360 mL, Rfl: 5    hydroxychloroquine (PLAQUENIL) 200 MG tablet, Take 1 tablet by mouth daily, Disp: 30 tablet, Rfl: 3    amLODIPine (NORVASC) 5 MG tablet, TAKE 1 TABLET BY MOUTH DAILY, Disp: 90 tablet, Rfl: 3    sulfaSALAzine (AZULFIDINE) 500 MG tablet, TAKE 2 TABLETS BY MOUTH TWICE DAILY, Disp: 120 tablet, Rfl: 0    predniSONE (DELTASONE) 5 MG tablet, TAKE 1 TABLET BY MOUTH DAILY, Disp: 30 tablet, Rfl: 2    acetaminophen (TYLENOL) 500 MG tablet, Take 500 mg by mouth daily, Disp: , Rfl:     atorvastatin (LIPITOR) 20 MG tablet, Take 1 tablet by mouth daily, Disp: 90 tablet, Rfl: 4    albuterol sulfate HFA (PROVENTIL HFA) 108 (90 BASE) MCG/ACT inhaler, Inhale 2 puffs into the lungs every 6 hours as needed for Wheezing, Disp: 1 Inhaler, Rfl: 0    PHYSICAL EXAMINATION / OBJECTIVE     Objective:  /80 (Site: Left Upper Arm, Position: Sitting, Cuff Size: Medium Adult)   Pulse 79   Ht 4' 11.02\" (1.499 m)   Wt 161 lb 6.4 oz (73.2 kg)   SpO2 95%   BMI 32.58 kg/m²     Physical Exam    General Appearance: General appearance:  AAO x 3 ,  well-developed and well nourished  Head: NCAT  Eyes: No abnormalities. ,  Sclera icteric,   Ears / Nose:  normal  appearance  ears and nose. No active drainage from nose. Mouth:  MMM, ears w/o deformities  Neck: No jugular venous distention, appears symmetric, good ROM  Lymph: no cervical adenopathy   Pulmonary/Chest: CTA bilateral ,  symmetric chest expansion. Cardiovascular: Normal S1 and S2, NO murmur, rub, gallop    Neurologic: Speech normal, no facial droop,  Skin: NO rash on exposed skin. Upper extremities:    SHOULDERS Non-tender,   ELBOWS nontender, no swelling  WRISTS mild swelling left. Non-tender. HANDS/FINGERS MCP subluxation, Rigth w/ ulnar deviation.  No synovitis.  + nodules right DIPs.      Lower extremities:  HIPS nontender  KNEES nontender, no swelling  ANKLES nontender, no swelling   FEET : nontender, no swelling       MDHAQ:   12/21/2021 --- MDHAQ 3 + 6.5 + 7.5 = 17       Remission: <3  Low Disease Activity: <6  Moderate Disease Activity: >=6 and <=12  High Disease Activity: >12     LABS      Lab Results   Component Value Date    WBC 12.3 (H) 12/07/2021    HGB 11.4 (L) 12/07/2021    MCV 87.4 12/07/2021    MCHC 30.0 (L) 12/07/2021    RDW 14.3 11/05/2020     (H) 12/07/2021    NEUTROABS 4.94 07/27/2020    LYMPHSABS 1.6 12/07/2021    EOSABS 0.1 12/07/2021    BASOSABS 0.0 12/07/2021         Chemistry        Component Value Date/Time     12/07/2021 1559    K 4.7 12/07/2021 1559    K 4.0 03/27/2021 0323     12/07/2021 1559    CO2 24 12/07/2021 1559    BUN 14 12/07/2021 1559    CREATININE 0.8 12/07/2021 1559        Component Value Date/Time    CALCIUM 8.7 12/07/2021 1559    ALKPHOS 132 (H) 12/07/2021 1559    AST 47 (H) 12/07/2021 1559    ALT 40 12/07/2021 1559    BILITOT 0.2 (L) 12/07/2021 1559            Lab Results   Component Value Date    SEDRATE 34 (H) 12/07/2021    SEDRATE 25 (H) 11/22/2021    SEDRATE 25 (H) 11/08/2021    CRP 0.61 12/07/2021 CRP < 0.30 11/22/2021    CRP < 0.30 11/08/2021       Lab Results   Component Value Date    VITD25 42 06/21/2017       Lab Results   Component Value Date    ANASCRN None Detected 11/29/2021     No results found for: SSA  No results found for: SSB  No results found for: ANTI-SMITH  No results found for: DSDNAAB   No results found for: ANTIRNP  No results found for: C3, C4  No results found for: CCPAB  Lab Results   Component Value Date    .0 (H) 12/14/2020           RADIOLOGY / PROCEDURES:           Impression    IMPRESSION:       1. No evidence of pulmonary emboli. 2. Abnormal density in the left lower lobe, left mid lung field and to a lesser extent right lung consistent with inflammatory process possibly representing Covid 19 infection. 3. Evidence for old granulomatous disease in left lower lobe. 4. Cardiomegaly. 5. Atherosclerotic calcification in the thoracic aorta. 6. Thoracic spondylosis. 7. Hiatal hernia. Keiko Herberth EXAM: DEXA BONE DENSITY AXIAL SKELETON        HISTORY: 80 years, Female, Postmenopausal       COMPARISON: There are no prior exams for comparison. FINDINGS:   Bone densitometry has been performed using a Hologic bone densitometer. The routine evaluation includes the lumbar spine and both hips. Evaluation of the L1-L4 of the lumbar spine demonstrates an average bone mineral density measurement of 0.860g/cm2 which corresponds to a T-score of -1.7 and a Z-score of 1.0. This qualifies as osteopenia. The calculated bone density in the left femoral neck is 0.545 g/cm2 which corresponds to a T-score of  -2.7 and a Z-score of -0.4. This qualifies as osteoporosis. The calculated bone density in the right femoral neck  is 0.481 g/cm2 which corresponds to a T-score of  -3.3 and a Z-score of -1.0. This qualifies as osteoporosis. The patient does not qualify for a FRAX assessment because there is a T score at or below -2.5. MRI Abdomen 12/2021   1. 3.7 x 2.5 cm area of abnormal signal intensity in the right lobe of liver adjacent to the hepatorenal fossa. There is heterogeneous signal intensity, early arterial enhancement and venous drainage into the right portal vein. The appearance is not    typical for a hemangioma. Hepatoma or metastatic disease cannot be ruled out. Please correlate with liver function tests and AFP levels. If these are elevated, CT-guided biopsy would be helpful for better evaluation. 2. Fluid/fluid level within the gallbladder possibly representing milk of calcium bile. No significant intra or extrahepatic biliary dilatation. No evidence of choledocholithiasis. 3. Moderate-sized hiatal hernia. 4. Borderline splenomegaly. 5. Small cystic area in the body of the pancreas. Mild atrophy involving the pancreas. 6. Small benign adenoma in the left adrenal gland. 7. 17 x 14   Bosniak type II cyst in the left kidney. Small Bosniak type I cysts in the right kidney. 8. Lumbar spondylosis. Mild dextroscoliosis.         June 2021            Electronically signed by Attila Burnett DO on 12/21/21 at 2:36 PM EST  Please contact the office if you have any questions or change of symptoms.

## 2021-12-26 LAB
HBV DNA IU/ML: NOT DETECTED IU/ML
INTERPRETATION: NOT DETECTED
LOG 10 HBV AS IU/ML: NOT DETECTED LOG IU/ML

## 2022-01-07 RX ORDER — PREDNISONE 1 MG/1
5 TABLET ORAL DAILY
Qty: 30 TABLET | Refills: 2 | Status: SHIPPED | OUTPATIENT
Start: 2022-01-07 | End: 2022-04-20 | Stop reason: SDUPTHER

## 2022-02-04 DIAGNOSIS — M05.9 SEROPOSITIVE RHEUMATOID ARTHRITIS (HCC): ICD-10-CM

## 2022-02-04 RX ORDER — HYDROXYCHLOROQUINE SULFATE 200 MG/1
200 TABLET, FILM COATED ORAL DAILY
Qty: 90 TABLET | Refills: 1 | Status: SHIPPED | OUTPATIENT
Start: 2022-02-04 | End: 2022-07-11

## 2022-02-10 ENCOUNTER — NURSE ONLY (OUTPATIENT)
Dept: LAB | Age: 82
End: 2022-02-10

## 2022-02-10 DIAGNOSIS — Z51.81 MEDICATION MONITORING ENCOUNTER: ICD-10-CM

## 2022-02-10 LAB
ALBUMIN SERPL-MCNC: 4.1 G/DL (ref 3.5–5.1)
ALP BLD-CCNC: 86 U/L (ref 38–126)
ALT SERPL-CCNC: 50 U/L (ref 11–66)
ANION GAP SERPL CALCULATED.3IONS-SCNC: 12 MEQ/L (ref 8–16)
AST SERPL-CCNC: 64 U/L (ref 5–40)
BASOPHILS # BLD: 0.6 %
BASOPHILS ABSOLUTE: 0.1 THOU/MM3 (ref 0–0.1)
BILIRUB SERPL-MCNC: 0.4 MG/DL (ref 0.3–1.2)
BUN BLDV-MCNC: 21 MG/DL (ref 7–22)
C-REACTIVE PROTEIN: < 0.3 MG/DL (ref 0–1)
CALCIUM SERPL-MCNC: 9.4 MG/DL (ref 8.5–10.5)
CHLORIDE BLD-SCNC: 107 MEQ/L (ref 98–111)
CO2: 23 MEQ/L (ref 23–33)
CREAT SERPL-MCNC: 1 MG/DL (ref 0.4–1.2)
EOSINOPHIL # BLD: 2.1 %
EOSINOPHILS ABSOLUTE: 0.2 THOU/MM3 (ref 0–0.4)
ERYTHROCYTE [DISTWIDTH] IN BLOOD BY AUTOMATED COUNT: 16.4 % (ref 11.5–14.5)
ERYTHROCYTE [DISTWIDTH] IN BLOOD BY AUTOMATED COUNT: 49.9 FL (ref 35–45)
GFR SERPL CREATININE-BSD FRML MDRD: 53 ML/MIN/1.73M2
GLUCOSE BLD-MCNC: 139 MG/DL (ref 70–108)
HCT VFR BLD CALC: 38.7 % (ref 37–47)
HEMOGLOBIN: 11.3 GM/DL (ref 12–16)
IMMATURE GRANS (ABS): 0.03 THOU/MM3 (ref 0–0.07)
IMMATURE GRANULOCYTES: 0.4 %
LYMPHOCYTES # BLD: 14.4 %
LYMPHOCYTES ABSOLUTE: 1.2 THOU/MM3 (ref 1–4.8)
MCH RBC QN AUTO: 24.5 PG (ref 26–33)
MCHC RBC AUTO-ENTMCNC: 29.2 GM/DL (ref 32.2–35.5)
MCV RBC AUTO: 83.9 FL (ref 81–99)
MONOCYTES # BLD: 7.5 %
MONOCYTES ABSOLUTE: 0.6 THOU/MM3 (ref 0.4–1.3)
NUCLEATED RED BLOOD CELLS: 0 /100 WBC
PLATELET # BLD: 373 THOU/MM3 (ref 130–400)
PMV BLD AUTO: 11 FL (ref 9.4–12.4)
POTASSIUM SERPL-SCNC: 4.6 MEQ/L (ref 3.5–5.2)
RBC # BLD: 4.61 MILL/MM3 (ref 4.2–5.4)
SEDIMENTATION RATE, ERYTHROCYTE: 18 MM/HR (ref 0–20)
SEG NEUTROPHILS: 75 %
SEGMENTED NEUTROPHILS ABSOLUTE COUNT: 6.3 THOU/MM3 (ref 1.8–7.7)
SODIUM BLD-SCNC: 142 MEQ/L (ref 135–145)
TOTAL PROTEIN: 6.7 G/DL (ref 6.1–8)
WBC # BLD: 8.4 THOU/MM3 (ref 4.8–10.8)

## 2022-03-14 ENCOUNTER — TELEPHONE (OUTPATIENT)
Dept: RHEUMATOLOGY | Age: 82
End: 2022-03-14

## 2022-03-14 NOTE — TELEPHONE ENCOUNTER
Please inform the patient that the eye exam results revealed macular degeneration.  Because of these abnormalities it would like to discuss potential transition of the hydroxychloroquine to an alternative agent we can discuss this more at her follow-up appointment on 3/22/2022    [facility] and reviewed; she voiced understanding

## 2022-03-22 ENCOUNTER — OFFICE VISIT (OUTPATIENT)
Dept: RHEUMATOLOGY | Age: 82
End: 2022-03-22
Payer: MEDICARE

## 2022-03-22 ENCOUNTER — TELEPHONE (OUTPATIENT)
Dept: PULMONOLOGY | Age: 82
End: 2022-03-22

## 2022-03-22 VITALS
WEIGHT: 161 LBS | HEIGHT: 59 IN | DIASTOLIC BLOOD PRESSURE: 72 MMHG | BODY MASS INDEX: 32.46 KG/M2 | OXYGEN SATURATION: 93 % | SYSTOLIC BLOOD PRESSURE: 116 MMHG | HEART RATE: 83 BPM

## 2022-03-22 DIAGNOSIS — M05.9 SEROPOSITIVE RHEUMATOID ARTHRITIS (HCC): Primary | ICD-10-CM

## 2022-03-22 DIAGNOSIS — M81.0 AGE RELATED OSTEOPOROSIS, UNSPECIFIED PATHOLOGICAL FRACTURE PRESENCE: ICD-10-CM

## 2022-03-22 DIAGNOSIS — Z51.81 MEDICATION MONITORING ENCOUNTER: ICD-10-CM

## 2022-03-22 DIAGNOSIS — R79.89 LFT ELEVATION: ICD-10-CM

## 2022-03-22 DIAGNOSIS — R06.02 SOB (SHORTNESS OF BREATH): ICD-10-CM

## 2022-03-22 DIAGNOSIS — M15.9 OSTEOARTHRITIS OF MULTIPLE JOINTS, UNSPECIFIED OSTEOARTHRITIS TYPE: ICD-10-CM

## 2022-03-22 DIAGNOSIS — R93.2 ABNORMAL LIVER ULTRASOUND: ICD-10-CM

## 2022-03-22 DIAGNOSIS — J44.1 COPD EXACERBATION (HCC): Primary | ICD-10-CM

## 2022-03-22 DIAGNOSIS — B19.20 HEPATITIS C VIRUS INFECTION WITHOUT HEPATIC COMA, UNSPECIFIED CHRONICITY: ICD-10-CM

## 2022-03-22 PROCEDURE — G8427 DOCREV CUR MEDS BY ELIG CLIN: HCPCS | Performed by: NURSE PRACTITIONER

## 2022-03-22 PROCEDURE — G8417 CALC BMI ABV UP PARAM F/U: HCPCS | Performed by: NURSE PRACTITIONER

## 2022-03-22 PROCEDURE — 1123F ACP DISCUSS/DSCN MKR DOCD: CPT | Performed by: NURSE PRACTITIONER

## 2022-03-22 PROCEDURE — 4040F PNEUMOC VAC/ADMIN/RCVD: CPT | Performed by: NURSE PRACTITIONER

## 2022-03-22 PROCEDURE — G8484 FLU IMMUNIZE NO ADMIN: HCPCS | Performed by: NURSE PRACTITIONER

## 2022-03-22 PROCEDURE — 1090F PRES/ABSN URINE INCON ASSESS: CPT | Performed by: NURSE PRACTITIONER

## 2022-03-22 PROCEDURE — 1036F TOBACCO NON-USER: CPT | Performed by: NURSE PRACTITIONER

## 2022-03-22 PROCEDURE — 99214 OFFICE O/P EST MOD 30 MIN: CPT | Performed by: NURSE PRACTITIONER

## 2022-03-22 PROCEDURE — G8399 PT W/DXA RESULTS DOCUMENT: HCPCS | Performed by: NURSE PRACTITIONER

## 2022-03-22 RX ORDER — PREDNISONE 20 MG/1
40 TABLET ORAL DAILY
Qty: 10 TABLET | Refills: 0 | Status: SHIPPED | OUTPATIENT
Start: 2022-03-22 | End: 2022-03-27

## 2022-03-22 RX ORDER — AZITHROMYCIN 250 MG/1
250 TABLET, FILM COATED ORAL SEE ADMIN INSTRUCTIONS
Qty: 6 TABLET | Refills: 0 | Status: SHIPPED | OUTPATIENT
Start: 2022-03-22 | End: 2022-03-27

## 2022-03-22 ASSESSMENT — ENCOUNTER SYMPTOMS
DIARRHEA: 0
BACK PAIN: 0
COUGH: 1
EYE PAIN: 0
TROUBLE SWALLOWING: 0
CONSTIPATION: 0
ABDOMINAL PAIN: 0
NAUSEA: 0
SHORTNESS OF BREATH: 1
EYE ITCHING: 0

## 2022-03-22 NOTE — PROGRESS NOTES
[address] NOTE       Date Of Service: 3/22/2022  Provider: ERNESTO Nichols CNP    Name: Uday Keita   MRN: [id number]    Chief Complaint(s)     Chief Complaint   Patient presents with    Follow-up     3 mo f/u    Other     Has been sick for about 4-5 days, no temp, clear mucus. History of Present Illness (HPI)     Uday Keita  is M(K)44 y.o. female with a hx of HTN, HLD, tobacco abuse, COPD, cancer right eye  here for the f/u evaluation of seropositive rheumatoid arthritis    Interval hx:    - had evaluation with [facility] for + hep C, liver lesion. There is some concern that the liver lesion could be metastatic disease, patient was referred to [facility] and they did not go to appointment. Daughter and patient state they do not with to pursue any further workup or treatment regarding liver issues. - had recent eye exam which showed macular degeneration    pain affecting the left shoulder  Pain on a scale 0-10: 4-5//10  Type of pain: intermittent  Timing:n/a  Aggravating factors: knees: stairs, wt bearing. Hands: increase use. Shoulders: reaching up or back   Alleviating factors: prednisone    Associated symptoms:  denies swelling/  Redness/ warmth + AM stiffness lasting ~ 15 minutes      REVIEW OF SYSTEMS: (ROS)    Review of Systems   Constitutional: Negative for fatigue, fever and unexpected weight change. HENT: Positive for hearing loss. Negative for congestion and trouble swallowing. Eyes: Negative for pain and itching. Respiratory: Positive for cough and shortness of breath. Cardiovascular: Negative for chest pain and leg swelling. Gastrointestinal: Negative for abdominal pain, constipation, diarrhea and nausea. Endocrine: Negative for cold intolerance and heat intolerance. Genitourinary: Negative for difficulty urinating, frequency and urgency. Musculoskeletal: Positive for arthralgias and joint swelling. Negative for back pain.    Skin: Negative for rash. Neurological: Negative for dizziness, weakness, numbness and headaches. Psychiatric/Behavioral: The patient is not nervous/anxious.         PAST MEDICAL HISTORY      Past Medical History:   Diagnosis Date    Arthritis     Cancer of eye ([address]     Right    COVID-19 03/2021    Dyslipidemia 02/15/2017    Fatigue     History of tobacco abuse 02/15/2017    Hyperlipidemia 10/13/2014    Hypertension     Non morbid obesity due to excess calories 02/15/2017    Pneumonia     Precordial pain 02/15/2017    Rheumatoid arteritis ([address]     Smoker     SOB (shortness of breath) 02/15/2017    Tobacco abuse        PAST SURGICAL HISTORY      Past Surgical History:   Procedure Laterality Date    EYE SURGERY      Right eye cancer    HEMORRHOID SURGERY      HERNIA REPAIR N/A 4/00/1452    ROBOTIC UMBILICAL HERNIA REPAIR WITH MESH performed by Neil Joe MD at [address] [place] [address]      Family History   Problem Relation Age of Onset    Cancer Mother     Cancer Father     Heart Failure Sister     Cancer Brother     Cancer Daughter        SOCIAL HISTORY      Social History     Tobacco History     Smoking Status  Former Smoker Smoking Start Date  1/1/1963 Quit date  1/14/2012 Smoking Frequency  2 packs/day for 51 years (102 pk yrs)    Smokeless Tobacco Use  Never Used    Tobacco Comment  tryin to cut down          Alcohol History     Alcohol Use Status  No Comment  quit          Drug Use     Drug Use Status  No          Sexual Activity     Sexually Active  Not Currently                ALLERGIES   No Known Allergies    CURRENT MEDICATIONS      Current Outpatient Medications   Medication Sig Dispense Refill    hydroxychloroquine (PLAQUENIL) 200 MG tablet TAKE 1 TABLET BY MOUTH DAILY 90 tablet 1    predniSONE (DELTASONE) 5 MG tablet TAKE 1 TABLET BY MOUTH DAILY 30 tablet 2    ALENDRONATE SODIUM PO Take by mouth      ipratropium-albuterol (DUONEB) 0.5-2.5 (3) MG/3ML SOLN nebulizer solution Inhale 3 mLs into the lungs every 4 hours as needed for Shortness of Breath or Other (wheezing) 360 mL 5    amLODIPine (NORVASC) 5 MG tablet TAKE 1 TABLET BY MOUTH DAILY 90 tablet 3    sulfaSALAzine (AZULFIDINE) 500 MG tablet TAKE 2 TABLETS BY MOUTH TWICE DAILY 120 tablet 0    acetaminophen (TYLENOL) 500 MG tablet Take 500 mg by mouth daily      atorvastatin (LIPITOR) 20 MG tablet Take 1 tablet by mouth daily 90 tablet 4    albuterol sulfate HFA (PROVENTIL HFA) 108 (90 BASE) MCG/ACT inhaler Inhale 2 puffs into the lungs every 6 hours as needed for Wheezing 1 Inhaler 0     No current facility-administered medications for this visit. PHYSICAL EXAMINATION / OBJECTIVE   Objective:  /72 (Site: Left Upper Arm, Position: Sitting, Cuff Size: Large Adult)   Pulse 83   Ht 4' 11.02\" (1.499 m)   Wt 161 lb (73 kg)   SpO2 93%   BMI 32.50 kg/m²     Physical Exam  Vitals reviewed. Constitutional:       Appearance: She is well-developed. Cardiovascular:      Rate and Rhythm: Normal rate and regular rhythm. Pulmonary:      Effort: Pulmonary effort is normal.      Breath sounds: Normal breath sounds. Abdominal:      Palpations: Abdomen is soft. Tenderness: There is no abdominal tenderness. Musculoskeletal:      Cervical back: Normal range of motion and neck supple. Skin:     General: Skin is warm and dry. Findings: No rash. Neurological:      Mental Status: She is alert and oriented to person, place, and time. Deep Tendon Reflexes: Reflexes are normal and symmetric. Psychiatric:         Thought Content:  Thought content normal.       Upper extremities:    SHOULDERS nontender  ELBOWS nontender, no swelling  WRISTS nontender, swelling/warmth left   HANDS/FINGERS   MCPs nontender, + bogginess bilat 2,3    PIPs nontender, bogginess bilat    DIPs + nodules right 2,5    Lower extremities:  HIPS nontender  KNEES nontender, no swelling  ANKLES nontender, no swelling   FEET : nontender, no swelling      LABS    CBC  Lab Results   Component Value Date    WBC 8.4 02/10/2022    RBC 4.61 02/10/2022    HGB 11.3 02/10/2022    HCT 38.7 02/10/2022    MCV 83.9 02/10/2022    MCH 24.5 02/10/2022    MCHC 29.2 02/10/2022    RDW 14.3 11/05/2020     02/10/2022       CMP  Lab Results   Component Value Date    CALCIUM 9.4 02/10/2022    LABALBU 4.1 02/10/2022    LABALBU 4.7 05/22/2012    PROT 6.7 02/10/2022     02/10/2022    K 4.6 02/10/2022    K 4.0 03/27/2021    CO2 23 02/10/2022     02/10/2022    BUN 21 02/10/2022    CREATININE 1.0 02/10/2022    ALKPHOS 86 02/10/2022    ALT 50 02/10/2022    AST 64 02/10/2022       HgBA1c: No components found for: HGBA1C    Lab Results   Component Value Date    VITD25 42 06/21/2017         Lab Results   Component Value Date    ANASCRN None Detected 11/29/2021     No results found for: SSA  No results found for: SSB  No results found for: ANTI-SMITH  No results found for: DSDNAAB   No results found for: ANTIRNP  No results found for: C3, C4  No results found for: CCPAB  Lab Results   Component Value Date    .0 (H) 12/14/2020       No components found for: CANCASCRN, APANCASCRN  Lab Results   Component Value Date    SEDRATE 18 02/10/2022     Lab Results   Component Value Date    CRP < 0.30 02/10/2022       RADIOLOGY:         MRI abdomen 12/17/2021  Impression       1. 3.7 x 2.5 cm area of abnormal signal intensity in the right lobe of liver adjacent to the hepatorenal fossa. There is heterogeneous signal intensity, early arterial enhancement and venous drainage into the right portal vein. The appearance is not    typical for a hemangioma. Hepatoma or metastatic disease cannot be ruled out. Please correlate with liver function tests and AFP levels. If these are elevated, CT-guided biopsy would be helpful for better evaluation. 2. Fluid/fluid level within the gallbladder possibly representing milk of calcium bile.  No significant intra or extrahepatic biliary dilatation. No evidence of choledocholithiasis. 3. Moderate-sized hiatal hernia. 4. Borderline splenomegaly. 5. Small cystic area in the body of the pancreas. Mild atrophy involving the pancreas. 6. Small benign adenoma in the left adrenal gland. 7. 17 x 14   Bosniak type II cyst in the left kidney. Small Bosniak type I cysts in the right kidney. 8. Lumbar spondylosis. Mild dextroscoliosis. ASSESSMENT/PLAN    Assessment   Plan     Seropositive rheumatoid arthritis (HCC)  -  + , negative CCP. ESR/sed rate elevation, duration > 6 weeks, erosions on xrays. Exam with synovitis. - prior tx: prednisone (sig relief), meloxicam (no relief), sulfasalazine 1000 mg BID (LFT elevation)             - stop plaquenil 200 mg daily due to macular degeneration   - prednisone 5 mg daily   - limited on treatment options given active hep C, abnormal MRI and concern for metastatic disease, + LFT elevation- patient refusing treatment or further workup for these concerns at this time. Restrictive lung disease  -- hx of COPD. Prior CXR w/ opacities concerning for scarring/fibrotic changes. ? Related to #1 vs other  - PFT's as above. - Awaiting HRCT to be completed    Hepatitis C- + HCV viral load  + hepatitis B core antibody  Abnormal liver ultrasound- concerning for possible hepatoma vs metastatic disease  - MRI abdomen as above   - following with [facility]- recommended referral to [facility]- patient did not go to appointment. Long discussion with patient and daughter today, patient is refusing any further workup for liver lesions and possible metastatic disease.  They do not wish to go to [facility] or pursue any further workup.      Osteoporosis  - T score of -3.3 in right femoral neck   - alendronate 70 mg PO weekly     Osteoarthritis of multiple joints, unspecified osteoarthritis type               - continue meloxicam 15 mg daily    Medication monitoring   - CBC, CMP, sed rate, CRP q 12 weeks       No follow-ups on file. Electronically signed by ERNESTO Brar CNP on 3/22/2022 at 1:51 PM    New Prescriptions    No medications on file       Thank you for allowing me to participate in the care of this patient. Please call if there are any questions.

## 2022-04-19 ENCOUNTER — PATIENT MESSAGE (OUTPATIENT)
Dept: RHEUMATOLOGY | Age: 82
End: 2022-04-19

## 2022-04-20 RX ORDER — PREDNISONE 1 MG/1
5 TABLET ORAL DAILY
Qty: 30 TABLET | Refills: 2 | Status: SHIPPED | OUTPATIENT
Start: 2022-04-20 | End: 2022-07-18

## 2022-04-20 NOTE — TELEPHONE ENCOUNTER
From: Sailaja Severino  To: Nanda Grover  Sent: 4/19/2022 9:09 PM EDT  Subject: Refill    Hi HungValier, Mom needs a refill on her Prednisone. I also will be taking her next week for her regular bloodwork.  Thanks so much!!

## 2022-05-10 ENCOUNTER — NURSE ONLY (OUTPATIENT)
Dept: LAB | Age: 82
End: 2022-05-10

## 2022-05-10 DIAGNOSIS — I10 HYPERTENSION, ESSENTIAL: ICD-10-CM

## 2022-05-10 LAB
ALBUMIN SERPL-MCNC: 4.2 G/DL (ref 3.5–5.1)
ALP BLD-CCNC: 78 U/L (ref 38–126)
ALT SERPL-CCNC: 47 U/L (ref 11–66)
ANION GAP SERPL CALCULATED.3IONS-SCNC: 15 MEQ/L (ref 8–16)
AST SERPL-CCNC: 59 U/L (ref 5–40)
BASOPHILS # BLD: 0.8 %
BASOPHILS ABSOLUTE: 0.1 THOU/MM3 (ref 0–0.1)
BILIRUB SERPL-MCNC: 0.4 MG/DL (ref 0.3–1.2)
BUN BLDV-MCNC: 19 MG/DL (ref 7–22)
CALCIUM SERPL-MCNC: 9.5 MG/DL (ref 8.5–10.5)
CHLORIDE BLD-SCNC: 101 MEQ/L (ref 98–111)
CHOLESTEROL, TOTAL: 144 MG/DL (ref 100–199)
CO2: 23 MEQ/L (ref 23–33)
CREAT SERPL-MCNC: 0.9 MG/DL (ref 0.4–1.2)
CREATININE, URINE: 87.8 MG/DL
EOSINOPHIL # BLD: 3 %
EOSINOPHILS ABSOLUTE: 0.3 THOU/MM3 (ref 0–0.4)
ERYTHROCYTE [DISTWIDTH] IN BLOOD BY AUTOMATED COUNT: 17.1 % (ref 11.5–14.5)
ERYTHROCYTE [DISTWIDTH] IN BLOOD BY AUTOMATED COUNT: 50.4 FL (ref 35–45)
GFR SERPL CREATININE-BSD FRML MDRD: 60 ML/MIN/1.73M2
GLUCOSE BLD-MCNC: 84 MG/DL (ref 70–108)
HCT VFR BLD CALC: 42 % (ref 37–47)
HDLC SERPL-MCNC: 60 MG/DL
HEMOGLOBIN: 12.3 GM/DL (ref 12–16)
IMMATURE GRANS (ABS): 0.04 THOU/MM3 (ref 0–0.07)
IMMATURE GRANULOCYTES: 0.4 %
LDL CHOLESTEROL CALCULATED: 60 MG/DL
LYMPHOCYTES # BLD: 17 %
LYMPHOCYTES ABSOLUTE: 1.7 THOU/MM3 (ref 1–4.8)
MCH RBC QN AUTO: 24.2 PG (ref 26–33)
MCHC RBC AUTO-ENTMCNC: 29.3 GM/DL (ref 32.2–35.5)
MCV RBC AUTO: 82.5 FL (ref 81–99)
MICROALBUMIN UR-MCNC: 2.16 MG/DL
MICROALBUMIN/CREAT UR-RTO: 25 MG/G (ref 0–30)
MONOCYTES # BLD: 10.5 %
MONOCYTES ABSOLUTE: 1.1 THOU/MM3 (ref 0.4–1.3)
NUCLEATED RED BLOOD CELLS: 0 /100 WBC
PLATELET # BLD: 383 THOU/MM3 (ref 130–400)
PMV BLD AUTO: 10.7 FL (ref 9.4–12.4)
POTASSIUM SERPL-SCNC: 4.8 MEQ/L (ref 3.5–5.2)
RBC # BLD: 5.09 MILL/MM3 (ref 4.2–5.4)
SEG NEUTROPHILS: 68.3 %
SEGMENTED NEUTROPHILS ABSOLUTE COUNT: 7 THOU/MM3 (ref 1.8–7.7)
SODIUM BLD-SCNC: 139 MEQ/L (ref 135–145)
TOTAL PROTEIN: 6.6 G/DL (ref 6.1–8)
TRIGL SERPL-MCNC: 120 MG/DL (ref 0–199)
TSH SERPL DL<=0.05 MIU/L-ACNC: 2.02 UIU/ML (ref 0.4–4.2)
WBC # BLD: 10.2 THOU/MM3 (ref 4.8–10.8)

## 2022-05-11 ENCOUNTER — TELEPHONE (OUTPATIENT)
Dept: FAMILY MEDICINE CLINIC | Age: 82
End: 2022-05-11

## 2022-05-11 NOTE — TELEPHONE ENCOUNTER
----- Message from ERNESTO Singh CNP sent at 5/11/2022 11:08 AM EDT -----  Let pt know her abs are stable and in appropriate range, continue meds as ordered

## 2022-05-17 ENCOUNTER — OFFICE VISIT (OUTPATIENT)
Dept: FAMILY MEDICINE CLINIC | Age: 82
End: 2022-05-17
Payer: MEDICARE

## 2022-05-17 VITALS — BODY MASS INDEX: 38.95 KG/M2 | WEIGHT: 193.2 LBS | RESPIRATION RATE: 18 BRPM | HEIGHT: 59 IN

## 2022-05-17 DIAGNOSIS — C80.1 MALIGNANT NEOPLASM (HCC): ICD-10-CM

## 2022-05-17 DIAGNOSIS — J44.9 COPD, MILD (HCC): ICD-10-CM

## 2022-05-17 DIAGNOSIS — E66.01 SEVERE OBESITY (BMI 35.0-39.9) WITH COMORBIDITY (HCC): ICD-10-CM

## 2022-05-17 DIAGNOSIS — B18.2 CHRONIC HEPATITIS C WITHOUT HEPATIC COMA (HCC): ICD-10-CM

## 2022-05-17 DIAGNOSIS — Z13.31 DEPRESSION SCREENING NEGATIVE: ICD-10-CM

## 2022-05-17 DIAGNOSIS — Z00.00 MEDICARE ANNUAL WELLNESS VISIT, SUBSEQUENT: Primary | ICD-10-CM

## 2022-05-17 PROCEDURE — G0439 PPPS, SUBSEQ VISIT: HCPCS | Performed by: NURSE PRACTITIONER

## 2022-05-17 ASSESSMENT — PATIENT HEALTH QUESTIONNAIRE - PHQ9
SUM OF ALL RESPONSES TO PHQ QUESTIONS 1-9: 0
SUM OF ALL RESPONSES TO PHQ9 QUESTIONS 1 & 2: 0
SUM OF ALL RESPONSES TO PHQ QUESTIONS 1-9: 0
SUM OF ALL RESPONSES TO PHQ QUESTIONS 1-9: 0
1. LITTLE INTEREST OR PLEASURE IN DOING THINGS: 0
2. FEELING DOWN, DEPRESSED OR HOPELESS: 0
SUM OF ALL RESPONSES TO PHQ QUESTIONS 1-9: 0

## 2022-05-17 ASSESSMENT — LIFESTYLE VARIABLES: HOW OFTEN DO YOU HAVE A DRINK CONTAINING ALCOHOL: NEVER

## 2022-05-17 NOTE — PROGRESS NOTES
Medicare Annual Wellness Visit    Chaim Gannon is here for Medicare AWV    Assessment & Plan    Recommendations for Preventive Services Due: see orders and patient instructions/AVS.  Recommended screening schedule for the next 5-10 years is provided to the patient in written form: see Patient Instructions/AVS.      Encounter Diagnoses   Name Primary?  Medicare annual wellness visit, subsequent Yes    Chronic hepatitis C without hepatic coma ([address]     COPD, mild ([address]     Malignant neoplasm ([address]     Depression screening negative     Severe obesity (BMI 35.0-39. 9) with comorbidity ([address]      chronic conditions stable continue meds as ordered. No orders of the defined types were placed in this encounter. Return in 6 months (on 11/17/2022) for Medicare Annual Wellness Visit in 1 year, me in 6 months . Subjective   COPD mild being managed by pulmonary  Pt also diagnosed with hep c and has liver lesion. This is being managed by GI. Lab Results   Component Value Date     05/10/2022    K 4.8 05/10/2022     05/10/2022    CO2 23 05/10/2022    BUN 19 05/10/2022    CREATININE 0.9 05/10/2022    GLUCOSE 84 05/10/2022    CALCIUM 9.5 05/10/2022    PROT 6.6 05/10/2022    LABALBU 4.2 05/10/2022    BILITOT 0.4 05/10/2022    ALKPHOS 78 05/10/2022    AST 59 (H) 05/10/2022    ALT 47 05/10/2022    LABGLOM 60 (A) 05/10/2022    GFRAA 57 (L) 11/05/2020         Pt with obesity, lives with her daughter not concerned with her weight. Patient's complete Health Risk Assessment and screening values have been reviewed and are found in Flowsheets. The following problems were reviewed today and where indicated follow up appointments were made and/or referrals ordered.     Positive Risk Factor Screenings with Interventions:    Fall Risk:  Do you feel unsteady or are you worried about falling? : (!) yes  2 or more falls in past year?: no  Fall with injury in past year?: (!) yes     Fall Risk Interventions: · Home safety tips provided  · Home exercises provided to promote strength and balance  · patient lives wiht her daughter and stays in her bed room alot. Health Habits/Nutrition:     Physical Activity: Inactive    Days of Exercise per Week: 0 days    Minutes of Exercise per Session: 0 min     Have you lost any weight without trying in the past 3 months?: No  Body mass index: (!) 39.02  Have you seen the dentist within the past year?: (!) No    Health Habits/Nutrition Interventions:  · Inadequate physical activity:  patient is not ready to increase his/her physical activity level at this time  · She gets around wit h a wheelchair and walker, can not navigate the steps very well. Hearing/Vision:  Do you or your family notice any trouble with your hearing that hasn't been managed with hearing aids?: (!) Yes  Do you have difficulty driving, watching TV, or doing any of your daily activities because of your eyesight?: No  Have you had an eye exam within the past year?: Yes  No exam data present    Hearing/Vision Interventions:  · Hearing concerns:  patient declines any further evaluation/treatment for hearing issues            Objective   Vitals:    05/17/22 1538   Resp: 18   Weight: 193 lb 3.2 oz (87.6 kg)   Height: 4' 11\" (1.499 m)      Body mass index is 39.02 kg/m².         General Appearance: alert and oriented to person, place and time, well-developed and well-nourished, in no acute distress  Skin: warm and dry, no rash or erythema  ENT: tympanic membrane, external ear and ear canal normal bilaterally, oropharynx clear and moist with normal mucous membranes  Neck: neck supple and non tender without mass, no thyromegaly or thyroid nodules, no cervical lymphadenopathy   Pulmonary/Chest: wheezing present- in bilateral fields on prednisone daily and uses her breathing machine, had been on anoro but uses prn, advised to restart the anoro daily and use breathing treatments bid   Cardiovascular: normal

## 2022-05-17 NOTE — PATIENT INSTRUCTIONS
Personalized Preventive Plan for [facility] 6/[birth year]/2309  Medicare offers a range of preventive health benefits. Some of the tests and screenings are paid in full while other may be subject to a deductible, co-insurance, and/or copay. Some of these benefits include a comprehensive review of your medical history including lifestyle, illnesses that may run in your family, and various assessments and screenings as appropriate. After reviewing your medical record and screening and assessments performed today your provider may have ordered immunizations, labs, imaging, and/or referrals for you. A list of these orders (if applicable) as well as your Preventive Care list are included within your After Visit Summary for your review. Other Preventive Recommendations:    · A preventive eye exam performed by an eye specialist is recommended every 1-2 years to screen for glaucoma; cataracts, macular degeneration, and other eye disorders. · A preventive dental visit is recommended every 6 months. · Try to get at least 150 minutes of exercise per week or 10,000 steps per day on a pedometer . · Order or download the FREE \"Exercise & Physical Activity: Your Everyday Guide\" from [employer]. Call 4-735.268.3415 or search [employer] online. · You need 8296-7873 mg of calcium and 7999-1119 IU of vitamin D per day. It is possible to meet your calcium requirement with diet alone, but a vitamin D supplement is usually necessary to meet this goal.  · When exposed to the sun, use a sunscreen that protects against both UVA and UVB radiation with an SPF of 30 or greater. Reapply every 2 to 3 hours or after sweating, drying off with a towel, or swimming. · Always wear a seat belt when traveling in a car. Always wear a helmet when riding a bicycle or motorcycle.

## 2022-05-17 NOTE — ASSESSMENT & PLAN NOTE
Monitored by specialist- no acute findings meriting change in the plan Sees Pulmonary for this, does have anoro inhaler if needed, Has restrictive lung disease.

## 2022-05-17 NOTE — ASSESSMENT & PLAN NOTE
Monitored by specialist- no acute findings meriting change in the plan Not in treatment for this as pt refused.  Is being treated for hepatitis per GI

## 2022-06-06 RX ORDER — ATORVASTATIN CALCIUM 20 MG/1
20 TABLET, FILM COATED ORAL DAILY
Qty: 90 TABLET | Refills: 4 | Status: SHIPPED | OUTPATIENT
Start: 2022-06-06 | End: 2022-07-11

## 2022-06-20 ENCOUNTER — HOSPITAL ENCOUNTER (OUTPATIENT)
Dept: CT IMAGING | Age: 82
Discharge: HOME | End: 2022-06-20
Payer: MEDICARE

## 2022-06-20 DIAGNOSIS — J84.10 PULMONARY FIBROSIS (HCC): ICD-10-CM

## 2022-06-20 DIAGNOSIS — J98.4 RESTRICTIVE LUNG DISEASE: ICD-10-CM

## 2022-06-20 PROCEDURE — 71250 CT THORAX DX C-: CPT

## 2022-06-21 ENCOUNTER — OFFICE VISIT (OUTPATIENT)
Dept: RHEUMATOLOGY | Age: 82
End: 2022-06-21
Payer: MEDICARE

## 2022-06-21 VITALS
DIASTOLIC BLOOD PRESSURE: 84 MMHG | SYSTOLIC BLOOD PRESSURE: 140 MMHG | WEIGHT: 196.2 LBS | HEIGHT: 59 IN | OXYGEN SATURATION: 96 % | BODY MASS INDEX: 39.55 KG/M2 | HEART RATE: 76 BPM

## 2022-06-21 DIAGNOSIS — M15.9 OSTEOARTHRITIS OF MULTIPLE JOINTS, UNSPECIFIED OSTEOARTHRITIS TYPE: ICD-10-CM

## 2022-06-21 DIAGNOSIS — B19.20 HEPATITIS C VIRUS INFECTION WITHOUT HEPATIC COMA, UNSPECIFIED CHRONICITY: ICD-10-CM

## 2022-06-21 DIAGNOSIS — M81.0 AGE RELATED OSTEOPOROSIS, UNSPECIFIED PATHOLOGICAL FRACTURE PRESENCE: ICD-10-CM

## 2022-06-21 DIAGNOSIS — Z51.81 MEDICATION MONITORING ENCOUNTER: ICD-10-CM

## 2022-06-21 DIAGNOSIS — M05.9 SEROPOSITIVE RHEUMATOID ARTHRITIS (HCC): Primary | ICD-10-CM

## 2022-06-21 PROCEDURE — G8417 CALC BMI ABV UP PARAM F/U: HCPCS | Performed by: NURSE PRACTITIONER

## 2022-06-21 PROCEDURE — G8427 DOCREV CUR MEDS BY ELIG CLIN: HCPCS | Performed by: NURSE PRACTITIONER

## 2022-06-21 PROCEDURE — 1123F ACP DISCUSS/DSCN MKR DOCD: CPT | Performed by: NURSE PRACTITIONER

## 2022-06-21 PROCEDURE — 1090F PRES/ABSN URINE INCON ASSESS: CPT | Performed by: NURSE PRACTITIONER

## 2022-06-21 PROCEDURE — G8399 PT W/DXA RESULTS DOCUMENT: HCPCS | Performed by: NURSE PRACTITIONER

## 2022-06-21 PROCEDURE — 1036F TOBACCO NON-USER: CPT | Performed by: NURSE PRACTITIONER

## 2022-06-21 PROCEDURE — 99214 OFFICE O/P EST MOD 30 MIN: CPT | Performed by: NURSE PRACTITIONER

## 2022-06-21 ASSESSMENT — ENCOUNTER SYMPTOMS
SHORTNESS OF BREATH: 1
EYE PAIN: 0
COUGH: 1
ABDOMINAL PAIN: 0
BACK PAIN: 0
NAUSEA: 0
DIARRHEA: 0
CONSTIPATION: 0
EYE ITCHING: 0
TROUBLE SWALLOWING: 0

## 2022-06-21 NOTE — PROGRESS NOTES
[facility] RHEUMATOLOGY FOLLOW UP NOTE       Date Of Service: 6/21/2022  Provider: ERNESTO Kurtz - KARLENE    Name: Gamaliel West   MRN: [id number]    Chief Complaint(s)     Chief Complaint   Patient presents with    3 Month Follow-Up        History of Present Illness (HPI)     Gamaliel West  is M(V)6 y.o. female with a hx of HTN, HLD, tobacco abuse, COPD, cancer right eye  here for the f/u evaluation of seropositive rheumatoid arthritis    Interval hx:    - no new symptoms- has not seen GI for hepatitis     Patient denies any current joints pains. Rare generalized intermittent joints pain which can affect all joints. Gets up to a 2.5/10. Associated symptoms:  denies swelling/  Redness/ warmth + AM stiffness lasting ~ 10 minutes      REVIEW OF SYSTEMS: (ROS)    Review of Systems   Constitutional: Negative for fatigue, fever and unexpected weight change. HENT: Positive for hearing loss. Negative for congestion and trouble swallowing. Eyes: Negative for pain and itching. Respiratory: Positive for cough and shortness of breath. Cardiovascular: Negative for chest pain and leg swelling. Gastrointestinal: Negative for abdominal pain, constipation, diarrhea and nausea. Endocrine: Negative for cold intolerance and heat intolerance. Genitourinary: Negative for difficulty urinating, frequency and urgency. Musculoskeletal: Positive for arthralgias and joint swelling. Negative for back pain. Skin: Negative for rash. Neurological: Negative for dizziness, weakness, numbness and headaches. Psychiatric/Behavioral: The patient is not nervous/anxious.         PAST MEDICAL HISTORY      Past Medical History:   Diagnosis Date    Arthritis     Cancer of [facility] ([facility]     Right    COVID-19 03/2021    Dyslipidemia 02/15/2017    Fatigue     History of tobacco abuse 02/15/2017    Hyperlipidemia 10/13/2014    Hypertension     Non morbid obesity due to excess calories 02/15/2017    Pneumonia  Precordial pain 02/15/2017    Rheumatoid arteritis (HCC)     Smoker     SOB (shortness of breath) 02/15/2017    Tobacco abuse        PAST SURGICAL HISTORY      Past Surgical History:   Procedure Laterality Date    EYE SURGERY      Right eye cancer    HEMORRHOID SURGERY      HERNIA REPAIR N/A 8/52/7313    ROBOTIC UMBILICAL HERNIA REPAIR WITH MESH performed by Rafael Burns MD at [address] [facility] [address]      Family History   Problem Relation Age of Onset    Cancer Mother     Cancer Father     Heart Failure Sister     Cancer Brother     Cancer Daughter        SOCIAL HISTORY      Social History     Tobacco History     Smoking Status  Former Smoker Smoking Start Date  1/1/1963 Quit date  1/14/2012 Smoking Frequency  2 packs/day for 51 years (102 pk yrs)    Smokeless Tobacco Use  Never Used    Tobacco Comment  tryin to cut down          Alcohol History     Alcohol Use Status  No Comment  quit          Drug Use     Drug Use Status  No          Sexual Activity     Sexually Active  Not Currently                ALLERGIES   No Known Allergies    CURRENT MEDICATIONS      Current Outpatient Medications   Medication Sig Dispense Refill    atorvastatin (LIPITOR) 20 MG tablet TAKE 1 TABLET BY MOUTH DAILY 90 tablet 4    predniSONE (DELTASONE) 5 MG tablet Take 1 tablet by mouth daily 30 tablet 2    hydroxychloroquine (PLAQUENIL) 200 MG tablet TAKE 1 TABLET BY MOUTH DAILY 90 tablet 1    ALENDRONATE SODIUM PO Take by mouth      ipratropium-albuterol (DUONEB) 0.5-2.5 (3) MG/3ML SOLN nebulizer solution Inhale 3 mLs into the lungs every 4 hours as needed for Shortness of Breath or Other (wheezing) 360 mL 5    amLODIPine (NORVASC) 5 MG tablet TAKE 1 TABLET BY MOUTH DAILY 90 tablet 3    sulfaSALAzine (AZULFIDINE) 500 MG tablet TAKE 2 TABLETS BY MOUTH TWICE DAILY 120 tablet 0    acetaminophen (TYLENOL) 500 MG tablet Take 500 mg by mouth daily      albuterol sulfate HFA (PROVENTIL HFA) 108 (90 BASE) MCG/ACT inhaler Inhale 2 puffs into the lungs every 6 hours as needed for Wheezing 1 Inhaler 0     No current facility-administered medications for this visit. PHYSICAL EXAMINATION / OBJECTIVE   Objective:  BP (!) 140/84 (Site: Left Upper Arm, Position: Sitting, Cuff Size: Medium Adult)   Pulse 76   Ht 4' 11.02\" (1.499 m)   Wt 196 lb 3.2 oz (89 kg)   SpO2 96%   BMI 39.61 kg/m²     Physical Exam  Vitals reviewed. Constitutional:       Appearance: She is well-developed. Cardiovascular:      Rate and Rhythm: Normal rate and regular rhythm. Pulmonary:      Effort: Pulmonary effort is normal.      Breath sounds: Normal breath sounds. Musculoskeletal:      Cervical back: Normal range of motion and neck supple. Skin:     General: Skin is warm and dry. Findings: No rash. Neurological:      Mental Status: She is alert and oriented to person, place, and time. Psychiatric:         Thought Content:  Thought content normal.       Upper extremities:    SHOULDERS nontender  ELBOWS nontender, no swelling  WRISTS nontender, swelling/warmth left   HANDS/FINGERS   MCPs nontender, + bogginess bilat 2,3    PIPs nontender, bogginess bilat    DIPs + nodules right 2,5    Lower extremities:  HIPS nontender  KNEES nontender, no swelling  ANKLES nontender, no swelling   FEET : nontender, no swelling      LABS    CBC  Lab Results   Component Value Date    WBC 10.2 05/10/2022    RBC 5.09 05/10/2022    HGB 12.3 05/10/2022    HCT 42.0 05/10/2022    MCV 82.5 05/10/2022    MCH 24.2 05/10/2022    MCHC 29.3 05/10/2022    RDW 14.3 11/05/2020     05/10/2022       CMP  Lab Results   Component Value Date    CALCIUM 9.5 05/10/2022    LABALBU 4.2 05/10/2022    LABALBU 4.7 05/22/2012    PROT 6.6 05/10/2022     05/10/2022    K 4.8 05/10/2022    K 4.0 03/27/2021    CO2 23 05/10/2022     05/10/2022    BUN 19 05/10/2022    CREATININE 0.9 05/10/2022    ALKPHOS 78 05/10/2022    ALT 47 05/10/2022    AST 59 05/10/2022 HgBA1c: No components found for: HGBA1C    Lab Results   Component Value Date    VITD25 42 06/21/2017         Lab Results   Component Value Date    ANASCRN None Detected 11/29/2021     No results found for: SSA  No results found for: SSB  No results found for: ANTI-SMITH  No results found for: DSDNAAB   No results found for: ANTIRNP  No results found for: C3, C4  No results found for: CCPAB  Lab Results   Component Value Date    .0 (H) 12/14/2020       No components found for: CANCASCRN, APANCASCRN  Lab Results   Component Value Date    SEDRATE 18 02/10/2022     Lab Results   Component Value Date    CRP < 0.30 02/10/2022       RADIOLOGY:         MRI abdomen 12/17/2021  Impression       1. 3.7 x 2.5 cm area of abnormal signal intensity in the right lobe of liver adjacent to the hepatorenal fossa. There is heterogeneous signal intensity, early arterial enhancement and venous drainage into the right portal vein. The appearance is not    typical for a hemangioma. Hepatoma or metastatic disease cannot be ruled out. Please correlate with liver function tests and AFP levels. If these are elevated, CT-guided biopsy would be helpful for better evaluation. 2. Fluid/fluid level within the gallbladder possibly representing milk of calcium bile. No significant intra or extrahepatic biliary dilatation. No evidence of choledocholithiasis. 3. Moderate-sized hiatal hernia. 4. Borderline splenomegaly. 5. Small cystic area in the body of the pancreas. Mild atrophy involving the pancreas. 6. Small benign adenoma in the left adrenal gland. 7. 17 x 14   Bosniak type II cyst in the left kidney. Small Bosniak type I cysts in the right kidney. 8. Lumbar spondylosis. Mild dextroscoliosis. ASSESSMENT/PLAN    Assessment   Plan     Seropositive rheumatoid arthritis (HCC)  -  + , negative CCP. ESR/sed rate elevation, duration > 6 weeks, erosions on xrays. Exam with synovitis.    - prior tx: prednisone (sig relief), meloxicam (no relief), sulfasalazine 1000 mg BID (LFT elevation), plaquenil 200 mg daily (macular degeneration)    - prednisone 5 mg daily   - limited on treatment options given active hep C, abnormal MRI and concern for metastatic disease, + LFT elevation- patient refusing treatment or further workup for these concerns at this time. Restrictive lung disease  -- hx of COPD. Prior CXR w/ opacities concerning for scarring/fibrotic changes. ? Related to #1 vs other. HRCT with improvement of groundglass opacities. PFT's as above. Hepatitis C- + HCV viral load  + hepatitis B core antibody  Abnormal liver ultrasound- concerning for possible hepatoma vs metastatic disease  - MRI abdomen as above   - [facility]- recommended referral to [address] [place] [address]- patient did not go to appointment. Patient is refusing any further workup for metastatic disease, however is getting second opinion from Dr. Macie Gardner on hepatitis- appointment is scheduled    Osteoporosis  - T score of -3.3 in right femoral neck   - alendronate 70 mg PO weekly     Osteoarthritis of multiple joints, unspecified osteoarthritis type               - continue meloxicam 15 mg daily    Medication monitoring   - CBC, CMP, sed rate, CRP q 12 weeks       No follow-ups on file. Electronically signed by ERNESTO Stephens CNP on 6/21/2022 at 2:39 PM    New Prescriptions    No medications on file       Thank you for allowing me to participate in the care of this patient. Please call if there are any questions.

## 2022-07-11 ENCOUNTER — OFFICE VISIT (OUTPATIENT)
Dept: PULMONOLOGY | Age: 82
End: 2022-07-11
Payer: MEDICARE

## 2022-07-11 VITALS
HEIGHT: 59 IN | BODY MASS INDEX: 39.63 KG/M2 | TEMPERATURE: 97.9 F | OXYGEN SATURATION: 97 % | DIASTOLIC BLOOD PRESSURE: 84 MMHG | SYSTOLIC BLOOD PRESSURE: 126 MMHG | HEART RATE: 76 BPM

## 2022-07-11 DIAGNOSIS — R06.2 WHEEZING: ICD-10-CM

## 2022-07-11 DIAGNOSIS — J98.4 RESTRICTIVE LUNG DISEASE: Primary | ICD-10-CM

## 2022-07-11 DIAGNOSIS — R06.02 SOB (SHORTNESS OF BREATH): ICD-10-CM

## 2022-07-11 DIAGNOSIS — M05.9 RHEUMATOID ARTHRITIS WITH POSITIVE RHEUMATOID FACTOR, INVOLVING UNSPECIFIED SITE (HCC): ICD-10-CM

## 2022-07-11 DIAGNOSIS — Z87.891 PERSONAL HISTORY OF TOBACCO USE: ICD-10-CM

## 2022-07-11 DIAGNOSIS — J44.9 COPD, MILD (HCC): ICD-10-CM

## 2022-07-11 PROCEDURE — G8427 DOCREV CUR MEDS BY ELIG CLIN: HCPCS | Performed by: NURSE PRACTITIONER

## 2022-07-11 PROCEDURE — 94010 BREATHING CAPACITY TEST: CPT | Performed by: NURSE PRACTITIONER

## 2022-07-11 PROCEDURE — 99214 OFFICE O/P EST MOD 30 MIN: CPT | Performed by: NURSE PRACTITIONER

## 2022-07-11 PROCEDURE — 1090F PRES/ABSN URINE INCON ASSESS: CPT | Performed by: NURSE PRACTITIONER

## 2022-07-11 PROCEDURE — 95012 NITRIC OXIDE EXP GAS DETER: CPT | Performed by: NURSE PRACTITIONER

## 2022-07-11 PROCEDURE — 1036F TOBACCO NON-USER: CPT | Performed by: NURSE PRACTITIONER

## 2022-07-11 PROCEDURE — G8417 CALC BMI ABV UP PARAM F/U: HCPCS | Performed by: NURSE PRACTITIONER

## 2022-07-11 PROCEDURE — 94618 PULMONARY STRESS TESTING: CPT | Performed by: NURSE PRACTITIONER

## 2022-07-11 PROCEDURE — 1123F ACP DISCUSS/DSCN MKR DOCD: CPT | Performed by: NURSE PRACTITIONER

## 2022-07-11 PROCEDURE — 3023F SPIROM DOC REV: CPT | Performed by: NURSE PRACTITIONER

## 2022-07-11 PROCEDURE — G8399 PT W/DXA RESULTS DOCUMENT: HCPCS | Performed by: NURSE PRACTITIONER

## 2022-07-11 RX ORDER — BUDESONIDE 0.5 MG/2ML
1 INHALANT ORAL 2 TIMES DAILY
Qty: 360 ML | Refills: 3 | Status: SHIPPED | OUTPATIENT
Start: 2022-07-11

## 2022-07-11 ASSESSMENT — ENCOUNTER SYMPTOMS
ALLERGIC/IMMUNOLOGIC NEGATIVE: 1
WHEEZING: 1
SHORTNESS OF BREATH: 1
GASTROINTESTINAL NEGATIVE: 1
COUGH: 1
EYES NEGATIVE: 1

## 2022-07-11 NOTE — PROGRESS NOTES
comment: tryin to cut down   Vaping Use    Vaping Use: Never used   Substance Use Topics    Alcohol use: No     Comment: quit    Drug use: No     ALLERGIES:No Known Allergies  FAMILY HISTORY:  Family History   Problem Relation Age of Onset    Cancer Mother     Cancer Father     Heart Failure Sister     Cancer Brother     Cancer Daughter      CURRENT MEDICATIONS:  Current Outpatient Medications   Medication Sig Dispense Refill    budesonide (PULMICORT) 0.5 MG/2ML nebulizer suspension Take 2 mLs by nebulization 2 times daily 360 mL 3    predniSONE (DELTASONE) 5 MG tablet Take 1 tablet by mouth daily 30 tablet 2    ALENDRONATE SODIUM PO Take by mouth      ipratropium-albuterol (DUONEB) 0.5-2.5 (3) MG/3ML SOLN nebulizer solution Inhale 3 mLs into the lungs every 4 hours as needed for Shortness of Breath or Other (wheezing) 360 mL 5    amLODIPine (NORVASC) 5 MG tablet TAKE 1 TABLET BY MOUTH DAILY 90 tablet 3    acetaminophen (TYLENOL) 500 MG tablet Take 500 mg by mouth daily      albuterol sulfate HFA (PROVENTIL HFA) 108 (90 BASE) MCG/ACT inhaler Inhale 2 puffs into the lungs every 6 hours as needed for Wheezing 1 Inhaler 0     No current facility-administered medications for this visit. ROS   Review of Systems   Constitutional: Negative. Negative for chills and fever. HENT: Positive for hearing loss. Negative for congestion. Eyes: Negative. Respiratory: Positive for cough, shortness of breath and wheezing. Cardiovascular: Negative. Negative for chest pain and leg swelling. Gastrointestinal: Negative. Endocrine: Negative. Genitourinary: Negative. Musculoskeletal: Positive for arthralgias and gait problem. Allergic/Immunologic: Negative. Hematological: Negative. Psychiatric/Behavioral: Negative. Negative for sleep disturbance.         Physical exam   /84 (Site: Left Upper Arm, Position: Sitting, Cuff Size: Medium Adult)   Pulse 76   Temp 97.9 °F (36.6 °C) (Oral)   Ht 4' 11\" (1.499 m)   SpO2 97%   BMI 39.63 kg/m²    Wt Readings from Last 3 Encounters:   06/21/22 196 lb 3.2 oz (89 kg)   05/17/22 193 lb 3.2 oz (87.6 kg)   03/22/22 161 lb (73 kg)       Physical Exam  Vitals and nursing note reviewed. Constitutional:       Appearance: She is well-developed. She is obese. HENT:      Head: Normocephalic and atraumatic. Eyes:      Conjunctiva/sclera: Conjunctivae normal.      Pupils: Pupils are equal, round, and reactive to light. Neck:      Vascular: No JVD. Cardiovascular:      Rate and Rhythm: Normal rate and regular rhythm. Heart sounds: Normal heart sounds. No murmur heard. No friction rub. No gallop. Pulmonary:      Effort: Pulmonary effort is normal. No respiratory distress. Breath sounds: Wheezing (I/E diffuse ) present. No rales. Abdominal:      General: Bowel sounds are normal.      Palpations: Abdomen is soft. Musculoskeletal:         General: Normal range of motion. Cervical back: Normal range of motion and neck supple. Skin:     General: Skin is warm and dry. Capillary Refill: Capillary refill takes less than 2 seconds. Neurological:      Mental Status: She is alert and oriented to person, place, and time. Motor: Weakness present. Gait: Gait abnormal.   Psychiatric:         Behavior: Behavior normal.         Thought Content: Thought content normal.         Judgment: Judgment normal.          results   Lung Nodule Screening     [x] Qualifies    [] Does not qualify   [] Declined    [x] Completed  The USPSTF recommends annual screening for lung cancer with low-dose computed tomography (LDCT) in adults aged 48 to [de-identified] years who have a 30 pack-year smoking history and currently smoke or have quit within the past 20 years.  Screening should be discontinued once a person has not smoked for 20 years or develops a health problem that substantially limits life expectancy or the ability or willingness to have curative lung surgery. Six Minute Walk Test  Janet Myers 5/48/1827    Six minute walk test done in my office today by my medical assistant. Gladiss oxygen saturation at rest on room air was 96%. Her oxygen saturation dropped to 85% on room air with exertion after walking 108 feet and within 3.5 minutes. The six minute walk test was repeated with oxygen supplementation. Oxygen supplementation was started with 1 LPM via nasal cannula and titrated to 2 LPM via nasal cannula. At the end of the test [address] oxygen saturation remained at 95% on 2 LPM with exertion. She is mobile in the home and requires oxygen as outlined above. Patient ambulated a total of 324 feet with oxygen. Resting Dyspnea/Pascual score was 1 / 10  and 5 / 5  upon completion of the walk. Resting heart rate was  76 bpm and 77 bpm upon completing the walk. Nasal Oxygen order:  2 lpm to be used with:  Rest: No.  Walking: Yes. Sleep: No.   POC flow: Yes. Continuous flow: No.    DME Medical Necessity Documentation    Cruzito Loredo was seen in the office on 7/11/2022 for the diagnosis COPD. I am prescribing oxygen because the diagnosis and testing requires the patient to have oxygen in the home. her condition will improve or be benefited by oxygen use. The patient is able to perform good mobility in a home setting and therefore does require the use of a portable oxygen system. 6/20/2022   CT CHEST HIGH RESOLUTION   FINDINGS: Heart/mediastinum: The heart size is normal. Coronary artery calcifications are observed. No pericardial effusion is identified. The aorta is not dilated. No mediastinal, hilar, or axillary lymphadenopathy is observed. Lungs: Improved aeration is noted at the lung bases. Groundglass opacity in the upper and lower lobes bilaterally have improved. Scarring at the lung apices is unchanged. No focal consolidation, pleural effusion, or pneumothorax is identified. No pulmonary mass or nodule is observed. Centrilobular emphysema is most notable at the lung apices. No honeycombing or bronchiectasis is observed. Upper abdomen: No acute findings are visualized in the limited images through the upper abdomen. A large sliding-type hiatal hernia is stable. The previously seen liver lesion is not well visualized without IV contrast.   Musculoskeletal: Diffuse osteopenia is present. The visualized skeletal structures appear intact. 1. Improved aeration is noted in the upper and lower lobes bilaterally. Subpleural scarring at the lung bases has improved. Mild centrilobular emphysema and scarring at the lung apices is unchanged. No honeycombing or bronchiectasis is observed. 2. The previously identified liver lesion is not well visualized/characterized without IV contrast. Multiple chronic findings are discussed. 6/7/21  PFT- Moderate restriction with severely reduced DLCO    Assessment      Diagnosis Orders   1. Restrictive lung disease  6 Minute Walk Test    DME Order for Home Oxygen as OP   2. COPD, mild (HCC)  6 Minute Walk Test    budesonide (PULMICORT) 0.5 MG/2ML nebulizer suspension    DME Order for Home Oxygen as OP   3. Rheumatoid arthritis with positive rheumatoid factor, involving unspecified site ([facility] [address]     4. Personal history of tobacco use     5. Wheezing  POCT Nitric Oxide    budesonide (PULMICORT) 0.5 MG/2ML nebulizer suspension   6. SOB (shortness of breath)  6 Minute Walk Test    DME Order for Home Oxygen as OP         Plan   -HRCT reviewed with patient  -Continue Duoneb PRN for SOB/wheezing   -Rheumatology notes reviewed   -6MWT done today in the office 2LPM needed with activity only; DME ordered placed    -NIOX done today in the office 7 ppb showing no significant airway inflammation   -RX done for Pulmicort 0.5 mcg neb BID.  Patient instructed on importance of rinsing mouth after use   -Advised to maintain pneumonia vaccine with PCP and to take flu vaccine this coming season.  -Advised patient to call office with any changes, questions, or concerns regarding respiratory status  -Weight loss encouraged     Will see Chapis Tay back in: 6 months     Ashwin Dubois, [address] [facility]  7/11/2022

## 2022-07-18 RX ORDER — PREDNISONE 1 MG/1
5 TABLET ORAL DAILY
Qty: 30 TABLET | Refills: 2 | Status: SHIPPED | OUTPATIENT
Start: 2022-07-18 | End: 2022-10-31

## 2022-07-19 RX ORDER — ALENDRONATE SODIUM 70 MG/1
70 TABLET ORAL
Qty: 12 TABLET | Refills: 1 | Status: SHIPPED | OUTPATIENT
Start: 2022-07-19

## 2022-08-02 DIAGNOSIS — M05.9 SEROPOSITIVE RHEUMATOID ARTHRITIS (HCC): ICD-10-CM

## 2022-08-02 DIAGNOSIS — J44.9 COPD, MODERATE (HCC): ICD-10-CM

## 2022-08-02 RX ORDER — HYDROXYCHLOROQUINE SULFATE 200 MG/1
200 TABLET, FILM COATED ORAL DAILY
Qty: 90 TABLET | Refills: 1 | OUTPATIENT
Start: 2022-08-02

## 2022-08-04 ENCOUNTER — PATIENT MESSAGE (OUTPATIENT)
Dept: FAMILY MEDICINE CLINIC | Age: 82
End: 2022-08-04

## 2022-08-04 RX ORDER — UMECLIDINIUM BROMIDE AND VILANTEROL TRIFENATATE 62.5; 25 UG/1; UG/1
1 POWDER RESPIRATORY (INHALATION) DAILY
Qty: 60 EACH | OUTPATIENT
Start: 2022-08-04

## 2022-08-05 DIAGNOSIS — J44.9 COPD, MILD (HCC): Primary | ICD-10-CM

## 2022-09-20 ENCOUNTER — OFFICE VISIT (OUTPATIENT)
Dept: RHEUMATOLOGY | Age: 82
End: 2022-09-20
Payer: MEDICARE

## 2022-09-20 VITALS
DIASTOLIC BLOOD PRESSURE: 80 MMHG | OXYGEN SATURATION: 95 % | BODY MASS INDEX: 40.98 KG/M2 | HEART RATE: 104 BPM | HEIGHT: 59 IN | SYSTOLIC BLOOD PRESSURE: 138 MMHG

## 2022-09-20 DIAGNOSIS — M05.9 SEROPOSITIVE RHEUMATOID ARTHRITIS (HCC): Primary | ICD-10-CM

## 2022-09-20 PROCEDURE — 1036F TOBACCO NON-USER: CPT | Performed by: INTERNAL MEDICINE

## 2022-09-20 PROCEDURE — G8417 CALC BMI ABV UP PARAM F/U: HCPCS | Performed by: INTERNAL MEDICINE

## 2022-09-20 PROCEDURE — 99214 OFFICE O/P EST MOD 30 MIN: CPT | Performed by: INTERNAL MEDICINE

## 2022-09-20 PROCEDURE — 1123F ACP DISCUSS/DSCN MKR DOCD: CPT | Performed by: INTERNAL MEDICINE

## 2022-09-20 PROCEDURE — G8427 DOCREV CUR MEDS BY ELIG CLIN: HCPCS | Performed by: INTERNAL MEDICINE

## 2022-09-20 PROCEDURE — G8399 PT W/DXA RESULTS DOCUMENT: HCPCS | Performed by: INTERNAL MEDICINE

## 2022-09-20 PROCEDURE — 1090F PRES/ABSN URINE INCON ASSESS: CPT | Performed by: INTERNAL MEDICINE

## 2022-09-20 ASSESSMENT — ENCOUNTER SYMPTOMS
SHORTNESS OF BREATH: 1
COUGH: 1
CONSTIPATION: 0
BACK PAIN: 0
EYE ITCHING: 0
NAUSEA: 0
ABDOMINAL PAIN: 0
TROUBLE SWALLOWING: 0
DIARRHEA: 0
EYE PAIN: 0

## 2022-09-20 ASSESSMENT — JOINT PAIN
TOTAL NUMBER OF SWOLLEN JOINTS: 1
TOTAL NUMBER OF TENDER JOINTS: 0

## 2022-09-20 NOTE — PROGRESS NOTES
[facility] RHEUMATOLOGY FOLLOW UP NOTE       Date Of Service: 9/20/2022  Provider: Kg Ngo DO    Name: Dylan Messer   MRN: [id number]    Chief Complaint(s)     Chief Complaint   Patient presents with    Follow-up     3 month follow up        History of Present Illness (HPI)     Dylan Messer  is O(N)1 y.o. female with a hx of HTN, HLD, tobacco abuse, COPD, cancer right eye  here for the f/u evaluation of seropositive rheumatoid arthritis    GI evaluation 8/29/2022 --- for chronic hep c, liver mass - patient declined additional evaluation of the liver mass, repeat c-scope. Patient denies any current joints pains. Rare generalized intermittent joints pain which can affect all joints. Gets up to a 2.5/10.  shoulder pain worse w/ certain positions. Denies joint swelling since the last evaluation. + AM stiffness lasting ~ 10 minutes. Daughter reporting the patient has limited activity. REVIEW OF SYSTEMS: (ROS)    Review of Systems   Constitutional:  Negative for fatigue, fever and unexpected weight change. HENT:  Positive for hearing loss. Negative for congestion and trouble swallowing. Eyes:  Negative for pain and itching. Respiratory:  Positive for cough and shortness of breath. Cardiovascular:  Negative for chest pain and leg swelling. Gastrointestinal:  Negative for abdominal pain, constipation, diarrhea and nausea. Endocrine: Negative for cold intolerance and heat intolerance. Genitourinary:  Negative for difficulty urinating, frequency and urgency. Musculoskeletal:  Positive for arthralgias and joint swelling. Negative for back pain. Skin:  Negative for rash. Neurological:  Negative for dizziness, weakness, numbness and headaches. Psychiatric/Behavioral:  The patient is not nervous/anxious.       PAST MEDICAL HISTORY      Past Medical History:   Diagnosis Date    Arthritis     Basal cell carcinoma (BCC) of skin of face     cheek below right eye    COVID-19 03/2021 Dyslipidemia 02/15/2017    Fatigue     History of tobacco abuse 02/15/2017    Hyperlipidemia 10/13/2014    Hypertension     Non morbid obesity due to excess calories 02/15/2017    Pneumonia     Precordial pain 02/15/2017    [facility]     Smoker     SOB (shortness of breath) 02/15/2017    Tobacco abuse        PAST SURGICAL HISTORY      Past Surgical History:   Procedure Laterality Date    COLONOSCOPY W/ POLYPECTOMY  2012    HEMORRHOID SURGERY      HERNIA REPAIR N/A 06/16/6611    ROBOTIC UMBILICAL HERNIA REPAIR WITH MESH performed by Trent Chua MD at [address] [place] [address] 75 Right        FAMILY HISTORY      Family History   Problem Relation Age of Onset    Cancer Mother     Cancer Father     Heart Failure Sister     Cancer Brother     Cancer Daughter     Colon Cancer Neg Hx        SOCIAL HISTORY      Social History       Tobacco History       Smoking Status  Former Smoker Smoking Start Date  1/1/1963 Quit date  1/14/2012 Smoking Frequency  2 packs/day for 51 years (102 pk yrs)      Smokeless Tobacco Use  Never Used      Tobacco Comment  tryin to cut down              Alcohol History       Alcohol Use Status  No Comment  quit              Drug Use       Drug Use Status  No              Sexual Activity       Sexually Active  Not Currently                    ALLERGIES   No Known Allergies    CURRENT MEDICATIONS      Current Outpatient Medications   Medication Sig Dispense Refill    bisacodyl (DULCOLAX) 5 MG EC tablet See Prep Instructions 4 tablet 0    polyethylene glycol (GLYCOLAX) 17 GM/SCOOP powder Dispense 238 Gram Bottle. Use as Directed 238 g 0    umeclidinium-vilanterol (ANORO ELLIPTA) 62.5-25 MCG/INH AEPB inhaler Inhale 1 puff into the lungs in the morning.  1 each 1    alendronate (FOSAMAX) 70 MG tablet TAKE 1 TABLET BY MOUTH EVERY 7 DAYS 12 tablet 1    predniSONE (DELTASONE) 5 MG tablet TAKE 1 TABLET BY MOUTH DAILY 30 tablet 2    budesonide (PULMICORT) 0.5 MG/2ML nebulizer suspension Take 2 mLs by nebulization 2 times daily 360 mL 3    ALENDRONATE SODIUM PO Take by mouth      ipratropium-albuterol (DUONEB) 0.5-2.5 (3) MG/3ML SOLN nebulizer solution Inhale 3 mLs into the lungs every 4 hours as needed for Shortness of Breath or Other (wheezing) 360 mL 5    amLODIPine (NORVASC) 5 MG tablet TAKE 1 TABLET BY MOUTH DAILY 90 tablet 3    acetaminophen (TYLENOL) 500 MG tablet Take 500 mg by mouth daily       No current facility-administered medications for this visit. PHYSICAL EXAMINATION / OBJECTIVE   Objective:  /80 (Site: Left Upper Arm, Position: Sitting, Cuff Size: Large Adult)   Pulse (!) 104   Ht 4' 11.02\" (1.499 m)   SpO2 95%   BMI 40.98 kg/m²     Physical Exam  Vitals reviewed. Constitutional:       Appearance: She is well-developed. Cardiovascular:      Rate and Rhythm: Normal rate and regular rhythm. Pulmonary:      Effort: Pulmonary effort is normal.      Breath sounds: Wheezing present. Musculoskeletal:      Cervical back: Normal range of motion and neck supple. Skin:     General: Skin is warm and dry. Findings: No rash. Neurological:      Mental Status: She is alert and oriented to person, place, and time. Psychiatric:         Thought Content: Thought content normal.     Upper extremities:    SHOULDERS nontender  ELBOWS nontender, no swelling  WRISTS nontender, swelling/warmth left. HANDS/FINGERS   MCPs nontender, ulnar deviation bilat.      PIPs nontender,     DIPs + nodules right 2,5    Lower extremities:  HIPS nontender  KNEES nontender, no swelling  ANKLES nontender, no swelling   FEET : nontender, no swelling     Physical Exam        GAGE-28 (ESR): 2.33 (Remission)  GAGE-28 (CRP): --          LABS    CBC  Lab Results   Component Value Date/Time    WBC 10.2 05/10/2022 11:14 AM    RBC 5.09 05/10/2022 11:14 AM    HGB 12.3 05/10/2022 11:14 AM    HCT 42.0 05/10/2022 11:14 AM    MCV 82.5 05/10/2022 11:14 AM    MCH 24.2 05/10/2022 11:14 AM    MCHC 29.3 05/10/2022 11:14 AM    RDW 14.3 11/05/2020 02:48 PM     05/10/2022 11:14 AM       CMP  Lab Results   Component Value Date/Time    CALCIUM 9.5 05/10/2022 11:14 AM    LABALBU 4.2 05/10/2022 11:14 AM    LABALBU 4.7 05/22/2012 11:18 AM    PROT 6.6 05/10/2022 11:14 AM     05/10/2022 11:14 AM    K 4.8 05/10/2022 11:14 AM    K 4.0 03/27/2021 03:23 AM    CO2 23 05/10/2022 11:14 AM     05/10/2022 11:14 AM    BUN 19 05/10/2022 11:14 AM    CREATININE 0.9 05/10/2022 11:14 AM    ALKPHOS 78 05/10/2022 11:14 AM    ALT 47 05/10/2022 11:14 AM    AST 59 05/10/2022 11:14 AM       HgBA1c: No components found for: HGBA1C    Lab Results   Component Value Date/Time    VITD25 42 06/21/2017 11:19 AM         Lab Results   Component Value Date    .0 (H) 12/14/2020       No components found for: CANCASCRN, APANCASCRN  Lab Results   Component Value Date    SEDRATE 18 02/10/2022     Lab Results   Component Value Date    CRP < 0.30 02/10/2022       RADIOLOGY:         MRI abdomen 12/17/2021  Impression       1. 3.7 x 2.5 cm area of abnormal signal intensity in the right lobe of liver adjacent to the hepatorenal fossa. There is heterogeneous signal intensity, early arterial enhancement and venous drainage into the right portal vein. The appearance is not    typical for a hemangioma. Hepatoma or metastatic disease cannot be ruled out. Please correlate with liver function tests and AFP levels. If these are elevated, CT-guided biopsy would be helpful for better evaluation. 2. Fluid/fluid level within the gallbladder possibly representing milk of calcium bile. No significant intra or extrahepatic biliary dilatation. No evidence of choledocholithiasis. 3. Moderate-sized hiatal hernia. 4. Borderline splenomegaly. 5. Small cystic area in the body of the pancreas. Mild atrophy involving the pancreas. 6. Small benign adenoma in the left adrenal gland. 7. 17 x 14   Bosniak type II cyst in the left kidney.  Small Bosniak type I cysts in the right kidney. 8. Lumbar spondylosis. Mild dextroscoliosis. PROCEDURE: CT CHEST HIGH RESOLUTION       CLINICAL INFORMATION: Restrictive lung disease. Pulmonary fibrosis. COMPARISON: Low-dose CT chest 6/7/2021. TECHNIQUE:    1 mm axial imaging from the lung apices to the lung bases every 15 mm with lung enhanced reconstruction. 5 mm axial imaging was then performed from above the lung apices through the adrenal glands without IV contrast.        All CT scans at this facility use dose modulation, iterative reconstruction, and/or weight based dosing when appropriate to reduce the radiation dose to as low as reasonably achievable. FINDINGS:    Heart/mediastinum: The heart size is normal. Coronary artery calcifications are observed. No pericardial effusion is identified. The aorta is not dilated. No mediastinal, hilar, or axillary lymphadenopathy is observed. Lungs: Improved aeration is noted at the lung bases. Groundglass opacity in the upper and lower lobes bilaterally have improved. Scarring at the lung apices is unchanged. No focal consolidation, pleural effusion, or pneumothorax is identified. No    pulmonary mass or nodule is observed. Centrilobular emphysema is most notable at the lung apices. No honeycombing or bronchiectasis is observed. Upper abdomen: No acute findings are visualized in the limited images through the upper abdomen. A large sliding-type hiatal hernia is stable. The previously seen liver lesion is not well visualized without IV contrast.       Musculoskeletal: Diffuse osteopenia is present. The visualized skeletal structures appear intact. Impression   1. Improved aeration is noted in the upper and lower lobes bilaterally. Subpleural scarring at the lung bases has improved. Mild centrilobular emphysema and scarring at the lung apices is unchanged. No honeycombing or bronchiectasis is observed.        2. The previously identified liver lesion is not well visualized/characterized without IV contrast. Multiple chronic findings are discussed. ASSESSMENT/PLAN    Assessment   Plan     # Seropositive rheumatoid arthritis ([facility] - stable dz. -  + , negative CCP. ESR/sed rate elevation, duration > 6 weeks, erosions on xrays. Exam with synovitis. - prior tx: prednisone (sig relief), meloxicam (no relief), sulfasalazine 1000 mg BID (LFT elevation), plaquenil 200 mg daily (macular degeneration)    - prednisone 5 mg daily   - limited on treatment options given active hep C, abnormal MRI and concern for metastatic disease,       # Restrictive lung disease  -- hx of COPD. Prior CXR w/ opacities concerning for scarring/fibrotic changes. ? Related to #1 vs other. HRCT with improvement of groundglass opacities. PFT's as above. # chronic hepatitis C:  + viral load 2021   # hep B ab - negative viral load. # LFT elevation  - active. # Liver mass:  -- patient refusing treatment or further workup for these concerns at this time. # Osteoporosis  - T score of -3.3 in right femoral neck   - alendronate 70 mg PO weekly     # Osteoarthritis of multiple joints, unspecified osteoarthritis type               - continue meloxicam 15 mg daily    # Medication monitoring   - CBC, CMP, sed rate, CRP q 4 months -- overdue for blood work        Return in about 4 months (around 1/20/2023). Electronically signed by Kimmy Jernigan DO on 9/20/2022 at 2:52 PM    New Prescriptions    No medications on file       Thank you for allowing me to participate in the care of this patient. Please call if there are any questions.

## 2022-09-30 DIAGNOSIS — J44.9 COPD, MILD (HCC): ICD-10-CM

## 2022-10-03 NOTE — TELEPHONE ENCOUNTER
Future Appointments   Date Time Provider Bhupendra Violetta   1/10/2023  3:30 PM ERNESTO Johnson [address]   1/23/2023  2:40 PM ERNESTO Choe [place] [id number]

## 2022-10-13 RX ORDER — AMLODIPINE BESYLATE 5 MG/1
5 TABLET ORAL DAILY
Qty: 90 TABLET | Refills: 3 | Status: SHIPPED | OUTPATIENT
Start: 2022-10-13

## 2022-10-31 RX ORDER — PREDNISONE 1 MG/1
5 TABLET ORAL DAILY
Qty: 30 TABLET | Refills: 2 | OUTPATIENT
Start: 2022-10-31

## 2022-10-31 RX ORDER — PREDNISONE 1 MG/1
5 TABLET ORAL DAILY
Qty: 30 TABLET | Refills: 2 | Status: SHIPPED | OUTPATIENT
Start: 2022-10-31

## 2022-11-02 ENCOUNTER — NURSE ONLY (OUTPATIENT)
Dept: LAB | Age: 82
End: 2022-11-02

## 2022-11-02 DIAGNOSIS — Z51.81 MEDICATION MONITORING ENCOUNTER: ICD-10-CM

## 2022-11-02 LAB
ALBUMIN SERPL-MCNC: 3.7 G/DL (ref 3.5–5.1)
ALP BLD-CCNC: 64 U/L (ref 38–126)
ALT SERPL-CCNC: 22 U/L (ref 11–66)
ANION GAP SERPL CALCULATED.3IONS-SCNC: 16 MEQ/L (ref 8–16)
AST SERPL-CCNC: 32 U/L (ref 5–40)
BASOPHILS # BLD: 0.5 %
BASOPHILS ABSOLUTE: 0 THOU/MM3 (ref 0–0.1)
BILIRUB SERPL-MCNC: 0.5 MG/DL (ref 0.3–1.2)
BUN BLDV-MCNC: 14 MG/DL (ref 7–22)
C-REACTIVE PROTEIN: 2.32 MG/DL (ref 0–1)
CALCIUM SERPL-MCNC: 8.9 MG/DL (ref 8.5–10.5)
CHLORIDE BLD-SCNC: 104 MEQ/L (ref 98–111)
CO2: 19 MEQ/L (ref 23–33)
CREAT SERPL-MCNC: 0.9 MG/DL (ref 0.4–1.2)
EOSINOPHIL # BLD: 1.3 %
EOSINOPHILS ABSOLUTE: 0.1 THOU/MM3 (ref 0–0.4)
ERYTHROCYTE [DISTWIDTH] IN BLOOD BY AUTOMATED COUNT: 18.4 % (ref 11.5–14.5)
ERYTHROCYTE [DISTWIDTH] IN BLOOD BY AUTOMATED COUNT: 47.2 FL (ref 35–45)
GFR SERPL CREATININE-BSD FRML MDRD: > 60 ML/MIN/1.73M2
GLUCOSE BLD-MCNC: 106 MG/DL (ref 70–108)
HCT VFR BLD CALC: 40.7 % (ref 37–47)
HEMOGLOBIN: 12.4 GM/DL (ref 12–16)
IMMATURE GRANS (ABS): 0.14 THOU/MM3 (ref 0–0.07)
IMMATURE GRANULOCYTES: 1.6 %
LYMPHOCYTES # BLD: 18.4 %
LYMPHOCYTES ABSOLUTE: 1.6 THOU/MM3 (ref 1–4.8)
MCH RBC QN AUTO: 23.4 PG (ref 26–33)
MCHC RBC AUTO-ENTMCNC: 30.5 GM/DL (ref 32.2–35.5)
MCV RBC AUTO: 76.6 FL (ref 81–99)
MONOCYTES # BLD: 8.9 %
MONOCYTES ABSOLUTE: 0.8 THOU/MM3 (ref 0.4–1.3)
NUCLEATED RED BLOOD CELLS: 0 /100 WBC
PLATELET # BLD: 496 THOU/MM3 (ref 130–400)
PMV BLD AUTO: 10.3 FL (ref 9.4–12.4)
POTASSIUM SERPL-SCNC: 3.7 MEQ/L (ref 3.5–5.2)
RBC # BLD: 5.31 MILL/MM3 (ref 4.2–5.4)
SEDIMENTATION RATE, ERYTHROCYTE: 63 MM/HR (ref 0–20)
SEG NEUTROPHILS: 69.3 %
SEGMENTED NEUTROPHILS ABSOLUTE COUNT: 5.9 THOU/MM3 (ref 1.8–7.7)
SODIUM BLD-SCNC: 139 MEQ/L (ref 135–145)
TOTAL PROTEIN: 6.8 G/DL (ref 6.1–8)
WBC # BLD: 8.5 THOU/MM3 (ref 4.8–10.8)

## 2022-12-12 DIAGNOSIS — J44.9 COPD, MILD (HCC): ICD-10-CM

## 2022-12-12 RX ORDER — UMECLIDINIUM BROMIDE AND VILANTEROL TRIFENATATE 62.5; 25 UG/1; UG/1
POWDER RESPIRATORY (INHALATION)
Qty: 60 EACH | Refills: 2 | Status: SHIPPED | OUTPATIENT
Start: 2022-12-12

## 2022-12-12 NOTE — TELEPHONE ENCOUNTER
Recent Visits  Date Type Provider Dept   05/17/22 Office Visit ERNESTO Metz CNP [facility]   11/09/21 Office Visit ERNESTO Metz CNP [facility]   Showing recent visits within past 540 days with a meds authorizing provider and meeting all other requirements  Future Appointments  No visits were found meeting these conditions.   Showing future appointments within next 150 days with a meds authorizing provider and meeting all other requirements      Future Appointments   Date Time Provider Bhupendra Shipley   1/10/2023  3:30 PM ERNESTO Sutherland CNP [facility] [address] [facility]   1/23/2023  2:40 PM ERNESTO Ortiz [address]

## 2023-01-09 ENCOUNTER — OFFICE VISIT (OUTPATIENT)
Dept: FAMILY MEDICINE CLINIC | Age: 83
End: 2023-01-09
Payer: MEDICARE

## 2023-01-09 VITALS
RESPIRATION RATE: 16 BRPM | TEMPERATURE: 97.3 F | BODY MASS INDEX: 40.44 KG/M2 | DIASTOLIC BLOOD PRESSURE: 70 MMHG | HEIGHT: 59 IN | OXYGEN SATURATION: 95 % | SYSTOLIC BLOOD PRESSURE: 118 MMHG | HEART RATE: 105 BPM | WEIGHT: 200.6 LBS

## 2023-01-09 DIAGNOSIS — J44.9 CHRONIC OBSTRUCTIVE PULMONARY DISEASE, UNSPECIFIED COPD TYPE (HCC): Primary | ICD-10-CM

## 2023-01-09 DIAGNOSIS — C80.1 MALIGNANT NEOPLASM (HCC): ICD-10-CM

## 2023-01-09 DIAGNOSIS — R09.89 PURSED-LIP BREATHING: ICD-10-CM

## 2023-01-09 DIAGNOSIS — D47.3 ESSENTIAL (HEMORRHAGIC) THROMBOCYTHEMIA (HCC): ICD-10-CM

## 2023-01-09 DIAGNOSIS — K76.9 LIVER LESION: ICD-10-CM

## 2023-01-09 DIAGNOSIS — E66.01 OBESITY, CLASS III, BMI 40-49.9 (MORBID OBESITY) (HCC): ICD-10-CM

## 2023-01-09 DIAGNOSIS — B18.2 CHRONIC HEPATITIS C WITHOUT HEPATIC COMA (HCC): ICD-10-CM

## 2023-01-09 DIAGNOSIS — M05.9 SEROPOSITIVE RHEUMATOID ARTHRITIS (HCC): ICD-10-CM

## 2023-01-09 DIAGNOSIS — R53.83 OTHER FATIGUE: ICD-10-CM

## 2023-01-09 DIAGNOSIS — R06.2 WHEEZING: ICD-10-CM

## 2023-01-09 DIAGNOSIS — R53.81 PHYSICAL DECONDITIONING: ICD-10-CM

## 2023-01-09 DIAGNOSIS — Z91.199 MEDICAL NON-COMPLIANCE: ICD-10-CM

## 2023-01-09 PROCEDURE — 3023F SPIROM DOC REV: CPT | Performed by: NURSE PRACTITIONER

## 2023-01-09 PROCEDURE — 1090F PRES/ABSN URINE INCON ASSESS: CPT | Performed by: NURSE PRACTITIONER

## 2023-01-09 PROCEDURE — G8484 FLU IMMUNIZE NO ADMIN: HCPCS | Performed by: NURSE PRACTITIONER

## 2023-01-09 PROCEDURE — G8427 DOCREV CUR MEDS BY ELIG CLIN: HCPCS | Performed by: NURSE PRACTITIONER

## 2023-01-09 PROCEDURE — 1036F TOBACCO NON-USER: CPT | Performed by: NURSE PRACTITIONER

## 2023-01-09 PROCEDURE — 1123F ACP DISCUSS/DSCN MKR DOCD: CPT | Performed by: NURSE PRACTITIONER

## 2023-01-09 PROCEDURE — G8399 PT W/DXA RESULTS DOCUMENT: HCPCS | Performed by: NURSE PRACTITIONER

## 2023-01-09 PROCEDURE — G8417 CALC BMI ABV UP PARAM F/U: HCPCS | Performed by: NURSE PRACTITIONER

## 2023-01-09 PROCEDURE — 99214 OFFICE O/P EST MOD 30 MIN: CPT | Performed by: NURSE PRACTITIONER

## 2023-01-09 PROCEDURE — 3078F DIAST BP <80 MM HG: CPT | Performed by: NURSE PRACTITIONER

## 2023-01-09 PROCEDURE — 3074F SYST BP LT 130 MM HG: CPT | Performed by: NURSE PRACTITIONER

## 2023-01-09 RX ORDER — GUAIFENESIN 600 MG/1
600 TABLET, EXTENDED RELEASE ORAL 2 TIMES DAILY
Qty: 30 TABLET | Refills: 0 | Status: SHIPPED | OUTPATIENT
Start: 2023-01-09 | End: 2023-01-24

## 2023-01-09 RX ORDER — PREDNISONE 20 MG/1
TABLET ORAL
Qty: 18 TABLET | Refills: 0 | Status: SHIPPED | OUTPATIENT
Start: 2023-01-09

## 2023-01-09 RX ORDER — AMOXICILLIN AND CLAVULANATE POTASSIUM 875; 125 MG/1; MG/1
1 TABLET, FILM COATED ORAL 2 TIMES DAILY
Qty: 20 TABLET | Refills: 0 | Status: SHIPPED | OUTPATIENT
Start: 2023-01-09 | End: 2023-01-19

## 2023-01-09 SDOH — ECONOMIC STABILITY: FOOD INSECURITY: WITHIN THE PAST 12 MONTHS, YOU WORRIED THAT YOUR FOOD WOULD RUN OUT BEFORE YOU GOT MONEY TO BUY MORE.: NEVER TRUE

## 2023-01-09 SDOH — ECONOMIC STABILITY: FOOD INSECURITY: WITHIN THE PAST 12 MONTHS, THE FOOD YOU BOUGHT JUST DIDN'T LAST AND YOU DIDN'T HAVE MONEY TO GET MORE.: NEVER TRUE

## 2023-01-09 ASSESSMENT — ENCOUNTER SYMPTOMS
PHOTOPHOBIA: 0
WHEEZING: 1
SHORTNESS OF BREATH: 1
COUGH: 1
GASTROINTESTINAL NEGATIVE: 1

## 2023-01-09 ASSESSMENT — PATIENT HEALTH QUESTIONNAIRE - PHQ9
SUM OF ALL RESPONSES TO PHQ QUESTIONS 1-9: 0
DEPRESSION UNABLE TO ASSESS: FUNCTIONAL CAPACITY MOTIVATION LIMITS ACCURACY
1. LITTLE INTEREST OR PLEASURE IN DOING THINGS: 0
2. FEELING DOWN, DEPRESSED OR HOPELESS: 0
SUM OF ALL RESPONSES TO PHQ QUESTIONS 1-9: 0
SUM OF ALL RESPONSES TO PHQ9 QUESTIONS 1 & 2: 0
SUM OF ALL RESPONSES TO PHQ QUESTIONS 1-9: 0
SUM OF ALL RESPONSES TO PHQ QUESTIONS 1-9: 0

## 2023-01-09 ASSESSMENT — SOCIAL DETERMINANTS OF HEALTH (SDOH): HOW HARD IS IT FOR YOU TO PAY FOR THE VERY BASICS LIKE FOOD, HOUSING, MEDICAL CARE, AND HEATING?: NOT HARD AT ALL

## 2023-01-09 NOTE — ASSESSMENT & PLAN NOTE
Monitored by specialist- no acute findings meriting change in the plan Seeing GI for this, pt declines treatment   Lab Results   Component Value Date/Time    [facility] 64 11/02/2022 04:55 PM    ALT 22 11/02/2022 04:55 PM    AST 32 11/02/2022 04:55 PM    PROT 6.8 11/02/2022 04:55 PM    BILITOT 0.5 11/02/2022 04:55 PM    BILIDIR <0.2 12/23/2021 02:03 PM    LABALBU 3.7 11/02/2022 04:55 PM    LABALBU 4.7 05/22/2012 11:18 AM      Lab Results   Component Value Date    HEPAIGM Negative 02/08/2021    HEPBIGM Negative 12/23/2021    HEPBCAB NONREACTIVE 01/12/2022    HBEAG Negative 12/23/2021    HEPCAB POS (A) 11/29/2021

## 2023-01-09 NOTE — PROGRESS NOTES
[address] [facility]  [address] [facility] [place] [address]  Dept: [phone]  Dept Fax: [phone]  Loc: [phone]    Janet Myers is a 80 y.o. Josepha Crutch presents today for her medical conditions/complaints as noted below. Rachelle Paulson c/o of 6 Month Follow-Up (No concerns )      HPI:      Pt presents with daughter today. Pt lives with daughter. Pt is home bound and uses a wheelchair and leaving the home has an increased stress on pt and caregiver. Daughter states pt has been diagnosed with a liver lesion and possible liver carcinoma, she was referred to Gi for treatment but would not follow through with a liver biopsy or labs or liver biopsy for confirmation. Pt has been weak lately and sleeping a lot. COPD    HPI:    Current medication regimen - anoro and pulmicort BID, daughter states mother does not use anoro daily and will only do 1 breathing treatment a day, has been coughing a lot and wheezing, is to be on oxygen at 2 liters but will not use it. Having pursed lip breathing today with audible wheezing. Compliant with medications?  no  Limitations in function - physical activity daughter states she does not walk around much   Does patient smoke? No    Chronic cough?: yes  Chest pain/Tightness?:  no  Shortness of breath?: yes  Wheezing? yes    Last PFTs - 6/21 being managed by pulmonary     Hospitalized and/or intubated in the past?: Yes  Number of times prescribed oral steroids in the past year - on prednisone daily for RA  Influenza vaccine up to date? Yes  Pneumococcal vaccine up to date? Yes    Has also been diagnosed with hep can referred to GI for this but won't take the treatment.      Lab Results       Component                Value               Date                       WBC                      8.5                 11/02/2022                 HGB                      12.4                11/02/2022                 HCT 40.7                11/02/2022                 MCV                      76.6 (L)            11/02/2022                 PLT                      496 (H)             11/02/2022               Lab Results       Component                Value               Date/Time                  NA                       139                 11/02/2022 04:55 PM        K                        3.7                 11/02/2022 04:55 PM        K                        4.0                 03/27/2021 03:23 AM        CL                       104                 11/02/2022 04:55 PM        CO2                      19                  11/02/2022 04:55 PM        BUN                      14                  11/02/2022 04:55 PM        CREATININE               0.9                 11/02/2022 04:55 PM        GLUCOSE                  106                 11/02/2022 04:55 PM        GLUCOSE                  93                  05/22/2012 11:18 AM        CALCIUM                  8.9                 11/02/2022 04:55 PM        CT CHEST HIGH RESOLUTION  Narrative: PROCEDURE: CT CHEST HIGH RESOLUTION    CLINICAL INFORMATION: Restrictive lung disease. Pulmonary fibrosis. COMPARISON: Low-dose CT chest 6/7/2021. TECHNIQUE:   1 mm axial imaging from the lung apices to the lung bases every 15 mm with lung enhanced reconstruction. 5 mm axial imaging was then performed from above the lung apices through the adrenal glands without IV contrast.     All CT scans at this facility use dose modulation, iterative reconstruction, and/or weight based dosing when appropriate to reduce the radiation dose to as low as reasonably achievable. FINDINGS:   Heart/mediastinum: The heart size is normal. Coronary artery calcifications are observed. No pericardial effusion is identified. The aorta is not dilated. No mediastinal, hilar, or axillary lymphadenopathy is observed. Lungs: Improved aeration is noted at the lung bases.  Groundglass opacity in the upper and lower lobes bilaterally have improved. Scarring at the lung apices is unchanged. No focal consolidation, pleural effusion, or pneumothorax is identified. No   pulmonary mass or nodule is observed. Centrilobular emphysema is most notable at the lung apices. No honeycombing or bronchiectasis is observed. Upper abdomen: No acute findings are visualized in the limited images through the upper abdomen. A large sliding-type hiatal hernia is stable. The previously seen liver lesion is not well visualized without IV contrast.    Musculoskeletal: Diffuse osteopenia is present. The visualized skeletal structures appear intact. Impression: 1. Improved aeration is noted in the upper and lower lobes bilaterally. Subpleural scarring at the lung bases has improved. Mild centrilobular emphysema and scarring at the lung apices is unchanged. No honeycombing or bronchiectasis is observed. 2. The previously identified liver lesion is not well visualized/characterized without IV contrast. Multiple chronic findings are discussed. Lab Results       Component                Value               Date/Time                  ALKPHOS                  64                  11/02/2022 04:55 PM        ALT                      22                  11/02/2022 04:55 PM        AST                      32                  11/02/2022 04:55 PM        PROT                     6.8                 11/02/2022 04:55 PM        BILITOT                  0.5                 11/02/2022 04:55 PM        BILIDIR                  <0.2                12/23/2021 02:03 PM        LABALBU                  3.7                 11/02/2022 04:55 PM        LABALBU                  4.7                 05/22/2012 11:18 AM      HTN    Does patient check BP regularly at home? - No  Current Medication regimen - in chart   Tolerating medications well? - yes    Shortness of breath or chest pain? Yes  Headache or visual complaints? No  Neurologic changes like confusion? No  Extremity edema? No    BP Readings from Last 3 Encounters:  01/09/23 : 118/70  09/20/22 : 138/80  08/29/22 : 137/67            Current Outpatient Medications   Medication Sig Dispense Refill    predniSONE (DELTASONE) 20 MG tablet 1 tab po tid for 3 days 1 tab po bid for 3 days 1 tab po once day for 3 days 18 tablet 0    guaiFENesin (MUCINEX) 600 MG extended release tablet Take 1 tablet by mouth 2 times daily for 15 days 30 tablet 0    amoxicillin-clavulanate (AUGMENTIN) 875-125 MG per tablet Take 1 tablet by mouth 2 times daily for 10 days 20 tablet 0    ANORO ELLIPTA 62.5-25 MCG/ACT AEPB INHALE 1 PUFF INTO THE LUNGS IN THE MORNING 60 each 2    predniSONE (DELTASONE) 5 MG tablet TAKE 1 TABLET BY MOUTH DAILY 30 tablet 2    amLODIPine (NORVASC) 5 MG tablet TAKE 1 TABLET BY MOUTH DAILY 90 tablet 3    polyethylene glycol (GLYCOLAX) 17 GM/SCOOP powder Dispense 238 Gram Bottle. Use as Directed 238 g 0    alendronate (FOSAMAX) 70 MG tablet TAKE 1 TABLET BY MOUTH EVERY 7 DAYS 12 tablet 1    budesonide (PULMICORT) 0.5 MG/2ML nebulizer suspension Take 2 mLs by nebulization 2 times daily 360 mL 3    ALENDRONATE SODIUM PO Take by mouth      ipratropium-albuterol (DUONEB) 0.5-2.5 (3) MG/3ML SOLN nebulizer solution Inhale 3 mLs into the lungs every 4 hours as needed for Shortness of Breath or Other (wheezing) 360 mL 5    acetaminophen (TYLENOL) 500 MG tablet Take 500 mg by mouth daily      bisacodyl (DULCOLAX) 5 MG EC tablet See Prep Instructions 4 tablet 0     No current facility-administered medications for this visit.           Past Medical History:   Diagnosis Date    Arthritis     Basal cell carcinoma (BCC) of skin of face     cheek below right eye    COVID-19 03/2021    Dyslipidemia 02/15/2017    Fatigue     History of tobacco abuse 02/15/2017    Hyperlipidemia 10/13/2014    Hypertension     Non morbid obesity due to excess calories 02/15/2017    Pneumonia     Precordial pain 02/15/2017    Rheumatoid arteritis ([facility] [address] Smoker     SOB (shortness of breath) 02/15/2017    Tobacco abuse       Past Surgical History:   Procedure Laterality Date    COLONOSCOPY W/ POLYPECTOMY  2012    HEMORRHOID SURGERY      HERNIA REPAIR N/A 03/82/4024    ROBOTIC UMBILICAL HERNIA REPAIR WITH MESH performed by Dany Cruz MD at [address] [place] [address] [address]      Family History   Problem Relation Age of Onset    Cancer Mother     Cancer Father     Heart Failure Sister     Cancer Brother     Cancer Daughter     Colon Cancer Neg Hx      Social History     Tobacco Use    Smoking status: Former     Packs/day: 2.00     Years: 51.00     Pack years: 102.00     Types: Cigarettes     Start date: 1/1/1963     Quit date: 1/14/2012     Years since quitting: 10.9    Smokeless tobacco: Never    Tobacco comments:     tryin to cut down   Substance Use Topics    Alcohol use: No     Comment: quit        No Known Allergies    Health Maintenance   Topic Date Due    Hepatitis A vaccine (1 of 2 - Risk 2-dose series) Never done    Hepatitis B vaccine (1 of 3 - Risk 3-dose series) Never done    Shingles vaccine (1 of 2) Never done    DTaP/Tdap/Td vaccine (1 - Tdap) 01/29/2021    COVID-19 Vaccine (2 - Pfizer series) 03/22/2021    Flu vaccine (1) 08/01/2022    Depression Screen  05/17/2023    Annual Wellness Visit (AWV)  05/18/2023    DEXA (modify frequency per FRAX score)  Completed    Pneumococcal 65+ years Vaccine  Completed    Hib vaccine  Aged Out    Meningococcal (ACWY) vaccine  Aged Out       Subjective:      Review of Systems   Constitutional:  Positive for fatigue (sleeping more). Negative for chills and fever. HENT: Negative. Eyes:  Negative for photophobia and visual disturbance. Respiratory:  Positive for cough, shortness of breath and wheezing. Cardiovascular: Negative. Gastrointestinal: Negative. Endocrine: Negative for polydipsia, polyphagia and polyuria.    Genitourinary:  Negative for decreased urine volume, difficulty urinating and dysuria. Musculoskeletal: Negative. Skin: Negative. Neurological:  Positive for weakness. Negative for dizziness, tremors, syncope, facial asymmetry, light-headedness and headaches. Psychiatric/Behavioral:  Negative for agitation, behavioral problems, self-injury, sleep disturbance and suicidal ideas. The patient is nervous/anxious. Objective:      /70   Pulse (!) 105   Temp 97.3 °F (36.3 °C)   Resp 16   Ht 4' 11\" (1.499 m)   Wt 200 lb 9.6 oz (91 kg)   SpO2 95%   BMI 40.52 kg/m²      Physical Exam  Vitals and nursing note reviewed. Constitutional:       Appearance: She is not ill-appearing. Comments: Disheveled,    HENT:      Right Ear: Tympanic membrane, ear canal and external ear normal.      Left Ear: Tympanic membrane, ear canal and external ear normal.      Nose: Nose normal.      Mouth/Throat:      Mouth: Mucous membranes are moist.   Cardiovascular:      Rate and Rhythm: Normal rate and regular rhythm. Pulmonary:      Effort: Prolonged expiration present. No bradypnea, accessory muscle usage or respiratory distress. Breath sounds: Examination of the right-upper field reveals wheezing. Examination of the left-upper field reveals wheezing. Examination of the right-lower field reveals wheezing. Wheezing present. No rhonchi. Comments: Clubbing nails, no oxygen on today     Wheezes noted throughout   Abdominal:      General: Abdomen is flat. Bowel sounds are normal. There is no distension. Palpations: Abdomen is soft. Skin:     General: Skin is warm and dry. Capillary Refill: Capillary refill takes less than 2 seconds. Neurological:      General: No focal deficit present. Mental Status: She is alert and oriented to person, place, and time. Psychiatric:         Mood and Affect: Mood normal.         Behavior: Behavior normal.         Thought Content: Thought content normal.         Judgment: Judgment normal.        Assessment/Plan:           1. Chronic obstructive pulmonary disease, unspecified COPD type ([facility] [address]  F/u with pulmonary  Wear oxygen daily  Start breathing treatment as ordered  ER for any pulse ox below 92%  They do have a pulse ox at home to check  - [facility]    2. Seropositive rheumatoid arthritis ([facility] [address]    Rheumatology   3. Essential (hemorrhagic) thrombocythemia ([facility]  Rheumatology     4. Obesity, Class III, BMI 40-49.9 (morbid obesity) (HCC)  Decrease caloric intake     5. Malignant neoplasm ([facility]  Back to GI for hep c and liver biopsy  I did spend over 20 minutes talking with pt and daughter advising them of the seriousness of not treating this condition. 6. Chronic hepatitis C without hepatic coma ([facility]  #5  - [address] [facility]    7. Other fatigue  Likely due to hep c and or liver carcinoma. 8. Liver lesion  GI    9. Wheezing  Prednisone  Start pulmicort bid  Pulmonary f/u     10. Pursed-lip breathing  #9  Likely copd exacerbation due to non compliance     11. Physical deconditioning    - [facility]. [facility]    12. Medical non-compliance    - [address] will require the following home care treatments or therapies: PT/OT medical aide. Home care will be necessary because of leaving home requires a great deal of assistance, pt Is oxygen dependent. .  The patient is in agreement to receiving home care. Return in about 6 months (around 7/9/2023) for MAW. Reccommended tobaccocessation options including pharmacologic methods, counseled great than 3 minutesduring this visit:  Yes[]  No  []       Patient given educational materials -see patient instructions. Discussed use, benefit, and side effects of prescribedmedications. All patient questions answered. Pt voiced understanding. Reviewedhealth maintenance. Instructed to continue current medications, diet and exercise. Patient agreed with treatment plan. Follow up as directed. Electronicallysigned by [facility] ERNESTO Lund CNP on 1/9/2023 at 2:08 PM

## 2023-01-09 NOTE — ASSESSMENT & PLAN NOTE
Monitored by specialist- no acute findings meriting change in the plan possible liver carcinoma, pt refuses treatment for this, pt is are this could be liver cancer,

## 2023-01-23 ENCOUNTER — OFFICE VISIT (OUTPATIENT)
Dept: RHEUMATOLOGY | Age: 83
End: 2023-01-23
Payer: MEDICARE

## 2023-01-23 VITALS
DIASTOLIC BLOOD PRESSURE: 68 MMHG | SYSTOLIC BLOOD PRESSURE: 130 MMHG | WEIGHT: 200 LBS | HEART RATE: 112 BPM | HEIGHT: 59 IN | OXYGEN SATURATION: 95 % | BODY MASS INDEX: 40.32 KG/M2

## 2023-01-23 DIAGNOSIS — B19.20 HEPATITIS C VIRUS INFECTION WITHOUT HEPATIC COMA, UNSPECIFIED CHRONICITY: ICD-10-CM

## 2023-01-23 DIAGNOSIS — Z86.19 HISTORY OF HEPATITIS C: ICD-10-CM

## 2023-01-23 DIAGNOSIS — R93.2 ABNORMAL LIVER ULTRASOUND: ICD-10-CM

## 2023-01-23 DIAGNOSIS — M05.9 SEROPOSITIVE RHEUMATOID ARTHRITIS (HCC): Primary | ICD-10-CM

## 2023-01-23 DIAGNOSIS — R06.02 SOB (SHORTNESS OF BREATH): ICD-10-CM

## 2023-01-23 DIAGNOSIS — M15.9 OSTEOARTHRITIS OF MULTIPLE JOINTS, UNSPECIFIED OSTEOARTHRITIS TYPE: ICD-10-CM

## 2023-01-23 DIAGNOSIS — M81.0 AGE RELATED OSTEOPOROSIS, UNSPECIFIED PATHOLOGICAL FRACTURE PRESENCE: ICD-10-CM

## 2023-01-23 DIAGNOSIS — Z51.81 MEDICATION MONITORING ENCOUNTER: ICD-10-CM

## 2023-01-23 PROCEDURE — 3075F SYST BP GE 130 - 139MM HG: CPT | Performed by: NURSE PRACTITIONER

## 2023-01-23 PROCEDURE — 1123F ACP DISCUSS/DSCN MKR DOCD: CPT | Performed by: NURSE PRACTITIONER

## 2023-01-23 PROCEDURE — G8417 CALC BMI ABV UP PARAM F/U: HCPCS | Performed by: NURSE PRACTITIONER

## 2023-01-23 PROCEDURE — 1036F TOBACCO NON-USER: CPT | Performed by: NURSE PRACTITIONER

## 2023-01-23 PROCEDURE — 1090F PRES/ABSN URINE INCON ASSESS: CPT | Performed by: NURSE PRACTITIONER

## 2023-01-23 PROCEDURE — G8399 PT W/DXA RESULTS DOCUMENT: HCPCS | Performed by: NURSE PRACTITIONER

## 2023-01-23 PROCEDURE — G8484 FLU IMMUNIZE NO ADMIN: HCPCS | Performed by: NURSE PRACTITIONER

## 2023-01-23 PROCEDURE — 99214 OFFICE O/P EST MOD 30 MIN: CPT | Performed by: NURSE PRACTITIONER

## 2023-01-23 PROCEDURE — 3078F DIAST BP <80 MM HG: CPT | Performed by: NURSE PRACTITIONER

## 2023-01-23 PROCEDURE — G8427 DOCREV CUR MEDS BY ELIG CLIN: HCPCS | Performed by: NURSE PRACTITIONER

## 2023-01-23 ASSESSMENT — ENCOUNTER SYMPTOMS
SHORTNESS OF BREATH: 1
BACK PAIN: 0
COUGH: 1
DIARRHEA: 0
TROUBLE SWALLOWING: 0
NAUSEA: 0
EYE PAIN: 0
CONSTIPATION: 0
ABDOMINAL PAIN: 0
EYE ITCHING: 0

## 2023-01-23 NOTE — PROGRESS NOTES
[facility] RHEUMATOLOGY FOLLOW UP NOTE       Date Of Service: 1/23/2023  Provider: Micaela Weathers APRN - CNP    Name: Maggi Francisco   MRN: [id number]    Chief Complaint(s)     Chief Complaint   Patient presents with    Follow-up     4 month follow up        History of Present Illness (HPI)     Maggi Francisco  is T(G)57 y.o. female with a hx of HTN, HLD, tobacco abuse, COPD, cancer right eye  here for the f/u evaluation of seropositive rheumatoid arthritis    Interval hx:    - currently in PT and OT to work on mobility and strengthening     Patient denies any joint pains. Denies swelling/redness/ warmth + AM stiffness lasting ~ 5-10 minutes      REVIEW OF SYSTEMS: (ROS)    Review of Systems   Constitutional:  Negative for fatigue, fever and unexpected weight change. HENT:  Positive for hearing loss. Negative for congestion and trouble swallowing. Eyes:  Negative for pain and itching. Respiratory:  Positive for cough and shortness of breath. Cardiovascular:  Negative for chest pain and leg swelling. Gastrointestinal:  Negative for abdominal pain, constipation, diarrhea and nausea. Endocrine: Negative for cold intolerance and heat intolerance. Genitourinary:  Negative for difficulty urinating, frequency and urgency. Musculoskeletal:  Positive for arthralgias and joint swelling. Negative for back pain. Skin:  Negative for rash. Neurological:  Negative for dizziness, weakness, numbness and headaches. Psychiatric/Behavioral:  The patient is not nervous/anxious.       PAST MEDICAL HISTORY      Past Medical History:   Diagnosis Date    Arthritis     Basal cell carcinoma (BCC) of skin of face     cheek below right eye    COVID-19 03/2021    Dyslipidemia 02/15/2017    Fatigue     History of tobacco abuse 02/15/2017    Hyperlipidemia 10/13/2014    Hypertension     Non morbid obesity due to excess calories 02/15/2017    Pneumonia     Precordial pain 02/15/2017    Rheumatoid arteritis ([facility] [address] Smoker     SOB (shortness of breath) 02/15/2017    Tobacco abuse        PAST SURGICAL HISTORY      Past Surgical History:   Procedure Laterality Date    COLONOSCOPY W/ POLYPECTOMY  2012    HEMORRHOID SURGERY      HERNIA REPAIR N/A 06/05/7872    ROBOTIC UMBILICAL HERNIA REPAIR WITH MESH performed by Felix Yoo MD at [address] [place] [address] [address]        FAMILY HISTORY      Family History   Problem Relation Age of Onset    Cancer Mother     Cancer Father     Heart Failure Sister     Cancer Brother     Cancer Daughter     Colon Cancer Neg Hx        SOCIAL HISTORY      Social History       Tobacco History       Smoking Status  Former Smoker Smoking Start Date  1/1/1963 Quit date  1/14/2012 Smoking Frequency  2 packs/day for 51 years (80 pk yrs)      Smokeless Tobacco Use  Never Used      Tobacco Comment  tryin to cut down              Alcohol History       Alcohol Use Status  No Comment  quit              Drug Use       Drug Use Status  No              Sexual Activity       Sexually Active  Not Currently                    ALLERGIES   No Known Allergies    CURRENT MEDICATIONS      Current Outpatient Medications   Medication Sig Dispense Refill    predniSONE (DELTASONE) 20 MG tablet 1 tab po tid for 3 days 1 tab po bid for 3 days 1 tab po once day for 3 days 18 tablet 0    guaiFENesin (MUCINEX) 600 MG extended release tablet Take 1 tablet by mouth 2 times daily for 15 days 30 tablet 0    ANORO ELLIPTA 62.5-25 MCG/ACT AEPB INHALE 1 PUFF INTO THE LUNGS IN THE MORNING 60 each 2    predniSONE (DELTASONE) 5 MG tablet TAKE 1 TABLET BY MOUTH DAILY 30 tablet 2    amLODIPine (NORVASC) 5 MG tablet TAKE 1 TABLET BY MOUTH DAILY 90 tablet 3    polyethylene glycol (GLYCOLAX) 17 GM/SCOOP powder Dispense 238 Gram Bottle.   Use as Directed 238 g 0    alendronate (FOSAMAX) 70 MG tablet TAKE 1 TABLET BY MOUTH EVERY 7 DAYS 12 tablet 1    budesonide (PULMICORT) 0.5 MG/2ML nebulizer suspension Take 2 mLs by nebulization 2 times daily 360 mL 3    ALENDRONATE SODIUM PO Take by mouth      ipratropium-albuterol (DUONEB) 0.5-2.5 (3) MG/3ML SOLN nebulizer solution Inhale 3 mLs into the lungs every 4 hours as needed for Shortness of Breath or Other (wheezing) 360 mL 5    acetaminophen (TYLENOL) 500 MG tablet Take 500 mg by mouth daily      bisacodyl (DULCOLAX) 5 MG EC tablet See Prep Instructions 4 tablet 0     No current facility-administered medications for this visit. PHYSICAL EXAMINATION / OBJECTIVE   Objective: There were no vitals taken for this visit. Physical Exam  Vitals reviewed. Constitutional:       Appearance: She is well-developed. Cardiovascular:      Rate and Rhythm: Normal rate and regular rhythm. Pulmonary:      Effort: Pulmonary effort is normal.      Breath sounds: Normal breath sounds. Musculoskeletal:      Cervical back: Normal range of motion and neck supple. Skin:     General: Skin is warm and dry. Findings: No rash. Neurological:      Mental Status: She is alert and oriented to person, place, and time. Psychiatric:         Thought Content:  Thought content normal.     Upper extremities:    SHOULDERS nontender  ELBOWS nontender, no swelling  WRISTS nontender, + warmth left  HANDS/FINGERS   MCPs nontender    PIPs nontender    DIPs + nodules right 2,5    Lower extremities:  HIPS nontender  KNEES nontender, no swelling  ANKLES nontender, no swelling   FEET : nontender, no swelling      LABS    CBC  Lab Results   Component Value Date/Time    WBC 8.5 11/02/2022 04:55 PM    RBC 5.31 11/02/2022 04:55 PM    HGB 12.4 11/02/2022 04:55 PM    HCT 40.7 11/02/2022 04:55 PM    MCV 76.6 11/02/2022 04:55 PM    MCH 23.4 11/02/2022 04:55 PM    MCHC 30.5 11/02/2022 04:55 PM    RDW 14.3 11/05/2020 02:48 PM     11/02/2022 04:55 PM       CMP  Lab Results   Component Value Date/Time    CALCIUM 8.9 11/02/2022 04:55 PM    LABALBU 3.7 11/02/2022 04:55 PM    LABALBU 4.7 05/22/2012 11:18 AM    PROT 6.8 11/02/2022 04:55 PM     11/02/2022 04:55 PM    K 3.7 11/02/2022 04:55 PM    K 4.0 03/27/2021 03:23 AM    CO2 19 11/02/2022 04:55 PM     11/02/2022 04:55 PM    BUN 14 11/02/2022 04:55 PM    CREATININE 0.9 11/02/2022 04:55 PM    ALKPHOS 64 11/02/2022 04:55 PM    ALT 22 11/02/2022 04:55 PM    AST 32 11/02/2022 04:55 PM       HgBA1c: No components found for: HGBA1C    Lab Results   Component Value Date/Time    VITD25 42 06/21/2017 11:19 AM         Lab Results   Component Value Date    ANASCRN None Detected 11/29/2021     No results found for: SSA  No results found for: SSB  No results found for: ANTI-SMITH  No results found for: DSDNAAB   No results found for: ANTIRNP  No results found for: C3, C4  No results found for: CCPAB  Lab Results   Component Value Date    .0 (H) 12/14/2020       No components found for: CANCASCRN, APANCASCRN  Lab Results   Component Value Date    SEDRATE 63 (H) 11/02/2022     Lab Results   Component Value Date    CRP 2.32 (H) 11/02/2022       RADIOLOGY:         MRI abdomen 12/17/2021  Impression       1. 3.7 x 2.5 cm area of abnormal signal intensity in the right lobe of liver adjacent to the hepatorenal fossa. There is heterogeneous signal intensity, early arterial enhancement and venous drainage into the right portal vein. The appearance is not    typical for a hemangioma. Hepatoma or metastatic disease cannot be ruled out. Please correlate with liver function tests and AFP levels. If these are elevated, CT-guided biopsy would be helpful for better evaluation. 2. Fluid/fluid level within the gallbladder possibly representing milk of calcium bile. No significant intra or extrahepatic biliary dilatation. No evidence of choledocholithiasis. 3. Moderate-sized hiatal hernia. 4. Borderline splenomegaly. 5. Small cystic area in the body of the pancreas. Mild atrophy involving the pancreas. 6. Small benign adenoma in the left adrenal gland.    7. 17 x 14   Bosniak type II cyst in the left kidney. Small Bosniak type I cysts in the right kidney. 8. Lumbar spondylosis. Mild dextroscoliosis. ASSESSMENT/PLAN    Assessment   Plan     Seropositive rheumatoid arthritis (HCC)  -  + , negative CCP. ESR/sed rate elevation, duration > 6 weeks, erosions on xrays. Exam with synovitis. - prior tx: prednisone (sig relief), meloxicam (no relief), sulfasalazine 1000 mg BID (LFT elevation), plaquenil 200 mg daily (macular degeneration)    - prednisone 5 mg daily   - limited on treatment options given active hep C, abnormal MRI and concern for metastatic disease    Restrictive lung disease  -- hx of COPD. Prior CXR w/ opacities concerning for scarring/fibrotic changes. ? Related to #1 vs other. HRCT with improvement of groundglass opacities. PFT's as above. Hepatitis C- + HCV viral load 2021  + hepatitis B core antibody  LFT elevation  Liver mass- patient refusing further treatment of workup for these concerns at this time     Osteoporosis  - T score of -3.3 in right femoral neck   - alendronate 70 mg PO weekly     Osteoarthritis of multiple joints, unspecified osteoarthritis type               - continue meloxicam 15 mg daily    Medication monitoring   - CBC, CMP, sed rate, CRP q 4 months       No follow-ups on file. Electronically signed by ERNESTO Wolfe CNP on 1/23/2023 at 2:40 PM    New Prescriptions    No medications on file       Thank you for allowing me to participate in the care of this patient. Please call if there are any questions.

## 2023-01-30 ENCOUNTER — OFFICE VISIT (OUTPATIENT)
Dept: PULMONOLOGY | Age: 83
End: 2023-01-30
Payer: MEDICARE

## 2023-01-30 VITALS
BODY MASS INDEX: 40.64 KG/M2 | DIASTOLIC BLOOD PRESSURE: 68 MMHG | OXYGEN SATURATION: 97 % | TEMPERATURE: 96.8 F | HEIGHT: 59 IN | WEIGHT: 201.6 LBS | HEART RATE: 90 BPM | SYSTOLIC BLOOD PRESSURE: 120 MMHG

## 2023-01-30 DIAGNOSIS — E66.01 MORBID OBESITY (HCC): ICD-10-CM

## 2023-01-30 DIAGNOSIS — R06.02 SOB (SHORTNESS OF BREATH): ICD-10-CM

## 2023-01-30 DIAGNOSIS — J44.9 COPD, MILD (HCC): ICD-10-CM

## 2023-01-30 DIAGNOSIS — J98.4 RESTRICTIVE LUNG DISEASE: Primary | ICD-10-CM

## 2023-01-30 PROCEDURE — 1123F ACP DISCUSS/DSCN MKR DOCD: CPT | Performed by: NURSE PRACTITIONER

## 2023-01-30 PROCEDURE — 1090F PRES/ABSN URINE INCON ASSESS: CPT | Performed by: NURSE PRACTITIONER

## 2023-01-30 PROCEDURE — 1036F TOBACCO NON-USER: CPT | Performed by: NURSE PRACTITIONER

## 2023-01-30 PROCEDURE — G8399 PT W/DXA RESULTS DOCUMENT: HCPCS | Performed by: NURSE PRACTITIONER

## 2023-01-30 PROCEDURE — G8427 DOCREV CUR MEDS BY ELIG CLIN: HCPCS | Performed by: NURSE PRACTITIONER

## 2023-01-30 PROCEDURE — 99213 OFFICE O/P EST LOW 20 MIN: CPT | Performed by: NURSE PRACTITIONER

## 2023-01-30 PROCEDURE — G8484 FLU IMMUNIZE NO ADMIN: HCPCS | Performed by: NURSE PRACTITIONER

## 2023-01-30 PROCEDURE — G8417 CALC BMI ABV UP PARAM F/U: HCPCS | Performed by: NURSE PRACTITIONER

## 2023-01-30 PROCEDURE — 3023F SPIROM DOC REV: CPT | Performed by: NURSE PRACTITIONER

## 2023-01-30 PROCEDURE — 3078F DIAST BP <80 MM HG: CPT | Performed by: NURSE PRACTITIONER

## 2023-01-30 PROCEDURE — 3074F SYST BP LT 130 MM HG: CPT | Performed by: NURSE PRACTITIONER

## 2023-01-30 RX ORDER — PREDNISONE 1 MG/1
5 TABLET ORAL DAILY
Qty: 30 TABLET | Refills: 2 | OUTPATIENT
Start: 2023-01-30

## 2023-01-30 RX ORDER — IPRATROPIUM BROMIDE AND ALBUTEROL SULFATE 2.5; .5 MG/3ML; MG/3ML
3 SOLUTION RESPIRATORY (INHALATION) EVERY 4 HOURS PRN
Qty: 360 ML | Refills: 5 | Status: SHIPPED | OUTPATIENT
Start: 2023-01-30

## 2023-01-30 RX ORDER — PREDNISONE 1 MG/1
5 TABLET ORAL DAILY
Qty: 30 TABLET | Refills: 2 | Status: SHIPPED | OUTPATIENT
Start: 2023-01-30

## 2023-01-30 RX ORDER — UMECLIDINIUM BROMIDE AND VILANTEROL TRIFENATATE 62.5; 25 UG/1; UG/1
1 POWDER RESPIRATORY (INHALATION) DAILY
Qty: 3 EACH | Refills: 3 | Status: SHIPPED | OUTPATIENT
Start: 2023-01-30

## 2023-01-30 ASSESSMENT — ENCOUNTER SYMPTOMS
GASTROINTESTINAL NEGATIVE: 1
WHEEZING: 0
ALLERGIC/IMMUNOLOGIC NEGATIVE: 1
COUGH: 0
EYES NEGATIVE: 1
SHORTNESS OF BREATH: 1

## 2023-01-30 NOTE — PROGRESS NOTES
Patient is experiencing SOB: yes, always    Patient is experiencing wheezing: Yes    Patient states they have had a strong unproductive    Phlegm is none    Patient is coughing up blood: no    Patient has been experiencing chest pains: non-existent    Patient is currently taking the following inhaler(s): anoro    Patient is currently taking the following nebulizer treatment(s): duoned, pulmicort    Patient is using their rescue inhaler 0 times per n/a. Patient is currently using 2 liters of oxygen during day as needed after exertion.     Patient needs refills of the following medications: n/a    All refills should be sent to n/a    Other: n/a

## 2023-01-30 NOTE — PROGRESS NOTES
[facility]    Patient: Mariano Barney, 80 y.o.   : 1940      Subjective     Chief Complaint   Patient presents with    Follow-up     RLD  6MO         HPI  Janet Powell is here for follow up for RLD and mild COPD  No new issues at her pulmonary baseline   SOB with exertion   Possible liver cancer patient refusing any biopsy- seen GI  Ordered oxygen 2LPM with activity only presents today on room air   Inhaler use of Anoro daily, Budesonide BID, and Albuterol PRN   No recent hospitalizations  Was treated recently by PCP with Prednisone for URI symptoms     Patient is experiencing SOB: yes, always     Patient is experiencing wheezing: Yes     Patient states they have had a strong unproductive     Phlegm is none     Patient is coughing up blood: no     Patient has been experiencing chest pains: non-existent     Patient is currently taking the following inhaler(s): anoro     Patient is currently taking the following nebulizer treatment(s): duoned, pulmicort     Patient is using their rescue inhaler 0 times per n/a. Patient is currently using 2 liters of oxygen during day as needed after exertion. Patient needs refills of the following medications: n/a     All refills should be sent to n/a     Other: n/a    Progress History:   Since last visit any new medical issues? No  New ER or hospital visits? No  Any new or changes in medicines? No  Using inhalers? Yes   Are they helpful?  Yes   Flu vaccine NA  Pneumonia vaccine- series completed   Covid vaccine- vaccine done x 1 and ended up in hospital     Past Medical hx   PMH:  Past Medical History:   Diagnosis Date    Arthritis     Basal cell carcinoma (BCC) of skin of face     cheek below right eye    COVID-19 2021    Dyslipidemia 02/15/2017    Fatigue     History of tobacco abuse 02/15/2017    Hyperlipidemia 10/13/2014    Hypertension     Non morbid obesity due to excess calories 02/15/2017    Pneumonia Precordial pain 02/15/2017    Rheumatoid arteritis ([facility] [address]     Smoker     SOB (shortness of breath) 02/15/2017    Tobacco abuse      SURGICAL HISTORY:  Past Surgical History:   Procedure Laterality Date    COLONOSCOPY W/ POLYPECTOMY      HEMORRHOID SURGERY      HERNIA REPAIR N/A     ROBOTIC UMBILICAL HERNIA REPAIR WITH MESH performed by Angelina Garcia MD at [address] [facility] [address]      SOCIAL HISTORY:  Social History     Tobacco Use    Smoking status: Former     Packs/day: 2.00     Years: 51.00     Pack years: 102.00     Types: Cigarettes     Start date: 1963     Quit date: 2012     Years since quittin.0    Smokeless tobacco: Never    Tobacco comments:     tryin to cut down   Vaping Use    Vaping Use: Never used   Substance Use Topics    Alcohol use: No     Comment: quit    Drug use: No     ALLERGIES:No Known Allergies  FAMILY HISTORY:  Family History   Problem Relation Age of Onset    Cancer Mother     Cancer Father     Heart Failure Sister     Cancer Brother     Cancer Daughter     Colon Cancer Neg Hx      CURRENT MEDICATIONS:  Current Outpatient Medications   Medication Sig Dispense Refill    Umeclidinium-Vilanterol (ANORO ELLIPTA) 62.5-25 MCG/ACT AEPB Inhale 1 each into the lungs daily 3 each 3    ipratropium-albuterol (DUONEB) 0.5-2.5 (3) MG/3ML SOLN nebulizer solution Inhale 3 mLs into the lungs every 4 hours as needed for Shortness of Breath or Other (wheezing) 360 mL 5    predniSONE (DELTASONE) 5 MG tablet TAKE 1 TABLET BY MOUTH DAILY 30 tablet 2    amLODIPine (NORVASC) 5 MG tablet TAKE 1 TABLET BY MOUTH DAILY 90 tablet 3    alendronate (FOSAMAX) 70 MG tablet TAKE 1 TABLET BY MOUTH EVERY 7 DAYS 12 tablet 1    budesonide (PULMICORT) 0.5 MG/2ML nebulizer suspension Take 2 mLs by nebulization 2 times daily 360 mL 3    ALENDRONATE SODIUM PO Take by mouth      acetaminophen (TYLENOL) 500 MG tablet Take 500 mg by mouth daily       No current facility-administered medications for this visit. ROS   Review of Systems   Constitutional:  Positive for fatigue. Negative for chills and fever. HENT: Negative. Negative for congestion. Eyes: Negative. Respiratory:  Positive for shortness of breath. Negative for cough and wheezing. Cardiovascular: Negative. Negative for chest pain and leg swelling. Gastrointestinal: Negative. Endocrine: Negative. Genitourinary: Negative. Musculoskeletal:  Positive for arthralgias and gait problem. Allergic/Immunologic: Negative. Hematological: Negative. Psychiatric/Behavioral: Negative. Negative for sleep disturbance. Physical exam   /68 (Site: Left Lower Arm, Position: Sitting, Cuff Size: Medium Adult)   Pulse 90   Temp 96.8 °F (36 °C) (Infrared)   Ht 4' 11\" (1.499 m)   Wt 201 lb 9.6 oz (91.4 kg)   SpO2 97%   BMI 40.72 kg/m²    Wt Readings from Last 3 Encounters:   01/30/23 201 lb 9.6 oz (91.4 kg)   01/23/23 200 lb (90.7 kg)   01/09/23 200 lb 9.6 oz (91 kg)       Physical Exam  Vitals and nursing note reviewed. Constitutional:       Appearance: She is morbidly obese. HENT:      Head: Normocephalic and atraumatic. Eyes:      Conjunctiva/sclera: Conjunctivae normal.      Pupils: Pupils are equal, round, and reactive to light. Neck:      Vascular: No JVD. Cardiovascular:      Rate and Rhythm: Normal rate and regular rhythm. Heart sounds: Normal heart sounds. No murmur heard. No friction rub. No gallop. Pulmonary:      Effort: Pulmonary effort is normal. No respiratory distress. Breath sounds: Examination of the right-upper field reveals wheezing. Examination of the left-upper field reveals wheezing. Wheezing present. No rales. Abdominal:      General: Bowel sounds are normal.      Palpations: Abdomen is soft. Musculoskeletal:         General: Normal range of motion. Cervical back: Normal range of motion and neck supple. Skin:     General: Skin is warm and dry. Capillary Refill: Capillary refill takes less than 2 seconds. Neurological:      Mental Status: She is alert and oriented to person, place, and time. Motor: Weakness present. Gait: Gait abnormal.   Psychiatric:         Behavior: Behavior normal.         Thought Content: Thought content normal.         Judgment: Judgment normal.        Results   Lung Nodule Screening     [] Qualifies    [x] Does not qualify   [] Declined    [] Completed  The USPSTF recommends annual screening for lung cancer with low-dose computed tomography (LDCT) in adults aged 48 to [de-identified] years who have a 30 pack-year smoking history and currently smoke or have quit within the past 20 years. Screening should be discontinued once a person has not smoked for 20 years or develops a health problem that substantially limits life expectancy or the ability or willingness to have curative lung surgery.     -No testing to review today     Assessment      Diagnosis Orders   1. Restrictive lung disease  ipratropium-albuterol (DUONEB) 0.5-2.5 (3) MG/3ML SOLN nebulizer solution      2. COPD, mild (HCC)  Umeclidinium-Vilanterol (ANORO ELLIPTA) 62.5-25 MCG/ACT AEPB    ipratropium-albuterol (DUONEB) 0.5-2.5 (3) MG/3ML SOLN nebulizer solution      3. Morbid obesity ([address]        4.  SOB (shortness of breath)  ipratropium-albuterol (DUONEB) 0.5-2.5 (3) MG/3ML SOLN nebulizer solution            Plan   -Continue current inhaler regimen  -Anoro refilled x 1 year  -Continue Budesonide BID- rinsing mouth after use   -Weight loss encouraged  -Activity as tolerated  -Oxygen use at 2LPM with activity encouraged  -Advised to maintain pneumonia vaccine with PCP and to take flu vaccine this coming season.  -Advised patient to call office with any changes, questions, or concerns regarding respiratory status    Will see Brandee Booth back in: 1 year    Carolina Locke [facility]  1/30/2023

## 2023-03-22 ENCOUNTER — TELEPHONE (OUTPATIENT)
Dept: RHEUMATOLOGY | Age: 83
End: 2023-03-22

## 2023-03-22 DIAGNOSIS — Z51.81 MEDICATION MONITORING ENCOUNTER: Primary | ICD-10-CM

## 2023-03-22 NOTE — TELEPHONE ENCOUNTER
Patients daughter, on hipaa, called to verify when Prince Butler is due for labs. She was thinking it was soon. Per last OV note: Medication monitoring               - CBC, CMP, sed rate, CRP q 4 months    It appears the last labs were done 11/02/2022, which was 4 months ago. However, I am not seeing any active lab orders. Please advise. Nasrin asked if she could just get a message through [employer] to let her know when the labs were in and that they use [facility].

## 2023-04-22 ENCOUNTER — NURSE ONLY (OUTPATIENT)
Dept: LAB | Age: 83
End: 2023-04-22

## 2023-04-22 DIAGNOSIS — Z51.81 MEDICATION MONITORING ENCOUNTER: ICD-10-CM

## 2023-04-22 LAB
ALBUMIN SERPL BCG-MCNC: 3.8 G/DL (ref 3.5–5.1)
ALP SERPL-CCNC: 80 U/L (ref 38–126)
ALT SERPL W/O P-5'-P-CCNC: 21 U/L (ref 11–66)
ANION GAP SERPL CALC-SCNC: 13 MEQ/L (ref 8–16)
ANISOCYTOSIS BLD QL SMEAR: PRESENT
AST SERPL-CCNC: 29 U/L (ref 5–40)
BASOPHILS ABSOLUTE: 0.1 THOU/MM3 (ref 0–0.1)
BASOPHILS NFR BLD AUTO: 0.8 %
BILIRUB SERPL-MCNC: 0.6 MG/DL (ref 0.3–1.2)
BUN SERPL-MCNC: 12 MG/DL (ref 7–22)
CALCIUM SERPL-MCNC: 9.3 MG/DL (ref 8.5–10.5)
CHLORIDE SERPL-SCNC: 106 MEQ/L (ref 98–111)
CO2 SERPL-SCNC: 21 MEQ/L (ref 23–33)
CREAT SERPL-MCNC: 0.8 MG/DL (ref 0.4–1.2)
CRP SERPL-MCNC: 1.1 MG/DL (ref 0–1)
DEPRECATED RDW RBC AUTO: 51.5 FL (ref 35–45)
EOSINOPHIL NFR BLD AUTO: 5 %
EOSINOPHILS ABSOLUTE: 0.5 THOU/MM3 (ref 0–0.4)
ERYTHROCYTE [DISTWIDTH] IN BLOOD BY AUTOMATED COUNT: 18.6 % (ref 11.5–14.5)
ERYTHROCYTE [SEDIMENTATION RATE] IN BLOOD BY WESTERGREN METHOD: 25 MM/HR (ref 0–20)
GFR SERPL CREATININE-BSD FRML MDRD: > 60 ML/MIN/1.73M2
GLUCOSE SERPL-MCNC: 111 MG/DL (ref 70–108)
HCT VFR BLD AUTO: 43.5 % (ref 37–47)
HGB BLD-MCNC: 12.5 GM/DL (ref 12–16)
HYPOCHROMIA BLD QL SMEAR: PRESENT
IMM GRANULOCYTES # BLD AUTO: 0.03 THOU/MM3 (ref 0–0.07)
IMM GRANULOCYTES NFR BLD AUTO: 0.3 %
LYMPHOCYTES ABSOLUTE: 2.4 THOU/MM3 (ref 1–4.8)
LYMPHOCYTES NFR BLD AUTO: 22.1 %
MCH RBC QN AUTO: 23.4 PG (ref 26–33)
MCHC RBC AUTO-ENTMCNC: 28.7 GM/DL (ref 32.2–35.5)
MCV RBC AUTO: 81.3 FL (ref 81–99)
MONOCYTES ABSOLUTE: 1 THOU/MM3 (ref 0.4–1.3)
MONOCYTES NFR BLD AUTO: 8.9 %
NEUTROPHILS NFR BLD AUTO: 62.9 %
NRBC BLD AUTO-RTO: 0 /100 WBC
PLATELET # BLD AUTO: 381 THOU/MM3 (ref 130–400)
PLATELET BLD QL SMEAR: ADEQUATE
PMV BLD AUTO: 11.4 FL (ref 9.4–12.4)
POTASSIUM SERPL-SCNC: 3.8 MEQ/L (ref 3.5–5.2)
PROT SERPL-MCNC: 7.3 G/DL (ref 6.1–8)
RBC # BLD AUTO: 5.35 MILL/MM3 (ref 4.2–5.4)
SCAN OF BLOOD SMEAR: NORMAL
SEGMENTED NEUTROPHILS ABSOLUTE COUNT: 6.7 THOU/MM3 (ref 1.8–7.7)
SODIUM SERPL-SCNC: 140 MEQ/L (ref 135–145)
WBC # BLD AUTO: 10.7 THOU/MM3 (ref 4.8–10.8)

## 2023-04-24 RX ORDER — ALENDRONATE SODIUM 70 MG/1
70 TABLET ORAL
Qty: 12 TABLET | Refills: 1 | Status: SHIPPED | OUTPATIENT
Start: 2023-04-24

## 2023-05-08 RX ORDER — PREDNISONE 1 MG/1
5 TABLET ORAL DAILY
Qty: 30 TABLET | Refills: 2 | Status: SHIPPED | OUTPATIENT
Start: 2023-05-08

## 2023-05-23 ENCOUNTER — OFFICE VISIT (OUTPATIENT)
Dept: RHEUMATOLOGY | Age: 83
End: 2023-05-23
Payer: MEDICARE

## 2023-05-23 VITALS
HEART RATE: 85 BPM | WEIGHT: 201 LBS | OXYGEN SATURATION: 93 % | DIASTOLIC BLOOD PRESSURE: 70 MMHG | SYSTOLIC BLOOD PRESSURE: 130 MMHG | BODY MASS INDEX: 40.52 KG/M2 | HEIGHT: 59 IN

## 2023-05-23 DIAGNOSIS — Z78.0 POSTMENOPAUSAL: ICD-10-CM

## 2023-05-23 DIAGNOSIS — M80.08XA AGE-RELATED OSTEOPOROSIS WITH CURRENT PATHOLOGICAL FRACTURE, VERTEBRA(E), INITIAL ENCOUNTER FOR FRACTURE (HCC): ICD-10-CM

## 2023-05-23 DIAGNOSIS — M15.9 OSTEOARTHRITIS OF MULTIPLE JOINTS, UNSPECIFIED OSTEOARTHRITIS TYPE: ICD-10-CM

## 2023-05-23 DIAGNOSIS — Z51.81 MEDICATION MONITORING ENCOUNTER: ICD-10-CM

## 2023-05-23 DIAGNOSIS — M05.9 SEROPOSITIVE RHEUMATOID ARTHRITIS (HCC): Primary | ICD-10-CM

## 2023-05-23 DIAGNOSIS — J98.4 RESTRICTIVE LUNG DISEASE: ICD-10-CM

## 2023-05-23 DIAGNOSIS — B19.20 HEPATITIS C VIRUS INFECTION WITHOUT HEPATIC COMA, UNSPECIFIED CHRONICITY: ICD-10-CM

## 2023-05-23 DIAGNOSIS — M81.0 AGE RELATED OSTEOPOROSIS, UNSPECIFIED PATHOLOGICAL FRACTURE PRESENCE: ICD-10-CM

## 2023-05-23 PROCEDURE — 3075F SYST BP GE 130 - 139MM HG: CPT | Performed by: INTERNAL MEDICINE

## 2023-05-23 PROCEDURE — 1123F ACP DISCUSS/DSCN MKR DOCD: CPT | Performed by: INTERNAL MEDICINE

## 2023-05-23 PROCEDURE — 99214 OFFICE O/P EST MOD 30 MIN: CPT | Performed by: INTERNAL MEDICINE

## 2023-05-23 PROCEDURE — 1036F TOBACCO NON-USER: CPT | Performed by: INTERNAL MEDICINE

## 2023-05-23 PROCEDURE — 3078F DIAST BP <80 MM HG: CPT | Performed by: INTERNAL MEDICINE

## 2023-05-23 PROCEDURE — 1090F PRES/ABSN URINE INCON ASSESS: CPT | Performed by: INTERNAL MEDICINE

## 2023-05-23 PROCEDURE — G8399 PT W/DXA RESULTS DOCUMENT: HCPCS | Performed by: INTERNAL MEDICINE

## 2023-05-23 PROCEDURE — G8417 CALC BMI ABV UP PARAM F/U: HCPCS | Performed by: INTERNAL MEDICINE

## 2023-05-23 PROCEDURE — G8427 DOCREV CUR MEDS BY ELIG CLIN: HCPCS | Performed by: INTERNAL MEDICINE

## 2023-05-23 ASSESSMENT — ENCOUNTER SYMPTOMS
COUGH: 1
ABDOMINAL PAIN: 0
BACK PAIN: 0
NAUSEA: 0
EYE PAIN: 0
EYE ITCHING: 0
SHORTNESS OF BREATH: 1
DIARRHEA: 0
TROUBLE SWALLOWING: 0
CONSTIPATION: 0

## 2023-05-23 NOTE — PROGRESS NOTES
disease cannot be ruled out. Please correlate with liver function tests and AFP levels. If these are elevated, CT-guided biopsy would be helpful for better evaluation. 2. Fluid/fluid level within the gallbladder possibly representing milk of calcium bile. No significant intra or extrahepatic biliary dilatation. No evidence of choledocholithiasis. 3. Moderate-sized hiatal hernia. 4. Borderline splenomegaly. 5. Small cystic area in the body of the pancreas. Mild atrophy involving the pancreas. 6. Small benign adenoma in the left adrenal gland. 7. 17 x 14   Bosniak type II cyst in the left kidney. Small Bosniak type I cysts in the right kidney. 8. Lumbar spondylosis. Mild dextroscoliosis. ASSESSMENT/PLAN    Assessment   Plan     Seropositive rheumatoid arthritis (HCC)  -  + , negative CCP. ESR/sed rate elevation, duration > 6 weeks, erosions on xrays. Exam with synovitis. - prior tx: prednisone (sig relief), meloxicam (no relief), sulfasalazine 1000 mg BID (LFT elevation), plaquenil 200 mg daily (macular degeneration)    - prednisone 5 mg daily   -- the patient declined a trial of arava or Azathioprine b/c of the continued synovitis     Restrictive lung disease  -- hx of COPD. Prior CXR w/ opacities concerning for scarring/fibrotic changes. ? Related to #1 vs other. HRCT with improvement of groundglass opacities. PFT's as above. -- repeat PFTs ordered. Hepatitis C- + HCV viral load 2021  Hepatitis B core antibody  LFT elevation - resolved. Liver mass- patient has refused further treatment or  workup this at this time. Osteoporosis  - T score of -3.3 in right femoral neck   - alendronate 70 mg PO weekly   - repeat dexa orderd for the end of June to early July 2023.       Osteoarthritis of multiple joints, unspecified osteoarthritis type               - continue meloxicam 15 mg daily    Medication monitoring   - CBC, CMP, sed rate, CRP q 4 months   - patient does have a

## 2023-07-10 RX ORDER — AMLODIPINE BESYLATE 5 MG/1
5 TABLET ORAL DAILY
Qty: 90 TABLET | Refills: 3 | Status: SHIPPED | OUTPATIENT
Start: 2023-07-10

## 2023-07-10 NOTE — TELEPHONE ENCOUNTER
Future Appointments   Date Time Provider [address]   9/26/2023  2:20 PM DO PORTER Giordano [facility] [place]   1/30/2024  2:20 PM ERNESTO Vergara - [address] [facility]    Recent Visits  Date Type Provider Dept   01/09/23 Office Visit ERNESTO Lai - CNP [facility]   05/17/22 Office Visit ERNESTO Lai - CNP [facility]   Showing recent visits within past 540 days with a meds authorizing provider and meeting all other requirements  Future Appointments  No visits were found meeting these conditions.   Showing future appointments within next 150 days with a meds authorizing provider and meeting all other requirements

## 2023-07-18 RX ORDER — ALENDRONATE SODIUM 70 MG/1
70 TABLET ORAL
Qty: 12 TABLET | Refills: 1 | Status: SHIPPED | OUTPATIENT
Start: 2023-07-18

## 2023-08-07 RX ORDER — PREDNISONE 5 MG/1
5 TABLET ORAL DAILY
Qty: 30 TABLET | Refills: 2 | Status: SHIPPED | OUTPATIENT
Start: 2023-08-07

## 2023-08-07 RX ORDER — PREDNISONE 20 MG/1
TABLET ORAL
Qty: 18 TABLET | Refills: 0 | OUTPATIENT
Start: 2023-08-07

## 2023-08-07 NOTE — TELEPHONE ENCOUNTER
Recent Visits  Date Type Provider Dept   01/09/23 Office Visit ERNESTO Burgess CNP [facility]   05/17/22 Office Visit ERNESTO Burgess CNP [facility]   Showing recent visits within past 540 days with a meds authorizing provider and meeting all other requirements  Future Appointments  Date Type Provider Dept   08/08/23 Appointment ERNESTO Burgess CNP [facility]   Showing future appointments within next 150 days with a meds authorizing provider and meeting all other requirements     Future Appointments   Date Time Provider [address]   8/8/2023  1:40 PM [facility]   8/21/2023  3:30 PM [facility]  [facility]   9/26/2023  2:20 PM [facility]  Larissa [facility]us   1/30/2024  2:20 PM ERNESTO Pleitez CNP [address] [facility]

## 2023-08-08 ENCOUNTER — OFFICE VISIT (OUTPATIENT)
Dept: FAMILY MEDICINE CLINIC | Age: 83
End: 2023-08-08
Payer: MEDICARE

## 2023-08-08 VITALS
SYSTOLIC BLOOD PRESSURE: 132 MMHG | OXYGEN SATURATION: 92 % | TEMPERATURE: 97.8 F | WEIGHT: 197.4 LBS | HEART RATE: 74 BPM | DIASTOLIC BLOOD PRESSURE: 76 MMHG | RESPIRATION RATE: 20 BRPM | BODY MASS INDEX: 39.8 KG/M2 | HEIGHT: 59 IN

## 2023-08-08 DIAGNOSIS — R06.2 WHEEZING: ICD-10-CM

## 2023-08-08 DIAGNOSIS — Z13.29 SCREENING FOR THYROID DISORDER: ICD-10-CM

## 2023-08-08 DIAGNOSIS — Z13.31 DEPRESSION SCREENING NEGATIVE: ICD-10-CM

## 2023-08-08 DIAGNOSIS — Z00.00 MEDICARE ANNUAL WELLNESS VISIT, SUBSEQUENT: Primary | ICD-10-CM

## 2023-08-08 DIAGNOSIS — Z13.220 SCREENING CHOLESTEROL LEVEL: ICD-10-CM

## 2023-08-08 DIAGNOSIS — N39.3 STRESS INCONTINENCE OF URINE: ICD-10-CM

## 2023-08-08 DIAGNOSIS — J44.9 CHRONIC OBSTRUCTIVE PULMONARY DISEASE, UNSPECIFIED COPD TYPE (HCC): ICD-10-CM

## 2023-08-08 DIAGNOSIS — J44.1 COPD EXACERBATION (HCC): ICD-10-CM

## 2023-08-08 DIAGNOSIS — B18.2 CHRONIC HEPATITIS C WITHOUT HEPATIC COMA (HCC): ICD-10-CM

## 2023-08-08 DIAGNOSIS — J44.9 COPD, MILD (HCC): ICD-10-CM

## 2023-08-08 PROBLEM — D47.3 ESSENTIAL (HEMORRHAGIC) THROMBOCYTHEMIA (HCC): Status: RESOLVED | Noted: 2023-01-09 | Resolved: 2023-08-08

## 2023-08-08 PROCEDURE — G8427 DOCREV CUR MEDS BY ELIG CLIN: HCPCS | Performed by: NURSE PRACTITIONER

## 2023-08-08 PROCEDURE — 3075F SYST BP GE 130 - 139MM HG: CPT | Performed by: NURSE PRACTITIONER

## 2023-08-08 PROCEDURE — 3023F SPIROM DOC REV: CPT | Performed by: NURSE PRACTITIONER

## 2023-08-08 PROCEDURE — G8399 PT W/DXA RESULTS DOCUMENT: HCPCS | Performed by: NURSE PRACTITIONER

## 2023-08-08 PROCEDURE — 1123F ACP DISCUSS/DSCN MKR DOCD: CPT | Performed by: NURSE PRACTITIONER

## 2023-08-08 PROCEDURE — 1090F PRES/ABSN URINE INCON ASSESS: CPT | Performed by: NURSE PRACTITIONER

## 2023-08-08 PROCEDURE — G8417 CALC BMI ABV UP PARAM F/U: HCPCS | Performed by: NURSE PRACTITIONER

## 2023-08-08 PROCEDURE — G0439 PPPS, SUBSEQ VISIT: HCPCS | Performed by: NURSE PRACTITIONER

## 2023-08-08 PROCEDURE — 99213 OFFICE O/P EST LOW 20 MIN: CPT | Performed by: NURSE PRACTITIONER

## 2023-08-08 PROCEDURE — 1036F TOBACCO NON-USER: CPT | Performed by: NURSE PRACTITIONER

## 2023-08-08 PROCEDURE — 3078F DIAST BP <80 MM HG: CPT | Performed by: NURSE PRACTITIONER

## 2023-08-08 RX ORDER — GUAIFENESIN 600 MG/1
600 TABLET, EXTENDED RELEASE ORAL 2 TIMES DAILY
Qty: 30 TABLET | Refills: 0 | Status: SHIPPED | OUTPATIENT
Start: 2023-08-08 | End: 2023-08-23

## 2023-08-08 RX ORDER — DOXYCYCLINE HYCLATE 100 MG
100 TABLET ORAL 2 TIMES DAILY
Qty: 20 TABLET | Refills: 0 | Status: SHIPPED | OUTPATIENT
Start: 2023-08-08 | End: 2023-08-18

## 2023-08-08 SDOH — ECONOMIC STABILITY: FOOD INSECURITY: WITHIN THE PAST 12 MONTHS, THE FOOD YOU BOUGHT JUST DIDN'T LAST AND YOU DIDN'T HAVE MONEY TO GET MORE.: NEVER TRUE

## 2023-08-08 SDOH — ECONOMIC STABILITY: TRANSPORTATION INSECURITY
IN THE PAST 12 MONTHS, HAS LACK OF TRANSPORTATION KEPT YOU FROM MEETINGS, WORK, OR FROM GETTING THINGS NEEDED FOR DAILY LIVING?: PATIENT DECLINED

## 2023-08-08 SDOH — ECONOMIC STABILITY: FOOD INSECURITY: WITHIN THE PAST 12 MONTHS, YOU WORRIED THAT YOUR FOOD WOULD RUN OUT BEFORE YOU GOT MONEY TO BUY MORE.: NEVER TRUE

## 2023-08-08 SDOH — ECONOMIC STABILITY: HOUSING INSECURITY
IN THE LAST 12 MONTHS, WAS THERE A TIME WHEN YOU DID NOT HAVE A STEADY PLACE TO SLEEP OR SLEPT IN A SHELTER (INCLUDING NOW)?: NO

## 2023-08-08 SDOH — HEALTH STABILITY: PHYSICAL HEALTH: ON AVERAGE, HOW MANY DAYS PER WEEK DO YOU ENGAGE IN MODERATE TO STRENUOUS EXERCISE (LIKE A BRISK WALK)?: 0 DAYS

## 2023-08-08 SDOH — ECONOMIC STABILITY: INCOME INSECURITY: HOW HARD IS IT FOR YOU TO PAY FOR THE VERY BASICS LIKE FOOD, HOUSING, MEDICAL CARE, AND HEATING?: NOT HARD AT ALL

## 2023-08-08 SDOH — HEALTH STABILITY: PHYSICAL HEALTH: ON AVERAGE, HOW MANY MINUTES DO YOU ENGAGE IN EXERCISE AT THIS LEVEL?: 0 MIN

## 2023-08-08 ASSESSMENT — PATIENT HEALTH QUESTIONNAIRE - PHQ9
2. FEELING DOWN, DEPRESSED OR HOPELESS: 0
5. POOR APPETITE OR OVEREATING: 2
SUM OF ALL RESPONSES TO PHQ QUESTIONS 1-9: 8
SUM OF ALL RESPONSES TO PHQ9 QUESTIONS 1 & 2: 3
10. IF YOU CHECKED OFF ANY PROBLEMS, HOW DIFFICULT HAVE THESE PROBLEMS MADE IT FOR YOU TO DO YOUR WORK, TAKE CARE OF THINGS AT HOME, OR GET ALONG WITH OTHER PEOPLE: 2
SUM OF ALL RESPONSES TO PHQ QUESTIONS 1-9: 8
7. TROUBLE CONCENTRATING ON THINGS, SUCH AS READING THE NEWSPAPER OR WATCHING TELEVISION: 0
3. TROUBLE FALLING OR STAYING ASLEEP: 0
1. LITTLE INTEREST OR PLEASURE IN DOING THINGS: 3
8. MOVING OR SPEAKING SO SLOWLY THAT OTHER PEOPLE COULD HAVE NOTICED. OR THE OPPOSITE, BEING SO FIGETY OR RESTLESS THAT YOU HAVE BEEN MOVING AROUND A LOT MORE THAN USUAL: 0
SUM OF ALL RESPONSES TO PHQ QUESTIONS 1-9: 8
SUM OF ALL RESPONSES TO PHQ QUESTIONS 1-9: 8
4. FEELING TIRED OR HAVING LITTLE ENERGY: 3
6. FEELING BAD ABOUT YOURSELF - OR THAT YOU ARE A FAILURE OR HAVE LET YOURSELF OR YOUR FAMILY DOWN: 0
9. THOUGHTS THAT YOU WOULD BE BETTER OFF DEAD, OR OF HURTING YOURSELF: 0

## 2023-08-08 ASSESSMENT — LIFESTYLE VARIABLES
HOW MANY STANDARD DRINKS CONTAINING ALCOHOL DO YOU HAVE ON A TYPICAL DAY: 0
HOW MANY STANDARD DRINKS CONTAINING ALCOHOL DO YOU HAVE ON A TYPICAL DAY: PATIENT DOES NOT DRINK
HOW OFTEN DO YOU HAVE SIX OR MORE DRINKS ON ONE OCCASION: 1
HOW OFTEN DO YOU HAVE A DRINK CONTAINING ALCOHOL: 1
HOW OFTEN DO YOU HAVE A DRINK CONTAINING ALCOHOL: NEVER

## 2023-08-08 NOTE — PROGRESS NOTES
ERNESTO Higginbotham CNP   budesonide (PULMICORT) 0.5 MG/2ML nebulizer suspension Take 2 mLs by nebulization 2 times daily Yes ERNESTO Higginbotham CNP   acetaminophen (TYLENOL) 500 MG tablet Take 1 tablet by mouth daily Yes Historical Provider, MD Romero (Including outside providers/suppliers regularly involved in providing care):   Patient Care Team:  ERNESTO Rivera CNP as PCP - General (Nurse Practitioner)  ERNESTO Rivera CNP as PCP - Empaneled Provider     Reviewed and updated this visit:  Tobacco  Allergies  Meds  Med Hx  Surg Hx  Soc Hx  Fam Hx

## 2023-08-10 RX ORDER — BUDESONIDE 0.5 MG/2ML
INHALANT ORAL
Qty: 360 ML | Refills: 3 | Status: SHIPPED | OUTPATIENT
Start: 2023-08-10

## 2023-08-21 ENCOUNTER — NURSE ONLY (OUTPATIENT)
Dept: LAB | Age: 83
End: 2023-08-21

## 2023-08-21 ENCOUNTER — HOSPITAL ENCOUNTER (OUTPATIENT)
Dept: WOMENS IMAGING | Age: 83
Discharge: HOME | End: 2023-08-21
Payer: MEDICARE

## 2023-08-21 DIAGNOSIS — R06.2 WHEEZING: ICD-10-CM

## 2023-08-21 DIAGNOSIS — Z00.00 MEDICARE ANNUAL WELLNESS VISIT, SUBSEQUENT: ICD-10-CM

## 2023-08-21 DIAGNOSIS — Z13.29 SCREENING FOR THYROID DISORDER: ICD-10-CM

## 2023-08-21 DIAGNOSIS — J44.1 COPD EXACERBATION (HCC): ICD-10-CM

## 2023-08-21 DIAGNOSIS — Z78.0 POSTMENOPAUSAL: ICD-10-CM

## 2023-08-21 LAB
BASOPHILS ABSOLUTE: 0.1 THOU/MM3 (ref 0–0.1)
BASOPHILS NFR BLD AUTO: 0.7 %
DEPRECATED RDW RBC AUTO: 54.4 FL (ref 35–45)
EOSINOPHIL NFR BLD AUTO: 1.4 %
EOSINOPHILS ABSOLUTE: 0.1 THOU/MM3 (ref 0–0.4)
ERYTHROCYTE [DISTWIDTH] IN BLOOD BY AUTOMATED COUNT: 19.5 % (ref 11.5–14.5)
HCT VFR BLD AUTO: 45.7 % (ref 37–47)
HGB BLD-MCNC: 13.2 GM/DL (ref 12–16)
HYPOCHROMIA BLD QL SMEAR: PRESENT
IMM GRANULOCYTES # BLD AUTO: 0.07 THOU/MM3 (ref 0–0.07)
IMM GRANULOCYTES NFR BLD AUTO: 0.7 %
LYMPHOCYTES ABSOLUTE: 1.3 THOU/MM3 (ref 1–4.8)
LYMPHOCYTES NFR BLD AUTO: 13 %
MCH RBC QN AUTO: 23.4 PG (ref 26–33)
MCHC RBC AUTO-ENTMCNC: 28.9 GM/DL (ref 32.2–35.5)
MCV RBC AUTO: 81.2 FL (ref 81–99)
MONOCYTES ABSOLUTE: 0.5 THOU/MM3 (ref 0.4–1.3)
MONOCYTES NFR BLD AUTO: 4.8 %
NEUTROPHILS NFR BLD AUTO: 79.4 %
NRBC BLD AUTO-RTO: 0 /100 WBC
PLATELET # BLD AUTO: 187 THOU/MM3 (ref 130–400)
PLATELET BLD QL SMEAR: ADEQUATE
PMV BLD AUTO: 11.3 FL (ref 9.4–12.4)
RBC # BLD AUTO: 5.63 MILL/MM3 (ref 4.2–5.4)
SCAN OF BLOOD SMEAR: NORMAL
SEGMENTED NEUTROPHILS ABSOLUTE COUNT: 7.7 THOU/MM3 (ref 1.8–7.7)
TSH SERPL DL<=0.005 MIU/L-ACNC: 1.25 UIU/ML (ref 0.4–4.2)
WBC # BLD AUTO: 9.7 THOU/MM3 (ref 4.8–10.8)

## 2023-08-21 PROCEDURE — 77080 DXA BONE DENSITY AXIAL: CPT

## 2023-08-22 ENCOUNTER — TELEPHONE (OUTPATIENT)
Dept: FAMILY MEDICINE CLINIC | Age: 83
End: 2023-08-22

## 2023-08-22 NOTE — TELEPHONE ENCOUNTER
I called and spoke to [facility] (ok per signed HIPAA) and she had no questions at this time.

## 2023-08-22 NOTE — TELEPHONE ENCOUNTER
----- Message from ERNESTO Burgess CNP sent at 8/22/2023  7:55 AM EDT -----  Let pt know her labs are stable, hgb and hct have improved, TSH levels are normal.

## 2023-09-26 ENCOUNTER — NURSE ONLY (OUTPATIENT)
Dept: LAB | Age: 83
End: 2023-09-26

## 2023-09-26 ENCOUNTER — OFFICE VISIT (OUTPATIENT)
Dept: RHEUMATOLOGY | Age: 83
End: 2023-09-26
Payer: MEDICARE

## 2023-09-26 VITALS
OXYGEN SATURATION: 94 % | SYSTOLIC BLOOD PRESSURE: 122 MMHG | HEART RATE: 83 BPM | BODY MASS INDEX: 39.78 KG/M2 | DIASTOLIC BLOOD PRESSURE: 80 MMHG | WEIGHT: 197.31 LBS | HEIGHT: 59 IN

## 2023-09-26 DIAGNOSIS — Z51.81 MEDICATION MONITORING ENCOUNTER: ICD-10-CM

## 2023-09-26 DIAGNOSIS — B19.20 HEPATITIS C VIRUS INFECTION WITHOUT HEPATIC COMA, UNSPECIFIED CHRONICITY: ICD-10-CM

## 2023-09-26 DIAGNOSIS — C44.90 SKIN CANCER: Primary | ICD-10-CM

## 2023-09-26 DIAGNOSIS — L98.8 SKIN PLAQUE: ICD-10-CM

## 2023-09-26 DIAGNOSIS — J98.4 RESTRICTIVE LUNG DISEASE: ICD-10-CM

## 2023-09-26 DIAGNOSIS — M15.9 OSTEOARTHRITIS OF MULTIPLE JOINTS, UNSPECIFIED OSTEOARTHRITIS TYPE: ICD-10-CM

## 2023-09-26 DIAGNOSIS — M05.9 SEROPOSITIVE RHEUMATOID ARTHRITIS (HCC): ICD-10-CM

## 2023-09-26 LAB
ALBUMIN SERPL BCG-MCNC: 4.1 G/DL (ref 3.5–5.1)
ALP SERPL-CCNC: 93 U/L (ref 38–126)
ALT SERPL W/O P-5'-P-CCNC: 28 U/L (ref 11–66)
ANION GAP SERPL CALC-SCNC: 14 MEQ/L (ref 8–16)
AST SERPL-CCNC: 40 U/L (ref 5–40)
BASOPHILS ABSOLUTE: 0.1 THOU/MM3 (ref 0–0.1)
BASOPHILS NFR BLD AUTO: 0.7 %
BILIRUB SERPL-MCNC: 0.4 MG/DL (ref 0.3–1.2)
BUN SERPL-MCNC: 15 MG/DL (ref 7–22)
CALCIUM SERPL-MCNC: 9.6 MG/DL (ref 8.5–10.5)
CHLORIDE SERPL-SCNC: 106 MEQ/L (ref 98–111)
CO2 SERPL-SCNC: 21 MEQ/L (ref 23–33)
CREAT SERPL-MCNC: 0.8 MG/DL (ref 0.4–1.2)
CRP SERPL-MCNC: 0.86 MG/DL (ref 0–1)
DEPRECATED RDW RBC AUTO: 49.2 FL (ref 35–45)
EOSINOPHIL NFR BLD AUTO: 1.4 %
EOSINOPHILS ABSOLUTE: 0.1 THOU/MM3 (ref 0–0.4)
ERYTHROCYTE [DISTWIDTH] IN BLOOD BY AUTOMATED COUNT: 18.4 % (ref 11.5–14.5)
ERYTHROCYTE [SEDIMENTATION RATE] IN BLOOD BY WESTERGREN METHOD: 45 MM/HR (ref 0–20)
GFR SERPL CREATININE-BSD FRML MDRD: > 60 ML/MIN/1.73M2
GLUCOSE SERPL-MCNC: 95 MG/DL (ref 70–108)
HCT VFR BLD AUTO: 42.1 % (ref 37–47)
HGB BLD-MCNC: 12.5 GM/DL (ref 12–16)
IMM GRANULOCYTES # BLD AUTO: 0.04 THOU/MM3 (ref 0–0.07)
IMM GRANULOCYTES NFR BLD AUTO: 0.4 %
LYMPHOCYTES ABSOLUTE: 1.3 THOU/MM3 (ref 1–4.8)
LYMPHOCYTES NFR BLD AUTO: 14 %
MCH RBC QN AUTO: 23.2 PG (ref 26–33)
MCHC RBC AUTO-ENTMCNC: 29.7 GM/DL (ref 32.2–35.5)
MCV RBC AUTO: 78.1 FL (ref 81–99)
MONOCYTES ABSOLUTE: 0.8 THOU/MM3 (ref 0.4–1.3)
MONOCYTES NFR BLD AUTO: 8.5 %
NEUTROPHILS NFR BLD AUTO: 75 %
NRBC BLD AUTO-RTO: 0 /100 WBC
PLATELET # BLD AUTO: 297 THOU/MM3 (ref 130–400)
PLATELET BLD QL SMEAR: ADEQUATE
PMV BLD AUTO: 11.4 FL (ref 9.4–12.4)
POTASSIUM SERPL-SCNC: 4.1 MEQ/L (ref 3.5–5.2)
PROT SERPL-MCNC: 7.3 G/DL (ref 6.1–8)
RBC # BLD AUTO: 5.39 MILL/MM3 (ref 4.2–5.4)
SCAN OF BLOOD SMEAR: NORMAL
SEGMENTED NEUTROPHILS ABSOLUTE COUNT: 6.8 THOU/MM3 (ref 1.8–7.7)
SODIUM SERPL-SCNC: 141 MEQ/L (ref 135–145)
WBC # BLD AUTO: 9 THOU/MM3 (ref 4.8–10.8)

## 2023-09-26 PROCEDURE — 3074F SYST BP LT 130 MM HG: CPT | Performed by: INTERNAL MEDICINE

## 2023-09-26 PROCEDURE — 99214 OFFICE O/P EST MOD 30 MIN: CPT | Performed by: INTERNAL MEDICINE

## 2023-09-26 PROCEDURE — 3079F DIAST BP 80-89 MM HG: CPT | Performed by: INTERNAL MEDICINE

## 2023-09-26 PROCEDURE — G8417 CALC BMI ABV UP PARAM F/U: HCPCS | Performed by: INTERNAL MEDICINE

## 2023-09-26 PROCEDURE — 1090F PRES/ABSN URINE INCON ASSESS: CPT | Performed by: INTERNAL MEDICINE

## 2023-09-26 PROCEDURE — G8427 DOCREV CUR MEDS BY ELIG CLIN: HCPCS | Performed by: INTERNAL MEDICINE

## 2023-09-26 PROCEDURE — 1036F TOBACCO NON-USER: CPT | Performed by: INTERNAL MEDICINE

## 2023-09-26 PROCEDURE — 1123F ACP DISCUSS/DSCN MKR DOCD: CPT | Performed by: INTERNAL MEDICINE

## 2023-09-26 PROCEDURE — G8399 PT W/DXA RESULTS DOCUMENT: HCPCS | Performed by: INTERNAL MEDICINE

## 2023-09-26 ASSESSMENT — ENCOUNTER SYMPTOMS
COUGH: 1
TROUBLE SWALLOWING: 0
BACK PAIN: 0
SHORTNESS OF BREATH: 1
EYES NEGATIVE: 1
GASTROINTESTINAL NEGATIVE: 1

## 2023-09-26 NOTE — PROGRESS NOTES
[facility] RHEUMATOLOGY FOLLOW UP NOTE       Date Of Service: 9/26/2023  Provider: Teagan Malin DO    Name: Ector Pride   MRN: [id number]    Chief Complaint(s)     Chief Complaint   Patient presents with    Follow-up     4 mo f/u, Seropositive rheumatoid arthritis ([address])    Pain     5/10 -  Lt arm        History of Present Illness (HPI)     Ector Pride  is P(L)89 y.o. female with a hx of HTN, HLD, tobacco abuse, COPD, cancer right eye  here for the f/u evaluation of seropositive rheumatoid arthritis    Interval hx:   RA/ osteoarthritis:   -- pain bilateral wrists w/ pain up to 5.5/10 over the past couple weeks. Reported swelling in the wrist and hands. Denies aggravating factors. -- significant lethargy per family member. - increased sleeping.   -- respiratory symptoms unchanged. - contineud SOB, wheezing, dry cough. REVIEW OF SYSTEMS: (ROS)    Review of Systems   Constitutional:  Positive for fatigue. HENT:  Positive for hearing loss. Negative for congestion and trouble swallowing. Eyes: Negative. Respiratory:  Positive for cough and shortness of breath. Cardiovascular: Negative. Gastrointestinal: Negative. Endocrine: Negative. Genitourinary: Negative. Urinary incontinence -- wearing pad per daughter. Musculoskeletal:  Positive for arthralgias and joint swelling. Negative for back pain. Skin:  Negative for rash. Neurological:  Negative for dizziness, weakness, numbness and headaches. Psychiatric/Behavioral:  The patient is not nervous/anxious. PmHx:  has a past medical history of Arthritis, Basal cell carcinoma (BCC) of skin of face, COVID-19, Dyslipidemia, Fatigue, History of tobacco abuse, Hyperlipidemia, Hypertension, Non morbid obesity due to excess calories, Pneumonia, Precordial pain, Rheumatoid arteritis ([address]), Smoker, SOB (shortness of breath), and Tobacco abuse. Social History:  reports that she quit smoking about 11 years ago.  Her

## 2023-11-20 NOTE — TELEPHONE ENCOUNTER
Pt's daughter states she's still pretty lethargic, she just states she's so tired  No fever , no pain   she's not eating or drinking much, her urine is dark and has a strong odor The patient is a 46y Male complaining of

## 2023-11-24 RX ORDER — PREDNISONE 5 MG/1
5 TABLET ORAL DAILY
Qty: 30 TABLET | Refills: 2 | Status: SHIPPED | OUTPATIENT
Start: 2023-11-24

## 2023-11-27 RX ORDER — PREDNISONE 5 MG/1
5 TABLET ORAL DAILY
Qty: 30 TABLET | Refills: 2 | Status: SHIPPED | OUTPATIENT
Start: 2023-11-27

## 2023-12-12 DIAGNOSIS — R05.1 ACUTE COUGH: Primary | ICD-10-CM

## 2023-12-12 RX ORDER — BENZONATATE 100 MG/1
100-200 CAPSULE ORAL 3 TIMES DAILY PRN
Qty: 60 CAPSULE | Refills: 1 | Status: SHIPPED | OUTPATIENT
Start: 2023-12-12 | End: 2023-12-19

## 2024-01-04 ENCOUNTER — OFFICE VISIT (OUTPATIENT)
Dept: RHEUMATOLOGY | Age: 84
End: 2024-01-04
Payer: MEDICARE

## 2024-01-04 ENCOUNTER — NURSE ONLY (OUTPATIENT)
Dept: LAB | Age: 84
End: 2024-01-04

## 2024-01-04 VITALS
WEIGHT: 197 LBS | DIASTOLIC BLOOD PRESSURE: 68 MMHG | HEIGHT: 59 IN | HEART RATE: 64 BPM | BODY MASS INDEX: 39.72 KG/M2 | SYSTOLIC BLOOD PRESSURE: 116 MMHG | OXYGEN SATURATION: 92 %

## 2024-01-04 DIAGNOSIS — M15.9 OSTEOARTHRITIS OF MULTIPLE JOINTS, UNSPECIFIED OSTEOARTHRITIS TYPE: ICD-10-CM

## 2024-01-04 DIAGNOSIS — Z51.81 MEDICATION MONITORING ENCOUNTER: ICD-10-CM

## 2024-01-04 DIAGNOSIS — E66.01 SEVERE OBESITY (BMI 35.0-39.9) WITH COMORBIDITY (HCC): ICD-10-CM

## 2024-01-04 DIAGNOSIS — B19.20 HEPATITIS C VIRUS INFECTION WITHOUT HEPATIC COMA, UNSPECIFIED CHRONICITY: ICD-10-CM

## 2024-01-04 DIAGNOSIS — J98.4 RESTRICTIVE LUNG DISEASE: ICD-10-CM

## 2024-01-04 DIAGNOSIS — M05.9 SEROPOSITIVE RHEUMATOID ARTHRITIS (HCC): Primary | ICD-10-CM

## 2024-01-04 LAB
ALBUMIN SERPL BCG-MCNC: 3.8 G/DL (ref 3.5–5.1)
ALP SERPL-CCNC: 75 U/L (ref 38–126)
ALT SERPL W/O P-5'-P-CCNC: 15 U/L (ref 11–66)
ANION GAP SERPL CALC-SCNC: 13 MEQ/L (ref 8–16)
AST SERPL-CCNC: 25 U/L (ref 5–40)
BASOPHILS ABSOLUTE: 0.1 THOU/MM3 (ref 0–0.1)
BASOPHILS NFR BLD AUTO: 0.8 %
BILIRUB SERPL-MCNC: 0.4 MG/DL (ref 0.3–1.2)
BUN SERPL-MCNC: 12 MG/DL (ref 7–22)
CALCIUM SERPL-MCNC: 9.2 MG/DL (ref 8.5–10.5)
CHLORIDE SERPL-SCNC: 105 MEQ/L (ref 98–111)
CO2 SERPL-SCNC: 22 MEQ/L (ref 23–33)
CREAT SERPL-MCNC: 0.8 MG/DL (ref 0.4–1.2)
DEPRECATED RDW RBC AUTO: 51.6 FL (ref 35–45)
EOSINOPHIL NFR BLD AUTO: 5 %
EOSINOPHILS ABSOLUTE: 0.5 THOU/MM3 (ref 0–0.4)
ERYTHROCYTE [DISTWIDTH] IN BLOOD BY AUTOMATED COUNT: 19.1 % (ref 11.5–14.5)
ERYTHROCYTE [SEDIMENTATION RATE] IN BLOOD BY WESTERGREN METHOD: 30 MM/HR (ref 0–20)
GFR SERPL CREATININE-BSD FRML MDRD: > 60 ML/MIN/1.73M2
GLUCOSE SERPL-MCNC: 85 MG/DL (ref 70–108)
HCT VFR BLD AUTO: 42.1 % (ref 37–47)
HGB BLD-MCNC: 12.6 GM/DL (ref 12–16)
IMM GRANULOCYTES # BLD AUTO: 0.03 THOU/MM3 (ref 0–0.07)
IMM GRANULOCYTES NFR BLD AUTO: 0.3 %
LYMPHOCYTES ABSOLUTE: 2 THOU/MM3 (ref 1–4.8)
LYMPHOCYTES NFR BLD AUTO: 21.2 %
MCH RBC QN AUTO: 23.5 PG (ref 26–33)
MCHC RBC AUTO-ENTMCNC: 29.9 GM/DL (ref 32.2–35.5)
MCV RBC AUTO: 78.4 FL (ref 81–99)
MONOCYTES ABSOLUTE: 0.7 THOU/MM3 (ref 0.4–1.3)
MONOCYTES NFR BLD AUTO: 7.9 %
NEUTROPHILS NFR BLD AUTO: 64.8 %
NRBC BLD AUTO-RTO: 0 /100 WBC
PLATELET # BLD AUTO: 385 THOU/MM3 (ref 130–400)
PMV BLD AUTO: 10.5 FL (ref 9.4–12.4)
POTASSIUM SERPL-SCNC: 3.6 MEQ/L (ref 3.5–5.2)
PROT SERPL-MCNC: 7.6 G/DL (ref 6.1–8)
RBC # BLD AUTO: 5.37 MILL/MM3 (ref 4.2–5.4)
SEGMENTED NEUTROPHILS ABSOLUTE COUNT: 6 THOU/MM3 (ref 1.8–7.7)
SODIUM SERPL-SCNC: 140 MEQ/L (ref 135–145)
WBC # BLD AUTO: 9.3 THOU/MM3 (ref 4.8–10.8)

## 2024-01-04 PROCEDURE — 1123F ACP DISCUSS/DSCN MKR DOCD: CPT | Performed by: INTERNAL MEDICINE

## 2024-01-04 PROCEDURE — 1090F PRES/ABSN URINE INCON ASSESS: CPT | Performed by: INTERNAL MEDICINE

## 2024-01-04 PROCEDURE — 3078F DIAST BP <80 MM HG: CPT | Performed by: INTERNAL MEDICINE

## 2024-01-04 PROCEDURE — G8399 PT W/DXA RESULTS DOCUMENT: HCPCS | Performed by: INTERNAL MEDICINE

## 2024-01-04 PROCEDURE — G8417 CALC BMI ABV UP PARAM F/U: HCPCS | Performed by: INTERNAL MEDICINE

## 2024-01-04 PROCEDURE — 99214 OFFICE O/P EST MOD 30 MIN: CPT | Performed by: INTERNAL MEDICINE

## 2024-01-04 PROCEDURE — 3074F SYST BP LT 130 MM HG: CPT | Performed by: INTERNAL MEDICINE

## 2024-01-04 PROCEDURE — G8427 DOCREV CUR MEDS BY ELIG CLIN: HCPCS | Performed by: INTERNAL MEDICINE

## 2024-01-04 PROCEDURE — 1036F TOBACCO NON-USER: CPT | Performed by: INTERNAL MEDICINE

## 2024-01-04 PROCEDURE — G8484 FLU IMMUNIZE NO ADMIN: HCPCS | Performed by: INTERNAL MEDICINE

## 2024-01-04 ASSESSMENT — ENCOUNTER SYMPTOMS
COUGH: 1
EYES NEGATIVE: 1
TROUBLE SWALLOWING: 0
SHORTNESS OF BREATH: 1
BACK PAIN: 0
GASTROINTESTINAL NEGATIVE: 1

## 2024-01-04 NOTE — PROGRESS NOTES
[facility] RHEUMATOLOGY FOLLOW UP NOTE       Date Of Service: 1/4/2024  Provider: FRANNY JONES DO  Name: Court Hutchison   MRN: [id number]      Subjective     Cc: Follow-up (3 month f/u Seropositive rheumatoid arthritis)    Court Hutchison  is a(n)83 y.o. female with a hx of HTN, HLD, tobacco abuse, COPD, cancer right eye  here for the f/u evaluation of seropositive rheumatoid arthritis    -- skin cancer along the nose - awaiting resection.     -- skin cancer resection of cancer base of the left neck.   -- 25 lbs wt loss with decreased snacking    RA/ osteoarthritis:  - no flares. Pain bilateral hands, mild up to 3/10 over the past wek. Denies aggravating factors. . + swelling of hands.   -- significant lethargy per daughter.  -- respiratory symptoms unchanged. - contineud SOB, wheezing, dry cough.           REVIEW OF SYSTEMS: (ROS)    Review of Systems   Constitutional:  Positive for fatigue.   HENT:  Positive for hearing loss. Negative for congestion and trouble swallowing.    Eyes: Negative.    Respiratory:  Positive for cough and shortness of breath.    Cardiovascular: Negative.    Gastrointestinal: Negative.    Endocrine: Negative.    Genitourinary: Negative.         Urinary incontinence -- wearing pad per daughter.    Musculoskeletal:  Positive for arthralgias and joint swelling. Negative for back pain.   Skin:  Negative for rash.   Neurological:  Negative for dizziness, weakness, numbness and headaches.   Psychiatric/Behavioral:  The patient is not nervous/anxious.        PmHx:  has a past medical history of Arthritis, Basal cell carcinoma (BCC) of skin of face, COVID-19, Dyslipidemia, Fatigue, History of tobacco abuse, Hyperlipidemia, Hypertension, Non morbid obesity due to excess calories, Pneumonia, Precordial pain, Rheumatoid arteritis (HCC), Smoker, SOB (shortness of breath), and Tobacco abuse.     Social History:  reports that she quit smoking about 11 years ago. Her smoking use included

## 2024-01-30 ENCOUNTER — OFFICE VISIT (OUTPATIENT)
Dept: PULMONOLOGY | Age: 84
End: 2024-01-30
Payer: MEDICARE

## 2024-01-30 VITALS
WEIGHT: 178 LBS | BODY MASS INDEX: 35.88 KG/M2 | SYSTOLIC BLOOD PRESSURE: 124 MMHG | DIASTOLIC BLOOD PRESSURE: 80 MMHG | HEART RATE: 83 BPM | OXYGEN SATURATION: 92 % | TEMPERATURE: 98 F | HEIGHT: 59 IN

## 2024-01-30 DIAGNOSIS — J43.2 CENTRILOBULAR EMPHYSEMA (HCC): ICD-10-CM

## 2024-01-30 DIAGNOSIS — J98.4 RESTRICTIVE LUNG DISEASE: ICD-10-CM

## 2024-01-30 DIAGNOSIS — Z87.891 PERSONAL HISTORY OF TOBACCO USE: ICD-10-CM

## 2024-01-30 DIAGNOSIS — J44.9 COPD, MILD (HCC): Primary | ICD-10-CM

## 2024-01-30 PROCEDURE — 3079F DIAST BP 80-89 MM HG: CPT | Performed by: NURSE PRACTITIONER

## 2024-01-30 PROCEDURE — 1036F TOBACCO NON-USER: CPT | Performed by: NURSE PRACTITIONER

## 2024-01-30 PROCEDURE — G8484 FLU IMMUNIZE NO ADMIN: HCPCS | Performed by: NURSE PRACTITIONER

## 2024-01-30 PROCEDURE — G8399 PT W/DXA RESULTS DOCUMENT: HCPCS | Performed by: NURSE PRACTITIONER

## 2024-01-30 PROCEDURE — 99213 OFFICE O/P EST LOW 20 MIN: CPT | Performed by: NURSE PRACTITIONER

## 2024-01-30 PROCEDURE — 3074F SYST BP LT 130 MM HG: CPT | Performed by: NURSE PRACTITIONER

## 2024-01-30 PROCEDURE — 1090F PRES/ABSN URINE INCON ASSESS: CPT | Performed by: NURSE PRACTITIONER

## 2024-01-30 PROCEDURE — G8427 DOCREV CUR MEDS BY ELIG CLIN: HCPCS | Performed by: NURSE PRACTITIONER

## 2024-01-30 PROCEDURE — 3023F SPIROM DOC REV: CPT | Performed by: NURSE PRACTITIONER

## 2024-01-30 PROCEDURE — 1123F ACP DISCUSS/DSCN MKR DOCD: CPT | Performed by: NURSE PRACTITIONER

## 2024-01-30 PROCEDURE — G8417 CALC BMI ABV UP PARAM F/U: HCPCS | Performed by: NURSE PRACTITIONER

## 2024-01-30 RX ORDER — ALBUTEROL SULFATE 2.5 MG/3ML
2.5 SOLUTION RESPIRATORY (INHALATION) EVERY 4 HOURS PRN
Qty: 360 EACH | Refills: 3 | Status: SHIPPED | OUTPATIENT
Start: 2024-01-30

## 2024-01-30 RX ORDER — NEBULIZER ACCESSORIES
1 EACH MISCELLANEOUS EVERY 6 HOURS PRN
Qty: 1 EACH | Refills: 1 | Status: SHIPPED | OUTPATIENT
Start: 2024-01-30

## 2024-01-30 RX ORDER — BUDESONIDE 0.5 MG/2ML
1 INHALANT ORAL 2 TIMES DAILY
Qty: 360 ML | Refills: 5 | Status: SHIPPED | OUTPATIENT
Start: 2024-01-30

## 2024-01-30 RX ORDER — ALBUTEROL SULFATE 2.5 MG/3ML
2.5 SOLUTION RESPIRATORY (INHALATION) EVERY 4 HOURS PRN
Qty: 360 EACH | Refills: 3 | Status: SHIPPED | OUTPATIENT
Start: 2024-01-30 | End: 2024-01-30

## 2024-01-30 RX ORDER — IPRATROPIUM BROMIDE AND ALBUTEROL SULFATE 2.5; .5 MG/3ML; MG/3ML
3 SOLUTION RESPIRATORY (INHALATION) EVERY 4 HOURS PRN
Qty: 360 ML | Refills: 5 | Status: CANCELLED | OUTPATIENT
Start: 2024-01-30

## 2024-01-30 RX ORDER — UMECLIDINIUM BROMIDE AND VILANTEROL TRIFENATATE 62.5; 25 UG/1; UG/1
1 POWDER RESPIRATORY (INHALATION) DAILY
Qty: 3 EACH | Refills: 3 | Status: SHIPPED | OUTPATIENT
Start: 2024-01-30

## 2024-01-30 RX ORDER — ALBUTEROL SULFATE 90 UG/1
2 AEROSOL, METERED RESPIRATORY (INHALATION) EVERY 4 HOURS PRN
Qty: 54 G | Refills: 3 | Status: SHIPPED | OUTPATIENT
Start: 2024-01-30

## 2024-01-30 ASSESSMENT — ENCOUNTER SYMPTOMS
WHEEZING: 1
COUGH: 0
SHORTNESS OF BREATH: 1
ALLERGIC/IMMUNOLOGIC NEGATIVE: 1
EYES NEGATIVE: 1
GASTROINTESTINAL NEGATIVE: 1

## 2024-01-30 NOTE — PROGRESS NOTES
[facility]    Patient: COURT DIAZ, 83 y.o.   : 1940    Pt of Lakesha Lilly CNP     Subjective     Chief Complaint   Patient presents with    Follow-up     1 year restrictive lung disease no testing        HPI  Court is here for follow up for her mild COPD and restrictive lung disease.    Former smoker  Follows with Dr. Gupta for her RA- PFT testing recently ordered   Home oxygen supplementation at 2LPM with activity - last 6MWT done 22- patient hasn't been using   Liver lesion/mass and hx of Hepatitis C- seen per GI and patient is now going to do liver biopsy. Rheumatology needs hepatitis C treated for better treatment of her RA  Current inhaler use of Anoro daily, Pulmicort neb BID and Albuterol PRN   Caregiver at home now and this helps with her care   BMI 35  Doesn't ambulate much per daughter   Left neck mole/skin cancer biopsy done per Dr. Arana - states she needs her nose done but is hesitant   Presents today on room air in WC    Progress History:   Since last visit any new medical issues? No  New ER or hospital visits? No  Any new or changes in medicines? No  Using inhalers? Yes   Are they helpful? Yes   Immunization History   Administered Date(s) Administered    COVID-19, PFIZER PURPLE top, DILUTE for use, (age 12 y+), 30mcg/0.3mL 2021    Influenza 10/22/2013    Influenza Vaccine, unspecified formulation 10/17/2015    Influenza Virus Vaccine 10/22/2013, 10/13/2014, 2016, 2017, 10/18/2017, 2019    Influenza, AFLURIA (age 3 yrs+), FLUZONE, (age 6 mo+), MDV, 0.5mL 10/17/2015    Influenza, FLUAD, (age 65 y+), Adjuvanted, 0.5mL 2021, 2021    Influenza, High Dose (Fluzone 65 yrs and older) 10/22/2013, 10/17/2015, 2016, 2016, 2017, 10/18/2017, 2019    Pneumococcal, PCV-13, PREVNAR 13, (age 6w+), IM, 0.5mL 2016    Pneumococcal, PPSV23, PNEUMOVAX 23, (age 2y+), SC/IM, 0.5mL 10/05/2012

## 2024-02-03 ENCOUNTER — NURSE ONLY (OUTPATIENT)
Dept: LAB | Age: 84
End: 2024-02-03

## 2024-02-03 DIAGNOSIS — R76.8 HEPATITIS B CORE ANTIBODY POSITIVE: ICD-10-CM

## 2024-02-03 DIAGNOSIS — R93.2 ABNORMAL MAGNETIC RESONANCE IMAGING OF LIVER: ICD-10-CM

## 2024-02-03 DIAGNOSIS — Z11.59 ENCOUNTER FOR SCREENING FOR OTHER VIRAL DISEASES: ICD-10-CM

## 2024-02-03 DIAGNOSIS — B18.2 CHRONIC HEPATITIS C WITHOUT HEPATIC COMA (HCC): ICD-10-CM

## 2024-02-03 DIAGNOSIS — K52.9 CHRONIC DIARRHEA: ICD-10-CM

## 2024-02-03 DIAGNOSIS — R53.82 CHRONIC FATIGUE, UNSPECIFIED: ICD-10-CM

## 2024-02-03 DIAGNOSIS — R93.2 ABNORMAL FINDING ON IMAGING OF LIVER: ICD-10-CM

## 2024-02-03 LAB
ALBUMIN SERPL BCG-MCNC: 3.9 G/DL (ref 3.5–5.1)
ALP SERPL-CCNC: 78 U/L (ref 38–126)
ALT SERPL W/O P-5'-P-CCNC: 13 U/L (ref 11–66)
AMMONIA PLAS-MCNC: 21 UMOL/L (ref 11–60)
ANION GAP SERPL CALC-SCNC: 13 MEQ/L (ref 8–16)
AST SERPL-CCNC: 23 U/L (ref 5–40)
BILIRUB SERPL-MCNC: 0.4 MG/DL (ref 0.3–1.2)
BUN SERPL-MCNC: 9 MG/DL (ref 7–22)
CALCIUM SERPL-MCNC: 9.2 MG/DL (ref 8.5–10.5)
CHLORIDE SERPL-SCNC: 102 MEQ/L (ref 98–111)
CO2 SERPL-SCNC: 22 MEQ/L (ref 23–33)
CREAT SERPL-MCNC: 0.8 MG/DL (ref 0.4–1.2)
DEPRECATED RDW RBC AUTO: 51.5 FL (ref 35–45)
ERYTHROCYTE [DISTWIDTH] IN BLOOD BY AUTOMATED COUNT: 19 % (ref 11.5–14.5)
FERRITIN SERPL IA-MCNC: 43 NG/ML (ref 10–291)
GFR SERPL CREATININE-BSD FRML MDRD: > 60 ML/MIN/1.73M2
GGT SERPL-CCNC: 15 U/L (ref 8–69)
GLUCOSE SERPL-MCNC: 108 MG/DL (ref 70–108)
HAV IGM SER QL: NEGATIVE
HBV SURFACE AB SER QL IA: NEGATIVE
HBV SURFACE AG SERPL QL IA: NEGATIVE
HCT VFR BLD AUTO: 45.2 % (ref 37–47)
HCV IGG SERPL QL IA: ABNORMAL
HGB BLD-MCNC: 13.3 GM/DL (ref 12–16)
INR PPP: 0.98 (ref 0.85–1.13)
MCH RBC QN AUTO: 23.3 PG (ref 26–33)
MCHC RBC AUTO-ENTMCNC: 29.4 GM/DL (ref 32.2–35.5)
MCV RBC AUTO: 79 FL (ref 81–99)
PLATELET # BLD AUTO: 332 THOU/MM3 (ref 130–400)
PMV BLD AUTO: 10.7 FL (ref 9.4–12.4)
POTASSIUM SERPL-SCNC: 4 MEQ/L (ref 3.5–5.2)
PROT SERPL-MCNC: 7.8 G/DL (ref 6.1–8)
RBC # BLD AUTO: 5.72 MILL/MM3 (ref 4.2–5.4)
SODIUM SERPL-SCNC: 137 MEQ/L (ref 135–145)
WBC # BLD AUTO: 7.1 THOU/MM3 (ref 4.8–10.8)

## 2024-02-04 LAB — AFP SERPL-MCNC: 6.7 UG/L

## 2024-02-05 LAB — HBV CORE IGG+IGM SERPL QL IA: NONREACTIVE

## 2024-02-06 LAB
CERULOPLASMIN SERPL-MCNC: 45 MG/DL (ref 16–45)
MITOCHONDRIAL M2 AB, IGG: 1.2 U/ML (ref 0–4)

## 2024-02-07 LAB
HCV RNA SERPL NAA+PROBE-ACNC: ABNORMAL IU/ML
HCV RNA SERPL NAA+PROBE-LOG IU: 6.33 LOG IU/ML
HCV RNA SERPL QL NAA+PROBE: DETECTED
SMA IGG SER-ACNC: 8 UNITS (ref 0–19)

## 2024-02-08 LAB — HCV GENTYP SERPL NAA+PROBE: NORMAL

## 2024-02-10 ENCOUNTER — APPOINTMENT (OUTPATIENT)
Dept: GENERAL RADIOLOGY | Age: 84
DRG: 064 | End: 2024-02-10
Payer: MEDICARE

## 2024-02-10 ENCOUNTER — APPOINTMENT (OUTPATIENT)
Dept: CT IMAGING | Age: 84
DRG: 064 | End: 2024-02-10
Payer: MEDICARE

## 2024-02-10 ENCOUNTER — APPOINTMENT (OUTPATIENT)
Dept: MRI IMAGING | Age: 84
DRG: 064 | End: 2024-02-10
Payer: MEDICARE

## 2024-02-10 ENCOUNTER — HOSPITAL ENCOUNTER (INPATIENT)
Age: 84
LOS: 6 days | Discharge: NURSING, REHAB OR LONG-TERM CARE | DRG: 064 | End: 2024-02-16
Attending: EMERGENCY MEDICINE
Payer: MEDICARE

## 2024-02-10 DIAGNOSIS — R29.90 STROKE-LIKE SYMPTOM: ICD-10-CM

## 2024-02-10 DIAGNOSIS — R42 DIZZINESS: Primary | ICD-10-CM

## 2024-02-10 LAB
ALBUMIN SERPL BCG-MCNC: 4.1 G/DL (ref 3.5–5.1)
ALP SERPL-CCNC: 85 U/L (ref 38–126)
ALT SERPL W/O P-5'-P-CCNC: 13 U/L (ref 11–66)
ANION GAP SERPL CALC-SCNC: 17 MEQ/L (ref 8–16)
AST SERPL-CCNC: 25 U/L (ref 5–40)
BASE EXCESS BLDA CALC-SCNC: 0 MMOL/L (ref -2–3)
BASOPHILS ABSOLUTE: 0.1 THOU/MM3 (ref 0–0.1)
BASOPHILS NFR BLD AUTO: 0.9 %
BILIRUB SERPL-MCNC: 0.6 MG/DL (ref 0.3–1.2)
BILIRUB UR QL STRIP.AUTO: NEGATIVE
BUN SERPL-MCNC: 10 MG/DL (ref 7–22)
CALCIUM SERPL-MCNC: 9.4 MG/DL (ref 8.5–10.5)
CHARACTER UR: CLEAR
CHLORIDE SERPL-SCNC: 102 MEQ/L (ref 98–111)
CO2 SERPL-SCNC: 20 MEQ/L (ref 23–33)
COLLECTED BY:: ABNORMAL
COLOR: YELLOW
CREAT SERPL-MCNC: 0.8 MG/DL (ref 0.4–1.2)
DEPRECATED MEAN GLUCOSE BLD GHB EST-ACNC: 120 MG/DL (ref 70–126)
DEPRECATED RDW RBC AUTO: 49.4 FL (ref 35–45)
EKG ATRIAL RATE: 80 BPM
EKG ATRIAL RATE: 85 BPM
EKG P AXIS: 18 DEGREES
EKG P-R INTERVAL: 128 MS
EKG Q-T INTERVAL: 388 MS
EKG Q-T INTERVAL: 402 MS
EKG QRS DURATION: 70 MS
EKG QRS DURATION: 78 MS
EKG QTC CALCULATION (BAZETT): 464 MS
EKG QTC CALCULATION (BAZETT): 478 MS
EKG R AXIS: 0 DEGREES
EKG R AXIS: 2 DEGREES
EKG T AXIS: 19 DEGREES
EKG T AXIS: 5 DEGREES
EKG VENTRICULAR RATE: 85 BPM
EKG VENTRICULAR RATE: 86 BPM
EOSINOPHIL NFR BLD AUTO: 6.6 %
EOSINOPHILS ABSOLUTE: 0.6 THOU/MM3 (ref 0–0.4)
ERYTHROCYTE [DISTWIDTH] IN BLOOD BY AUTOMATED COUNT: 18.9 % (ref 11.5–14.5)
ETHANOL SERPL-MCNC: < 0.01 %
GFR SERPL CREATININE-BSD FRML MDRD: > 60 ML/MIN/1.73M2
GLUCOSE SERPL-MCNC: 98 MG/DL (ref 70–108)
GLUCOSE UR QL STRIP.AUTO: NEGATIVE MG/DL
HBA1C MFR BLD HPLC: 6 % (ref 4.4–6.4)
HCO3 BLDA-SCNC: 26 MMOL/L (ref 23–28)
HCT VFR BLD AUTO: 47 % (ref 37–47)
HGB BLD-MCNC: 14.2 GM/DL (ref 12–16)
HGB UR QL STRIP.AUTO: NEGATIVE
IMM GRANULOCYTES # BLD AUTO: 0.02 THOU/MM3 (ref 0–0.07)
IMM GRANULOCYTES NFR BLD AUTO: 0.2 %
INR PPP: 0.98 (ref 0.85–1.13)
KETONES UR QL STRIP.AUTO: NEGATIVE
LACTATE SERPL-SCNC: 2.1 MMOL/L (ref 0.5–2)
LACTATE SERPL-SCNC: 2.5 MMOL/L (ref 0.5–2)
LYMPHOCYTES ABSOLUTE: 1.8 THOU/MM3 (ref 1–4.8)
LYMPHOCYTES NFR BLD AUTO: 21 %
MCH RBC QN AUTO: 23.5 PG (ref 26–33)
MCHC RBC AUTO-ENTMCNC: 30.2 GM/DL (ref 32.2–35.5)
MCV RBC AUTO: 77.9 FL (ref 81–99)
MONOCYTES ABSOLUTE: 0.6 THOU/MM3 (ref 0.4–1.3)
MONOCYTES NFR BLD AUTO: 7.6 %
NEUTROPHILS NFR BLD AUTO: 63.7 %
NRBC BLD AUTO-RTO: 0 /100 WBC
NT-PROBNP SERPL IA-MCNC: 1971 PG/ML (ref 0–449)
OSMOLALITY SERPL CALC.SUM OF ELEC: 276.6 MOSMOL/KG (ref 275–300)
PCO2 TEMP ADJ BLDMV: 43 MMHG (ref 41–51)
PH BLDMV: 7.38 [PH] (ref 7.31–7.41)
PH UR STRIP.AUTO: 7 [PH] (ref 5–9)
PLATELET # BLD AUTO: 427 THOU/MM3 (ref 130–400)
PMV BLD AUTO: 9.5 FL (ref 9.4–12.4)
PO2 BLDMV: 22 MMHG (ref 25–40)
POC CREATININE WHOLE BLOOD: 0.7 MG/DL (ref 0.5–1.2)
POTASSIUM SERPL-SCNC: 3.8 MEQ/L (ref 3.5–5.2)
PROT SERPL-MCNC: 8 G/DL (ref 6.1–8)
PROT UR STRIP.AUTO-MCNC: NEGATIVE MG/DL
RBC # BLD AUTO: 6.03 MILL/MM3 (ref 4.2–5.4)
SALICYLATES SERPL-MCNC: 0.5 MG/DL (ref 2–10)
SAO2 % BLDMV: 36 %
SEGMENTED NEUTROPHILS ABSOLUTE COUNT: 5.4 THOU/MM3 (ref 1.8–7.7)
SODIUM SERPL-SCNC: 139 MEQ/L (ref 135–145)
SP GR UR REFRACT.AUTO: > 1.03 (ref 1–1.03)
TROPONIN, HIGH SENSITIVITY: 22 NG/L (ref 0–12)
TROPONIN, HIGH SENSITIVITY: 26 NG/L (ref 0–12)
UROBILINOGEN, URINE: 0.2 EU/DL (ref 0–1)
WBC # BLD AUTO: 8.5 THOU/MM3 (ref 4.8–10.8)
WBC #/AREA URNS HPF: NEGATIVE /[HPF]

## 2024-02-10 PROCEDURE — 82565 ASSAY OF CREATININE: CPT

## 2024-02-10 PROCEDURE — 83036 HEMOGLOBIN GLYCOSYLATED A1C: CPT

## 2024-02-10 PROCEDURE — 83605 ASSAY OF LACTIC ACID: CPT

## 2024-02-10 PROCEDURE — 70450 CT HEAD/BRAIN W/O DYE: CPT

## 2024-02-10 PROCEDURE — 6370000000 HC RX 637 (ALT 250 FOR IP)

## 2024-02-10 PROCEDURE — 81003 URINALYSIS AUTO W/O SCOPE: CPT

## 2024-02-10 PROCEDURE — 70498 CT ANGIOGRAPHY NECK: CPT

## 2024-02-10 PROCEDURE — 70496 CT ANGIOGRAPHY HEAD: CPT

## 2024-02-10 PROCEDURE — 6360000004 HC RX CONTRAST MEDICATION

## 2024-02-10 PROCEDURE — 93005 ELECTROCARDIOGRAM TRACING: CPT | Performed by: EMERGENCY MEDICINE

## 2024-02-10 PROCEDURE — 36415 COLL VENOUS BLD VENIPUNCTURE: CPT

## 2024-02-10 PROCEDURE — 6360000002 HC RX W HCPCS

## 2024-02-10 PROCEDURE — 99285 EMERGENCY DEPT VISIT HI MDM: CPT

## 2024-02-10 PROCEDURE — 99291 CRITICAL CARE FIRST HOUR: CPT

## 2024-02-10 PROCEDURE — 2580000003 HC RX 258

## 2024-02-10 PROCEDURE — 93010 ELECTROCARDIOGRAM REPORT: CPT | Performed by: INTERNAL MEDICINE

## 2024-02-10 PROCEDURE — 85025 COMPLETE CBC W/AUTO DIFF WBC: CPT

## 2024-02-10 PROCEDURE — A9579 GAD-BASE MR CONTRAST NOS,1ML: HCPCS

## 2024-02-10 PROCEDURE — 71045 X-RAY EXAM CHEST 1 VIEW: CPT

## 2024-02-10 PROCEDURE — 80179 DRUG ASSAY SALICYLATE: CPT

## 2024-02-10 PROCEDURE — 94640 AIRWAY INHALATION TREATMENT: CPT

## 2024-02-10 PROCEDURE — 2700000000 HC OXYGEN THERAPY PER DAY

## 2024-02-10 PROCEDURE — 93005 ELECTROCARDIOGRAM TRACING: CPT

## 2024-02-10 PROCEDURE — 2060000000 HC ICU INTERMEDIATE R&B

## 2024-02-10 PROCEDURE — 94760 N-INVAS EAR/PLS OXIMETRY 1: CPT

## 2024-02-10 PROCEDURE — 6360000004 HC RX CONTRAST MEDICATION: Performed by: EMERGENCY MEDICINE

## 2024-02-10 PROCEDURE — 80053 COMPREHEN METABOLIC PANEL: CPT

## 2024-02-10 PROCEDURE — 85610 PROTHROMBIN TIME: CPT

## 2024-02-10 PROCEDURE — 83880 ASSAY OF NATRIURETIC PEPTIDE: CPT

## 2024-02-10 PROCEDURE — 84484 ASSAY OF TROPONIN QUANT: CPT

## 2024-02-10 PROCEDURE — 82077 ASSAY SPEC XCP UR&BREATH IA: CPT

## 2024-02-10 PROCEDURE — 82803 BLOOD GASES ANY COMBINATION: CPT

## 2024-02-10 RX ORDER — ONDANSETRON 4 MG/1
4 TABLET, ORALLY DISINTEGRATING ORAL EVERY 8 HOURS PRN
Status: DISCONTINUED | OUTPATIENT
Start: 2024-02-10 | End: 2024-02-16 | Stop reason: HOSPADM

## 2024-02-10 RX ORDER — ALBUTEROL SULFATE 90 UG/1
2 AEROSOL, METERED RESPIRATORY (INHALATION) EVERY 4 HOURS PRN
Status: DISCONTINUED | OUTPATIENT
Start: 2024-02-10 | End: 2024-02-14

## 2024-02-10 RX ORDER — POTASSIUM CHLORIDE 7.45 MG/ML
10 INJECTION INTRAVENOUS PRN
Status: DISCONTINUED | OUTPATIENT
Start: 2024-02-10 | End: 2024-02-16 | Stop reason: HOSPADM

## 2024-02-10 RX ORDER — ALBUTEROL SULFATE 2.5 MG/3ML
2.5 SOLUTION RESPIRATORY (INHALATION)
Status: DISCONTINUED | OUTPATIENT
Start: 2024-02-11 | End: 2024-02-11

## 2024-02-10 RX ORDER — ACETAMINOPHEN 650 MG/1
650 SUPPOSITORY RECTAL EVERY 6 HOURS PRN
Status: DISCONTINUED | OUTPATIENT
Start: 2024-02-10 | End: 2024-02-16 | Stop reason: HOSPADM

## 2024-02-10 RX ORDER — SODIUM CHLORIDE, SODIUM LACTATE, POTASSIUM CHLORIDE, AND CALCIUM CHLORIDE .6; .31; .03; .02 G/100ML; G/100ML; G/100ML; G/100ML
500 INJECTION, SOLUTION INTRAVENOUS ONCE
Status: COMPLETED | OUTPATIENT
Start: 2024-02-10 | End: 2024-02-11

## 2024-02-10 RX ORDER — BUDESONIDE 0.5 MG/2ML
500 INHALANT ORAL 2 TIMES DAILY
Status: DISCONTINUED | OUTPATIENT
Start: 2024-02-10 | End: 2024-02-16 | Stop reason: HOSPADM

## 2024-02-10 RX ORDER — CLOPIDOGREL BISULFATE 75 MG/1
75 TABLET ORAL DAILY
Status: DISCONTINUED | OUTPATIENT
Start: 2024-02-10 | End: 2024-02-10

## 2024-02-10 RX ORDER — ASPIRIN 81 MG/1
81 TABLET ORAL DAILY
Status: DISCONTINUED | OUTPATIENT
Start: 2024-02-10 | End: 2024-02-10

## 2024-02-10 RX ORDER — ASPIRIN 81 MG/1
81 TABLET ORAL DAILY
Status: DISCONTINUED | OUTPATIENT
Start: 2024-02-11 | End: 2024-02-11

## 2024-02-10 RX ORDER — MAGNESIUM SULFATE IN WATER 40 MG/ML
2000 INJECTION, SOLUTION INTRAVENOUS PRN
Status: DISCONTINUED | OUTPATIENT
Start: 2024-02-10 | End: 2024-02-16 | Stop reason: HOSPADM

## 2024-02-10 RX ORDER — CLOPIDOGREL BISULFATE 75 MG/1
75 TABLET ORAL DAILY
Status: DISCONTINUED | OUTPATIENT
Start: 2024-02-11 | End: 2024-02-11

## 2024-02-10 RX ORDER — AMLODIPINE BESYLATE 5 MG/1
5 TABLET ORAL DAILY
Status: DISCONTINUED | OUTPATIENT
Start: 2024-02-10 | End: 2024-02-11

## 2024-02-10 RX ORDER — ONDANSETRON 2 MG/ML
4 INJECTION INTRAMUSCULAR; INTRAVENOUS EVERY 6 HOURS PRN
Status: DISCONTINUED | OUTPATIENT
Start: 2024-02-10 | End: 2024-02-16 | Stop reason: HOSPADM

## 2024-02-10 RX ORDER — PREDNISONE 5 MG/1
5 TABLET ORAL DAILY
Status: DISCONTINUED | OUTPATIENT
Start: 2024-02-10 | End: 2024-02-10 | Stop reason: SDUPTHER

## 2024-02-10 RX ORDER — IPRATROPIUM BROMIDE AND ALBUTEROL SULFATE 2.5; .5 MG/3ML; MG/3ML
1 SOLUTION RESPIRATORY (INHALATION)
Status: DISCONTINUED | OUTPATIENT
Start: 2024-02-10 | End: 2024-02-10

## 2024-02-10 RX ORDER — ASPIRIN 81 MG/1
324 TABLET, CHEWABLE ORAL ONCE
Status: COMPLETED | OUTPATIENT
Start: 2024-02-10 | End: 2024-02-10

## 2024-02-10 RX ORDER — ACETAMINOPHEN 325 MG/1
650 TABLET ORAL EVERY 6 HOURS PRN
Status: DISCONTINUED | OUTPATIENT
Start: 2024-02-10 | End: 2024-02-16 | Stop reason: HOSPADM

## 2024-02-10 RX ORDER — SODIUM CHLORIDE, SODIUM LACTATE, POTASSIUM CHLORIDE, CALCIUM CHLORIDE 600; 310; 30; 20 MG/100ML; MG/100ML; MG/100ML; MG/100ML
INJECTION, SOLUTION INTRAVENOUS CONTINUOUS
Status: DISCONTINUED | OUTPATIENT
Start: 2024-02-10 | End: 2024-02-11

## 2024-02-10 RX ORDER — IPRATROPIUM BROMIDE AND ALBUTEROL SULFATE 2.5; .5 MG/3ML; MG/3ML
2 SOLUTION RESPIRATORY (INHALATION)
Status: COMPLETED | OUTPATIENT
Start: 2024-02-10 | End: 2024-02-10

## 2024-02-10 RX ORDER — ALBUTEROL SULFATE 2.5 MG/3ML
2.5 SOLUTION RESPIRATORY (INHALATION) EVERY 4 HOURS PRN
Status: DISCONTINUED | OUTPATIENT
Start: 2024-02-10 | End: 2024-02-11

## 2024-02-10 RX ORDER — SODIUM CHLORIDE, SODIUM LACTATE, POTASSIUM CHLORIDE, AND CALCIUM CHLORIDE .6; .31; .03; .02 G/100ML; G/100ML; G/100ML; G/100ML
250 INJECTION, SOLUTION INTRAVENOUS ONCE
Status: DISCONTINUED | OUTPATIENT
Start: 2024-02-10 | End: 2024-02-10

## 2024-02-10 RX ORDER — PREDNISONE 5 MG/1
5 TABLET ORAL DAILY
Status: DISCONTINUED | OUTPATIENT
Start: 2024-02-10 | End: 2024-02-11

## 2024-02-10 RX ORDER — ALBUTEROL SULFATE 2.5 MG/3ML
2.5 SOLUTION RESPIRATORY (INHALATION)
Status: DISCONTINUED | OUTPATIENT
Start: 2024-02-10 | End: 2024-02-10

## 2024-02-10 RX ORDER — CLOPIDOGREL 300 MG/1
300 TABLET, FILM COATED ORAL ONCE
Status: COMPLETED | OUTPATIENT
Start: 2024-02-10 | End: 2024-02-10

## 2024-02-10 RX ORDER — ALBUTEROL SULFATE 2.5 MG/3ML
2.5 SOLUTION RESPIRATORY (INHALATION) ONCE
Status: COMPLETED | OUTPATIENT
Start: 2024-02-10 | End: 2024-02-10

## 2024-02-10 RX ORDER — IPRATROPIUM BROMIDE AND ALBUTEROL SULFATE 2.5; .5 MG/3ML; MG/3ML
1 SOLUTION RESPIRATORY (INHALATION)
Status: DISCONTINUED | OUTPATIENT
Start: 2024-02-10 | End: 2024-02-10 | Stop reason: CLARIF

## 2024-02-10 RX ORDER — POTASSIUM CHLORIDE 20 MEQ/1
40 TABLET, EXTENDED RELEASE ORAL PRN
Status: DISCONTINUED | OUTPATIENT
Start: 2024-02-10 | End: 2024-02-16 | Stop reason: HOSPADM

## 2024-02-10 RX ORDER — ENOXAPARIN SODIUM 100 MG/ML
40 INJECTION SUBCUTANEOUS EVERY 24 HOURS
Status: DISCONTINUED | OUTPATIENT
Start: 2024-02-10 | End: 2024-02-16 | Stop reason: HOSPADM

## 2024-02-10 RX ADMIN — ALBUTEROL SULFATE 2.5 MG: 2.5 SOLUTION RESPIRATORY (INHALATION) at 21:22

## 2024-02-10 RX ADMIN — IPRATROPIUM BROMIDE AND ALBUTEROL SULFATE 2 DOSE: .5; 3 SOLUTION RESPIRATORY (INHALATION) at 16:09

## 2024-02-10 RX ADMIN — ALBUTEROL SULFATE 2.5 MG: 2.5 SOLUTION RESPIRATORY (INHALATION) at 16:20

## 2024-02-10 RX ADMIN — BUDESONIDE 500 MCG: 0.5 INHALANT RESPIRATORY (INHALATION) at 21:22

## 2024-02-10 RX ADMIN — GADOTERIDOL 15 ML: 279.3 INJECTION, SOLUTION INTRAVENOUS at 17:03

## 2024-02-10 RX ADMIN — ASPIRIN 81 MG 324 MG: 81 TABLET ORAL at 15:03

## 2024-02-10 RX ADMIN — PREDNISONE 5 MG: 5 TABLET ORAL at 20:59

## 2024-02-10 RX ADMIN — CLOPIDOGREL BISULFATE 300 MG: 300 TABLET, FILM COATED ORAL at 15:03

## 2024-02-10 RX ADMIN — IOPAMIDOL 80 ML: 755 INJECTION, SOLUTION INTRAVENOUS at 15:05

## 2024-02-10 RX ADMIN — ENOXAPARIN SODIUM 40 MG: 100 INJECTION SUBCUTANEOUS at 20:58

## 2024-02-10 RX ADMIN — SODIUM CHLORIDE, POTASSIUM CHLORIDE, SODIUM LACTATE AND CALCIUM CHLORIDE 500 ML: 600; 310; 30; 20 INJECTION, SOLUTION INTRAVENOUS at 22:09

## 2024-02-10 NOTE — ED NOTES
Pt presents to ED via [facility] from home for c/o slurred speech, dizziness and being off-balance. EMS reports pt's LKW 2230 last night. Family reports pt normally sleeps until 1030-11am, when they went to wake her, pt was dizzy and off-balance, after sitting at bedside for some time. Family also reports pt has having difficulty with reaching for something (pt was reaching 2 ft to the L for an object). Family also reports pt has chronic issues with R side of face d/t past surgery, however notes L sided facial droop. Family reports pt has chronic slurred speech d/t no teeth. Upon initial assessment, pt is [place], A&Ox4, resps labored. EKG completed upon arrival. POCT 96 upon arrival. IV established with blood drawn and sent to lab. Dr Soliz at bedside, STROKE ALERT INITIATED. Dr Mcclendon at bedside. CT scans delayed at this time d/t current trauma pt in CT. Monitor applied and VS as noted. Will transport pt to CT asap.

## 2024-02-10 NOTE — ED NOTES
ED to inpatient nurses report      Chief Complaint:  Chief Complaint   Patient presents with    Aphasia    Dizziness    Off-Balance     Present to ED from: Home    MOA:     LOC: alert and orientated to name, place, date  Mobility: Requires assistance * 1  Oxygen Baseline: Room Air    Current needs required: Room Air     Code Status:   Prior    What abnormal results were found and what did you give/do to treat them?   Any procedures or intervention occur?     Mental Status:  Level of Consciousness: Alert (0)    Psych Assessment:        Vitals:  Patient Vitals for the past 24 hrs:   BP Temp Temp src Pulse Resp SpO2 Weight   02/10/24 1629 -- -- -- -- -- 98 % --   02/10/24 1620 -- -- -- 82 24 100 % --   02/10/24 1612 -- -- -- 77 24 98 % --   02/10/24 1611 -- -- -- 76 24 90 % --   02/10/24 1532 (!) 153/109 -- -- 77 21 100 % --   02/10/24 1441 (!) 159/70 -- -- 80 18 94 % --   02/10/24 1402 (!) 143/90 97.7 °F (36.5 °C) Oral 82 20 94 % 80.7 kg (178 lb)   02/10/24 1358 -- -- Oral -- 20 92 % --        LDAs:   Peripheral IV 02/10/24 Posterior;Right Forearm (Active)   Site Assessment Clean, dry & intact 02/10/24 1534   Line Status Blood return noted;Flushed;Normal saline locked;Specimen collected 02/10/24 1416   Phlebitis Assessment No symptoms 02/10/24 1416   Infiltration Assessment 0 02/10/24 1416   Dressing Status Clean, dry & intact 02/10/24 1416   Dressing Type Transparent 02/10/24 1416   Dressing Intervention New 02/10/24 1416       Ambulatory Status:  Presents to emergency department  because of falls (Syncope, seizure, or loss of consciousness): Yes, Age > 70: Yes, Altered Mental Status, Intoxication with alcohol or substance confusion (Disorientation, impaired judgment, poor safety awaremess, or inability to follow instructions): No, Impaired Mobility: Ambulates or transfers with assistive devices or assistance; Unable to ambulate or transer.: Yes, Nursing Judgement: Yes    Diagnosis:  DISPOSITION Admitted 02/10/2024

## 2024-02-10 NOTE — ED NOTES
Pt transported to CT in stable condition via this RN, Kristofer NEWSOME, Dr Mcclendon, Dr Soliz, and Alicia SARAH.

## 2024-02-10 NOTE — ED PROVIDER NOTES
ATTENDING NOTE:    I supervised and discussed the history, physical exam and the management of this patient with the resident. I reviewed the resident's note and agree with the documented findings and plan of care.  Please see my additional note.    I personally saw and examined the patient.  I have reviewed and agree with the resident's findings, including all diagnostic interpretations and treatment plans as written.  I was present for the key portion of any procedures performed and the inclusive time noted in any critical care statement.    Electronically verified by CELESTINE ACOSTA      Critical Care    Performed by: Celestine Acosta MD  Authorized by: Celestine Acosta MD    Critical care provider statement:     Critical care time (minutes):  35    Critical care time was exclusive of:  Separately billable procedures and treating other patients and teaching time    Critical care was necessary to treat or prevent imminent or life-threatening deterioration of the following conditions:  CNS failure or compromise    Critical care was time spent personally by me on the following activities:  Ordering and performing treatments and interventions, ordering and review of laboratory studies, ordering and review of radiographic studies, pulse oximetry, re-evaluation of patient's condition, review of old charts, development of treatment plan with patient or surrogate, discussions with consultants, evaluation of patient's response to treatment, examination of patient and obtaining history from patient or surrogate    I assumed direction of critical care for this patient from another provider in my specialty: no      Care discussed with: admitting provider    Comments:      Patient seen emergently as stroke alert, NIH 3 acutely (also has score of 2 due to chronic neuro deficits), seen by neurointerventional, no TNK, recommend MRI in addition to CTA's         Celestine Acosta MD  02/10/24 9743

## 2024-02-10 NOTE — ED PROVIDER NOTES
[facility] EMERGENCY DEPARTMENT    EMERGENCY MEDICINE     Patient Name: Court Hutchison  MRN: [id number]  YOB: 1940  Date of Evaluation: 2/10/2024  Treating Resident Physician: Thao Soliz DO  Supervising Physician: Dr. Keiko Mcclendon MD    CHIEF COMPLAINT       Chief Complaint   Patient presents with    Aphasia    Dizziness    Off-Balance       HISTORY OF PRESENT ILLNESS      History obtained from child, chart review, and the patient.    Court Hutchison is a 83 y.o. female who presents to the emergency department from home brought in by EMS for evaluation of stroke like symptoms. Last known well was 2200 yesterday 2/9/24 before patient went to bed.   Woke up this morning with worsen slurring, left side droop, dizziness with episode of vomiting NBNB, worsening balance. Daughter in room provide history that her mother usually uses her walker and today she was unsteady in her gait, had a left side droop (her right side is baseline droop due hx of basal cell carcinoma surgery). Patients base line slurs her worse due to no teeth,  it worsen today.   PMH significant for HTN/HLD, COPD. Patient had no previous CVA not on any aspirin or blood thinners.   Stroke alert was activated on this patient and was urgently taken to CT and neurology evaluated patient at beside. Initial NIH 5 patient confused mentation at baseline per family.   Daughter report she chronic cough that uses inhalers at home. No recent fevers, cough chronically productive nothing worsen, no chest pain, baseline short of breath that not worsen, no abdominal pain, no urinary dysuria or hematuria.     Pertinent previous and/or external records reviewed: Non-contributory      CODE STROKE   Activation By:  ED  Arrived By: EMS    NIHSS, POC glucose, rapid physical and neurologic exam, vitals including weight obtained in the ED before transfer to CT.     The CODE Stroke Team met the patient on arrival to CT.    Last Known Well: 2200 yesterday

## 2024-02-11 ENCOUNTER — APPOINTMENT (OUTPATIENT)
Dept: GENERAL RADIOLOGY | Age: 84
DRG: 064 | End: 2024-02-11
Payer: MEDICARE

## 2024-02-11 LAB
ANION GAP SERPL CALC-SCNC: 14 MEQ/L (ref 8–16)
ARTERIAL PATENCY WRIST A: POSITIVE
BASE EXCESS BLDA CALC-SCNC: -3.1 MMOL/L (ref -2.5–2.5)
BASOPHILS ABSOLUTE: 0.1 THOU/MM3 (ref 0–0.1)
BASOPHILS NFR BLD AUTO: 0.7 %
BDY SITE: ABNORMAL
BUN SERPL-MCNC: 12 MG/DL (ref 7–22)
CALCIUM SERPL-MCNC: 8.9 MG/DL (ref 8.5–10.5)
CHLORIDE SERPL-SCNC: 102 MEQ/L (ref 98–111)
CHOLEST SERPL-MCNC: 139 MG/DL (ref 100–199)
CO2 SERPL-SCNC: 19 MEQ/L (ref 23–33)
COLLECTED BY:: ABNORMAL
CREAT SERPL-MCNC: 0.8 MG/DL (ref 0.4–1.2)
CRP SERPL-MCNC: 1.19 MG/DL (ref 0–1)
DEPRECATED MEAN GLUCOSE BLD GHB EST-ACNC: 117 MG/DL (ref 70–126)
DEPRECATED RDW RBC AUTO: 48.8 FL (ref 35–45)
DEVICE: ABNORMAL
EKG ATRIAL RATE: 94 BPM
EKG P AXIS: 26 DEGREES
EKG P-R INTERVAL: 134 MS
EKG Q-T INTERVAL: 412 MS
EKG QRS DURATION: 72 MS
EKG QTC CALCULATION (BAZETT): 515 MS
EKG R AXIS: 12 DEGREES
EKG T AXIS: -5 DEGREES
EKG VENTRICULAR RATE: 94 BPM
EOSINOPHIL NFR BLD AUTO: 0.5 %
EOSINOPHILS ABSOLUTE: 0.1 THOU/MM3 (ref 0–0.4)
ERYTHROCYTE [DISTWIDTH] IN BLOOD BY AUTOMATED COUNT: 17.8 % (ref 11.5–14.5)
FIO2 ON VENT O2 ANALYZER: 4 %
GFR SERPL CREATININE-BSD FRML MDRD: > 60 ML/MIN/1.73M2
GLUCOSE SERPL-MCNC: 113 MG/DL (ref 70–108)
HBA1C MFR BLD HPLC: 5.9 % (ref 4.4–6.4)
HCO3 BLDA-SCNC: 20 MMOL/L (ref 23–28)
HCT VFR BLD AUTO: 41.2 % (ref 37–47)
HDLC SERPL-MCNC: 32 MG/DL
HGB BLD-MCNC: 12.5 GM/DL (ref 12–16)
IMM GRANULOCYTES # BLD AUTO: 0.05 THOU/MM3 (ref 0–0.07)
IMM GRANULOCYTES NFR BLD AUTO: 0.5 %
LACTATE SERPL-SCNC: 1.5 MMOL/L (ref 0.5–2)
LACTATE SERPL-SCNC: 1.6 MMOL/L (ref 0.5–2)
LDLC SERPL CALC-MCNC: 88 MG/DL
LYMPHOCYTES ABSOLUTE: 0.8 THOU/MM3 (ref 1–4.8)
LYMPHOCYTES NFR BLD AUTO: 8 %
MCH RBC QN AUTO: 23.5 PG (ref 26–33)
MCHC RBC AUTO-ENTMCNC: 30.3 GM/DL (ref 32.2–35.5)
MCV RBC AUTO: 77.6 FL (ref 81–99)
MONOCYTES ABSOLUTE: 0.7 THOU/MM3 (ref 0.4–1.3)
MONOCYTES NFR BLD AUTO: 6.4 %
NEUTROPHILS NFR BLD AUTO: 83.9 %
NRBC BLD AUTO-RTO: 0 /100 WBC
PCO2 BLDA: 30 MMHG (ref 35–45)
PH BLDA: 7.44 [PH] (ref 7.35–7.45)
PLATELET # BLD AUTO: 376 THOU/MM3 (ref 130–400)
PMV BLD AUTO: 10.1 FL (ref 9.4–12.4)
PO2 BLDA: 73 MMHG (ref 71–104)
POTASSIUM SERPL-SCNC: 3.9 MEQ/L (ref 3.5–5.2)
PROCALCITONIN SERPL IA-MCNC: 0.08 NG/ML (ref 0.01–0.09)
RBC # BLD AUTO: 5.31 MILL/MM3 (ref 4.2–5.4)
SAO2 % BLDA: 95 %
SEGMENTED NEUTROPHILS ABSOLUTE COUNT: 8.8 THOU/MM3 (ref 1.8–7.7)
SODIUM SERPL-SCNC: 135 MEQ/L (ref 135–145)
TRIGL SERPL-MCNC: 97 MG/DL (ref 0–199)
WBC # BLD AUTO: 10.5 THOU/MM3 (ref 4.8–10.8)

## 2024-02-11 PROCEDURE — 6360000002 HC RX W HCPCS

## 2024-02-11 PROCEDURE — 86140 C-REACTIVE PROTEIN: CPT

## 2024-02-11 PROCEDURE — 97163 PT EVAL HIGH COMPLEX 45 MIN: CPT

## 2024-02-11 PROCEDURE — 2580000003 HC RX 258: Performed by: STUDENT IN AN ORGANIZED HEALTH CARE EDUCATION/TRAINING PROGRAM

## 2024-02-11 PROCEDURE — 97166 OT EVAL MOD COMPLEX 45 MIN: CPT

## 2024-02-11 PROCEDURE — 80048 BASIC METABOLIC PNL TOTAL CA: CPT

## 2024-02-11 PROCEDURE — 93010 ELECTROCARDIOGRAM REPORT: CPT | Performed by: INTERNAL MEDICINE

## 2024-02-11 PROCEDURE — 74018 RADEX ABDOMEN 1 VIEW: CPT

## 2024-02-11 PROCEDURE — 6370000000 HC RX 637 (ALT 250 FOR IP)

## 2024-02-11 PROCEDURE — 2580000003 HC RX 258

## 2024-02-11 PROCEDURE — 2700000000 HC OXYGEN THERAPY PER DAY

## 2024-02-11 PROCEDURE — 87641 MR-STAPH DNA AMP PROBE: CPT

## 2024-02-11 PROCEDURE — 6360000002 HC RX W HCPCS: Performed by: NURSE PRACTITIONER

## 2024-02-11 PROCEDURE — 2060000000 HC ICU INTERMEDIATE R&B

## 2024-02-11 PROCEDURE — 94640 AIRWAY INHALATION TREATMENT: CPT

## 2024-02-11 PROCEDURE — 82803 BLOOD GASES ANY COMBINATION: CPT

## 2024-02-11 PROCEDURE — 83605 ASSAY OF LACTIC ACID: CPT

## 2024-02-11 PROCEDURE — 84145 PROCALCITONIN (PCT): CPT

## 2024-02-11 PROCEDURE — 36415 COLL VENOUS BLD VENIPUNCTURE: CPT

## 2024-02-11 PROCEDURE — 85025 COMPLETE CBC W/AUTO DIFF WBC: CPT

## 2024-02-11 PROCEDURE — 36600 WITHDRAWAL OF ARTERIAL BLOOD: CPT

## 2024-02-11 PROCEDURE — 71045 X-RAY EXAM CHEST 1 VIEW: CPT

## 2024-02-11 PROCEDURE — 99233 SBSQ HOSP IP/OBS HIGH 50: CPT | Performed by: STUDENT IN AN ORGANIZED HEALTH CARE EDUCATION/TRAINING PROGRAM

## 2024-02-11 PROCEDURE — 74230 X-RAY XM SWLNG FUNCJ C+: CPT

## 2024-02-11 PROCEDURE — 94761 N-INVAS EAR/PLS OXIMETRY MLT: CPT

## 2024-02-11 PROCEDURE — 87040 BLOOD CULTURE FOR BACTERIA: CPT

## 2024-02-11 PROCEDURE — 71046 X-RAY EXAM CHEST 2 VIEWS: CPT

## 2024-02-11 PROCEDURE — 80061 LIPID PANEL: CPT

## 2024-02-11 PROCEDURE — 92610 EVALUATE SWALLOWING FUNCTION: CPT

## 2024-02-11 PROCEDURE — 92611 MOTION FLUOROSCOPY/SWALLOW: CPT

## 2024-02-11 PROCEDURE — 83036 HEMOGLOBIN GLYCOSYLATED A1C: CPT

## 2024-02-11 PROCEDURE — 97530 THERAPEUTIC ACTIVITIES: CPT

## 2024-02-11 PROCEDURE — 2500000003 HC RX 250 WO HCPCS: Performed by: STUDENT IN AN ORGANIZED HEALTH CARE EDUCATION/TRAINING PROGRAM

## 2024-02-11 PROCEDURE — 99232 SBSQ HOSP IP/OBS MODERATE 35: CPT

## 2024-02-11 RX ORDER — IPRATROPIUM BROMIDE AND ALBUTEROL SULFATE 2.5; .5 MG/3ML; MG/3ML
1 SOLUTION RESPIRATORY (INHALATION)
Status: DISCONTINUED | OUTPATIENT
Start: 2024-02-12 | End: 2024-02-12

## 2024-02-11 RX ORDER — ASPIRIN 81 MG/1
81 TABLET, CHEWABLE ORAL DAILY
Status: DISCONTINUED | OUTPATIENT
Start: 2024-02-11 | End: 2024-02-12

## 2024-02-11 RX ORDER — ALBUTEROL SULFATE 2.5 MG/3ML
2.5 SOLUTION RESPIRATORY (INHALATION) EVERY 4 HOURS PRN
Status: DISCONTINUED | OUTPATIENT
Start: 2024-02-11 | End: 2024-02-16 | Stop reason: HOSPADM

## 2024-02-11 RX ORDER — SODIUM CHLORIDE, SODIUM LACTATE, POTASSIUM CHLORIDE, CALCIUM CHLORIDE 600; 310; 30; 20 MG/100ML; MG/100ML; MG/100ML; MG/100ML
INJECTION, SOLUTION INTRAVENOUS CONTINUOUS
Status: ACTIVE | OUTPATIENT
Start: 2024-02-11 | End: 2024-02-12

## 2024-02-11 RX ORDER — ATORVASTATIN CALCIUM 40 MG/1
40 TABLET, FILM COATED ORAL NIGHTLY
Status: DISCONTINUED | OUTPATIENT
Start: 2024-02-11 | End: 2024-02-16 | Stop reason: HOSPADM

## 2024-02-11 RX ORDER — AMLODIPINE BESYLATE 5 MG/1
5 TABLET ORAL DAILY
Status: DISCONTINUED | OUTPATIENT
Start: 2024-02-11 | End: 2024-02-14

## 2024-02-11 RX ORDER — CLOPIDOGREL BISULFATE 75 MG/1
75 TABLET ORAL DAILY
Status: DISCONTINUED | OUTPATIENT
Start: 2024-02-11 | End: 2024-02-14

## 2024-02-11 RX ORDER — PREDNISONE 5 MG/1
5 TABLET ORAL DAILY
Status: DISCONTINUED | OUTPATIENT
Start: 2024-02-11 | End: 2024-02-14

## 2024-02-11 RX ORDER — ASPIRIN 300 MG/1
300 SUPPOSITORY RECTAL DAILY
Status: DISCONTINUED | OUTPATIENT
Start: 2024-02-11 | End: 2024-02-11

## 2024-02-11 RX ORDER — FUROSEMIDE 10 MG/ML
20 INJECTION INTRAMUSCULAR; INTRAVENOUS ONCE
Status: COMPLETED | OUTPATIENT
Start: 2024-02-11 | End: 2024-02-11

## 2024-02-11 RX ADMIN — PIPERACILLIN AND TAZOBACTAM 4500 MG: 4; .5 INJECTION, POWDER, FOR SOLUTION INTRAVENOUS at 22:01

## 2024-02-11 RX ADMIN — FUROSEMIDE 20 MG: 10 INJECTION, SOLUTION INTRAMUSCULAR; INTRAVENOUS at 05:48

## 2024-02-11 RX ADMIN — BUDESONIDE 500 MCG: 0.5 INHALANT RESPIRATORY (INHALATION) at 07:52

## 2024-02-11 RX ADMIN — BARIUM SULFATE 15 ML: 400 PASTE ORAL at 09:02

## 2024-02-11 RX ADMIN — BARIUM SULFATE 50 ML: 0.81 POWDER, FOR SUSPENSION ORAL at 09:02

## 2024-02-11 RX ADMIN — BUDESONIDE 500 MCG: 0.5 INHALANT RESPIRATORY (INHALATION) at 20:32

## 2024-02-11 RX ADMIN — ALBUTEROL SULFATE 2.5 MG: 2.5 SOLUTION RESPIRATORY (INHALATION) at 12:25

## 2024-02-11 RX ADMIN — ENOXAPARIN SODIUM 40 MG: 100 INJECTION SUBCUTANEOUS at 21:59

## 2024-02-11 RX ADMIN — BARIUM SULFATE 25 ML: 400 SUSPENSION ORAL at 09:02

## 2024-02-11 RX ADMIN — ALBUTEROL SULFATE 2.5 MG: 2.5 SOLUTION RESPIRATORY (INHALATION) at 20:32

## 2024-02-11 RX ADMIN — SODIUM CHLORIDE, POTASSIUM CHLORIDE, SODIUM LACTATE AND CALCIUM CHLORIDE: 600; 310; 30; 20 INJECTION, SOLUTION INTRAVENOUS at 02:48

## 2024-02-11 RX ADMIN — ALBUTEROL SULFATE 2.5 MG: 2.5 SOLUTION RESPIRATORY (INHALATION) at 07:52

## 2024-02-11 RX ADMIN — ALBUTEROL SULFATE 2.5 MG: 2.5 SOLUTION RESPIRATORY (INHALATION) at 03:12

## 2024-02-11 RX ADMIN — ACETAMINOPHEN 650 MG: 650 SUPPOSITORY RECTAL at 20:07

## 2024-02-11 RX ADMIN — ALBUTEROL SULFATE 2.5 MG: 2.5 SOLUTION RESPIRATORY (INHALATION) at 17:05

## 2024-02-11 RX ADMIN — SODIUM CHLORIDE, POTASSIUM CHLORIDE, SODIUM LACTATE AND CALCIUM CHLORIDE: 600; 310; 30; 20 INJECTION, SOLUTION INTRAVENOUS at 19:47

## 2024-02-12 ENCOUNTER — APPOINTMENT (OUTPATIENT)
Dept: CT IMAGING | Age: 84
DRG: 064 | End: 2024-02-12
Payer: MEDICARE

## 2024-02-12 ENCOUNTER — APPOINTMENT (OUTPATIENT)
Dept: GENERAL RADIOLOGY | Age: 84
DRG: 064 | End: 2024-02-12
Payer: MEDICARE

## 2024-02-12 ENCOUNTER — APPOINTMENT (OUTPATIENT)
Age: 84
DRG: 064 | End: 2024-02-12
Payer: MEDICARE

## 2024-02-12 PROBLEM — I63.9 CEREBROVASCULAR ACCIDENT (CVA) (HCC): Status: ACTIVE | Noted: 2024-02-12

## 2024-02-12 PROBLEM — R42 DIZZINESS: Status: ACTIVE | Noted: 2024-02-12

## 2024-02-12 PROBLEM — I69.391 DYSPHAGIA DUE TO RECENT STROKE: Status: ACTIVE | Noted: 2024-02-12

## 2024-02-12 LAB
ANION GAP SERPL CALC-SCNC: 14 MEQ/L (ref 8–16)
ARTERIAL PATENCY WRIST A: POSITIVE
BASE EXCESS BLDA CALC-SCNC: -1.6 MMOL/L (ref -2.5–2.5)
BASOPHILS ABSOLUTE: 0.1 THOU/MM3 (ref 0–0.1)
BASOPHILS NFR BLD AUTO: 0.5 %
BDY SITE: NORMAL
BUN SERPL-MCNC: 15 MG/DL (ref 7–22)
CALCIUM SERPL-MCNC: 9 MG/DL (ref 8.5–10.5)
CHLORIDE SERPL-SCNC: 102 MEQ/L (ref 98–111)
CO2 SERPL-SCNC: 21 MEQ/L (ref 23–33)
COLLECTED BY:: NORMAL
CREAT SERPL-MCNC: 0.8 MG/DL (ref 0.4–1.2)
DEPRECATED RDW RBC AUTO: 51 FL (ref 35–45)
DEVICE: NORMAL
ECHO AV CUSP MM: 2 CM
ECHO AV PEAK GRADIENT: 6 MMHG
ECHO AV PEAK VELOCITY: 1.2 M/S
ECHO AV VELOCITY RATIO: 0.67
ECHO BSA: 1.8 M2
ECHO LA AREA 2C: 12.7 CM2
ECHO LA AREA 4C: 16.6 CM2
ECHO LA DIAMETER INDEX: 1.46 CM/M2
ECHO LA DIAMETER: 2.5 CM
ECHO LA MAJOR AXIS: 5.2 CM
ECHO LA MINOR AXIS: 5 CM
ECHO LA VOL BP: 33 ML (ref 22–52)
ECHO LA VOL MOD A2C: 25 ML (ref 22–52)
ECHO LA VOL MOD A4C: 42 ML (ref 22–52)
ECHO LA VOL/BSA BIPLANE: 19 ML/M2 (ref 16–34)
ECHO LA VOLUME INDEX MOD A2C: 15 ML/M2 (ref 16–34)
ECHO LA VOLUME INDEX MOD A4C: 25 ML/M2 (ref 16–34)
ECHO LV E' LATERAL VELOCITY: 5 CM/S
ECHO LV E' SEPTAL VELOCITY: 3 CM/S
ECHO LV FRACTIONAL SHORTENING: 25 % (ref 28–44)
ECHO LV INTERNAL DIMENSION DIASTOLE INDEX: 1.87 CM/M2
ECHO LV INTERNAL DIMENSION DIASTOLIC: 3.2 CM (ref 3.9–5.3)
ECHO LV INTERNAL DIMENSION SYSTOLIC INDEX: 1.4 CM/M2
ECHO LV INTERNAL DIMENSION SYSTOLIC: 2.4 CM
ECHO LV IVSD: 1.5 CM (ref 0.6–0.9)
ECHO LV MASS 2D: 135.7 G (ref 67–162)
ECHO LV MASS INDEX 2D: 79.3 G/M2 (ref 43–95)
ECHO LV POSTERIOR WALL DIASTOLIC: 1.1 CM (ref 0.6–0.9)
ECHO LV RELATIVE WALL THICKNESS RATIO: 0.69
ECHO LVOT PEAK GRADIENT: 3 MMHG
ECHO LVOT PEAK VELOCITY: 0.8 M/S
ECHO MV A VELOCITY: 1.05 M/S
ECHO MV E VELOCITY: 0.42 M/S
ECHO MV E/A RATIO: 0.4
ECHO MV E/E' LATERAL: 8.4
ECHO MV E/E' RATIO (AVERAGED): 11.2
ECHO MV REGURGITANT PEAK GRADIENT: 67 MMHG
ECHO MV REGURGITANT PEAK VELOCITY: 4.1 M/S
ECHO PV MAX VELOCITY: 0.5 M/S
ECHO PV PEAK GRADIENT: 1 MMHG
ECHO RV INTERNAL DIMENSION: 2.6 CM
ECHO TV E WAVE: 0.6 M/S
EOSINOPHIL NFR BLD AUTO: 0.2 %
EOSINOPHILS ABSOLUTE: 0 THOU/MM3 (ref 0–0.4)
ERYTHROCYTE [DISTWIDTH] IN BLOOD BY AUTOMATED COUNT: 18.1 % (ref 11.5–14.5)
FIO2 ON VENT O2 ANALYZER: 4 %
FOLATE SERPL-MCNC: 3.8 NG/ML (ref 4.8–24.2)
GFR SERPL CREATININE-BSD FRML MDRD: > 60 ML/MIN/1.73M2
GLUCOSE SERPL-MCNC: 119 MG/DL (ref 70–108)
HCO3 BLDA-SCNC: 23 MMOL/L (ref 23–28)
HCT VFR BLD AUTO: 40.6 % (ref 37–47)
HGB BLD-MCNC: 12 GM/DL (ref 12–16)
IMM GRANULOCYTES # BLD AUTO: 0.07 THOU/MM3 (ref 0–0.07)
IMM GRANULOCYTES NFR BLD AUTO: 0.6 %
LYMPHOCYTES ABSOLUTE: 1.5 THOU/MM3 (ref 1–4.8)
LYMPHOCYTES NFR BLD AUTO: 11.9 %
MAGNESIUM SERPL-MCNC: 2.3 MG/DL (ref 1.6–2.4)
MCH RBC QN AUTO: 23.5 PG (ref 26–33)
MCHC RBC AUTO-ENTMCNC: 29.6 GM/DL (ref 32.2–35.5)
MCV RBC AUTO: 79.5 FL (ref 81–99)
MONOCYTES ABSOLUTE: 1.3 THOU/MM3 (ref 0.4–1.3)
MONOCYTES NFR BLD AUTO: 10.6 %
MRSA DNA SPEC QL NAA+PROBE: NEGATIVE
NEUTROPHILS NFR BLD AUTO: 76.2 %
NRBC BLD AUTO-RTO: 0 /100 WBC
PCO2 BLDA: 38 MMHG (ref 35–45)
PH BLDA: 7.39 [PH] (ref 7.35–7.45)
PLATELET # BLD AUTO: 344 THOU/MM3 (ref 130–400)
PMV BLD AUTO: 10 FL (ref 9.4–12.4)
PO2 BLDA: 82 MMHG (ref 71–104)
POTASSIUM SERPL-SCNC: 3.4 MEQ/L (ref 3.5–5.2)
RBC # BLD AUTO: 5.11 MILL/MM3 (ref 4.2–5.4)
SAO2 % BLDA: 96 %
SEGMENTED NEUTROPHILS ABSOLUTE COUNT: 9.5 THOU/MM3 (ref 1.8–7.7)
SODIUM SERPL-SCNC: 137 MEQ/L (ref 135–145)
T4 FREE SERPL-MCNC: 1.4 NG/DL (ref 0.93–1.76)
TSH SERPL DL<=0.005 MIU/L-ACNC: 0.47 UIU/ML (ref 0.4–4.2)
VIT B12 SERPL-MCNC: 213 PG/ML (ref 211–911)
WBC # BLD AUTO: 12.5 THOU/MM3 (ref 4.8–10.8)

## 2024-02-12 PROCEDURE — 99222 1ST HOSP IP/OBS MODERATE 55: CPT | Performed by: PHYSICAL MEDICINE & REHABILITATION

## 2024-02-12 PROCEDURE — 84443 ASSAY THYROID STIM HORMONE: CPT

## 2024-02-12 PROCEDURE — 82746 ASSAY OF FOLIC ACID SERUM: CPT

## 2024-02-12 PROCEDURE — 70450 CT HEAD/BRAIN W/O DYE: CPT

## 2024-02-12 PROCEDURE — 6360000002 HC RX W HCPCS: Performed by: STUDENT IN AN ORGANIZED HEALTH CARE EDUCATION/TRAINING PROGRAM

## 2024-02-12 PROCEDURE — 2580000003 HC RX 258: Performed by: STUDENT IN AN ORGANIZED HEALTH CARE EDUCATION/TRAINING PROGRAM

## 2024-02-12 PROCEDURE — 82803 BLOOD GASES ANY COMBINATION: CPT

## 2024-02-12 PROCEDURE — 83735 ASSAY OF MAGNESIUM: CPT

## 2024-02-12 PROCEDURE — 74018 RADEX ABDOMEN 1 VIEW: CPT

## 2024-02-12 PROCEDURE — 95816 EEG AWAKE AND DROWSY: CPT

## 2024-02-12 PROCEDURE — 2060000000 HC ICU INTERMEDIATE R&B

## 2024-02-12 PROCEDURE — 99233 SBSQ HOSP IP/OBS HIGH 50: CPT | Performed by: STUDENT IN AN ORGANIZED HEALTH CARE EDUCATION/TRAINING PROGRAM

## 2024-02-12 PROCEDURE — 94640 AIRWAY INHALATION TREATMENT: CPT

## 2024-02-12 PROCEDURE — 85025 COMPLETE CBC W/AUTO DIFF WBC: CPT

## 2024-02-12 PROCEDURE — 6370000000 HC RX 637 (ALT 250 FOR IP): Performed by: STUDENT IN AN ORGANIZED HEALTH CARE EDUCATION/TRAINING PROGRAM

## 2024-02-12 PROCEDURE — 84439 ASSAY OF FREE THYROXINE: CPT

## 2024-02-12 PROCEDURE — 99233 SBSQ HOSP IP/OBS HIGH 50: CPT | Performed by: NURSE PRACTITIONER

## 2024-02-12 PROCEDURE — 2580000003 HC RX 258

## 2024-02-12 PROCEDURE — 36415 COLL VENOUS BLD VENIPUNCTURE: CPT

## 2024-02-12 PROCEDURE — 80048 BASIC METABOLIC PNL TOTAL CA: CPT

## 2024-02-12 PROCEDURE — 94761 N-INVAS EAR/PLS OXIMETRY MLT: CPT

## 2024-02-12 PROCEDURE — 6370000000 HC RX 637 (ALT 250 FOR IP)

## 2024-02-12 PROCEDURE — 93306 TTE W/DOPPLER COMPLETE: CPT | Performed by: INTERNAL MEDICINE

## 2024-02-12 PROCEDURE — 6360000002 HC RX W HCPCS

## 2024-02-12 PROCEDURE — 2700000000 HC OXYGEN THERAPY PER DAY

## 2024-02-12 PROCEDURE — 36600 WITHDRAWAL OF ARTERIAL BLOOD: CPT

## 2024-02-12 PROCEDURE — 82607 VITAMIN B-12: CPT

## 2024-02-12 PROCEDURE — 2500000003 HC RX 250 WO HCPCS: Performed by: STUDENT IN AN ORGANIZED HEALTH CARE EDUCATION/TRAINING PROGRAM

## 2024-02-12 PROCEDURE — 93306 TTE W/DOPPLER COMPLETE: CPT

## 2024-02-12 PROCEDURE — 95816 EEG AWAKE AND DROWSY: CPT | Performed by: PSYCHIATRY & NEUROLOGY

## 2024-02-12 RX ORDER — POTASSIUM CHLORIDE 7.45 MG/ML
10 INJECTION INTRAVENOUS
Status: COMPLETED | OUTPATIENT
Start: 2024-02-12 | End: 2024-02-12

## 2024-02-12 RX ORDER — FOLIC ACID 5 MG/ML
1 INJECTION, SOLUTION INTRAMUSCULAR; INTRAVENOUS; SUBCUTANEOUS DAILY
Status: DISCONTINUED | OUTPATIENT
Start: 2024-02-12 | End: 2024-02-12

## 2024-02-12 RX ORDER — LABETALOL HYDROCHLORIDE 5 MG/ML
5 INJECTION INTRAVENOUS EVERY 6 HOURS
Status: DISCONTINUED | OUTPATIENT
Start: 2024-02-12 | End: 2024-02-13 | Stop reason: SDUPTHER

## 2024-02-12 RX ORDER — SODIUM CHLORIDE, SODIUM LACTATE, POTASSIUM CHLORIDE, CALCIUM CHLORIDE 600; 310; 30; 20 MG/100ML; MG/100ML; MG/100ML; MG/100ML
INJECTION, SOLUTION INTRAVENOUS CONTINUOUS
Status: DISCONTINUED | OUTPATIENT
Start: 2024-02-12 | End: 2024-02-14

## 2024-02-12 RX ORDER — ASPIRIN 300 MG/1
81 SUPPOSITORY RECTAL DAILY
Status: DISCONTINUED | OUTPATIENT
Start: 2024-02-12 | End: 2024-02-12

## 2024-02-12 RX ORDER — IPRATROPIUM BROMIDE AND ALBUTEROL SULFATE 2.5; .5 MG/3ML; MG/3ML
1 SOLUTION RESPIRATORY (INHALATION) EVERY 4 HOURS PRN
Status: DISCONTINUED | OUTPATIENT
Start: 2024-02-12 | End: 2024-02-13

## 2024-02-12 RX ORDER — ASPIRIN 300 MG/1
150 SUPPOSITORY RECTAL DAILY
Status: DISCONTINUED | OUTPATIENT
Start: 2024-02-12 | End: 2024-02-14

## 2024-02-12 RX ADMIN — POTASSIUM CHLORIDE 10 MEQ: 7.46 INJECTION, SOLUTION INTRAVENOUS at 12:23

## 2024-02-12 RX ADMIN — PIPERACILLIN AND TAZOBACTAM 3375 MG: 3; .375 INJECTION, POWDER, FOR SOLUTION INTRAVENOUS at 02:57

## 2024-02-12 RX ADMIN — IPRATROPIUM BROMIDE AND ALBUTEROL SULFATE 1 DOSE: .5; 3 SOLUTION RESPIRATORY (INHALATION) at 12:14

## 2024-02-12 RX ADMIN — PIPERACILLIN AND TAZOBACTAM 3375 MG: 3; .375 INJECTION, POWDER, FOR SOLUTION INTRAVENOUS at 12:54

## 2024-02-12 RX ADMIN — POTASSIUM CHLORIDE 10 MEQ: 7.46 INJECTION, SOLUTION INTRAVENOUS at 09:51

## 2024-02-12 RX ADMIN — POTASSIUM CHLORIDE 10 MEQ: 7.46 INJECTION, SOLUTION INTRAVENOUS at 11:10

## 2024-02-12 RX ADMIN — SODIUM CHLORIDE, POTASSIUM CHLORIDE, SODIUM LACTATE AND CALCIUM CHLORIDE: 600; 310; 30; 20 INJECTION, SOLUTION INTRAVENOUS at 09:31

## 2024-02-12 RX ADMIN — FOLIC ACID 1 MG: 5 INJECTION, SOLUTION INTRAMUSCULAR; INTRAVENOUS; SUBCUTANEOUS at 17:36

## 2024-02-12 RX ADMIN — POTASSIUM CHLORIDE 10 MEQ: 7.46 INJECTION, SOLUTION INTRAVENOUS at 13:34

## 2024-02-12 RX ADMIN — IPRATROPIUM BROMIDE AND ALBUTEROL SULFATE 1 DOSE: .5; 3 SOLUTION RESPIRATORY (INHALATION) at 08:14

## 2024-02-12 RX ADMIN — ENOXAPARIN SODIUM 40 MG: 100 INJECTION SUBCUTANEOUS at 20:17

## 2024-02-12 RX ADMIN — PIPERACILLIN AND TAZOBACTAM 3375 MG: 3; .375 INJECTION, POWDER, FOR SOLUTION INTRAVENOUS at 19:23

## 2024-02-12 RX ADMIN — ASPIRIN 150 MG: 300 SUPPOSITORY RECTAL at 17:29

## 2024-02-12 RX ADMIN — BUDESONIDE 500 MCG: 0.5 INHALANT RESPIRATORY (INHALATION) at 08:21

## 2024-02-12 NOTE — SIGNIFICANT EVENT
Significant Event Note    Name: Court Hutchison  : 1940  MRN: [id number]  Date of Service: 21      8:31 PM: Called to bedside due to increased lethargy and fever. 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 She has had noted dysphagia and significant hiatal hernia, noted difficult NG placement. Patient was acute on chronic hypoxic respiratory failure, suspected previously in the setting of aspiration pneumonitis, now maintained 4L nc. Patient noted febrile up to 101 this PM. Significant wheezing noted diffusely without sputum production. Given new onset fever and continued O2 requirement, significant concern for interval development of aspiration pneumonia. Given significant dysphagia concern, will plan for anaerobic coverage. Zosyn started, MRSA screen sent. Repeat CXR pending. Will add duonebs q4hr wa given significant wheezing, will hold on steroids at this time (continued on home prednisone 5mg, prescribed by rheumatology in the OP setting for RA).       Electronically signed by George Joel MD on 2024 at 8:31 PM

## 2024-02-12 NOTE — CONSULTS
[place] [address]                                  CONSULTATION    PATIENT NAME: MARITA DIAZ                    :        1940  MED REC NO:   [id number]                           ROOM:       0009  ACCOUNT NO:   [id number]                           ADMIT DATE: 02/10/2024  PROVIDER:     Karin Kaminski M.D.    CONSULT DATE:  2024    HISTORY OF PRESENT ILLNESS:  The patient is known to the practice. She  is an 83-year-old female, admitted recently with CVA, weakness,  dysphagia, asked to see for PEG tube placement.  The patient herself has  not been able to provide any history. History obtained from nursing  staff, chart, as well as some conversation with her daughter, Nasrin who  provided information about her mother.  The patient is with CVA, seen by  nurse practitioner Maru Parada recently in the office on 2024 for  evaluation of abnormal liver function test as well as endoscopy.  The  patient has left-sided facial droop, worsening dysphagia, not able to  eat, lately been having fever.  Aspiration possibility.   She is not  eating at this time.  Nursing staff failed to have NG tube for her.  Request to have that placed by the Surgery Service.  Possible large  hiatus hernia described by the family. The patient has slurred speech  _____ at the time of evaluations.  Not able to provide any history at  this time.    PAST MEDICAL HISTORY:  Ex-smoker.    PAST SURGICAL HISTORY:  Hemorrhoid surgery, colonoscopy, EGD, hernia  repair, skin cancer excision.    MEDICATIONS:  She is on Pulmicort inhalers, albuterol inhalers,  prednisone 5 mg daily, Fosamax on hold, Norvasc.    ALLERGIES:  NONE.    SOCIAL HISTORY:  Quit smoking more than 12 years ago. Long history of  smoking.  No smokeless tobacco.  Does not drink alcohol.  No drug abuse.    FAMILY HISTORY:  Cancer in her mother.  Father, coronary artery 
Neurology Stroke Alert Note    Date:2/10/2024       Room:[address] [place] [address]  Patient Name:Court Hutchison     YOB: 1940     Age:83 y.o.  Chief Complaint   Patient presents with    Aphasia    Dizziness    Off-Balance        Requesting Physician: Keiko Mcclendon MD     Reason for Consult:  Evaluate for stroke alert    Subjective       HPI: Court Hutchison who is a 83 y.o. female with a history of arthritis, basal cell carcinoma of cheek, COPD, hyperlipidemia, previous smoker, hypertension, rheumatoid arthritis who presented to [facility] ED for the evaluation of dizziness, increased dysarthria, and daughter feeling as though patient was not behaving as she normally would. Patient lives alone but daughter is close by and helps care for her. Daughter states that patient went to bed at 10:00 PM last night her normal self. She states that when the patient woke up this morning she complained of dizziness, and daughter states that that she was slurring her words. Patient is dysarthric at baseline but daughter states she is significantly worse today. She also states that she chronically has a right sided facial droop from facial surgery but notes a left sided facial droop today. She states patient at baseline is not alert to month, year, or her birthday. Initial NIH found to be a 5 - 2 for not answering orientation questions correctly which is baseline for patient, left sided facial droop, LUE ataxia, and worsening dysarthria. Initial /90. Initial BP 96.  Stat CT head negative for acute hemorrhage. Given patient presented outside the window for TNK administration, she was deemed not a candidate for TNK. Stat CTA head and neck negative for LVO, given this patient deemed not a candidate for neurointervention. 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 Recommended loading patient with Aspirin 325mg and Plavix 300mg and obtaining MRI 
Patient seen evaluated full consult to follow at send PEG tube will be done during the week might be difficult because of the hernia in the meanwhile patient will need Dobbhoff tube or NG tube inserted by the urology service  
Units 02/10/24 18:13 02/11/24 03:17 02/11/24 04:22   CRP 0.00 - 1.00 mg/dl   1.19 (H)   Pro-BNP 0.0 - 449.0 pg/mL 1971.0 (H)     Troponin, High Sensitivity 0 - 12 ng/L 26 (H)     Cholesterol, Total 100 - 199 mg/dL  139    HDL Cholesterol mg/dL  32    LDL Calculated mg/dL  88    Triglycerides 0 - 199 mg/dL  97    (H): Data is abnormally high       Latest Reference Range & Units 01/04/24 15:12 02/03/24 10:13 02/10/24 14:20   Albumin 3.5 - 5.1 g/dL 3.8 3.9 4.1   Alk Phos 38 - 126 U/L 75 78 85   ALT 11 - 66 U/L 15 13 13   AMMONIA, PLASMA, 424257 11 - 60 umol/L  21    AST 5 - 40 U/L 25 23 25   BILIRUBIN TOTAL 0.3 - 1.2 mg/dL 0.4 0.4 0.6   GGT 8 - 69 U/L  15    Total Protein 6.1 - 8.0 g/dL 7.6 7.8 8.0        Latest Reference Range & Units 02/10/24 14:20 02/11/24 03:17   Hemoglobin A1C 4.4 - 6.4 % 6.0 5.9        Latest Reference Range & Units 02/10/24 18:13   ETHYL ALCOHOL, SERUM 0.00 % < 0.01        Latest Reference Range & Units 02/10/24 14:20 02/11/24 03:17 02/12/24 04:02   WBC 4.8 - 10.8 thou/mm3 8.5 10.5 12.5 (H)   RBC 4.20 - 5.40 mill/mm3 6.03 (H) 5.31 5.11   Hemoglobin Quant 12.0 - 16.0 gm/dl 14.2 12.5 12.0   Hematocrit 37.0 - 47.0 % 47.0 41.2 40.6   MCV 81.0 - 99.0 fL 77.9 (L) 77.6 (L) 79.5 (L)   MCH 26.0 - 33.0 pg 23.5 (L) 23.5 (L) 23.5 (L)   MCHC 32.2 - 35.5 gm/dl 30.2 (L) 30.3 (L) 29.6 (L)   MPV 9.4 - 12.4 fL 9.5 10.1 10.0   RDW-CV 11.5 - 14.5 % 18.9 (H) 17.8 (H) 18.1 (H)   RDW-SD 35.0 - 45.0 fL 49.4 (H) 48.8 (H) 51.0 (H)   Platelet Count 130 - 400 thou/mm3 427 (H) 376 344   Lymphocytes Absolute 1.0 - 4.8 thou/mm3 1.8 0.8 (L) 1.5   Monocytes Absolute 0.4 - 1.3 thou/mm3 0.6 0.7 1.3   Eosinophils Absolute 0.0 - 0.4 thou/mm3 0.6 (H) 0.1 0.0   Basophils Absolute 0.0 - 0.1 thou/mm3 0.1 0.1 0.1   Seg Neutrophils % 63.7 83.9 76.2   Segs Absolute 1.8 - 7.7 thou/mm3 5.4 8.8 (H) 9.5 (H)   Lymphocytes % 21.0 8.0 11.9   Monocytes % 7.6 6.4 10.6   Eosinophils % 6.6 0.5 0.2   Basophils % 0.9 0.7 0.5   Immature Grans (Abs)

## 2024-02-12 NOTE — PROCEDURES
Date: 2/12/2024  Referring physician: Horacio Ramos DO    Indication  Patient aged 84 y with encephalopathy. EEG done to assess for epileptiform activity.    Introduction  This routine 20-minute EEG was recorded using the International 10-20 System on a [employer] workstation at 256 samples/s. Automated spike and seizure detection algorithms were applied.    Description  During the maximal alert state, a fairly-regulated, symmetric, and reactive 5-6 Hz posterior dominant rhythm was seen. No consistent focal slowing or interhemispheric asymmetry was noted. Stage I and stage II sleep were observed. There were no interictal epileptiform discharges or electrographic seizures.    Activations  Hyperventilation was not performed. Intermittent photic stimulation was performed and demonstrated no posterior driving response.    Impression  Abnormal awake EEG. The slowing mentioned above suggests mild non specific encephalopathy.     No epileptiform discharges were identified. Please note the absence of such activity on this record cannot conclusively rule out an epileptic disorder. If such is still clinically suspected, a repeat study with sleep deprivation and/or prolonged sampling may be helpful.    Danitza Kc MD  Epilepsy Board Certified.  Neurology Board Certified.    Electronically Signed

## 2024-02-13 LAB
AMMONIA PLAS-MCNC: 36 UMOL/L (ref 11–60)
ANION GAP SERPL CALC-SCNC: 12 MEQ/L (ref 8–16)
BASOPHILS ABSOLUTE: 0.1 THOU/MM3 (ref 0–0.1)
BASOPHILS NFR BLD AUTO: 1 %
BUN SERPL-MCNC: 15 MG/DL (ref 7–22)
CALCIUM SERPL-MCNC: 8.6 MG/DL (ref 8.5–10.5)
CHLORIDE SERPL-SCNC: 107 MEQ/L (ref 98–111)
CO2 SERPL-SCNC: 17 MEQ/L (ref 23–33)
CREAT SERPL-MCNC: 0.7 MG/DL (ref 0.4–1.2)
DEPRECATED RDW RBC AUTO: 51.8 FL (ref 35–45)
EOSINOPHIL NFR BLD AUTO: 8.5 %
EOSINOPHILS ABSOLUTE: 0.8 THOU/MM3 (ref 0–0.4)
ERYTHROCYTE [DISTWIDTH] IN BLOOD BY AUTOMATED COUNT: 18.1 % (ref 11.5–14.5)
GFR SERPL CREATININE-BSD FRML MDRD: > 60 ML/MIN/1.73M2
GLUCOSE BLD STRIP.AUTO-MCNC: 80 MG/DL (ref 70–108)
GLUCOSE SERPL-MCNC: 78 MG/DL (ref 70–108)
HCT VFR BLD AUTO: 40.4 % (ref 37–47)
HGB BLD-MCNC: 11.8 GM/DL (ref 12–16)
IMM GRANULOCYTES # BLD AUTO: 0.04 THOU/MM3 (ref 0–0.07)
IMM GRANULOCYTES NFR BLD AUTO: 0.4 %
LYMPHOCYTES ABSOLUTE: 1.2 THOU/MM3 (ref 1–4.8)
LYMPHOCYTES NFR BLD AUTO: 13 %
MAGNESIUM SERPL-MCNC: 2.5 MG/DL (ref 1.6–2.4)
MCH RBC QN AUTO: 23.6 PG (ref 26–33)
MCHC RBC AUTO-ENTMCNC: 29.2 GM/DL (ref 32.2–35.5)
MCV RBC AUTO: 80.8 FL (ref 81–99)
MONOCYTES ABSOLUTE: 0.9 THOU/MM3 (ref 0.4–1.3)
MONOCYTES NFR BLD AUTO: 9.5 %
NEUTROPHILS NFR BLD AUTO: 67.6 %
NRBC BLD AUTO-RTO: 0 /100 WBC
PLATELET # BLD AUTO: 349 THOU/MM3 (ref 130–400)
PMV BLD AUTO: 10.2 FL (ref 9.4–12.4)
POTASSIUM SERPL-SCNC: 4.1 MEQ/L (ref 3.5–5.2)
RBC # BLD AUTO: 5 MILL/MM3 (ref 4.2–5.4)
REASON FOR REJECTION: NORMAL
REJECTED TEST: NORMAL
SEGMENTED NEUTROPHILS ABSOLUTE COUNT: 6.2 THOU/MM3 (ref 1.8–7.7)
SODIUM SERPL-SCNC: 136 MEQ/L (ref 135–145)
WBC # BLD AUTO: 9.1 THOU/MM3 (ref 4.8–10.8)

## 2024-02-13 PROCEDURE — 80048 BASIC METABOLIC PNL TOTAL CA: CPT

## 2024-02-13 PROCEDURE — 82140 ASSAY OF AMMONIA: CPT

## 2024-02-13 PROCEDURE — 83735 ASSAY OF MAGNESIUM: CPT

## 2024-02-13 PROCEDURE — 94761 N-INVAS EAR/PLS OXIMETRY MLT: CPT

## 2024-02-13 PROCEDURE — 2580000003 HC RX 258

## 2024-02-13 PROCEDURE — 6360000002 HC RX W HCPCS

## 2024-02-13 PROCEDURE — 6370000000 HC RX 637 (ALT 250 FOR IP): Performed by: STUDENT IN AN ORGANIZED HEALTH CARE EDUCATION/TRAINING PROGRAM

## 2024-02-13 PROCEDURE — 99231 SBSQ HOSP IP/OBS SF/LOW 25: CPT | Performed by: NURSE PRACTITIONER

## 2024-02-13 PROCEDURE — 6360000002 HC RX W HCPCS: Performed by: STUDENT IN AN ORGANIZED HEALTH CARE EDUCATION/TRAINING PROGRAM

## 2024-02-13 PROCEDURE — 85025 COMPLETE CBC W/AUTO DIFF WBC: CPT

## 2024-02-13 PROCEDURE — 36415 COLL VENOUS BLD VENIPUNCTURE: CPT

## 2024-02-13 PROCEDURE — 6360000002 HC RX W HCPCS: Performed by: NURSE PRACTITIONER

## 2024-02-13 PROCEDURE — 2700000000 HC OXYGEN THERAPY PER DAY

## 2024-02-13 PROCEDURE — 99233 SBSQ HOSP IP/OBS HIGH 50: CPT | Performed by: STUDENT IN AN ORGANIZED HEALTH CARE EDUCATION/TRAINING PROGRAM

## 2024-02-13 PROCEDURE — 2580000003 HC RX 258: Performed by: STUDENT IN AN ORGANIZED HEALTH CARE EDUCATION/TRAINING PROGRAM

## 2024-02-13 PROCEDURE — 2500000003 HC RX 250 WO HCPCS: Performed by: STUDENT IN AN ORGANIZED HEALTH CARE EDUCATION/TRAINING PROGRAM

## 2024-02-13 PROCEDURE — 2060000000 HC ICU INTERMEDIATE R&B

## 2024-02-13 PROCEDURE — 82948 REAGENT STRIP/BLOOD GLUCOSE: CPT

## 2024-02-13 PROCEDURE — 94640 AIRWAY INHALATION TREATMENT: CPT

## 2024-02-13 RX ORDER — IPRATROPIUM BROMIDE AND ALBUTEROL SULFATE 2.5; .5 MG/3ML; MG/3ML
1 SOLUTION RESPIRATORY (INHALATION)
Status: DISCONTINUED | OUTPATIENT
Start: 2024-02-13 | End: 2024-02-16 | Stop reason: HOSPADM

## 2024-02-13 RX ORDER — CYANOCOBALAMIN 1000 UG/ML
1000 INJECTION, SOLUTION INTRAMUSCULAR; SUBCUTANEOUS
Status: DISCONTINUED | OUTPATIENT
Start: 2024-02-13 | End: 2024-02-16 | Stop reason: HOSPADM

## 2024-02-13 RX ORDER — LABETALOL HYDROCHLORIDE 5 MG/ML
5 INJECTION, SOLUTION INTRAVENOUS EVERY 6 HOURS
Status: DISCONTINUED | OUTPATIENT
Start: 2024-02-13 | End: 2024-02-15

## 2024-02-13 RX ORDER — LABETALOL HYDROCHLORIDE 5 MG/ML
10 INJECTION, SOLUTION INTRAVENOUS EVERY 6 HOURS
Status: DISCONTINUED | OUTPATIENT
Start: 2024-02-13 | End: 2024-02-13 | Stop reason: CLARIF

## 2024-02-13 RX ORDER — ENALAPRILAT 1.25 MG/ML
1.25 INJECTION INTRAVENOUS EVERY 6 HOURS SCHEDULED
Status: DISCONTINUED | OUTPATIENT
Start: 2024-02-13 | End: 2024-02-14

## 2024-02-13 RX ADMIN — ENALAPRILAT 1.25 MG: 2.5 INJECTION INTRAVENOUS at 18:31

## 2024-02-13 RX ADMIN — ENOXAPARIN SODIUM 40 MG: 100 INJECTION SUBCUTANEOUS at 19:54

## 2024-02-13 RX ADMIN — SODIUM CHLORIDE, POTASSIUM CHLORIDE, SODIUM LACTATE AND CALCIUM CHLORIDE: 600; 310; 30; 20 INJECTION, SOLUTION INTRAVENOUS at 14:06

## 2024-02-13 RX ADMIN — ASPIRIN 150 MG: 300 SUPPOSITORY RECTAL at 12:54

## 2024-02-13 RX ADMIN — IPRATROPIUM BROMIDE AND ALBUTEROL SULFATE 1 DOSE: .5; 3 SOLUTION RESPIRATORY (INHALATION) at 21:14

## 2024-02-13 RX ADMIN — IPRATROPIUM BROMIDE AND ALBUTEROL SULFATE 1 DOSE: .5; 3 SOLUTION RESPIRATORY (INHALATION) at 12:31

## 2024-02-13 RX ADMIN — CYANOCOBALAMIN 1000 MCG: 1000 INJECTION, SOLUTION INTRAMUSCULAR; SUBCUTANEOUS at 12:53

## 2024-02-13 RX ADMIN — LABETALOL HYDROCHLORIDE 5 MG: 5 INJECTION, SOLUTION INTRAVENOUS at 10:51

## 2024-02-13 RX ADMIN — HYDROCORTISONE SODIUM SUCCINATE 10 MG: 100 INJECTION, POWDER, FOR SOLUTION INTRAMUSCULAR; INTRAVENOUS at 12:53

## 2024-02-13 RX ADMIN — PIPERACILLIN AND TAZOBACTAM 3375 MG: 3; .375 INJECTION, POWDER, FOR SOLUTION INTRAVENOUS at 10:59

## 2024-02-13 RX ADMIN — SODIUM CHLORIDE, POTASSIUM CHLORIDE, SODIUM LACTATE AND CALCIUM CHLORIDE: 600; 310; 30; 20 INJECTION, SOLUTION INTRAVENOUS at 00:51

## 2024-02-13 RX ADMIN — IPRATROPIUM BROMIDE AND ALBUTEROL SULFATE 1 DOSE: .5; 3 SOLUTION RESPIRATORY (INHALATION) at 07:41

## 2024-02-13 RX ADMIN — LABETALOL HYDROCHLORIDE 5 MG: 5 INJECTION, SOLUTION INTRAVENOUS at 04:45

## 2024-02-13 RX ADMIN — LABETALOL HYDROCHLORIDE 5 MG: 5 INJECTION, SOLUTION INTRAVENOUS at 17:16

## 2024-02-13 RX ADMIN — LABETALOL HYDROCHLORIDE 5 MG: 5 INJECTION, SOLUTION INTRAVENOUS at 23:58

## 2024-02-13 RX ADMIN — HYDROCORTISONE SODIUM SUCCINATE 5 MG: 100 INJECTION, POWDER, FOR SOLUTION INTRAMUSCULAR; INTRAVENOUS at 17:15

## 2024-02-13 RX ADMIN — PIPERACILLIN AND TAZOBACTAM 3375 MG: 3; .375 INJECTION, POWDER, FOR SOLUTION INTRAVENOUS at 03:53

## 2024-02-13 RX ADMIN — FOLIC ACID 1 MG: 5 INJECTION, SOLUTION INTRAMUSCULAR; INTRAVENOUS; SUBCUTANEOUS at 10:24

## 2024-02-13 RX ADMIN — PIPERACILLIN AND TAZOBACTAM 4500 MG: 4; .5 INJECTION, POWDER, FOR SOLUTION INTRAVENOUS at 18:33

## 2024-02-13 RX ADMIN — ALBUTEROL SULFATE 2.5 MG: 2.5 SOLUTION RESPIRATORY (INHALATION) at 01:56

## 2024-02-13 NOTE — PALLIATIVE CARE
Initial Evaluation        Patient:   Court Hutchison  YOB: 1940  Age:  84 y.o.  Room:  [facility]09/[facility]-  MRN:  [id number]   Acct: [id number]    Date of Admission:  2/10/2024  1:58 PM  Date of Service:  2/13/2024  Completed By:  Shawna Samuels RN                 Reason for Palliative Care Evaluation:-               [] Code Status Discussion              [x] Goals of Care              [] Pain/Symptom Management               [] Emotional Support              [] Other:                    Current Issues:-   []  Pain  [x]  Fatigue  []  Nausea  []  Anxiety  []  Depression  []  Shortness of Breath  []  Constipation  [x]  Appetite - failed MBS. Plan for peg tube placement  []  Other:              Advance Directives:-    [x] [place] DNR Form  [x] Living Will  [] Medical POA              Current Code Status:-   Changed after conversation  [] Full Resuscitation  [x] DNR-Comfort Care-Arrest  [] DNR-Comfort Care       [] Limited Resuscitation             [] No CPR            [] No shock            [] No ET intubation/reintubation            [] No resuscitative medications            [] Other limitation:               Palliative Performance Status:-      [] 100%  Full ambulation; normal activity and work; no evidence of disease; able to do own self care; normal intake; fully conscious     [] 90%   Full ambulation; normal activity and work; some evidence of disease; able to do own self care; normal intake; fully conscious    [] 80%   Full ambulation; normal activity with effort; some evidence of disease; able to do own self care; normal or reduced intake; fully conscious    [] 70%  Ambulation reduced; unable to perform normal job/work; significant  disease; able to do own self care; normal or reduced intake; fully conscious    [] 60%  Ambulation reduced; Significant disease;Can't do hobbies/housework; intake normal or reduced; occasional assist; LOC full/confusion    [] 50%  Mainly sit/lie; Extensive disease;

## 2024-02-14 ENCOUNTER — APPOINTMENT (OUTPATIENT)
Dept: GENERAL RADIOLOGY | Age: 84
DRG: 064 | End: 2024-02-14
Payer: MEDICARE

## 2024-02-14 LAB
ANION GAP SERPL CALC-SCNC: 12 MEQ/L (ref 8–16)
BASOPHILS ABSOLUTE: 0.1 THOU/MM3 (ref 0–0.1)
BASOPHILS NFR BLD AUTO: 0.8 %
BUN SERPL-MCNC: 13 MG/DL (ref 7–22)
CALCIUM SERPL-MCNC: 8.9 MG/DL (ref 8.5–10.5)
CHLORIDE SERPL-SCNC: 106 MEQ/L (ref 98–111)
CO2 SERPL-SCNC: 23 MEQ/L (ref 23–33)
CREAT SERPL-MCNC: 0.7 MG/DL (ref 0.4–1.2)
DEPRECATED RDW RBC AUTO: 51.8 FL (ref 35–45)
EOSINOPHIL NFR BLD AUTO: 10.2 %
EOSINOPHILS ABSOLUTE: 1.1 THOU/MM3 (ref 0–0.4)
ERYTHROCYTE [DISTWIDTH] IN BLOOD BY AUTOMATED COUNT: 17.9 % (ref 11.5–14.5)
GFR SERPL CREATININE-BSD FRML MDRD: > 60 ML/MIN/1.73M2
GLUCOSE SERPL-MCNC: 77 MG/DL (ref 70–108)
HCT VFR BLD AUTO: 41.5 % (ref 37–47)
HGB BLD-MCNC: 12.1 GM/DL (ref 12–16)
IMM GRANULOCYTES # BLD AUTO: 0.06 THOU/MM3 (ref 0–0.07)
IMM GRANULOCYTES NFR BLD AUTO: 0.5 %
LYMPHOCYTES ABSOLUTE: 1.4 THOU/MM3 (ref 1–4.8)
LYMPHOCYTES NFR BLD AUTO: 12.6 %
MAGNESIUM SERPL-MCNC: 2.1 MG/DL (ref 1.6–2.4)
MCH RBC QN AUTO: 23.8 PG (ref 26–33)
MCHC RBC AUTO-ENTMCNC: 29.2 GM/DL (ref 32.2–35.5)
MCV RBC AUTO: 81.7 FL (ref 81–99)
MONOCYTES ABSOLUTE: 0.9 THOU/MM3 (ref 0.4–1.3)
MONOCYTES NFR BLD AUTO: 7.9 %
NEUTROPHILS NFR BLD AUTO: 68 %
NRBC BLD AUTO-RTO: 0 /100 WBC
PLATELET # BLD AUTO: 423 THOU/MM3 (ref 130–400)
PMV BLD AUTO: 10.3 FL (ref 9.4–12.4)
POTASSIUM SERPL-SCNC: 3.8 MEQ/L (ref 3.5–5.2)
RBC # BLD AUTO: 5.08 MILL/MM3 (ref 4.2–5.4)
SEGMENTED NEUTROPHILS ABSOLUTE COUNT: 7.5 THOU/MM3 (ref 1.8–7.7)
SODIUM SERPL-SCNC: 141 MEQ/L (ref 135–145)
WBC # BLD AUTO: 11.1 THOU/MM3 (ref 4.8–10.8)

## 2024-02-14 PROCEDURE — 2580000003 HC RX 258

## 2024-02-14 PROCEDURE — 92526 ORAL FUNCTION THERAPY: CPT

## 2024-02-14 PROCEDURE — 6370000000 HC RX 637 (ALT 250 FOR IP): Performed by: STUDENT IN AN ORGANIZED HEALTH CARE EDUCATION/TRAINING PROGRAM

## 2024-02-14 PROCEDURE — 6360000002 HC RX W HCPCS: Performed by: INTERNAL MEDICINE

## 2024-02-14 PROCEDURE — 97535 SELF CARE MNGMENT TRAINING: CPT

## 2024-02-14 PROCEDURE — 36415 COLL VENOUS BLD VENIPUNCTURE: CPT

## 2024-02-14 PROCEDURE — 94640 AIRWAY INHALATION TREATMENT: CPT

## 2024-02-14 PROCEDURE — 97530 THERAPEUTIC ACTIVITIES: CPT

## 2024-02-14 PROCEDURE — 0DH67UZ INSERTION OF FEEDING DEVICE INTO STOMACH, VIA NATURAL OR ARTIFICIAL OPENING: ICD-10-PCS | Performed by: STUDENT IN AN ORGANIZED HEALTH CARE EDUCATION/TRAINING PROGRAM

## 2024-02-14 PROCEDURE — 6360000002 HC RX W HCPCS: Performed by: STUDENT IN AN ORGANIZED HEALTH CARE EDUCATION/TRAINING PROGRAM

## 2024-02-14 PROCEDURE — 99231 SBSQ HOSP IP/OBS SF/LOW 25: CPT | Performed by: NURSE PRACTITIONER

## 2024-02-14 PROCEDURE — 2060000000 HC ICU INTERMEDIATE R&B

## 2024-02-14 PROCEDURE — 6360000002 HC RX W HCPCS: Performed by: NURSE PRACTITIONER

## 2024-02-14 PROCEDURE — 2700000000 HC OXYGEN THERAPY PER DAY

## 2024-02-14 PROCEDURE — 2500000003 HC RX 250 WO HCPCS: Performed by: STUDENT IN AN ORGANIZED HEALTH CARE EDUCATION/TRAINING PROGRAM

## 2024-02-14 PROCEDURE — 6370000000 HC RX 637 (ALT 250 FOR IP)

## 2024-02-14 PROCEDURE — 99233 SBSQ HOSP IP/OBS HIGH 50: CPT | Performed by: INTERNAL MEDICINE

## 2024-02-14 PROCEDURE — 94761 N-INVAS EAR/PLS OXIMETRY MLT: CPT

## 2024-02-14 PROCEDURE — 6370000000 HC RX 637 (ALT 250 FOR IP): Performed by: INTERNAL MEDICINE

## 2024-02-14 PROCEDURE — 6360000002 HC RX W HCPCS

## 2024-02-14 PROCEDURE — 71045 X-RAY EXAM CHEST 1 VIEW: CPT

## 2024-02-14 PROCEDURE — 83735 ASSAY OF MAGNESIUM: CPT

## 2024-02-14 PROCEDURE — 80048 BASIC METABOLIC PNL TOTAL CA: CPT

## 2024-02-14 PROCEDURE — 2580000003 HC RX 258: Performed by: STUDENT IN AN ORGANIZED HEALTH CARE EDUCATION/TRAINING PROGRAM

## 2024-02-14 PROCEDURE — 85025 COMPLETE CBC W/AUTO DIFF WBC: CPT

## 2024-02-14 RX ORDER — BUMETANIDE 0.25 MG/ML
1 INJECTION INTRAMUSCULAR; INTRAVENOUS ONCE
Status: COMPLETED | OUTPATIENT
Start: 2024-02-14 | End: 2024-02-14

## 2024-02-14 RX ORDER — FOLIC ACID 1 MG/1
1 TABLET ORAL DAILY
Status: DISCONTINUED | OUTPATIENT
Start: 2024-02-15 | End: 2024-02-16 | Stop reason: HOSPADM

## 2024-02-14 RX ORDER — ALBUTEROL SULFATE 90 UG/1
2 AEROSOL, METERED RESPIRATORY (INHALATION)
Status: DISCONTINUED | OUTPATIENT
Start: 2024-02-14 | End: 2024-02-15

## 2024-02-14 RX ORDER — CLOPIDOGREL BISULFATE 75 MG/1
75 TABLET ORAL DAILY
Status: DISCONTINUED | OUTPATIENT
Start: 2024-02-14 | End: 2024-02-16 | Stop reason: HOSPADM

## 2024-02-14 RX ORDER — AZITHROMYCIN 250 MG/1
250 TABLET, FILM COATED ORAL DAILY
Status: COMPLETED | OUTPATIENT
Start: 2024-02-14 | End: 2024-02-16

## 2024-02-14 RX ORDER — AMLODIPINE BESYLATE 10 MG/1
10 TABLET ORAL DAILY
Status: DISCONTINUED | OUTPATIENT
Start: 2024-02-14 | End: 2024-02-16 | Stop reason: HOSPADM

## 2024-02-14 RX ADMIN — CLOPIDOGREL BISULFATE 75 MG: 75 TABLET ORAL at 19:11

## 2024-02-14 RX ADMIN — PIPERACILLIN AND TAZOBACTAM 3375 MG: 3; .375 INJECTION, POWDER, FOR SOLUTION INTRAVENOUS at 19:40

## 2024-02-14 RX ADMIN — IPRATROPIUM BROMIDE AND ALBUTEROL SULFATE 1 DOSE: .5; 3 SOLUTION RESPIRATORY (INHALATION) at 11:48

## 2024-02-14 RX ADMIN — HYDROCORTISONE SODIUM SUCCINATE 5 MG: 100 INJECTION, POWDER, FOR SOLUTION INTRAMUSCULAR; INTRAVENOUS at 19:01

## 2024-02-14 RX ADMIN — ENOXAPARIN SODIUM 40 MG: 100 INJECTION SUBCUTANEOUS at 21:10

## 2024-02-14 RX ADMIN — LABETALOL HYDROCHLORIDE 5 MG: 5 INJECTION, SOLUTION INTRAVENOUS at 23:23

## 2024-02-14 RX ADMIN — ATORVASTATIN CALCIUM 40 MG: 40 TABLET, FILM COATED ORAL at 21:10

## 2024-02-14 RX ADMIN — PIPERACILLIN AND TAZOBACTAM 4500 MG: 4; .5 INJECTION, POWDER, FOR SOLUTION INTRAVENOUS at 03:14

## 2024-02-14 RX ADMIN — ENALAPRILAT 1.25 MG: 2.5 INJECTION INTRAVENOUS at 00:54

## 2024-02-14 RX ADMIN — PIPERACILLIN AND TAZOBACTAM 4500 MG: 4; .5 INJECTION, POWDER, FOR SOLUTION INTRAVENOUS at 11:28

## 2024-02-14 RX ADMIN — FOLIC ACID 1 MG: 5 INJECTION, SOLUTION INTRAMUSCULAR; INTRAVENOUS; SUBCUTANEOUS at 10:10

## 2024-02-14 RX ADMIN — HYDROCORTISONE SODIUM SUCCINATE 10 MG: 100 INJECTION, POWDER, FOR SOLUTION INTRAMUSCULAR; INTRAVENOUS at 11:40

## 2024-02-14 RX ADMIN — AMLODIPINE BESYLATE 10 MG: 10 TABLET ORAL at 21:10

## 2024-02-14 RX ADMIN — BUDESONIDE 500 MCG: 0.5 INHALANT RESPIRATORY (INHALATION) at 08:10

## 2024-02-14 RX ADMIN — LABETALOL HYDROCHLORIDE 5 MG: 5 INJECTION, SOLUTION INTRAVENOUS at 19:05

## 2024-02-14 RX ADMIN — ENALAPRILAT 1.25 MG: 2.5 INJECTION INTRAVENOUS at 04:41

## 2024-02-14 RX ADMIN — ASPIRIN 150 MG: 300 SUPPOSITORY RECTAL at 11:40

## 2024-02-14 RX ADMIN — SODIUM CHLORIDE, POTASSIUM CHLORIDE, SODIUM LACTATE AND CALCIUM CHLORIDE: 600; 310; 30; 20 INJECTION, SOLUTION INTRAVENOUS at 03:12

## 2024-02-14 RX ADMIN — LABETALOL HYDROCHLORIDE 5 MG: 5 INJECTION, SOLUTION INTRAVENOUS at 11:40

## 2024-02-14 RX ADMIN — ENALAPRILAT 1.25 MG: 2.5 INJECTION INTRAVENOUS at 13:47

## 2024-02-14 RX ADMIN — AZITHROMYCIN DIHYDRATE 250 MG: 250 TABLET ORAL at 19:11

## 2024-02-14 RX ADMIN — IPRATROPIUM BROMIDE AND ALBUTEROL SULFATE 1 DOSE: .5; 3 SOLUTION RESPIRATORY (INHALATION) at 22:19

## 2024-02-14 RX ADMIN — BUMETANIDE 1 MG: 0.25 INJECTION INTRAMUSCULAR; INTRAVENOUS at 19:09

## 2024-02-14 RX ADMIN — LABETALOL HYDROCHLORIDE 5 MG: 5 INJECTION, SOLUTION INTRAVENOUS at 04:40

## 2024-02-14 RX ADMIN — IPRATROPIUM BROMIDE AND ALBUTEROL SULFATE 1 DOSE: .5; 3 SOLUTION RESPIRATORY (INHALATION) at 08:10

## 2024-02-14 RX ADMIN — BUDESONIDE 500 MCG: 0.5 INHALANT RESPIRATORY (INHALATION) at 22:24

## 2024-02-14 RX ADMIN — ALBUTEROL SULFATE 2.5 MG: 2.5 SOLUTION RESPIRATORY (INHALATION) at 04:26

## 2024-02-14 NOTE — PALLIATIVE CARE
Follow Up / Progress Note        Patient:   Court Hutchison  YOB: 1940  Age:  84 y.o.  Room:  [facility]09/[facility]  MRN:  [id number]           [place] DNRCCA and DO NOT RESUSCITATE form signed. Copies made, faxed to medical records. Original and copies left at patient's bedside, no family present at this time.        Electronically signed by Shawna Samuels RN on 2/14/2024 at 11:06 AM             Palliative Care Office: [phone]

## 2024-02-14 NOTE — PALLIATIVE CARE
Follow Up / Progress Note        Patient:   Court Hutchison  YOB: 1940  Age:  84 y.o.  [facility]  MRN:  [id number]           Noted that patient has now been cleared for a diet. PEG tube placement was cancelled. On unit to follow up with family, still no family at bedside. Will continue to follow PRN, please call if further needs arise.         Electronically signed by Shawna Samuels RN on 2/14/2024 at 12:27 PM             Palliative Care Office: [phone]

## 2024-02-14 NOTE — DISCHARGE INSTR - COC
Continuity of Care Form    Patient Name: Court Hutchison   :  1940  MRN:  [id number]    Admit date:  2/10/2024  Discharge date:  ***    Code Status Order: Limited   Advance Directives:     Admitting Physician:  No admitting provider for patient encounter.  PCP: Hossein Msoquera APRN - CNP    Discharging Nurse: ***  Discharging Hospital Unit/Room#: 4A-09/009-A  Discharging Unit Phone Number: ***    Emergency Contact:   Extended Emergency Contact Information  Primary Emergency Contact: HerberthNasrin  Address: [address] [place] [address]  Home Phone: [phone]  Mobile Phone: [phone]  Relation: Child  Secondary Emergency Contact: Rosaura Siva  Address: [address] [place] [address]  Home Phone: [phone]  Relation: Child    Past Surgical History:  Past Surgical History:   Procedure Laterality Date    COLONOSCOPY W/ POLYPECTOMY      HEMORRHOID SURGERY      HERNIA REPAIR N/A 2020    ROBOTIC UMBILICAL HERNIA REPAIR WITH MESH performed by Rona Zhou MD at [facility] OR    MOHS SURGERY      SKIN CANCER EXCISION Right        Immunization History:   Immunization History   Administered Date(s) Administered    COVID-19, PFIZER PURPLE top, DILUTE for use, (age 12 y+), 30mcg/0.3mL 2021    Influenza 10/22/2013    Influenza Vaccine, unspecified formulation 10/17/2015    Influenza Virus Vaccine 10/22/2013, 10/13/2014, 2016, 2017, 10/18/2017, 2019    Influenza, AFLURIA (age 3 yrs+), FLUZONE, (age 6 mo+), MDV, 0.5mL 10/17/2015    Influenza, FLUAD, (age 65 y+), Adjuvanted, 0.5mL 2021, 2021    Influenza, High Dose (Fluzone 65 yrs and older) 10/22/2013, 10/17/2015, 2016, 2016, 2017, 10/18/2017, 2019    Pneumococcal, PCV-13, PREVNAR 13, (age 6w+), IM, 0.5mL 2016    Pneumococcal, PPSV23, PNEUMOVAX 23, (age 2y+), SC/IM, 0.5mL 10/05/2012    TD 2LF, TDVAX, (age 7y+), IM, 0.5mL

## 2024-02-15 ENCOUNTER — APPOINTMENT (OUTPATIENT)
Dept: GENERAL RADIOLOGY | Age: 84
DRG: 064 | End: 2024-02-15
Payer: MEDICARE

## 2024-02-15 LAB
BASOPHILS ABSOLUTE: 0 THOU/MM3 (ref 0–0.1)
BASOPHILS NFR BLD AUTO: 0.6 %
DEPRECATED RDW RBC AUTO: 49.3 FL (ref 35–45)
EOSINOPHIL NFR BLD AUTO: 1.4 %
EOSINOPHILS ABSOLUTE: 0.1 THOU/MM3 (ref 0–0.4)
ERYTHROCYTE [DISTWIDTH] IN BLOOD BY AUTOMATED COUNT: 17.6 % (ref 11.5–14.5)
HCT VFR BLD AUTO: 38.7 % (ref 37–47)
HGB BLD-MCNC: 11.5 GM/DL (ref 12–16)
IMM GRANULOCYTES # BLD AUTO: 0.03 THOU/MM3 (ref 0–0.07)
IMM GRANULOCYTES NFR BLD AUTO: 0.4 %
LYMPHOCYTES ABSOLUTE: 1.4 THOU/MM3 (ref 1–4.8)
LYMPHOCYTES NFR BLD AUTO: 18.1 %
MCH RBC QN AUTO: 23.5 PG (ref 26–33)
MCHC RBC AUTO-ENTMCNC: 29.7 GM/DL (ref 32.2–35.5)
MCV RBC AUTO: 79.1 FL (ref 81–99)
MONOCYTES ABSOLUTE: 0.7 THOU/MM3 (ref 0.4–1.3)
MONOCYTES NFR BLD AUTO: 9.3 %
NEUTROPHILS NFR BLD AUTO: 70.2 %
NRBC BLD AUTO-RTO: 0 /100 WBC
PLATELET # BLD AUTO: 374 THOU/MM3 (ref 130–400)
PMV BLD AUTO: 10.3 FL (ref 9.4–12.4)
RBC # BLD AUTO: 4.89 MILL/MM3 (ref 4.2–5.4)
SEGMENTED NEUTROPHILS ABSOLUTE COUNT: 5.5 THOU/MM3 (ref 1.8–7.7)
WBC # BLD AUTO: 7.8 THOU/MM3 (ref 4.8–10.8)

## 2024-02-15 PROCEDURE — 97530 THERAPEUTIC ACTIVITIES: CPT

## 2024-02-15 PROCEDURE — 99232 SBSQ HOSP IP/OBS MODERATE 35: CPT | Performed by: INTERNAL MEDICINE

## 2024-02-15 PROCEDURE — 85025 COMPLETE CBC W/AUTO DIFF WBC: CPT

## 2024-02-15 PROCEDURE — 94761 N-INVAS EAR/PLS OXIMETRY MLT: CPT

## 2024-02-15 PROCEDURE — 92526 ORAL FUNCTION THERAPY: CPT

## 2024-02-15 PROCEDURE — 71045 X-RAY EXAM CHEST 1 VIEW: CPT

## 2024-02-15 PROCEDURE — 36415 COLL VENOUS BLD VENIPUNCTURE: CPT

## 2024-02-15 PROCEDURE — 6360000002 HC RX W HCPCS

## 2024-02-15 PROCEDURE — 97535 SELF CARE MNGMENT TRAINING: CPT

## 2024-02-15 PROCEDURE — 97112 NEUROMUSCULAR REEDUCATION: CPT

## 2024-02-15 PROCEDURE — 2060000000 HC ICU INTERMEDIATE R&B

## 2024-02-15 PROCEDURE — 97110 THERAPEUTIC EXERCISES: CPT

## 2024-02-15 PROCEDURE — 6370000000 HC RX 637 (ALT 250 FOR IP): Performed by: STUDENT IN AN ORGANIZED HEALTH CARE EDUCATION/TRAINING PROGRAM

## 2024-02-15 PROCEDURE — 94640 AIRWAY INHALATION TREATMENT: CPT

## 2024-02-15 PROCEDURE — 6370000000 HC RX 637 (ALT 250 FOR IP)

## 2024-02-15 PROCEDURE — 2580000003 HC RX 258

## 2024-02-15 PROCEDURE — 6370000000 HC RX 637 (ALT 250 FOR IP): Performed by: INTERNAL MEDICINE

## 2024-02-15 PROCEDURE — 6360000002 HC RX W HCPCS: Performed by: STUDENT IN AN ORGANIZED HEALTH CARE EDUCATION/TRAINING PROGRAM

## 2024-02-15 RX ORDER — PREDNISONE 5 MG/1
5 TABLET ORAL DAILY
Status: DISCONTINUED | OUTPATIENT
Start: 2024-02-15 | End: 2024-02-16 | Stop reason: HOSPADM

## 2024-02-15 RX ORDER — BUMETANIDE 0.25 MG/ML
1 INJECTION INTRAMUSCULAR; INTRAVENOUS ONCE
Status: COMPLETED | OUTPATIENT
Start: 2024-02-15 | End: 2024-02-15

## 2024-02-15 RX ORDER — LOSARTAN POTASSIUM 25 MG/1
25 TABLET ORAL DAILY
Status: DISCONTINUED | OUTPATIENT
Start: 2024-02-15 | End: 2024-02-16

## 2024-02-15 RX ORDER — QUETIAPINE FUMARATE 25 MG/1
25 TABLET, FILM COATED ORAL NIGHTLY
Status: DISCONTINUED | OUTPATIENT
Start: 2024-02-15 | End: 2024-02-16 | Stop reason: HOSPADM

## 2024-02-15 RX ADMIN — FOLIC ACID 1 MG: 1 TABLET ORAL at 11:06

## 2024-02-15 RX ADMIN — PIPERACILLIN AND TAZOBACTAM 3375 MG: 3; .375 INJECTION, POWDER, FOR SOLUTION INTRAVENOUS at 03:30

## 2024-02-15 RX ADMIN — PIPERACILLIN AND TAZOBACTAM 3375 MG: 3; .375 INJECTION, POWDER, FOR SOLUTION INTRAVENOUS at 11:24

## 2024-02-15 RX ADMIN — PREDNISONE 5 MG: 5 TABLET ORAL at 11:07

## 2024-02-15 RX ADMIN — BUDESONIDE 500 MCG: 0.5 INHALANT RESPIRATORY (INHALATION) at 09:21

## 2024-02-15 RX ADMIN — BUMETANIDE 1 MG: 0.25 INJECTION INTRAMUSCULAR; INTRAVENOUS at 11:07

## 2024-02-15 RX ADMIN — LOSARTAN POTASSIUM 25 MG: 25 TABLET, FILM COATED ORAL at 11:07

## 2024-02-15 RX ADMIN — QUETIAPINE FUMARATE 25 MG: 25 TABLET ORAL at 20:26

## 2024-02-15 RX ADMIN — ATORVASTATIN CALCIUM 40 MG: 40 TABLET, FILM COATED ORAL at 20:26

## 2024-02-15 RX ADMIN — IPRATROPIUM BROMIDE AND ALBUTEROL SULFATE 1 DOSE: .5; 3 SOLUTION RESPIRATORY (INHALATION) at 09:19

## 2024-02-15 RX ADMIN — ENOXAPARIN SODIUM 40 MG: 100 INJECTION SUBCUTANEOUS at 20:26

## 2024-02-15 RX ADMIN — AZITHROMYCIN DIHYDRATE 250 MG: 250 TABLET ORAL at 11:06

## 2024-02-15 RX ADMIN — IPRATROPIUM BROMIDE AND ALBUTEROL SULFATE 1 DOSE: .5; 3 SOLUTION RESPIRATORY (INHALATION) at 15:29

## 2024-02-15 RX ADMIN — AMLODIPINE BESYLATE 10 MG: 10 TABLET ORAL at 11:06

## 2024-02-15 RX ADMIN — LABETALOL HYDROCHLORIDE 5 MG: 5 INJECTION, SOLUTION INTRAVENOUS at 04:36

## 2024-02-15 RX ADMIN — CLOPIDOGREL BISULFATE 75 MG: 75 TABLET ORAL at 11:06

## 2024-02-15 NOTE — RT PROTOCOL NOTE
RT Inhaler-Nebulizer Bronchodilator Protocol Note    There is a bronchodilator order in the chart from a provider indicating to follow the RT Bronchodilator Protocol and there is an “Initiate RT Inhaler-Nebulizer Bronchodilator Protocol” order as well (see protocol at bottom of note).    CXR Findings:  XR CHEST PORTABLE    Result Date: 2/10/2024  1. Suboptimal inflation of the lungs. Cardiomegaly. No effusion. 2. Mildly increased interstitial markings both mid and lower lung fields, consistent with interstitial pneumonitis/fibrosis. Question small patch consolidation retrocardiac region left lung base. **This report has been created using voice recognition software.  It may contain minor errors which are inherent in voice recognition technology.** Final report electronically signed by Dr. Rory Camacho on 2/10/2024 3:02 PM      The findings from the last RT Protocol Assessment were as follows:   History Pulmonary Disease: Chronic pulmonary disease  Respiratory Pattern: Dyspnea on exertion or RR 21-25 bpm  Breath Sounds: Inspiratory and expiratory or bilateral wheezing and/or rhonchi  Cough: Strong, spontaneous, non-productive  Indication for Bronchodilator Therapy:    Bronchodilator Assessment Score: 10    Aerosolized bronchodilator medication orders have been revised according to the RT Inhaler-Nebulizer Bronchodilator Protocol below.    Respiratory Therapist to perform RT Therapy Protocol Assessment initially then follow the protocol.  Repeat RT Therapy Protocol Assessment PRN for score 0-3 or on second treatment, BID, and PRN for scores above 3.    No Indications - adjust the frequency to every 6 hours PRN wheezing or bronchospasm, if no treatments needed after 48 hours then discontinue using Per Protocol order mode.     If indication present, adjust the RT bronchodilator orders based on the Bronchodilator Assessment Score as indicated below.  Use Inhaler orders unless patient has one or more of the following: on 
RT Inhaler-Nebulizer Bronchodilator Protocol Note    There is a bronchodilator order in the chart from a provider indicating to follow the RT Bronchodilator Protocol and there is an “Initiate RT Inhaler-Nebulizer Bronchodilator Protocol” order as well (see protocol at bottom of note).    CXR Findings:  XR CHEST PORTABLE    Result Date: 2/10/2024  1. Suboptimal inflation of the lungs. Cardiomegaly. No effusion. 2. Mildly increased interstitial markings both mid and lower lung fields, consistent with interstitial pneumonitis/fibrosis. Question small patch consolidation retrocardiac region left lung base. **This report has been created using voice recognition software.  It may contain minor errors which are inherent in voice recognition technology.** Final report electronically signed by Dr. Rory Camacho on 2/10/2024 3:02 PM      The findings from the last RT Protocol Assessment were as follows:   History Pulmonary Disease: Chronic pulmonary disease  Respiratory Pattern: Dyspnea on exertion or RR 21-25 bpm  Breath Sounds: Intermittent or unilateral wheezes  Cough: Strong, spontaneous, non-productive  Indication for Bronchodilator Therapy:    Bronchodilator Assessment Score: 8    Aerosolized bronchodilator medication orders have been revised according to the RT Inhaler-Nebulizer Bronchodilator Protocol below.    Respiratory Therapist to perform RT Therapy Protocol Assessment initially then follow the protocol.  Repeat RT Therapy Protocol Assessment PRN for score 0-3 or on second treatment, BID, and PRN for scores above 3.    No Indications - adjust the frequency to every 6 hours PRN wheezing or bronchospasm, if no treatments needed after 48 hours then discontinue using Per Protocol order mode.     If indication present, adjust the RT bronchodilator orders based on the Bronchodilator Assessment Score as indicated below.  Use Inhaler orders unless patient has one or more of the following: on home nebulizer, not able to 
RT Inhaler-Nebulizer Bronchodilator Protocol Note    There is a bronchodilator order in the chart from a provider indicating to follow the RT Bronchodilator Protocol and there is an “Initiate RT Inhaler-Nebulizer Bronchodilator Protocol” order as well (see protocol at bottom of note).    CXR Findings:  XR CHEST PORTABLE    Result Date: 2/14/2024  1. Mildly cardiomegaly. Suboptimal inflation of lungs. Questionable tiny effusion right side. 2. Moderately increased interstitial markings throughout both lungs likely due to combination of interstitial fibrosis and superimposed interstitial pneumonitis/edema. 3. Overall appearance of chest has worsened since prior. **This report has been created using voice recognition software.  It may contain minor errors which are inherent in voice recognition technology.** Final report electronically signed by Dr. Rory Camacho on 2/14/2024 4:27 PM      The findings from the last RT Protocol Assessment were as follows:   History Pulmonary Disease: Chronic pulmonary disease  Respiratory Pattern: Dyspnea on exertion or RR 21-25 bpm  Breath Sounds: Inspiratory and expiratory or bilateral wheezing and/or rhonchi  Cough: Strong, spontaneous, non-productive  Indication for Bronchodilator Therapy: Wheezing associated with pulm disorder  Bronchodilator Assessment Score: 10    Aerosolized bronchodilator medication orders have been revised according to the RT Inhaler-Nebulizer Bronchodilator Protocol below.    Respiratory Therapist to perform RT Therapy Protocol Assessment initially then follow the protocol.  Repeat RT Therapy Protocol Assessment PRN for score 0-3 or on second treatment, BID, and PRN for scores above 3.    No Indications - adjust the frequency to every 6 hours PRN wheezing or bronchospasm, if no treatments needed after 48 hours then discontinue using Per Protocol order mode.     If indication present, adjust the RT bronchodilator orders based on the Bronchodilator Assessment 
RT Nebulizer Bronchodilator Protocol Note    There is a bronchodilator order in the chart from a provider indicating to follow the RT Bronchodilator Protocol and there is an “Initiate RT Bronchodilator Protocol” order as well (see protocol at bottom of note).    CXR Findings:  XR CHEST PORTABLE    Result Date: 2/10/2024  1. Suboptimal inflation of the lungs. Cardiomegaly. No effusion. 2. Mildly increased interstitial markings both mid and lower lung fields, consistent with interstitial pneumonitis/fibrosis. Question small patch consolidation retrocardiac region left lung base. **This report has been created using voice recognition software.  It may contain minor errors which are inherent in voice recognition technology.** Final report electronically signed by Dr. Rory Camacho on 2/10/2024 3:02 PM      The findings from the last RT Protocol Assessment were as follows:  Smoking: Chronic pulmonary disease  Respiratory Pattern: Mild dyspnea at rest, irregular pattern, or RR 21-25 bpm  Breath Sounds: Intermittent or unilateral wheezes  Cough: Weak, productive  Indication for Bronchodilator Therapy: Decreased or absent breath sounds, Wheezing associated with pulm disorder  Bronchodilator Assessment Score: 12    Aerosolized bronchodilator medication orders have been revised according to the RT Nebulizer Bronchodilator Protocol below.    Respiratory Therapist to perform RT Therapy Protocol Assessment initially then follow the protocol.  Repeat RT Therapy Protocol Assessment PRN for score 0-3 or on second treatment, BID, and PRN for scores above 3.    No Indications - adjust the frequency to every 6 hours PRN wheezing or bronchospasm, if no treatments needed after 48 hours then discontinue using Per Protocol order mode.     If indication present, adjust the RT bronchodilator orders based on the Bronchodilator Assessment Score as indicated below.  If a patient is on this medication at home then do not decrease Frequency 
frequency to every 6 hours PRN wheezing or bronchospasm, if no treatments needed after 48 hours then discontinue using Per Protocol order mode.     If indication present, adjust the RT bronchodilator orders based on the Bronchodilator Assessment Score as indicated below.  Use Inhaler orders unless patient has one or more of the following: on home nebulizer, not able to hold breath for 10 seconds, is not alert and oriented, cannot activate and use MDI correctly, or respiratory rate 25 breaths per minute or more, then use the equivalent nebulizer order(s) with same Frequency and PRN reasons based on the score.  If a patient is on this medication at home then do not decrease Frequency below that used at home.    0-3 - enter or revise RT bronchodilator order(s) to equivalent RT Bronchodilator order with Frequency of every 4 hours PRN for wheezing or increased work of breathing using Per Protocol order mode.        4-6 - enter or revise RT Bronchodilator order(s) to two equivalent RT bronchodilator orders with one order with BID Frequency and one order with Frequency of every 4 hours PRN wheezing or increased work of breathing using Per Protocol order mode.        7-10 - enter or revise RT Bronchodilator order(s) to two equivalent RT bronchodilator orders with one order with TID Frequency and one order with Frequency of every 4 hours PRN wheezing or increased work of breathing using Per Protocol order mode.       11-13 - enter or revise RT Bronchodilator order(s) to one equivalent RT bronchodilator order with QID Frequency and an Albuterol order with Frequency of every 4 hours PRN wheezing or increased work of breathing using Per Protocol order mode.      Greater than 13 - enter or revise RT Bronchodilator order(s) to one equivalent RT bronchodilator order with every 4 hours Frequency and an Albuterol order with Frequency of every 2 hours PRN wheezing or increased work of breathing using Per Protocol order mode.     RT 
unless patient has one or more of the following: on home nebulizer, not able to hold breath for 10 seconds, is not alert and oriented, cannot activate and use MDI correctly, or respiratory rate 25 breaths per minute or more, then use the equivalent nebulizer order(s) with same Frequency and PRN reasons based on the score.  If a patient is on this medication at home then do not decrease Frequency below that used at home.    0-3 - enter or revise RT bronchodilator order(s) to equivalent RT Bronchodilator order with Frequency of every 4 hours PRN for wheezing or increased work of breathing using Per Protocol order mode.        4-6 - enter or revise RT Bronchodilator order(s) to two equivalent RT bronchodilator orders with one order with BID Frequency and one order with Frequency of every 4 hours PRN wheezing or increased work of breathing using Per Protocol order mode.        7-10 - enter or revise RT Bronchodilator order(s) to two equivalent RT bronchodilator orders with one order with TID Frequency and one order with Frequency of every 4 hours PRN wheezing or increased work of breathing using Per Protocol order mode.       11-13 - enter or revise RT Bronchodilator order(s) to one equivalent RT bronchodilator order with QID Frequency and an Albuterol order with Frequency of every 4 hours PRN wheezing or increased work of breathing using Per Protocol order mode.      Greater than 13 - enter or revise RT Bronchodilator order(s) to one equivalent RT bronchodilator order with every 4 hours Frequency and an Albuterol order with Frequency of every 2 hours PRN wheezing or increased work of breathing using Per Protocol order mode.     RT to enter RT Home Evaluation for COPD & MDI Assessment order using Per Protocol order mode.    Electronically signed by Scott Stewart RCP on 2/11/2024 at 8:36 PM  
use the equivalent nebulizer order(s) with same Frequency and PRN reasons based on the score.  If a patient is on this medication at home then do not decrease Frequency below that used at home.    0-3 - enter or revise RT bronchodilator order(s) to equivalent RT Bronchodilator order with Frequency of every 4 hours PRN for wheezing or increased work of breathing using Per Protocol order mode.        4-6 - enter or revise RT Bronchodilator order(s) to two equivalent RT bronchodilator orders with one order with BID Frequency and one order with Frequency of every 4 hours PRN wheezing or increased work of breathing using Per Protocol order mode.        7-10 - enter or revise RT Bronchodilator order(s) to two equivalent RT bronchodilator orders with one order with TID Frequency and one order with Frequency of every 4 hours PRN wheezing or increased work of breathing using Per Protocol order mode.       11-13 - enter or revise RT Bronchodilator order(s) to one equivalent RT bronchodilator order with QID Frequency and an Albuterol order with Frequency of every 4 hours PRN wheezing or increased work of breathing using Per Protocol order mode.      Greater than 13 - enter or revise RT Bronchodilator order(s) to one equivalent RT bronchodilator order with every 4 hours Frequency and an Albuterol order with Frequency of every 2 hours PRN wheezing or increased work of breathing using Per Protocol order mode.     RT to enter RT Home Evaluation for COPD & MDI Assessment order using Per Protocol order mode.    Electronically signed by Estela Boyer RCP on 2/13/2024 at 7:39 AM

## 2024-02-16 VITALS
TEMPERATURE: 98.2 F | OXYGEN SATURATION: 95 % | WEIGHT: 177.03 LBS | DIASTOLIC BLOOD PRESSURE: 99 MMHG | RESPIRATION RATE: 20 BRPM | HEART RATE: 75 BPM | BODY MASS INDEX: 38.19 KG/M2 | HEIGHT: 57 IN | SYSTOLIC BLOOD PRESSURE: 145 MMHG

## 2024-02-16 LAB
ANION GAP SERPL CALC-SCNC: 13 MEQ/L (ref 8–16)
BACTERIA BLD AEROBE CULT: NORMAL
BACTERIA BLD AEROBE CULT: NORMAL
BUN SERPL-MCNC: 11 MG/DL (ref 7–22)
CALCIUM SERPL-MCNC: 8.5 MG/DL (ref 8.5–10.5)
CHLORIDE SERPL-SCNC: 100 MEQ/L (ref 98–111)
CO2 SERPL-SCNC: 25 MEQ/L (ref 23–33)
CREAT SERPL-MCNC: 0.7 MG/DL (ref 0.4–1.2)
GFR SERPL CREATININE-BSD FRML MDRD: > 60 ML/MIN/1.73M2
GLUCOSE SERPL-MCNC: 83 MG/DL (ref 70–108)
MAGNESIUM SERPL-MCNC: 2 MG/DL (ref 1.6–2.4)
POTASSIUM SERPL-SCNC: 2.9 MEQ/L (ref 3.5–5.2)
POTASSIUM SERPL-SCNC: 3.4 MEQ/L (ref 3.5–5.2)
SODIUM SERPL-SCNC: 138 MEQ/L (ref 135–145)

## 2024-02-16 PROCEDURE — 80048 BASIC METABOLIC PNL TOTAL CA: CPT

## 2024-02-16 PROCEDURE — 84132 ASSAY OF SERUM POTASSIUM: CPT

## 2024-02-16 PROCEDURE — 6370000000 HC RX 637 (ALT 250 FOR IP)

## 2024-02-16 PROCEDURE — 2700000000 HC OXYGEN THERAPY PER DAY

## 2024-02-16 PROCEDURE — 2580000003 HC RX 258

## 2024-02-16 PROCEDURE — 36415 COLL VENOUS BLD VENIPUNCTURE: CPT

## 2024-02-16 PROCEDURE — 6370000000 HC RX 637 (ALT 250 FOR IP): Performed by: STUDENT IN AN ORGANIZED HEALTH CARE EDUCATION/TRAINING PROGRAM

## 2024-02-16 PROCEDURE — 83735 ASSAY OF MAGNESIUM: CPT

## 2024-02-16 PROCEDURE — 6360000002 HC RX W HCPCS

## 2024-02-16 PROCEDURE — 99239 HOSP IP/OBS DSCHRG MGMT >30: CPT | Performed by: INTERNAL MEDICINE

## 2024-02-16 PROCEDURE — 97110 THERAPEUTIC EXERCISES: CPT

## 2024-02-16 PROCEDURE — 97530 THERAPEUTIC ACTIVITIES: CPT

## 2024-02-16 PROCEDURE — 94640 AIRWAY INHALATION TREATMENT: CPT

## 2024-02-16 PROCEDURE — 92526 ORAL FUNCTION THERAPY: CPT

## 2024-02-16 PROCEDURE — 6370000000 HC RX 637 (ALT 250 FOR IP): Performed by: INTERNAL MEDICINE

## 2024-02-16 RX ORDER — CLOPIDOGREL BISULFATE 75 MG/1
75 TABLET ORAL DAILY
Qty: 30 TABLET | Refills: 3 | DISCHARGE
Start: 2024-02-17

## 2024-02-16 RX ORDER — LOSARTAN POTASSIUM 50 MG/1
50 TABLET ORAL DAILY
Status: DISCONTINUED | OUTPATIENT
Start: 2024-02-17 | End: 2024-02-16 | Stop reason: HOSPADM

## 2024-02-16 RX ORDER — ATORVASTATIN CALCIUM 40 MG/1
40 TABLET, FILM COATED ORAL NIGHTLY
Qty: 30 TABLET | Refills: 3 | DISCHARGE
Start: 2024-02-16

## 2024-02-16 RX ORDER — ASPIRIN 81 MG/1
81 TABLET ORAL DAILY
Qty: 90 TABLET | Refills: 0 | DISCHARGE
Start: 2024-02-16

## 2024-02-16 RX ORDER — AMLODIPINE BESYLATE 10 MG/1
10 TABLET ORAL DAILY
DISCHARGE
Start: 2024-02-16

## 2024-02-16 RX ORDER — POTASSIUM CHLORIDE 7.45 MG/ML
10 INJECTION INTRAVENOUS
Status: DISCONTINUED | OUTPATIENT
Start: 2024-02-16 | End: 2024-02-16

## 2024-02-16 RX ORDER — CYANOCOBALAMIN 1000 UG/ML
1000 INJECTION, SOLUTION INTRAMUSCULAR; SUBCUTANEOUS
DISCHARGE
Start: 2024-03-14

## 2024-02-16 RX ORDER — LOSARTAN POTASSIUM 50 MG/1
50 TABLET ORAL DAILY
Qty: 30 TABLET | Refills: 3 | DISCHARGE
Start: 2024-02-17

## 2024-02-16 RX ORDER — QUETIAPINE FUMARATE 25 MG/1
12.5 TABLET, FILM COATED ORAL NIGHTLY
Qty: 60 TABLET | Refills: 3 | DISCHARGE
Start: 2024-02-16

## 2024-02-16 RX ORDER — BUMETANIDE 0.5 MG/1
0.5 TABLET ORAL DAILY
Status: DISCONTINUED | OUTPATIENT
Start: 2024-02-16 | End: 2024-02-16

## 2024-02-16 RX ORDER — LOSARTAN POTASSIUM 25 MG/1
25 TABLET ORAL ONCE
Status: COMPLETED | OUTPATIENT
Start: 2024-02-16 | End: 2024-02-16

## 2024-02-16 RX ORDER — FOLIC ACID 1 MG/1
1 TABLET ORAL DAILY
Qty: 30 TABLET | Refills: 3 | DISCHARGE
Start: 2024-02-17

## 2024-02-16 RX ADMIN — FOLIC ACID 1 MG: 1 TABLET ORAL at 09:41

## 2024-02-16 RX ADMIN — POTASSIUM CHLORIDE 10 MEQ: 7.46 INJECTION, SOLUTION INTRAVENOUS at 09:46

## 2024-02-16 RX ADMIN — AZITHROMYCIN DIHYDRATE 250 MG: 250 TABLET ORAL at 09:40

## 2024-02-16 RX ADMIN — PIPERACILLIN AND TAZOBACTAM 3375 MG: 3; .375 INJECTION, POWDER, FOR SOLUTION INTRAVENOUS at 10:54

## 2024-02-16 RX ADMIN — LOSARTAN POTASSIUM 25 MG: 25 TABLET, FILM COATED ORAL at 12:42

## 2024-02-16 RX ADMIN — POTASSIUM CHLORIDE 10 MEQ: 7.46 INJECTION, SOLUTION INTRAVENOUS at 08:22

## 2024-02-16 RX ADMIN — PIPERACILLIN AND TAZOBACTAM 3375 MG: 3; .375 INJECTION, POWDER, FOR SOLUTION INTRAVENOUS at 03:40

## 2024-02-16 RX ADMIN — IPRATROPIUM BROMIDE AND ALBUTEROL SULFATE 1 DOSE: .5; 3 SOLUTION RESPIRATORY (INHALATION) at 08:31

## 2024-02-16 RX ADMIN — POTASSIUM BICARBONATE 40 MEQ: 782 TABLET, EFFERVESCENT ORAL at 12:42

## 2024-02-16 RX ADMIN — PREDNISONE 5 MG: 5 TABLET ORAL at 09:41

## 2024-02-16 RX ADMIN — CLOPIDOGREL BISULFATE 75 MG: 75 TABLET ORAL at 09:41

## 2024-02-16 RX ADMIN — AMLODIPINE BESYLATE 10 MG: 10 TABLET ORAL at 09:40

## 2024-02-16 RX ADMIN — BUDESONIDE 500 MCG: 0.5 INHALANT RESPIRATORY (INHALATION) at 08:31

## 2024-02-16 RX ADMIN — POTASSIUM CHLORIDE 10 MEQ: 7.46 INJECTION, SOLUTION INTRAVENOUS at 12:01

## 2024-02-16 RX ADMIN — LOSARTAN POTASSIUM 25 MG: 25 TABLET, FILM COATED ORAL at 09:41

## 2024-02-16 NOTE — PLAN OF CARE
Problem: Discharge Planning  Goal: Discharge to home or other facility with appropriate resources  Outcome: Progressing  Flowsheets (Taken 2/13/2024 2204)  Discharge to home or other facility with appropriate resources:   Identify barriers to discharge with patient and caregiver   Arrange for needed discharge resources and transportation as appropriate     Problem: Skin/Tissue Integrity  Goal: Absence of new skin breakdown  Description: 1.  Monitor for areas of redness and/or skin breakdown  2.  Assess vascular access sites hourly  3.  Every 4-6 hours minimum:  Change oxygen saturation probe site  4.  Every 4-6 hours:  If on nasal continuous positive airway pressure, respiratory therapy assess nares and determine need for appliance change or resting period.  Outcome: Progressing  Note: No signs of skin breakdown.  Skin warm, dry, and intact.  Mucous membranes pink and moist.  Assistance with turns/ambulation provided PRN.  Will continue to monitor.       Problem: Safety - Adult  Goal: Free from fall injury  Outcome: Progressing  Flowsheets (Taken 2/13/2024 2204)  Free From Fall Injury: Instruct family/caregiver on patient safety     Problem: Neurosensory - Adult  Goal: Achieves stable or improved neurological status  Outcome: Progressing  Flowsheets (Taken 2/13/2024 2204)  Achieves stable or improved neurological status: Assess for and report changes in neurological status  Goal: Achieves maximal functionality and self care  Outcome: Progressing  Flowsheets (Taken 2/13/2024 2204)  Achieves maximal functionality and self care:   Monitor swallowing and airway patency with patient fatigue and changes in neurological status   Encourage and assist patient to increase activity and self care with guidance from physical therapy/occupational therapy     Problem: Pain  Goal: Verbalizes/displays adequate comfort level or baseline comfort level  Outcome: Progressing  Flowsheets (Taken 2/13/2024 2204)  Verbalizes/displays 
  Problem: Respiratory - Adult  Goal: Clear lung sounds  Outcome: Progressing     
  Problem: Respiratory - Adult  Goal: Clear lung sounds  Outcome: Progressing     
  Problem: Respiratory - Adult  Goal: Clear lung sounds  Outcome: Progressing   Continue breathing medication to improve aeration/ decrease wheezing of lungs. Patient mutually agreed on goals.    
  Problem: Respiratory - Adult  Goal: Clear lung sounds  Outcome: Progressing   Patient is receiving ipratropium-albuterol and budesonide to promote and preserve a clear, open airway. Patient breath sounds improved post-treatment and will continue to improve with further therapy. Patient mutually agreed on goals.   
Care plan reviewed with patient and family.  Patient and family verbalize understanding of the plan of care and contribute to goal setting.   Problem: Discharge Planning  Goal: Discharge to home or other facility with appropriate resources  Outcome: Progressing     Problem: Skin/Tissue Integrity  Goal: Absence of new skin breakdown  Description: 1.  Monitor for areas of redness and/or skin breakdown  2.  Assess vascular access sites hourly  3.  Every 4-6 hours minimum:  Change oxygen saturation probe site  4.  Every 4-6 hours:  If on nasal continuous positive airway pressure, respiratory therapy assess nares and determine need for appliance change or resting period.  Outcome: Progressing     Problem: Safety - Adult  Goal: Free from fall injury  Outcome: Progressing     Problem: Respiratory - Adult  Goal: Clear lung sounds  2/14/2024 0819 by Laura Mercer, P  Outcome: Progressing     Problem: Neurosensory - Adult  Goal: Achieves stable or improved neurological status  Outcome: Progressing  Goal: Achieves maximal functionality and self care  Outcome: Progressing     Problem: Pain  Goal: Verbalizes/displays adequate comfort level or baseline comfort level  Outcome: Progressing     Problem: Nutrition Deficit:  Goal: Optimize nutritional status  Outcome: Progressing  Flowsheets (Taken 2/14/2024 1107 by Schwab, Allison C, RD, LD)  Nutrient intake appropriate for improving, restoring, or maintaining nutritional needs:   Assess nutritional status and recommend course of action   Monitor oral intake, labs, and treatment plans   Recommend appropriate diets, oral nutritional supplements, and vitamin/mineral supplements     Problem: Chronic Conditions and Co-morbidities  Goal: Patient's chronic conditions and co-morbidity symptoms are monitored and maintained or improved  Outcome: Progressing     
Consult received.  Please see SW note dated 2/12/2024.  
Assess pain using appropriate pain scale   Administer analgesics based on type and severity of pain and evaluate response   Implement non-pharmacological measures as appropriate and evaluate response     Problem: Nutrition Deficit:  Goal: Optimize nutritional status  Outcome: Progressing  Flowsheets  Nutrient intake appropriate for improving, restoring, or maintaining nutritional needs: Assess nutritional status and recommend course of action     Problem: Chronic Conditions and Co-morbidities  Goal: Patient's chronic conditions and co-morbidity symptoms are monitored and maintained or improved  Outcome: Progressing  Flowsheets  Care Plan - Patient's Chronic Conditions and Co-Morbidity Symptoms are Monitored and Maintained or Improved:   Collaborate with multidisciplinary team to address chronic and comorbid conditions and prevent exacerbation or deterioration   Monitor and assess patient's chronic conditions and comorbid symptoms for stability, deterioration, or improvement     
injury related to seizure activity:   Maintain airway, patient safety  and administer oxygen as ordered   Monitor patient for seizure activity, document and report duration and description of seizure to Licensed Independent Practitioner   If seizure occurs, turn patient to side and suction secretions as needed   Reorient patient post seizure   Seizure pads on all 4 side rails   Instruct patient/family to notify RN of any seizure activity   Instruct patient/family to call for assistance with activity based on assessment     Problem: Neurosensory - Adult  Goal: Achieves maximal functionality and self care  Outcome: Progressing  Flowsheets (Taken 2/15/2024 2043)  Achieves maximal functionality and self care:   Monitor swallowing and airway patency with patient fatigue and changes in neurological status   Encourage and assist patient to increase activity and self care with guidance from physical therapy/occupational therapy   Encourage visually impaired, hearing impaired and aphasic patients to use assistive/communication devices     Problem: Pain  Goal: Verbalizes/displays adequate comfort level or baseline comfort level  Outcome: Progressing  Flowsheets (Taken 2/15/2024 2043)  Verbalizes/displays adequate comfort level or baseline comfort level:   Encourage patient to monitor pain and request assistance   Assess pain using appropriate pain scale   Administer analgesics based on type and severity of pain and evaluate response   Implement non-pharmacological measures as appropriate and evaluate response     Problem: Nutrition Deficit:  Goal: Optimize nutritional status  Outcome: Progressing     Problem: Chronic Conditions and Co-morbidities  Goal: Patient's chronic conditions and co-morbidity symptoms are monitored and maintained or improved  Outcome: Progressing  Flowsheets (Taken 2/15/2024 2043)  Care Plan - Patient's Chronic Conditions and Co-Morbidity Symptoms are Monitored and Maintained or Improved:   Monitor and 
maximal functionality and self care  2/10/2024 2157 by Cece Quevedo, RN  Outcome: Progressing  Flowsheets (Taken 2/10/2024 2157)  Achieves maximal functionality and self care: Monitor swallowing and airway patency with patient fatigue and changes in neurological status

## 2024-02-16 NOTE — CARE COORDINATION
2/12/24, 12:12 PM EST    DISCHARGE ON GOING EVALUATION    [facility]  Location: -09/009-A Reason for admit: Dizziness [R42]  Stroke-like symptom [R29.90]   Procedure:   2/10 CT Head WO Contrast 1. No acute intracranial process..   2. Results of the study telephoned to supervising care provider (Rubi Vargas) in the CT scanner with patient, 1431 hours.   2/10 CTA Head Neck W WO Contrast  1. Calcified atherosclerosis of both carotid bulbs. There is no associated significant stenosis.   2. No intracranial stenoses or occlusions.   3. 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   2/11 MRI Brain W WO Contrast  1 Moderate-sized acute infarct in the superior left cerebellum.   2. Global volume loss.   3. Mild severity chronic small vessel ischemic changes.   2/11 MBS- failed, NPO  2/12 CT Head WO Contrast 1. No new abnormalities.   2. Developing low attenuation in the superior left cerebellum at the site of known infarction.   2/12 ECHO  Barriers to Discharge: WBC 12.5, NG tube maintenance with TF. Dietician following.  PT/OT/SLP, GI consult for possible PEG tube placement. Albuterol nebs, Pulmicort nebs, Lovenox, IV fluids, Zofran prn, IV Zosyn, Prednisone, electrolyte replacement protocols.   PCP: Hossein Mosquera APRN - CNP  Readmission Risk Score: 14.2%  Patient Goals/Plan/Treatment Preferences: From home with daughter, private duty aide. Physiatry consult. Will follow.                
2/13/24, 11:45 AM EST    DISCHARGE PLANNING EVALUATION    Spoke with daughter Nasrin.  She would like a referral made to [facility].  Called and made a referral to Ramez at the facility.  He is aware family is considering long term placement at the facility.    
2/14/24, 1:19 PM EST    DISCHARGE PLANNING EVALUATION    Spoke with Ramez at [facility].  They are able to accept at discharge.  Called and made him aware the Peg tube has been canceled.  He told SW that they can accept tomorrow if the patient is ready.  Left a message for daughter Nasrin.  Need to make her aware patient has been accepted to [facility].      2/14/24, 3:10 PM EST    Spoke with daughter Nasrin and made her aware Court has been accept to [facility].  She is aware there are no beds in [facility].    
2/14/24, 1:22 PM EST    DISCHARGE ON GOING EVALUATION    Court NULL [facility] day: 4  Location: -09/009-A Reason for admit: Dizziness [R42]  Stroke-like symptom [R29.90]   Procedure:   2/10 CT Head WO Contrast 1. No acute intracranial process..   2. Results of the study telephoned to supervising care provider (Rubi Vargas) in the CT scanner with patient, 1431 hours.   2/10 CTA Head Neck W WO Contrast  1. Calcified atherosclerosis of both carotid bulbs. There is no associated significant stenosis.   2. No intracranial stenoses or occlusions.   3. 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   2/11 MRI Brain W WO Contrast  1 Moderate-sized acute infarct in the superior left cerebellum.   2. Global volume loss.   3. Mild severity chronic small vessel ischemic changes.   2/11 MBS- failed, NPO  2/12 CT Head WO Contrast 1. No new abnormalities.   2. Developing low attenuation in the superior left cerebellum at the site of known infarction.   2/12 ECHO EF 60-65%  Barriers to Discharge: PEG tube placement canceled due to pt being more awake, SLP recommending dysphagia minced and moist diet. Palliative Care following. PT/OT. Nebs, Lovenox, IV Solu Cortef, folic acid, IV Zosyn, electrolyte replacement protocols.   PCP: Hossein Mosquera APRN - CNP  Readmission Risk Score: 17.6%  Patient Goals/Plan/Treatment Preferences: From home with daughter. Plan is [facility] at discharge. SW following. Refer to RIAN note.                
2/15/24, 11:33 AM EST    DISCHARGE ON GOING EVALUATION    Court NULL [facility] day: 5  Location: -09/009-A Reason for admit: Dizziness [R42]  Stroke-like symptom [R29.90]   Procedure:   2/10 CT Head WO Contrast 1. No acute intracranial process..   2. Results of the study telephoned to supervising care provider (Rubi Vargas) in the CT scanner with patient, 1431 hours.   2/10 CTA Head Neck W WO Contrast  1. Calcified atherosclerosis of both carotid bulbs. There is no associated significant stenosis.   2. No intracranial stenoses or occlusions.   3. 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   2/11 MRI Brain W WO Contrast  1 Moderate-sized acute infarct in the superior left cerebellum.   2. Global volume loss.   3. Mild severity chronic small vessel ischemic changes.   2/11 MBS- failed, NPO  2/12 CT Head WO Contrast 1. No new abnormalities.   2. Developing low attenuation in the superior left cerebellum at the site of known infarction.   2/12 ECHO EF 60-65%  2/14 CXR 1. Mildly cardiomegaly. Suboptimal inflation of lungs. Questionable tiny effusion right side.   2. Moderately increased interstitial markings throughout both lungs likely due to combination of interstitial fibrosis and superimposed interstitial pneumonitis/edema.   3. Overall appearance of chest has worsened since prior.   2/15 CXR pending  Barriers to Discharge: PT/OT/SLP,dysphagia minced and moist diet. Palliative Care following. PT/OT. Nebs, Lovenox, Prednisone, folic acid, IV Zosyn, electrolyte replacement protocols. Lovenox.  PCP: Hossein Mosquera APRN - CNP  Readmission Risk Score: 18.6%  Patient Goals/Plan/Treatment Preferences: Plan is [facility] at discharge. NOT a precert. Can go when medically stable. SW following.                
2/16/24, 1:29 PM EST    Patient goals/plan/ treatment preferences discussed by  and .  Patient goals/plan/ treatment preferences reviewed with patient/ family.  Patient/ family verbalize understanding of discharge plan and are in agreement with goal/plan/treatment preferences.  Understanding was demonstrated using the teach back method.  AVS provided by RN at time of discharge, which includes all necessary medical information pertaining to the patients current course of illness, treatment, post-discharge goals of care, and treatment preferences.     Services At/After Discharge: Skilled Nursing Facility (SNF), Aide services, In ambulance, Nursing service, OT, and PT       IMM Letter  IMM Letter given to Patient/Family/Significant other/Guardian/POA/by:: Keiko BAIG   IMM Letter date given:: 02/16/24  IMM Letter time given:: 1220     Court will be discharged to [facility].  She will be skilled at the facility under her Medicare benefit. She will be transported by ambulance. Discharge instructions and transport forms are attached to blue discharge packet.  Spoke with patients daughter Nasrin and made her aware of discharge.  Called Sarkis at [facility] and he will be ready to accept.    
DISCHARGE PLANNING EVALUATION  2/12/24, 2:31 PM EST    Reason for Referral: Discharge plan.  New HH at minimum.   Mental Status: Unable to assess.  Patient was asleep while SW was in the room.    Decision Making: Rosana Alexandra is helping with discharge placement.   Family/Social/Home Environment: Court was living at home with her daughter and son in law.  She had a room where she had most everything she needed.  Family left her home alone during the day and she did well.    Current Services including food security, transportation and housekeeping: Daughter hired a private duty aids to come for about an hour each morning to get her breakfast and help her get ready for the day.  Nasrin or patients daughter in law Haydee helped to get her to all of her appointments.    Current Equipment:oxygen, commode, walker, shower chair.   Payment Source:Medicare  Concerns or Barriers to Discharge: Patient may not be able to tolerate 3 hours of rehab every day.    Post-acute (PAC) provider list was provided to patient. Patient was informed of their freedom to choose PAC provider. Discussed and offered to show the patient the relevant PAC Providers quality and resource use measures on Medicare Compare web site via computer based on patient's goals of care and treatment preferences. Questions regarding selection process were answered.      Teach Back Method used with Court's daughter Nasrin regarding care plan and discharge planning.   Patient's family verbalized understanding of the plan of care and contribute to goal setting.       Patient goals, treatment preferences and discharge plan: Court's daughter would like her to go to [facility] at discharge.  If she is not appropriate [facility]  Waiting to hear back from [facility].      Electronically signed by ALEC Anderson on 2/12/2024 at 2:31 PM           
At/After Discharge   Confirm Follow Up Transport Family     Patient Goals/Plan/Treatment Preferences: Home with daughter Nasrin and has private duty aide. Has DME. Wears O2 prn thru [facility]. Recently approved thru [employer] for aide assistance when they have one come available. Agreeable to new HH at discharge at minimum. Monitor therapy evals for recommendations. Will need to be able to go up 7 steps when gets home; tri-level home. SW consulted.     If patient is discharged prior to next notation, then this note serves as note for discharge by case management.

## 2024-02-16 NOTE — DISCHARGE SUMMARY
Hospital Medicine Discharge Summary      Patient Identification:   Court Hutchison   : 1940  MRN: [id number]   Account: [id number]   Patient's PCP: Hossein Mosquera APRN - CNP    Admit Date: 2/10/2024   Discharge Date:   24    Admitting Physician: No admitting provider for patient encounter.  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 female with  has a past medical history of Arthritis, Arthritis, Basal cell carcinoma (BCC) of skin of face, COPD (chronic obstructive pulmonary disease) (HCC), COVID-19, Dyslipidemia, Fatigue, History of tobacco abuse, Hyperlipidemia, Hypertension, Non morbid obesity due to excess calories, Pneumonia, Precordial pain, Rheumatoid arteritis (HCC), Smoker, SOB (shortness of breath), and Tobacco abuse..     she was admitted to [facility] on 2/10/2024 for dizziness and altered mentation.  Patient was found to have an acute stroke noted below.  She now has residual left-sided deficits and is currently on a minced and moist diet.  Dr. Haas neuro-interventionalist is planning for angioplasty and stenting of her right vertebral artery.    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 Noted on MRI brain 2024.  Presented with significant dysarthria, dizziness and vomiting.  Initial NIH 3. Repeat CT head on 24 for decreased mentation was negative for acute intracranial changes.  Continue aspirin and Plavix for 21 days and then de-escalate to aspirin monotherapy.  Interventional neurology

## 2024-02-16 NOTE — PROGRESS NOTES
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 Noted on MRI brain 2024.  Presented with slurred speech, dizziness and vomiting.  Initial NIH 3.  Continue aspirin and Plavix for 21 days and then de-escalate to aspirin monotherapy.  Interventional neurology planning for angioplasty and stenting of the severe stenosis at the origin of the right vertebral artery to decrease risk of recurrent strokes sometime within a week.  PT/OT/SLP following.  Patient currently with dysphagia needing an NG tube and GI planning for PEG tube placement sometime this week as well.    Discussed with daughter, plan to discharge to [facility] if patient is a candidate.  Continue Lipitor 40 mg nightly.  Echocardiogram pending.  Fall precautions, NIHSS/neurochecks, stroke education.  Monitor telemetry.   Acute on chronic hypoxic respiratory failure: Requiring more oxygen due to possible aspiration pneumonitis +/- sleep apnea.  Is on baseline 1 to 2 L due to COPD.  Some pulmonary vascular congestion noted on CXR.  Clinically does not appear volume overloaded. Will hold off on diuretics at this time.    Continue to wean O2 as tolerated to maintain saturation greater than 90%.  Monitor CXR.   Dysphagia, hiatal hernia: Due to acute CVA.    Difficulty placing NG tube due to hiatal hernia as well.  GI planning for PEG tube placement sometime this week. IR consulted for Dobbhoff placement in the interim.  Dietitian consulted for tube feeds.  Primary HTN: Restart Norvasc 5 mg daily.  Rheumatoid arthritis: Continue prednisone 5 mg daily.  COPD: Not in exacerbation.  With chronic cough and wheezing. No new productive sputum.   Follows at the 
    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 Noted on MRI brain 2024.  Presented with significant dysarthria, dizziness and vomiting.  Initial NIH 3.  Continue aspirin and Plavix for 21 days and then de-escalate to aspirin monotherapy.  Interventional neurology planning for angioplasty and stenting of the severe stenosis at the origin of the right vertebral artery to decrease risk of recurrent strokes sometime within a week.  PT/OT/SLP following.  Patient currently with dysphagia needing an NG tube   Repeat CT head today for decreased mentation was negative for acute intracranial changes.  Checked ammonia, thyroid function test, B12, ABG.  Check EEG.  Consulted IR for PEG tube placement on DAPT if possible, appreciate assistance.  Continue Lipitor 40 mg nightly.  Echocardiogram pending.  Fall precautions, NIHSS/neurochecks, stroke education.  Monitor telemetry.   Acute on chronic hypoxic respiratory failure: Requiring more oxygen due to possible aspiration pneumonitis +/- sleep apnea.  Is on baseline 1 to 2 L due to COPD.  Some pulmonary vascular congestion noted on CXR.  Clinically does not appear volume overloaded. Will hold off on diuretics at this time.    Continue to wean O2 as tolerated to maintain saturation greater than 90%.  Monitor CXR.   Aspiration pneumonia - febrile overnight.  Leukocytosis present. Started on Zosyn. Monitor CXR and for worsening symptoms.   Dysphagia, hiatal hernia: Due to acute CVA.    Difficulty placing NG tube due to hiatal hernia as well.  IR consulted for assistance in placing NG tube and PEG tube. Dietitian consulted for tube feeds.  Primary HTN: Continue Norvasc 5 mg 
    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 Noted on MRI brain 2024.  Presented with significant dysarthria, dizziness and vomiting.  Initial NIH 3. Repeat CT head on 24 for decreased mentation was negative for acute intracranial changes.  Continue aspirin and Plavix for 21 days and then de-escalate to aspirin monotherapy.  Interventional neurology planning for angioplasty and stenting of the severe stenosis at the origin of the right vertebral artery to decrease risk of recurrent strokes sometime within a week.  PT/OT/SLP following.  Patient currently with dysphagia needing a PEG tube  Ammonia, thyroid function test, EEG were wnl. Added B12 and folate supplementation.   Continue Lipitor 40 mg nightly.  Echocardiogram results noted :                  Show images for Echo (TTE) complete (PRN contrast/bubble/strain/3D)  Interpretation Summary of Echo          Left Ventricle: Normal left ventricular systolic function with a visually estimated EF of 60 - 65%. Left ventricle size is normal. Normal wall thickness. Normal wall motion. Grade I diastolic dysfunction with normal LAP.    Mitral Valve: Mild regurgitation.    Left Atrium: Left atrium is moderately dilated.    Image quality is technically difficult. Technically difficult study due to patient's body habitus, limited study due to patient's ability to tolerate test and procedure performed with the patient in a supine position.    Fall precautions, NIHSS/neurochecks, stroke education.  Monitor telemetry.   Acute on chronic hypoxic respiratory failure: Requiring more oxygen due to possible aspiration pneumonitis +/- sleep apnea.  Is on baseline 1 to 
    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 Noted on MRI brain 2024.  Presented with significant dysarthria, dizziness and vomiting.  Initial NIH 3. Repeat CT head on 24 for decreased mentation was negative for acute intracranial changes.  Continue aspirin and Plavix for 21 days and then de-escalate to aspirin monotherapy.  Interventional neurology planning for angioplasty and stenting of the severe stenosis at the origin of the right vertebral artery to decrease risk of recurrent strokes sometime within a week.  PT/OT/SLP following.  Patient currently on minced and moist diet.    Continue Lipitor 40 mg nightly.  Fall precautions, NIHSS/neurochecks, stroke education.  Monitor telemetry.   Primary HTN: Continue Norvasc 10 mg and add Cozaar 25 mg daily.  Titrate as needed.  HFpEF: TTE on 24 showed EF 60-65% with G1DD.  CXR has been showing some pulmonary vascular infiltration.  Repeat Bumex 1 mg x 1 dose today, received a dose yesterday.  Tolerated well.  Monitor I/O and daily weights.  Aspiration pneumonia -fever and leukocytosis have improved.  Continue Zosyn. Monitor CXR and for worsening symptoms if needed.  Dysphagia, hiatal hernia: Due to acute CVA.  Improving.  Speech therapy to monitor and advance diet as tolerated.  Folate deficiency: Continue folate supplementation  B12 deficiency: Continue B12 supplementation.  Rheumatoid arthritis: Continue prednisone 5 mg daily.  COPD, pulmonary fibrosis: Not in exacerbation.  With chronic cough and wheezing. No new productive sputum.   Follows at the pulmonology clinic. Continue home inhalers and as needed bronchodilators.  : Start 
    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 Noted on MRI brain 2024.  Presented with significant dysarthria, dizziness and vomiting.  Initial NIH 3. Repeat CT head on 24 for decreased mentation was negative for acute intracranial changes.  Continue aspirin and Plavix for 21 days and then de-escalate to aspirin monotherapy.  Interventional neurology planning for angioplasty and stenting of the severe stenosis at the origin of the right vertebral artery to decrease risk of recurrent strokes sometime within a week.  PT/OT/SLP following.  Patient currently with dysphagia needing a PEG tube  Ammonia, thyroid function test, EEG were wnl. Added B12 and folate supplementation.   Continue Lipitor 40 mg nightly.  Echocardiogram pending.  Fall precautions, NIHSS/neurochecks, stroke education.  Monitor telemetry.   Acute on chronic hypoxic respiratory failure: Requiring more oxygen due to possible aspiration pneumonitis +/- sleep apnea.  Is on baseline 1 to 2 L due to COPD.  Clinically does not appear volume overloaded. Will hold off on diuretics at this time.    Continue to wean O2 as tolerated to maintain saturation greater than 90%.  Monitor CXR.   Aspiration pneumonia -fever and leukocytosis are improving.  Continue Zosyn. Monitor CXR and for worsening symptoms if needed.  Dysphagia, hiatal hernia: Due to acute CVA.    Difficulty placing NG tube due to hiatal hernia after multiple attempts.  GI planning for PEG tube tomorrow. Dietitian consulted for tube feeds.  Primary HTN: Continue IV labetalol 5 mg every 6 hours scheduled and start IV Vasotec for better BP control.  Restart p.o. Norvasc once PEG tube 
    [facility]     Neurodiagnostic Laboratory Technician worksheet       EEG Date: 2024    Name: Court Hutchison   : 1940   Age: 84 y.o.  SEX: female    Room: [facility]    MRN: [id number]     CSN: [id number]    Ordering Provider: MARLEN Ramos  EEG Number: [id number] Time of Test:  1506    Hand: unknown   Sedation: No    H.V. Done: No  age protocol  Photic: Yes    Sleep: Yes   Drowsy: No   Sleep Deprived: No    Seizures observed: no    Mentality: lethargic       Clinical History: slurring words, left facial droop, not verbal,  does open eyes to name,  not eye opening command  Ct     IMPRESSION:     1. No new abnormalities.  2. Developing low attenuation in the superior left cerebellum at the site of known infarction.    Past Medical History:       Diagnosis Date    Arthritis     Arthritis     Basal cell carcinoma (BCC) of skin of face     cheek below right eye    COPD (chronic obstructive pulmonary disease) (HCC) 2012    COVID-19 2021    Dyslipidemia 02/15/2017    Fatigue     History of tobacco abuse 02/15/2017    Hyperlipidemia 10/13/2014    Hypertension     Non morbid obesity due to excess calories 02/15/2017    Pneumonia     Precordial pain 02/15/2017    Rheumatoid arteritis (HCC)     Smoker     SOB (shortness of breath) 02/15/2017    Tobacco abuse          Prior to Admission medications    Medication Sig Start Date End Date Taking? Authorizing Provider   umeclidinium-vilanterol (ANORO ELLIPTA) 62.5-25 MCG/ACT inhaler Inhale 1 puff into the lungs daily 24   Lakesha Lilly APRN - CNP   budesonide (PULMICORT) 0.5 MG/2ML nebulizer suspension Take 2 mLs by nebulization 2 times daily 24   Lakesha Lilly APRN - CNP   albuterol sulfate HFA (VENTOLIN HFA) 108 (90 Base) MCG/ACT inhaler Inhale 2 puffs into the lungs every 4 hours as needed for Wheezing or Shortness of Breath 24   Lakesha Lilly APRN - CNP   Respiratory Therapy Supplies (NEBULIZER AIR TUBE/PLUGS) MISC 1 each by 
  Gastroenterology  Progress Note    2/13/2024 4:22 PM  Subjective:   Admit Date: 2/10/2024    Interval History: Patient with CVA dysphagia will need a PEG tube currently in double antiplatelet therapy will schedule her for EGD on Wednesday afternoon would not have to hold antiplatelet therapy due to recent CVA.    2/13/2024 patient with CVA with severe limb clinically improved able to move around.  Discussed with family technical aspect of the EGD with PEG tube placement which will be done tomorrow.  Due to high risk involved this patient was stopping the Plavix and aspirin we will do procedure regardless without stopping them of course there is increase of risk of bleeding family understand and accept the risk    Diet: Diet NPO Exceptions are: Ice Chips    Medications:   Scheduled Meds:    labetalol  5 mg IntraVENous Q6H    ipratropium 0.5 mg-albuterol 2.5 mg  1 Dose Inhalation TID RT    cyanocobalamin  1,000 mcg IntraMUSCular Q30 Days    hydrocortisone sodium succinate PF  10 mg IntraVENous QAM    hydrocortisone sodium succinate PF  5 mg IntraVENous QPM    piperacillin-tazobactam  4,500 mg IntraVENous Q8H    folic acid 1 mg in sodium chloride 0.9 % 50 mL IVPB  1 mg IntraVENous Daily    aspirin  150 mg Rectal Daily    [Held by provider] clopidogrel  75 mg Per NG tube Daily    [Held by provider] predniSONE  5 mg Per NG tube Daily    [Held by provider] atorvastatin  40 mg Oral Nightly    [Held by provider] amLODIPine  5 mg Oral Daily    [Held by provider] budesonide  500 mcg Nebulization BID    [Held by provider] tiotropium-olodaterol  2 puff Inhalation Daily    enoxaparin  40 mg SubCUTAneous Q24H     Continuous Infusions:    lactated ringers IV soln 75 mL/hr at 02/13/24 1406       CBC:   Recent Labs     02/11/24  0317 02/12/24  0402 02/13/24  0449   WBC 10.5 12.5* 9.1   HGB 12.5 12.0 11.8*    344 349     BMP:    Recent Labs     02/11/24  0317 02/12/24  0402 02/13/24  0642    137 136   K 3.9 3.4* 4.1 
  Gastroenterology  Progress Note    2/14/2024 6:44 PM  Subjective:   Admit Date: 2/10/2024    Interval History: Patient with CVA dysphagia will need a PEG tube currently in double antiplatelet therapy will schedule her for EGD on Wednesday afternoon would not have to hold antiplatelet therapy due to recent CVA.    2/13/2024 patient with CVA with severe limb clinically improved able to move around.  Discussed with family technical aspect of the EGD with PEG tube placement which will be done tomorrow.  Due to high risk involved this patient was stopping the Plavix and aspirin we will do procedure regardless without stopping them of course there is increase of risk of bleeding family understand and accept the risk.    2/14/2024 patient was scheduled for PEG tube placement today patient more awake alert due to able to feed her significant amount of food according to the nursing staff.  The patient is not going to need her nutritional need recommend Dobbhoff tube placement in the meanwhile to meet her nutritional need.  If not able to still consider PEG tube placement and the patient clinically improved significantly.    Diet: ADULT DIET; Dysphagia - Minced and Moist  ADULT ORAL NUTRITION SUPPLEMENT; Breakfast, Lunch, Dinner; Standard High Calorie/High Protein Oral Supplement  ADULT ORAL NUTRITION SUPPLEMENT; Dinner; Frozen Oral Supplement    Medications:   Scheduled Meds:    piperacillin-tazobactam  3,375 mg IntraVENous Q8H    amLODIPine  10 mg Oral Daily    [START ON 2/15/2024] folic acid  1 mg Oral Daily    azithromycin  250 mg Oral Daily    clopidogrel  75 mg Per NG tube Daily    albuterol sulfate HFA  2 puff Inhalation 4x Daily RT    [START ON 2/15/2024] hydrocortisone sodium succinate PF  10 mg IntraVENous QAM    hydrocortisone sodium succinate PF  5 mg IntraVENous QPM    bumetanide  1 mg IntraVENous Once    labetalol  5 mg IntraVENous Q6H    ipratropium 0.5 mg-albuterol 2.5 mg  1 Dose Inhalation TID RT    
  Gastroenterology  Progress Note    2/15/2024 5:45 PM  Subjective:   Admit Date: 2/10/2024    Interval History: Patient with CVA dysphagia will need a PEG tube currently in double antiplatelet therapy will schedule her for EGD on Wednesday afternoon would not have to hold antiplatelet therapy due to recent CVA.    2/13/2024 patient with CVA with severe limb clinically improved able to move around.  Discussed with family technical aspect of the EGD with PEG tube placement which will be done tomorrow.  Due to high risk involved this patient was stopping the Plavix and aspirin we will do procedure regardless without stopping them of course there is increase of risk of bleeding family understand and accept the risk.    2/14/2024 patient was scheduled for PEG tube placement today patient more awake alert due to able to feed her significant amount of food according to the nursing staff.  The patient is not going to need her nutritional need recommend Dobbhoff tube placement in the meanwhile to meet her nutritional need.  If not able to still consider PEG tube placement and the patient clinically improved significantly.    2/15/2024 appetite has improved able to talk enunciation is not very clear at this time eating around 25 to 60% of her diet no plan for EGD no plan for PEG tube placement will no plan for Dobbhoff tube at this time.  Diet will advance up to the speech therapy    Diet: ADULT DIET; Dysphagia - Minced and Moist  ADULT ORAL NUTRITION SUPPLEMENT; Breakfast, Lunch, Dinner; Standard High Calorie/High Protein Oral Supplement  ADULT ORAL NUTRITION SUPPLEMENT; Dinner; Frozen Oral Supplement    Medications:   Scheduled Meds:    losartan  25 mg Oral Daily    predniSONE  5 mg Oral Daily    QUEtiapine  25 mg Oral Nightly    piperacillin-tazobactam  3,375 mg IntraVENous Q8H    amLODIPine  10 mg Oral Daily    folic acid  1 mg Oral Daily    azithromycin  250 mg Oral Daily    clopidogrel  75 mg Per NG tube Daily    
  Pharmacy Note - Extended Infusion Beta-Lactam Dose Adjustment    Piperacillin/Tazobactam 3375 mg q8h extended infusion ordered for the treatment of Aspiration Pneumonia. Per [facility] Extended Infusion Beta-Lactam Policy, this will be changed to 4500 mg q8h extended infusion     Estimated Creatinine Clearance: Estimated Creatinine Clearance: 59 mL/min (based on SCr of 0.7 mg/dL).    BMI: Body mass index is 42.46 kg/m².    Rationale for Adjustment: Dose adjusted per [facility] Extended Infusion Policy based on renal function and indication. The above medication is renally eliminated and demonstrates time-dependent effects on bacterial eradication. Extended-infusion dosing strategy aims to enhance microbiologic and clinical efficacy.     Pharmacy will monitor renal function daily and adjust dose as necessary.      Please call with any questions.    Thank you,    Ney Torres, PharmD, BCPS  2/13/2024  2:12 PM      
 [facility]  INPATIENT PHYSICAL THERAPY  DAILY NOTE  TRISTON MURCIAS 4A - 4A-09/009-A      Time In: 1304  Time Out: 1335  Timed Code Treatment Minutes: 31 Minutes  Minutes: 31          Date: 2024  Patient Name: Court Hutchison,  Gender:  female        MRN: [id number]  : 1940  (84 y.o.)     Referring Practitioner: Mackenzie Chandler PA-C  Diagnosis: dizziness  Additional Pertinent Hx: PER H&P 2/10: Court is an 83-year-old  female with a past medical history of COPD, HTN who presents to [facility] ED today for the evaluation of dizziness and unbalanced this.  Patient at this time is very poor historian-alert and oriented to self place however not completely to situation.  Per chart review, patient was brought to the ED for increased difficulty speaking and alteration in mentation per daughter.  Patient states that she was fatigued, and does not understand the exact reasons why she came to the ER today.  Patient was noted to be slurring her words this morning and to have left facial droop that was new.  Patient denies pain at this time, she reports she is very thirsty otherwise.  She denies any other symptoms.  To note, patient's baseline: Mild dysarthria, not alert to month, year or birthday, chronic right facial droop status post facial surgery. MRI brain wet read:Acute ischemia left cerebellar hemisphere     Prior Level of Function:  Lives With: Daughter (son in law)  Type of Home: House  Home Layout: Multi-level  Home Access: Stairs to enter without rails  Entrance Stairs - Number of Steps: 6' platform +  two 2 inch steps  Home Equipment: Walker, rolling        Receives Help From: Family  ADL Assistance: Needs assistance  Ambulation Assistance: Independent  Transfer Assistance: Independent  Active : No  Additional Comments: 7 steps R rail for access to her bedroom and restroom. Pt's daughter works, they have assistance to get the pt up in the morning. Pt previously was able to 
 [facility]  INPATIENT PHYSICAL THERAPY  DAILY NOTE  TRISTON MURCIAS 4A - 4A-09/009-A      Time In: 0737  Time Out: 0818  Timed Code Treatment Minutes: 41 Minutes  Minutes: 41          Date: 2/15/2024  Patient Name: Court Hutchison,  Gender:  female        MRN: [id number]  : 1940  (84 y.o.)     Referring Practitioner: Mackenzie Chandler PA-C  Diagnosis: dizziness  Additional Pertinent Hx: PER H&P 2/10: Court is an 83-year-old  female with a past medical history of COPD, HTN who presents to [facility] ED today for the evaluation of dizziness and unbalanced this.  Patient at this time is very poor historian-alert and oriented to self place however not completely to situation.  Per chart review, patient was brought to the ED for increased difficulty speaking and alteration in mentation per daughter.  Patient states that she was fatigued, and does not understand the exact reasons why she came to the ER today.  Patient was noted to be slurring her words this morning and to have left facial droop that was new.  Patient denies pain at this time, she reports she is very thirsty otherwise.  She denies any other symptoms.  To note, patient's baseline: Mild dysarthria, not alert to month, year or birthday, chronic right facial droop status post facial surgery. MRI brain wet read:Acute ischemia left cerebellar hemisphere     Prior Level of Function:  Lives With: Daughter (son in law)  Type of Home: House  Home Layout: Multi-level  Home Access: Stairs to enter without rails  Entrance Stairs - Number of Steps: 6' platform +  two 2 inch steps  Home Equipment: Walker, rolling        Receives Help From: Family  ADL Assistance: Needs assistance  Ambulation Assistance: Independent  Transfer Assistance: Independent  Active : No  Additional Comments: 7 steps R rail for access to her bedroom and restroom. Pt's daughter works, they have assistance to get the pt up in the morning. Pt previously was able to 
 [facility]  INPATIENT PHYSICAL THERAPY  DAILY NOTE  TRISTON LE 4A - 4A-09/009-A      Time In: 09  Time Out: 1004  Timed Code Treatment Minutes: 24 Minutes  Minutes: 24          Date: 2024  Patient Name: Court Hutchison,  Gender:  female        MRN: [id number]  : 1940  (84 y.o.)     Referring Practitioner: Mackenzie Chandler PA-C  Diagnosis: dizziness  Additional Pertinent Hx: PER H&P 2/10: Court is an 83-year-old  female with a past medical history of COPD, HTN who presents to [facility] ED today for the evaluation of dizziness and unbalanced this.  Patient at this time is very poor historian-alert and oriented to self place however not completely to situation.  Per chart review, patient was brought to the ED for increased difficulty speaking and alteration in mentation per daughter.  Patient states that she was fatigued, and does not understand the exact reasons why she came to the ER today.  Patient was noted to be slurring her words this morning and to have left facial droop that was new.  Patient denies pain at this time, she reports she is very thirsty otherwise.  She denies any other symptoms.  To note, patient's baseline: Mild dysarthria, not alert to month, year or birthday, chronic right facial droop status post facial surgery. MRI brain wet read:Acute ischemia left cerebellar hemisphere     Prior Level of Function:  Lives With: Daughter (son in law)  Type of Home: House  Home Layout: Multi-level  Home Access: Stairs to enter without rails  Entrance Stairs - Number of Steps: 6' platform +  two 2 inch steps  Home Equipment: Walker, rolling        Receives Help From: Family  ADL Assistance: Needs assistance  Ambulation Assistance: Independent  Transfer Assistance: Independent  Active : No  Additional Comments: 7 steps R rail for access to her bedroom and restroom. Pt's daughter works, they have assistance to get the pt up in the morning. Pt previously was able to 
1611 Pt arrived to special procedure room 2 for NG tube placement. No family present.   1613 Patient assisted to table in supine position. Comfort ensured.  1618 NG placed  1621 Patient transported to radiology (CXR) to obtain post-procedure images.    
Afternoon assessment no change, patient resting in bed. Call light left within reach of patient.   
[facility]  INPATIENT SPEECH THERAPY  STRZ NEUROSCIENCES 4A  DAILY NOTE    TIME   SLP Individual Minutes  Time In: 0850  Time Out: 0900  Minutes: 10  Timed Code Treatment Minutes: 0 Minutes       Date: 2024  Patient Name: Court Hutchison      CSN: [id number]   : 1940  (84 y.o.)  Gender: female   Referring Physician:  Edson Fernandez DO   Diagnosis: Dizziness   Precautions: Fall risk, aspiration precautions   Current Diet: NPO  Respiratory Status: Room Air  Swallowing Strategies:  Full upright position, fully alert, alternate solids and liquids, double swallow, direct 1:1   Date of Last MBS/FEES:  MBS 24    Pain:  No pain reported.    Subjective:  Spoke with JOVANNI Casillas for approval of ST interventions. Upon arrival, patient sitting upright in bed. Alert and cooperative.      Short-Term Goals:  SHORT TERM GOAL #1:  Goal 1: Patient will consume minced and moist diet (advanced texture trials with ST) and thin liquids with stable pulmonary status and use of compensatory strategies to assist with nutrition/hydration.   INTERVENTIONS: ST completed comprehensive oral care with patient prior to PO trials. ST assisted patient with consumption of breakfast tray, consisting of minced and moist sausage and gravy, coffee, and ensure. Patient with adequate oral acceptance. Slightly diminished bolus formation and AP transit, however improved success with cues for double swallow and/or liquid assist. Patient with intermittent coughing with and without PO intake. MBS completed 24 endorses no airway invasion events with thin liquids via cup. Per conversation with patient's daughter on previous date, patient with poor respiratory sounds at baseline. Certainly cannot rule out pharyngeal dysfunction at bedside alone, however repeat instrumental evaluation not warranted at this time.     Recommend minced and moist diet, thin liquids. Direct 1:1 assistance and comprehensive oral care before meals. 
Comprehensive Nutrition Assessment    Type and Reason for Visit:  Initial, Consult (tube feed order and management consult per Dr aRmos)    Nutrition Recommendations/Plan:   When feeding tube placement confirmed and Provider okay with begin tube feed: Jevity 1.5 at 20ml/ hour, increase by 10ml every 4 hours as tolerates to goal 40ml/ hour  When off IV fluid, recommend 180ml water flush every 6 hours  NPO per Speech Therapy/ Provider       Malnutrition Assessment:  Malnutrition Status:  At risk for malnutrition (Comment) (02/12/24 2576)    Context:  Acute Illness     Findings of the 6 clinical characteristics of malnutrition:  Energy Intake:  Mild decrease in energy intake (Comment) (NPO since admit, plans for tube feeding to begin)  Weight Loss:   (9.6% loss over 5 months per EMR - intentional loss reported)     Body Fat Loss:  No significant body fat loss     Muscle Mass Loss:  Mild muscle mass loss Temples (temporalis)  Fluid Accumulation:  No significant fluid accumulation     Strength:  Not Performed    Nutrition Assessment:     Pt. nutritionally compromised AEB NPO status due to dysphagia, plans for dobhoff tube placement for enteral nutrition.  At risk for further nutrition compromise r/t admit with stroke, acute on chronic respiratory failure, dysphagia, hiatal hernia and underlying medical condition (RA, HTN, COPD).       Nutrition Related Findings:    Pt. Report/Treatments/Miscellaneous: Patient seen asleep, daughter at bedside reports patient with lethargy today, reports intentional weight loss over the past year with good appetite/ intake prior to admit and resided with daughter, Speech therapy advise NPO following MBS yesterday, report of attempt x 4 without success of placing NG tube 2/11, plans today for IR to place dobhoff and begin tube feeding    GI Status: BM 2/9  Pertinent Labs: Sodium 137, Potassium 3.4, BUN 15, Creatinine 0.8, glucose 119  Pertinent Meds: zosyn, deltasone, IV fluid at 50ml/ 
Comprehensive Nutrition Assessment    Type and Reason for Visit:  Reassess (po/supplement)    Nutrition Recommendations/Plan:   Consider MVI and an appetite stimulant.  Continue ONS: Ensure Enlive/Plus TID and a Magic Cup daily.  Continue current diet per SLP.     Malnutrition Assessment:  Malnutrition Status:  At risk for malnutrition (Comment) (02/12/24 8929)    Context:  Acute Illness     Findings of the 6 clinical characteristics of malnutrition:  Energy Intake:  Mild decrease in energy intake (Comment) (NPO since admit, plans for tube feeding to begin)  Weight Loss:   (9.6% loss over 5 months per EMR - intentional loss reported)     Body Fat Loss:  No significant body fat loss     Muscle Mass Loss:  Mild muscle mass loss Temples (temporalis)  Fluid Accumulation:  No significant fluid accumulation     Strength:  Not Performed    Nutrition Assessment:     Pt. remains nutritionally compromised AEB previous NPO status due to dysphagia, inability to place NGT after multiple attempts d/t HH.  At risk for further nutrition compromise r/t admit with stroke, acute on chronic respiratory failure, dysphagia, hiatal hernia and underlying medical condition (hx: RA, HTN, COPD).      Nutrition Related Findings:    Pt. Report/Treatments/Miscellaneous: Patient seen earlier this morning, she was very sleepy didn't awaken to her name.  Breakfast tray at bedside untouched.  Per her nurse she ate 25% of breakfast yesterday and ~ 50% of lunch and dinner.  Consuming 25-50% of Ensure shakes.  RN unsure about magic cup.   GI Status: BM 2/15/24  Pertinent Labs: 2/16/24: Potassium 2.9  Pertinent Meds: potassium replacement, zithromax, B12, folvite, zosyn, prednisone      Wound Type:  (left leg \"open to air\")       Current Nutrition Intake & Therapies:    Average Meal Intake: 26-50%, 1-25%  Average Supplements Intake: 1-25%, 26-50% (per nursing of Ensure, unsure about magic cup)  ADULT DIET; Dysphagia - Minced and Moist  ADULT ORAL 
Comprehensive Nutrition Assessment    Type and Reason for Visit:  Reassess, Consult (tubefeeding ordering and management/however PEG was cancelled, diet started per SLP)    Nutrition Recommendations/Plan:   Consider MVI.  Diet initiated today per SLP recommendations.  ONS initiated: Ensure Enlive TID and Magic cup daily.  PEG placement cancelled today. Discussed with Dr. Mcgrath.     Malnutrition Assessment:  Malnutrition Status:  At risk for malnutrition (Comment) (02/12/24 7132)    Context:  Acute Illness     Findings of the 6 clinical characteristics of malnutrition:  Energy Intake:  Mild decrease in energy intake (Comment) (NPO since admit, plans for tube feeding to begin)  Weight Loss:   (9.6% loss over 5 months per EMR - intentional loss reported)     Body Fat Loss:  No significant body fat loss     Muscle Mass Loss:  Mild muscle mass loss Temples (temporalis)  Fluid Accumulation:  No significant fluid accumulation     Strength:  Not Performed    Nutrition Assessment:     Pt. remains nutritionally compromised AEB previous NPO status due to dysphagia, inability to place NGT after multiple attempts d/t HH.  At risk for further nutrition compromise r/t admit with stroke, acute on chronic respiratory failure, dysphagia, hiatal hernia and underlying medical condition (hx: RA, HTN, COPD).        Nutrition Related Findings:    Pt. Report/Treatments/Miscellaneous: Patient seen, telesitter, and student RN present.  Patient very hard of hearing, explained plan to start letting her eat today. Agreed to ONS.  Student nurse reports patient's daughter thought patient may likes vanilla or chocolate Ensure shakes.    GI Status: 5 stools in the last 24 hours  Pertinent Labs: BMP wnls  Pertinent Meds: vitamin B12, folic acid, solucortef, zosyn     Wound Type: None       Current Nutrition Intake & Therapies:    Average Meal Intake:  (diet just initiated, previously NPO)     ADULT DIET; Dysphagia - Minced and Moist  ADULT ORAL 
Daughter called and updated on plan of care. No further concerns voiced at this time.   
Discharge teaching and instructions for diagnosis/procedure of cva completed with patient using teachback method. AVS reviewed. Printed prescriptions given to patient. Patient voiced understanding regarding prescriptions, follow up appointments, and care of self at home. Discharged via cart/stretcher and via ambulance to  intermediate care facility per EMS transportation.Report called and patient paperwork sent to facility.                 
Handoff given to primary RN. Patient voices no concern. Call light within reach.   
[facility]  SPEECH THERAPY  STRZ NEUROSCIENCES 4A  Modified Barium Swallow    SLP Individual Minutes  Time In: 0850  Time Out: 0900  Minutes: 10  Timed Code Treatment Minutes: 0 Minutes       Date: 2024  Patient Name: Court Hutchison      CSN: [id number]   : 1940  (83 y.o.)  Gender: female   Referring Physician: Edson Fernandez DO  Diagnosis: Dizziness  Secondary diagnosis: new CVA   Precautions: Fall risk, aspiration precautions   History of Present Illness/Injury:  Patient admit to [facility] with above medical dx. Please refer to physician H&P for full patient medical history. Positive for new CVA with MRI of brain positive for moderate sized acute infarct in the superior L cerebellum.      has a past medical history of Arthritis, Arthritis, Basal cell carcinoma (BCC) of skin of face, COPD (chronic obstructive pulmonary disease) (HCC), COVID-19, Dyslipidemia, Fatigue, History of tobacco abuse, Hyperlipidemia, Hypertension, Non morbid obesity due to excess calories, Pneumonia, Precordial pain, Rheumatoid arteritis (HCC), Smoker, SOB (shortness of breath), and Tobacco abuse.  Current Diet: NPO     Pain: No pain reported.    SUBJECTIVE:  Patient brought down to fluoroscopy suite via bed. Alert and cooperative. Study significantly limited s/t ongoing confused mental state.     OBJECTIVE:    Respiratory Status:  Nasal Canula: 4LPM    Behavioral Observation:  Alert, Confused, and Limited Direction Following    PATIENT WAS EVALUATED USING:  Barium:Thin Liquids, Mildly Thick Liquids, and Puree    ORAL PHASE CYNTHIA SCORE: (Dysphagia outcome and severity scale)  2 = Moderately Severe Dysphagia - Tolerates at least one consistency safely with total use of strategies - Severe oral residue and unable to clear or needs multiple cues - Non-oral nutrition    PHARYNGEAL PHASE CYNTHIA SCORE: (Dysphagia outcome and severity scale)  4 = Mild-Moderate Dysphagia - One or two consistencies restricted - 
IR order for PEG tube clarified with pt's RN. Consult note placed in the AM from Dr Kaminski indicates that GI will be placing a PEG tube in the near future. Nurse requesting that IR place PEG today but if unable d/t blood thinners, could IR just place an NG tube. Advised that per Dr Camacho, radiology protocol is a 5 day hold on blood thinners. If pt cannot be off blood thinners, the ordering provider would need to contact Dr Camacho to discuss further, however if GI is already willing to place PEG, then proceed with their scheduled procedure. Also, IR requires NG tube placement in order to place a PEG tube.     1630 NG tube successfully placed to left nares by IR nurse at bedside. During xray for confirmation, NG tube was pulled out. Multiple attempts were made by IR nurse to place NG tube again. Pt was very combative. Reinsertion was traumatic and pt developed bloody nose. Placement aborted and nurse notified. Pt's nurse  again requested IR to place PEG tube. IR nurse reiterated was stated earlier by Dr Camacho as noted above.   
[facility]  STRZ NEUROSCIENCES 4A  Occupational Therapy  Daily Note  Time:   Time In: 0800  Time Out: 0853  Timed Code Treatment Minutes: 53 Minutes  Minutes: 53          Date: 2024  Patient Name: Court Hutchison,   Gender: female      Room: [facility]09/009-A  MRN: [id number]  : 1940  (84 y.o.)  Referring Practitioner: Mackenzie Chandler PA-C  Diagnosis: dizziness  Additional Pertinent Hx: Per ER note n 2/10/2024:83 y.o. female who presents to the emergency department from home brought in by EMS for evaluation of stroke like symptoms. Last known well was 2200 yesterday 24 before patient went to bed.   Woke up this morning with worsen slurring, left side droop, dizziness with episode of vomiting NBNB, worsening balance. Daughter in room provide history that her mother usually uses her walker and today she was unsteady in her gait, had a left side droop (her right side is baseline droop due hx of basal cell carcinoma surgery). Patients base line slurs her worse due to no teeth,  it worsen today.   PMH significant for HTN/HLD, COPD. Patient had no previous CVA not on any aspirin or blood thinners.   Stroke alert was activated on this patient and was urgently taken to CT and neurology evaluated patient at beside. Initial NIH 5 patient confused mentation at baseline per family.    Restrictions/Precautions:  Restrictions/Precautions: General Precautions, Fall Risk  Position Activity Restriction  Other position/activity restrictions: dysarthria     SUBJECTIVE: Patient sleeping in bed upon arrival; minimal verbal stimulation to alert; agreeable to therapy.  Telesitter in room, daughter arrived mid session.  Patient pleasant and cooperative throughout, demonstrates more alertness, fatigued at end of session.  Patient orient to self, situation, verbal cues to orient to month.  Patient daughter expresses she would like for  to evaluate her swallowing to possibly avoid PEG tube placement today d/t 
[facility]   PROGRESS NOTE      Patient: Court Hutchison  Room #: 4A-09/009-A            YOB: 1940  Age: 84 y.o.  Gender: female            Admit Date & Time: 2/10/2024  1:58 PM    Assessment:    The patient was encountered on her bed today. The palliative care team shared the patient would be interested in care given today was ash Wednesday and Valentines day.     Interventions:  The  engaged in a conversation and the patient's primary concern was if her daughter was present. In this conversation the  noticed some slurring of words and that she was not wearing oxygen support.     Outcomes:  Nursing was informed of the concerns and promptly dealt with the concerns.     Plan:  1.Spiritual care will continue to follow the patient according to [facility] spiritual care SOP.       Electronically signed by Chaplain Nirmal, on 2/14/2024 at 1:28 PM.  Spiritual Care Department  [facility]  [phone]     02/14/24 1326   Encounter Summary   Encounter Overview/Reason  Initial Encounter   Service Provided For: Patient   Referral/Consult From: Palliative Care   Support System Children   Last Encounter  02/14/24   Complexity of Encounter Low   Begin Time 1230   End Time  1235   Total Time Calculated 5 min   Spiritual/Emotional needs   Type Emotional Distress   Assessment/Intervention/Outcome   Assessment Loneliness   Intervention Empowerment   Outcome Deescalated       
[facility]  INPATIENT REHABILITATION  ADMISSIONS COORDINATOR CONSULT    Referral Type: internal    Patient Name: Court Hutchison      MRN: [id number]    : 1940  (84 y.o.)  Gender: female   Race:White (non-)     Payor Source: Payor: MEDICARE / Plan: MEDICARE PART A AND B / Product Type: *No Product type* /   Secondary Payor Source:      Isolation Status: No active isolations    Lives With: Daughter (son in law)  Type of Home: House  Home Layout: Multi-level  Home Access: Stairs to enter without rails  Entrance Stairs - Number of Steps: 6' platform +  two 2 inch steps  Receives Help From: Family     Additional Comments: 7 steps R rail for access to her bedroom and restroom. Pt's daughter works, they have assistance to get the pt up in the morning. Pt previously was able to ambulate in her room mod I. She had assistance for mobility to the restroom and up and down the stairs. Daughter reports they help Pt get dressed daily either her or the aide (comes 5x/wk). Pt sleeps in her clothes, bathes in shower 2x/wk otherwise sponge bathes. A for mobility to toilet & toileting routine-hx of incontinence.    Disciplines Required upon Admission to Inpatient Rehabilitation: Physical Therapy and Occupational Therapy  Post operative: Yes  Fall: Yes  Dialysis: No  Diet: Diet NPO Exceptions are: Sips of Water with Meds, Sips of Clear Liquids  ADULT TUBE FEEDING; Nasogastric; Standard with Fiber; Continuous; 20; Yes; 10; Q 4 hours; 40; 180; Q 6 hours  Discussed patient with  and PM&R provider: Await medical work up completion including completed PM&R consult note for further determination of patient needs.     
[facility]  STRZ NEUROSCIENCES 4A  Occupational Therapy  Daily Note  Time:   Time In: 943  Time Out: 1007  Timed Code Treatment Minutes: 24 Minutes  Minutes: 24          Date: 2/15/2024  Patient Name: Court Hutchison,   Gender: female      Room: [facility]09/009-A  MRN: [id number]  : 1940  (84 y.o.)  Referring Practitioner: Mackenzie Chandler PA-C  Diagnosis: dizziness  Additional Pertinent Hx: Per ER note n 2/10/2024:83 y.o. female who presents to the emergency department from home brought in by EMS for evaluation of stroke like symptoms. Last known well was 2200 yesterday 24 before patient went to bed.   Woke up this morning with worsen slurring, left side droop, dizziness with episode of vomiting NBNB, worsening balance. Daughter in room provide history that her mother usually uses her walker and today she was unsteady in her gait, had a left side droop (her right side is baseline droop due hx of basal cell carcinoma surgery). Patients base line slurs her worse due to no teeth,  it worsen today.   PMH significant for HTN/HLD, COPD. Patient had no previous CVA not on any aspirin or blood thinners.   Stroke alert was activated on this patient and was urgently taken to CT and neurology evaluated patient at beside. Initial NIH 5 patient confused mentation at baseline per family.    Restrictions/Precautions:  Restrictions/Precautions: General Precautions, Fall Risk  Position Activity Restriction  Other position/activity restrictions: dysarthria     Social/Functional History:  Lives With: Daughter (son in law)  Type of Home: House  Home Layout: Multi-level  Home Access: Stairs to enter without rails  Entrance Stairs - Number of Steps: 6' platform +  two 2 inch steps  Home Equipment: Walker, rolling        Receives Help From: Family  ADL Assistance: Needs assistance  Ambulation Assistance: Independent  Transfer Assistance: Independent    Active : No  Patient's  Info: Daughter Nasrin   
[facility]  PHYSICAL THERAPY MISSED TREATMENT NOTE  STRZ NEUROSCIENCES 4A    Date: 2024  Patient Name: Court Hutchison        MRN: [id number]   : 1940  (84 y.o.)  Gender: female   Referring Practitioner: Mackenzie Chandler PA-C  Diagnosis: dizziness         REASON FOR MISSED TREATMENT:  Pt off unit for testing first attempt and unable to be aroused when she came back. Will check back next available date .            
[facility]  OCCUPATIONAL THERAPY MISSED TREATMENT NOTE  STRZ NEUROSCIENCES 4A  4A-09/009-A      Date: 2024  Patient Name: Court Hutchison        CSN: [id number]   : 1940  (84 y.o.)  Gender: female   Referring Practitioner: Mackenzie Chandler PA-C  Diagnosis: dizziness         REASON FOR MISSED TREATMENT:  Patient on hold per nurse due to not waking at this time. Will attempt again when time allows  Occupational therapy note reviewed by CECELIA Butler/RICK               
Neurology Progress Note    Date:2/11/2024       Room:[facility]009-A  Patient Name:Court Hutchison     YOB: 1940     Age:83 y.o.  Chief Complaint   Patient presents with    Aphasia    Dizziness    Off-Balance        Requesting Physician: Edson Fernandez DO     Reason for Consult:  Evaluate for stroke alert    Subjective       HPI: Court Hutchiosn who is a 83 y.o. female with a history of arthritis, basal cell carcinoma of cheek, COPD, hyperlipidemia, previous smoker, hypertension, rheumatoid arthritis who presented to [facility] ED for the evaluation of dizziness, increased dysarthria, and daughter feeling as though patient was not behaving as she normally would. Patient lives alone but daughter is close by and helps care for her. Daughter states that patient went to bed at 10:00 PM last night her normal self. She states that when the patient woke up this morning she complained of dizziness, and daughter states that that she was slurring her words. Patient is dysarthric at baseline but daughter states she is significantly worse today. She also states that she chronically has a right sided facial droop from facial surgery but notes a left sided facial droop today. She states patient at baseline is not alert to month, year, or her birthday. Initial NIH found to be a 5 - 2 for not answering orientation questions correctly which is baseline for patient, left sided facial droop, LUE ataxia, and worsening dysarthria. Initial /90. Initial BP 96.  Stat CT head negative for acute hemorrhage. Given patient presented outside the window for TNK administration, she was deemed not a candidate for TNK. Stat CTA head and neck negative for LVO, given this patient deemed not a candidate for neurointervention. 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 Recommended loading patient with Aspirin 325mg and Plavix 300mg and obtaining 
[facility]  OCCUPATIONAL THERAPY MISSED TREATMENT NOTE  STRZ NEUROSCIENCES 4A  4A-09/009-A      Date: 2024  Patient Name: Court Hutchison        CSN: [id number]   : 1940  (84 y.o.)  Gender: female   Referring Practitioner: Mackenzie Chandler PA-C  Diagnosis: dizziness         REASON FOR MISSED TREATMENT: Family Refused Patient not medically appropriate at this time due to extreme fatigue, and not responding to stimuli. Will attempt back as time allows.                
[facility]  PHYSICAL THERAPY MISSED TREATMENT NOTE  STRZ NEUROSCIENCES 4A    Date: 2024  Patient Name: Court Hutchison        MRN: [id number]   : 1940  (84 y.o.)  Gender: female   Referring Practitioner: Mackenzie Chandler PA-C  Diagnosis: dizziness         REASON FOR MISSED TREATMENT:  Attempted 2 times this am nursing reported pt not appropriate for PT this am, will check back later or next available date .            
Patient admitted to 4A Room 9 from ED  Complaint upon arrival to the room 9  IV none infusing into the forearm right, condition patent and no redness. IV site free of s/s of infection or infiltration.   Vital signs obtained. Assessment and data collection initiated. Oriented to room. Policies and procedures for  explained All questions answered with no further questions at this time. Fall prevention and safety brochure discussed with patient. 2 person skin check completed.    Patient declines PCP notification  Patient declines family notification.   Name & number of designated person to update during visitor restriction times:     Was swallow screen completed on admission? [x] YES or [] NO  If patient failed swallow test, obtain order for speech therapy consult and keep NPO.    
Patient is 83-year-old right-handed woman presented to the ER with worsening and significant slurred speech as well as dizziness and vomiting.  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  Left vertebral artery ends mainly as left posterior inferior cerebellar artery.  There was also evidence of prior strokes in the right cerebellum.  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  
Patient is not appropriate for IPR at this point.  Did leave a voicemail message for DORA Zhao inquiring about a palliative care consult for goals of care.    
Patient removed NG tube placed by night shift RN  Patient NPO diet placed by speech therapy.    During this RN's shift, 4x attempts of NG tube placement without success.   CXR shows small hiatal hernia   Dr. Kaminski at bedside   Dr. Lugo notified via [employer] [facility]   IR procedure request placed  This RN called department and phone went to voicemail.   Plan of care ongoing.         
Patient resting in bed. Patient is oriented to person and place. Pupils are equal and reactive. Left and Right hand  is weak. Push and pull both weak. Capillary refills less than 3 seconds. Patient is hard of hearing. Her lips and mouth are dry. Oral care performed, and chap stick applied to lips. Rhonchi and wheezing present in all four lobes. Labored breaths. Heart sounds regular. Abdomen is rounded and soft, no tender. Patient passing gas, last BM 2/14 - loose. Redness noted - chela cream applied. Weakness in lower extremities.     Changed patients brief, incontinent of stool and urine. Washed patient up, applied lotion and powder. Patient resting in bed, daughter at bedside. Call light left within reach.   
Patient/family has been educated on their personal risk factors of:  X Hypertension  X Hyperlipidemia  X smoking cessation      They have been given hand outs on the following medications:(give handouts/attach to AVS)  X  asa  X Plavix      Treatment for stroke includes:  Risk factor modifications  Following the medication regime prescribed by physician      Educated on FAST-Face-Arm-Speech-Time    A stroke is a brain attack.Stroke is a brain injury. It occurs when the brain's blood supply is interrupted. Blood carries oxygen and nutrients to the brain. Without oxygen and nutrients from blood, brain tissue starts to die rapidly. This can happen in less than 10 minutes.    A stroke occurs when blood flow to the brain is blocked (called ischemic stroke). This is caused by one of the following:   Sudden decreased blood flow   Damage to a blood vessel supplying blood to the brain can occur suddenly from either:   Injury   A clot that forms and breaks off from another part of the body (such as the heart or neck)   There are certain conditions which predispose people to form blood clots, such as:   Cancer   Pregnancy   Atrial fibrillation   Certain autoimmune diseases   Local blood clot   A build-up of fatty substances ( atherosclerotic plaque ) along the inner lining of the artery causes:   Narrowing of artery   Reduced elasticity   Local inflammation   Blood protein defects leading to increased clotting tendency   Decreased blood flow in the artery   Clot in an artery supplying the brain   Inflammatory conditions in the blood vessels (vasculitis)   A stroke may also occur if a blood vessel breaks and bleeds into or around the brain. This is called hemorrhagic stroke.      This condition needs to be monitored closely. Be sure to keep all appointments. Have exams and blood tests done as directed.     Call 911 If Any of the Following Occurs   It is important that you and those around you know the warning signs for stroke. 
Received report from Primary RN this morning, patient resting in bed. Daughter at bedside, call light left within reach.   
[facility]  SPEECH THERAPY MISSED TREATMENT NOTE  [facility]      Date: 2024  Patient Name: Court Hutchison        MRN: [id number]    : 1940  (84 y.o.)    REASON FOR MISSED TREATMENT:  Attempted to see patient for completion of ST interventions. JOVANNI Romo reports patient is not appropriate d/t limited alertness levels. Will follow up as patient is available/appropriate.     Jenni Cervantes MA CFY-SLP COND.[id number]            
Report called to [facility]; Report given to nurse Hatch; All questions answered at this time.    
Reported off to student nurse.   Patient resting in bed, daughter at bedside, call light left within reach.  
[facility]  INPATIENT OCCUPATIONAL THERAPY  [facility] 4[facility]  EVALUATION    Time:    Time In: 1319  Time Out: 1333  Timed Code Treatment Minutes: 0 Minutes  Minutes: 14          Date: 2024  Patient Name: Court Hutchison,   Gender: female      MRN: [id number]  : 1940  (83 y.o.)  Referring Practitioner: Mackenzie Chandler PA-C  Diagnosis: dizziness  Additional Pertinent Hx: Per ER note n 2/10/2024:83 y.o. female who presents to the emergency department from home brought in by EMS for evaluation of stroke like symptoms. Last known well was 2200 yesterday 24 before patient went to bed.   Woke up this morning with worsen slurring, left side droop, dizziness with episode of vomiting NBNB, worsening balance. Daughter in room provide history that her mother usually uses her walker and today she was unsteady in her gait, had a left side droop (her right side is baseline droop due hx of basal cell carcinoma surgery). Patients base line slurs her worse due to no teeth,  it worsen today.   PMH significant for HTN/HLD, COPD. Patient had no previous CVA not on any aspirin or blood thinners.   Stroke alert was activated on this patient and was urgently taken to CT and neurology evaluated patient at beside. Initial NIH 5 patient confused mentation at baseline per family.    Restrictions/Precautions:  Restrictions/Precautions: General Precautions, Fall Risk  Position Activity Restriction  Other position/activity restrictions: dysarthria    Subjective  Chart Reviewed: Yes, Orders, Progress Notes, History and Physical  Patient assessed for rehabilitation services?: Yes  Family / Caregiver Present: Yes (daughter)    Subjective: up in recliner ready for back to do bed; short session d/t Pt fatigued quickly & then MD in to see Pt & talk with family.    Pain: 0/10: no c/o pain during session    Vitals: Oxygen: O2 maintained >90% on 4L O 2-mod SOB with activity    Social/Functional History:  Lives With: 
Stroke Core Measure Documentation    Confirmed ischemic stroke?  Yes  TNK (thrombolytic therapy)? No  LDL level:  88 mg/dL   Date drawn: 2/11/2024  VTE prophylaxis within 24 hours?  Yes  Antithrombotic by end of hospital day 2? Yes-ASA + Plavix  Anticoagulation therapy for AFib/flutter? N/A  Current statin dose:  Atorvastatin 40mg  Rehab consult? Yes  Stroke Education? Yes    Madison Stokes PharmD 2/12/2024 4:31 PM  
[facility]  INPATIENT PHYSICAL THERAPY  EVALUATION  [facility] 4A - 4A-09/009-A    Time In: 1141  Time Out: 1230  Timed Code Treatment Minutes: 39 Minutes  Minutes: 49          Date: 2024  Patient Name: Court Hutchison,  Gender:  female        MRN: [id number]  : 1940  (83 y.o.)      Referring Practitioner: Mackenzie Chandler PA-C  Diagnosis: dizziness  Additional Pertinent Hx: PER H&P 2/10: Court is an 83-year-old  female with a past medical history of COPD, HTN who presents to [facility] ED today for the evaluation of dizziness and unbalanced this.  Patient at this time is very poor historian-alert and oriented to self place however not completely to situation.  Per chart review, patient was brought to the ED for increased difficulty speaking and alteration in mentation per daughter.  Patient states that she was fatigued, and does not understand the exact reasons why she came to the ER today.  Patient was noted to be slurring her words this morning and to have left facial droop that was new.  Patient denies pain at this time, she reports she is very thirsty otherwise.  She denies any other symptoms.  To note, patient's baseline: Mild dysarthria, not alert to month, year or birthday, chronic right facial droop status post facial surgery. MRI brain wet read:Acute ischemia left cerebellar hemisphere     Restrictions/Precautions:  Restrictions/Precautions: General Precautions, Fall Risk  Position Activity Restriction  Other position/activity restrictions: dysarthria    Subjective:  Chart Reviewed: Yes  Patient assessed for rehabilitation services?: Yes  Family / Caregiver Present: Yes  Subjective: RN approved session. Pt pleasantly agreed, she was noted to have garbled speech and labored breathing even at rest. RN and pt state this is baseline. Pt is very hard of hearing and her speech was mumbled. Pt's daughter present and hands on assisting throughout session. I educated on pt's 
[facility]  SPEECH THERAPY MISSED TREATMENT NOTE  [facility]      Date: 2024  Patient Name: Court Hutchison        MRN: [id number]    : 1940  (84 y.o.)    REASON FOR MISSED TREATMENT:  Attempted to see patient at 0820 for dysphagia management interventions. JOVANNI Romo reports patient not appropriate d/t decreased levels of alertness and plans to attempt Dobbhoff placement this date. Will follow up on sequential date.     Jenni Cervantes MA CFY-SLP COND.[id number]            
[facility]  INPATIENT SPEECH THERAPY  [facility] 4A  DAILY NOTE    TIME   SLP Individual Minutes  Time In: 922  Time Out: 40  Minutes: 18  Timed Code Treatment Minutes: 0 Minutes       Date: 2024  Patient Name: Court Hutchison      CSN: [id number]   : 1940  (84 y.o.)  Gender: female   Referring Physician:  Edson Fernandez DO   Diagnosis: Dizziness   Precautions: Fall risk, aspiration precautions   Current Diet: NPO  Respiratory Status: Room Air  Swallowing Strategies:  Full upright position, fully alert, alternate solids and liquids, double swallow, direct 1:1   Date of Last MBS/FEES:  MBS 24    Pain:  No pain reported.    Subjective:  Spoke with JOVANNI Romo for approval of ST interventions. Upon arrival, patient sitting upright in bed. Alert and cooperative.     **High levels of skilled education provided to patient's daughter regarding MBS results. Oral phase deficits compounded likely d/t high levels of lethargy. No tracheal aspiration observed across PO trials. Patient's daughter reports she does NOT want PEG placement as patient's QOL with drinking and eating is more valuable. ST explained that patient's swallow function is highly variable d/t high levels of fatigue to subsequently put her at risk for aspiration events. ST also explained patient may not meet nutrition/hydration standards with PO feedings. Patient's daughter verbalized understanding and states patient has little intake at home and sleeps ~16 hours a day.     Short-Term Goals:  SHORT TERM GOAL #1:  Goal 1: Following oral care completion, patient will consume PO trials of thin liquids and puree with ST only with improved oral initiation/preparatory phase, timely oral transit and ability to elicit double volitional swallows following min verbal cues to determine appropriateness for PO diet initiation versus appropriateness for repeat instrumental swallowing assessment. GOAL MET, NEW GOAL 1: Patient will 
[facility]  SPEECH THERAPY  [facility] 4A  Clinical Swallow Evaluation      SLP Individual Minutes  Time In: 0743  Time Out: 0751  Minutes: 8  Timed Code Treatment Minutes: 0 Minutes       Date: 2024  Patient Name: Court Hutchison      CSN: [id number]   : 1940  (83 y.o.)  Gender: female   Referring Physician: Mackenzie Chandler PA-C  Diagnosis: Stroke-like symptom    History of Present Illness/Injury: Patient admit to [facility] with above medical dx. Please refer to physician H&P for full patient medical history. Positive for new CVA with MRI of brain positive for moderate sized acute infarct in the superior L cerebellum.     Past Medical History:   Diagnosis Date    Arthritis     Arthritis     Basal cell carcinoma (BCC) of skin of face     cheek below right eye    COPD (chronic obstructive pulmonary disease) (HCC) 2012    COVID-19 2021    Dyslipidemia 02/15/2017    Fatigue     History of tobacco abuse 02/15/2017    Hyperlipidemia 10/13/2014    Hypertension     Non morbid obesity due to excess calories 02/15/2017    Pneumonia     Precordial pain 02/15/2017    Rheumatoid arteritis (HCC)     Smoker     SOB (shortness of breath) 02/15/2017    Tobacco abuse        SUBJECTIVE:  Session approved by RNCeleste. Patient seen upright in bed. ?Augustine as patient requesting multiple repetitions of stimuli. When asked about hearing acuity patient denies issues. Fully alert and cooperative.     OBJECTIVE:    Pain:  No pain reported.    Current Diet: NPO     Respiratory Status:  Nasal Canula: 4LPM    Behavioral Observation:  Alert, Confused, and Limited Direction Following    CRANIAL NERVE ASSESSMENT   CN V (Trigeminal) Closes and Opens Mandible WFL    Rotary Jaw Movement Not Tested      CN VII (Facial) Cheeks Hold Food out of Sulci WFL    Opens, Closes/Seals, Protrudes, Retracts Lips Impaired: Unilateral - Right    General Appearance Impaired and R facial droop    Sensation Not Tested 
This RT resulted the VBG with creat  
followed by daily 81 mg aspirin and 71 mg Plavix.  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  Modified barium swallowing study was done on 2/11/2024 and showed laryngeal penetration but no aspiration.  Speech therapy recommended continuing NPO status after modified barium swallowing study was done.  Gastroenterologist Dr. Kaminski was consulted on 2/11/2024 for PEG tube placement which is planned.  On 2/11/2024 night the patient became more lethargic.  She also developed fever up to 101 °F.  The patient became more lethargic on 2/12/2024.  IV Zosyn was started for concern of aspiration pneumonia.  CT of head was repeated on 2/12/2024 and show no new abnormality but developing low-attenuation in superior left cerebellum known infarct area.  NG tube was placed by IR on 2/12/2024.    At the present time the patient is sleepy with somnolence.  She cannot be awakened.  She is not answering any questioning or following any verbal command.  She did demonstrate some withdrawal respond to painful stimuli on her left upper and bilateral lower extremities.  Evaluation is very limited due to patient's mental status.    2/13/24: Patient seen today in follow up. Patient resting in bed. Restless, attempting to get comfortable. Patient not consistent with appropriate responses. Is more awake today. Discussed ongoing therapies on acute care. Daughter understanding that patient current doesn't qualify for IPR. She is open to SNF. Discussed PM&R will follow, but SW will start referrals for SNF in anticipation for that discharge. Daughter understanding. 
patient with poor respiratory sounds at baseline. Certainly cannot rule out pharyngeal dysfunction at bedside alone, however repeat instrumental evaluation not warranted at this time.     Recommend minced and moist diet, thin liquids. Direct 1:1 assistance and comprehensive oral care before meals. Skilled ST services recommended for dysphagia management interventions. Recommend continued GOC discussions.     SHORT TERM GOAL #2:  Goal 2: Complete speech/language/cognitive assessment for establishment of goals/POC s/t new CVA.  INTERVENTIONS:DNT due to focus on other STGs.     Long-Term Goals:  No LTGs established due to short ELOS.     Functional Oral Intake Scale: Total Oral Intake: 5.  Total oral intake of multiple consistencies requiring special preparation    EDUCATION:  Learner: Patient  Education:  Reviewed diet and strategies, Reviewed signs, symptoms and risks of aspiration, and Reviewed ST goals and Plan of Care  Evaluation of Education: Verbalizes understanding    ASSESSMENT/PLAN:  Activity Tolerance:  Patient tolerance of  treatment: good.      Assessment/Plan: Patient progressing toward established goals.  Continues to require skilled care of licensed speech pathologist to progress toward achievement of established goals and plan of care..     Plan for Next Session: Dysphagia management   Discharge Recommendations: [facility]     Jenni Cervantes MA CFY-SLP COND.[id number]      
splenomegaly.    Pancreas: Mild atrophy involving the pancreas. 4.2 mm cystic area in the body of the pancreas. The pancreatic duct is not dilated. There is no abnormal enhancement. The fat planes surrounding the pancreas are preserved    Adrenal glands: Mild fullness left adrenal gland. Signal loss on the out of phase images consistent with benign adenoma. Normal right adrenal gland.    Kidneys: Small Bosniak type I cyst in the right kidney. 17 x 14 mm Bosniak type II cyst in the left kidney. The perinephric fat appears be normal    Vascular: Normal flow in the abdominal aorta, inferior vena cava and portal vein.    Abdominal cavity. Moderate-sized hiatal hernia. No significant ascites. No significant adenopathy.    MUSCULOSKELETAL: Mild dextroscoliosis. Lumbar spondylosis.    Impression  1. 3.7 x 2.5 cm area of abnormal signal intensity in the right lobe of liver adjacent to the hepatorenal fossa. There is heterogeneous signal intensity, early arterial enhancement and venous drainage into the right portal vein. The appearance is not  typical for a hemangioma. Hepatoma or metastatic disease cannot be ruled out. Please correlate with liver function tests and AFP levels. If these are elevated, CT-guided biopsy would be helpful for better evaluation.  2. Fluid/fluid level within the gallbladder possibly representing milk of calcium bile. No significant intra or extrahepatic biliary dilatation. No evidence of choledocholithiasis.  3. Moderate-sized hiatal hernia.  4. Borderline splenomegaly.  5. Small cystic area in the body of the pancreas. Mild atrophy involving the pancreas.  6. Small benign adenoma in the left adrenal gland.  7. 17 x 14   Bosniak type II cyst in the left kidney. Small Bosniak type I cysts in the right kidney.  8. Lumbar spondylosis. Mild dextroscoliosis.          **This report has been created using voice recognition software. It may contain minor errors which are inherent in voice recognition 
of breath, extremities well perfused  Lungs: no audible wheezing or crackles, no increased WOB  Skin: warm and dry, no rash or erythema  Peripheral vascular: Edema: trace      Diagnostics:  Recent Results (from the past 24 hour(s))   POCT Glucose    Collection Time: 02/13/24 11:24 AM   Result Value Ref Range    POC Glucose 80 70 - 108 mg/dl   Basic Metabolic Panel    Collection Time: 02/14/24  3:38 AM   Result Value Ref Range    Sodium 141 135 - 145 meq/L    Potassium 3.8 3.5 - 5.2 meq/L    Chloride 106 98 - 111 meq/L    CO2 23 23 - 33 meq/L    Glucose 77 70 - 108 mg/dL    BUN 13 7 - 22 mg/dL    Creatinine 0.7 0.4 - 1.2 mg/dL    Calcium 8.9 8.5 - 10.5 mg/dL   Magnesium    Collection Time: 02/14/24  3:38 AM   Result Value Ref Range    Magnesium 2.1 1.6 - 2.4 mg/dL   CBC with Auto Differential    Collection Time: 02/14/24  3:38 AM   Result Value Ref Range    WBC 11.1 (H) 4.8 - 10.8 thou/mm3    RBC 5.08 4.20 - 5.40 mill/mm3    Hemoglobin 12.1 12.0 - 16.0 gm/dl    Hematocrit 41.5 37.0 - 47.0 %    MCV 81.7 81.0 - 99.0 fL    MCH 23.8 (L) 26.0 - 33.0 pg    MCHC 29.2 (L) 32.2 - 35.5 gm/dl    RDW-CV 17.9 (H) 11.5 - 14.5 %    RDW-SD 51.8 (H) 35.0 - 45.0 fL    Platelets 423 (H) 130 - 400 thou/mm3    MPV 10.3 9.4 - 12.4 fL    Seg Neutrophils 68.0 %    Lymphocytes 12.6 %    Monocytes 7.9 %    Eosinophils 10.2 %    Basophils 0.8 %    Immature Granulocytes 0.5 %    Segs Absolute 7.5 1.8 - 7.7 thou/mm3    Lymphocytes Absolute 1.4 1.0 - 4.8 thou/mm3    Monocytes Absolute 0.9 0.4 - 1.3 thou/mm3    Eosinophils Absolute 1.1 (H) 0.0 - 0.4 thou/mm3    Basophils Absolute 0.1 0.0 - 0.1 thou/mm3    Immature Grans (Abs) 0.06 0.00 - 0.07 thou/mm3    nRBC 0 /100 wbc   Anion Gap    Collection Time: 02/14/24  3:38 AM   Result Value Ref Range    Anion Gap 12.0 8.0 - 16.0 meq/L   Glomerular Filtration Rate, Estimated    Collection Time: 02/14/24  3:38 AM   Result Value Ref Range    Est, Glom Filt Rate >60 >60 ml/min/1.73m2 
2/11/2024  PROCEDURE: XR ABDOMEN FOR NG/OG/NE TUBE PLACEMENT CLINICAL INFORMATION: NG placement, further advanced . COMPARISON: Chest x-ray 2/11/2024. TECHNIQUE: A portable AP supine view of the abdomen was obtained. FINDINGS:  There is an esophageal route tube in place. The tip is in the distal esophagus. This has not gone beyond the hiatal hernia. The sidehole is seen in the middle esophagus. There is a small hiatal hernia which contains contrast.  No distended or dilated bowel loops are noted.  There is a coarsened appearance to the lung fields the lung bases.     Esophageal route tube tip in the distal esophagus. Small hiatal hernia which contains retained barium. **This report has been created using voice recognition software. It may contain minor errors which are inherent in voice recognition technology.** Final report electronically signed by Dr. Jaimee Sellers on 2/11/2024 1:05 PM    XR ABDOMEN FOR NG/OG/NE TUBE PLACEMENT    Result Date: 2/11/2024  PROCEDURE: XR ABDOMEN FOR NG/OG/NE TUBE PLACEMENT CLINICAL INFORMATION: Portable, NG tube placement . COMPARISON: 2/11/2024. TECHNIQUE: A portable AP supine view of the abdomen was obtained. FINDINGS:  There is an esophageal route tube in place. I do not see this below the patient's hiatal hernia. There is some contrast in the distal esophagus. There is also some oral contrast in a small hiatal hernia. No distended or dilated bowel loops are noted.  There is a coarsened appearance to the lung fields in the lung bases.     Esophageal route tube tip appears to be in the distal esophagus. This has not passed through the small hiatal hernia. **This report has been created using voice recognition software. It may contain minor errors which are inherent in voice recognition technology.** Final report electronically signed by Dr. Jaimee Sellers on 2/11/2024 10:50 AM    FL MODIFIED BARIUM SWALLOW W VIDEO    Result Date: 2/11/2024  PROCEDURE: FL MODIFIED BARIUM SWALLOW W 
Retired

## 2024-02-19 ENCOUNTER — TELEPHONE (OUTPATIENT)
Dept: FAMILY MEDICINE CLINIC | Age: 84
End: 2024-02-19

## 2024-02-19 NOTE — TELEPHONE ENCOUNTER
Care Transitions Initial Follow Up Call    Outreach made within 2 business days of discharge: Yes    Patient: Court Hutchison Patient : 1940   MRN: [id number]  Reason for Admission: There are no discharge diagnoses documented for the most recent discharge.  Discharge Date: 24       Spoke with: Nasrin Kevin (daughter on HIPAA)     Discharge department/facility: [facility] Interactive Patient Contact:  Was patient able to fill all prescriptions: Yes  Was patient instructed to bring all medications to the follow-up visit: Yes  Is patient taking all medications as directed in the discharge summary? Yes  Does patient understand their discharge instructions: Yes  Does patient have questions or concerns that need addressed prior to 7-14 day follow up office visit: no    Spoke to daughter and she said she is thinking about putting Court on hospice, she said she is going to try to make follow up appointment but she is very lethargic and not much energy       Scheduled appointment with PCP within 7-14 days    Follow Up  Future Appointments   Date Time Provider Department Center   2024  1:00 PM SCHEDULE, SRPX NEUROINTERVENTION [facility] NEURINT [facility] [place]   2024  2:30 PM STR MRI RM1 STRZ MRI STR Rad/Card   3/6/2024  4:20 PM Hossein Mosquera APRN - CNP Fam Med [facility] - [place]   3/11/2024  2:30 PM STR PULMONARY FUNCTION ROOM 1 [facility]   4/10/2024  3:00 PM Marilyn Gupta DO N SRPX Rheum [facility] - [place]   2024  1:20 PM Lakesha Lilly APRN - CNP N Pulm Med [facility] - [place]   2024  1:00 PM Hossein Mosquera APRN - CNP Fam Med [facility] [place]           [facility] [place]

## 2024-02-19 NOTE — TELEPHONE ENCOUNTER
I called Nasrin for a hospital follow up for Court, she stated that Court is very lethargic, and doesn't know if she will make the follow up. Daughter thinking about putting her on hospice, states everything is confusing right now. She wanted me to let you know as an FYI

## 2024-02-20 ENCOUNTER — OFFICE VISIT (OUTPATIENT)
Dept: NEUROLOGY | Age: 84
End: 2024-02-20
Payer: MEDICARE

## 2024-02-20 VITALS
WEIGHT: 177 LBS | HEIGHT: 57 IN | HEART RATE: 83 BPM | DIASTOLIC BLOOD PRESSURE: 70 MMHG | BODY MASS INDEX: 38.19 KG/M2 | SYSTOLIC BLOOD PRESSURE: 122 MMHG

## 2024-02-20 DIAGNOSIS — I65.01 STENOSIS OF RIGHT VERTEBRAL ARTERY: Primary | ICD-10-CM

## 2024-02-20 PROCEDURE — 1123F ACP DISCUSS/DSCN MKR DOCD: CPT | Performed by: NURSE PRACTITIONER

## 2024-02-20 PROCEDURE — G8484 FLU IMMUNIZE NO ADMIN: HCPCS | Performed by: NURSE PRACTITIONER

## 2024-02-20 PROCEDURE — 1090F PRES/ABSN URINE INCON ASSESS: CPT | Performed by: NURSE PRACTITIONER

## 2024-02-20 PROCEDURE — 99214 OFFICE O/P EST MOD 30 MIN: CPT | Performed by: NURSE PRACTITIONER

## 2024-02-20 PROCEDURE — G8427 DOCREV CUR MEDS BY ELIG CLIN: HCPCS | Performed by: NURSE PRACTITIONER

## 2024-02-20 PROCEDURE — G8399 PT W/DXA RESULTS DOCUMENT: HCPCS | Performed by: NURSE PRACTITIONER

## 2024-02-20 PROCEDURE — 3074F SYST BP LT 130 MM HG: CPT | Performed by: NURSE PRACTITIONER

## 2024-02-20 PROCEDURE — 1111F DSCHRG MED/CURRENT MED MERGE: CPT | Performed by: NURSE PRACTITIONER

## 2024-02-20 PROCEDURE — 3078F DIAST BP <80 MM HG: CPT | Performed by: NURSE PRACTITIONER

## 2024-02-20 PROCEDURE — 1036F TOBACCO NON-USER: CPT | Performed by: NURSE PRACTITIONER

## 2024-02-20 PROCEDURE — G8417 CALC BMI ABV UP PARAM F/U: HCPCS | Performed by: NURSE PRACTITIONER

## 2024-02-20 RX ORDER — TRIAMCINOLONE ACETONIDE 1 MG/G
CREAM TOPICAL
COMMUNITY
Start: 2024-01-04

## 2024-02-20 NOTE — PROGRESS NOTES
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 3. Overall appearance of chest has worsened since prior. **This report has been created using voice recognition software.  It may contain minor errors which are inherent in voice recognition technology.** Final report electronically signed by Dr. 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   Will give family time to think about procedure and how patient progresses with rehab  They state they have a care conference today at her nursing home to discuss goals moving forward  Continue to take Aspirin 81mg, Plavix 75mg, and Atorvastatin 40mg daily  Tight risk factor control:  blood pressure goal <130/80, A1C <7.0-Recent 5.9, LDL 42-35-Xirxex 88  Call our office with plans moving forward       This patient was seen and evaluated with Dr. Haas and he is in agreement with the assessment and plan.    Electronically signed by ERNESTO Mancuso CNP on 2/20/24 at 1:45 PM EST

## 2024-02-20 NOTE — TELEPHONE ENCOUNTER
Left VM with Nasrin to discuss next steps in mom's care and to se if they needed any assistance with next steps.

## 2024-04-09 ENCOUNTER — PATIENT MESSAGE (OUTPATIENT)
Dept: RHEUMATOLOGY | Age: 84
End: 2024-04-09

## 2024-04-09 DIAGNOSIS — Z51.81 MEDICATION MONITORING ENCOUNTER: Primary | ICD-10-CM

## 2024-04-09 NOTE — TELEPHONE ENCOUNTER
Lab orders faxed to [place] [facility] per the daughters request.

## 2024-04-09 NOTE — TELEPHONE ENCOUNTER
From: Lurdes WAITE  To: Court Hutchison  Sent: 4/9/2024 7:32 AM EDT  Subject: Appointment Reminder    [facility]  [address] [address]  [phone]        Hello,    Our records indicate that you have an upcoming appointment at [facility]. Please log into your [employer] [facility] and complete the visit pre-check section prior to your appointment. If you need any assistance with this process, please call our office at [phone].  This process can also be completed through your [employer] Account/Rio.     Respectfully,  [facility] staff       WHAT IS VISIT PRE-CHECK?  Visit Pre-Check saves time by letting you complete your appointment paperwork and pay your copay in advance of your appointment.  1.After signing in to [employer]/ [employer] rio, open My Appointments  2.Within 3 days of your scheduled appointment, the Visit Pre-Check button will appear  3.Select Visit Pre-Check  4.Verify or update your personal information  Demographics and address  Known allergies  Current medications, including preferred pharmacy  List of current health issues  Insurance coverage, including a photo of your insurance card  5.Sign your electronic consents such as Notice of Privacy Practices, Consent to Treat, Financial and Information Sharing  6.Pay your copay and outstanding balances, if eligible  7.Verify or update your patient health information  8.Submit and you are all ready for your appointment

## 2024-07-16 ENCOUNTER — TELEPHONE (OUTPATIENT)
Dept: PULMONOLOGY | Age: 84
End: 2024-07-16

## 2024-07-17 ENCOUNTER — TELEPHONE (OUTPATIENT)
Dept: FAMILY MEDICINE CLINIC | Age: 84
End: 2024-07-17

## 2024-07-17 NOTE — TELEPHONE ENCOUNTER
Regarding: Court Hutchison  Contact: [phone]  ----- Message from Mitzi Liao LPN sent at 7/16/2024  7:27 AM EDT -----       ----- Message from Court Hutchison to Hossein Mosquera APRN - CNP sent at 7/15/2024  9:49 PM -----   Just informing you that Court Passed away 7/13/24. We appreciate all your care and support during while she was in my care.    Thank You  Nasrin Lee ( Daughter)

## (undated) DEVICE — ARM DRAPE

## (undated) DEVICE — REDUCER: Brand: ENDOWRIST

## (undated) DEVICE — GENERAL LAPAROSCOPY PACK-LF: Brand: MEDLINE INDUSTRIES, INC.

## (undated) DEVICE — BLADELESS OBTURATOR: Brand: WECK VISTA

## (undated) DEVICE — LINER SUCT CANSTR 1500CC SEMI RIG W/ POR HYDROPHOBIC SHUT

## (undated) DEVICE — TIP COVER ACCESSORY

## (undated) DEVICE — INSUFFLATION NEEDLE TO ESTABLISH PNEUMOPERITONEUM.: Brand: INSUFFLATION NEEDLE

## (undated) DEVICE — COLUMN DRAPE

## (undated) DEVICE — TUBING INSUFFLATOR HEAT HUMIDIFIED SMK EVAC SET PNEUMOCLEAR

## (undated) DEVICE — SEAL

## (undated) DEVICE — GOWN,SIRUS,NON REINFRCD,LARGE,SET IN SL: Brand: MEDLINE

## (undated) DEVICE — GLOVE SURG SZ 65 THK91MIL LTX FREE SYN POLYISOPRENE

## (undated) DEVICE — ADHESIVE SKIN CLSR 0.7ML TOP DERMBND ADV

## (undated) DEVICE — ELECTRO LUBE IS A SINGLE PATIENT USE DEVICE THAT IS INTENDED TO BE USED ON ELECTROSURGICAL ELECTRODES TO REDUCE STICKING.: Brand: KEY SURGICAL ELECTRO LUBE

## (undated) DEVICE — GAUZE,SPONGE,4"X4",12PLY,STERILE,LF,2'S: Brand: MEDLINE

## (undated) DEVICE — COVER ARMBRD W13XL28.5IN IMPERV BLU FOR OP RM

## (undated) DEVICE — BINDER ABD 2XL H12XL60 75IN UNISX STD E 4 PNL DISPOSABLE

## (undated) DEVICE — ELECTRODE PT RET AD L9FT HI MOIST COND ADH HYDRGEL CORDED

## (undated) DEVICE — TUBING, SUCTION, 1/4" X 20', STRAIGHT: Brand: MEDLINE INDUSTRIES, INC.

## (undated) DEVICE — SOLUTION ANTIFOG VIS SYS CLEARIFY LAPSCP

## (undated) DEVICE — GLOVE ORANGE PI 7   MSG9070

## (undated) DEVICE — YANKAUER,BULB TIP,W/O VENT,RIGID,STERILE: Brand: MEDLINE

## (undated) DEVICE — SUTURE V-LOC 180 SZ 2-0 L9IN ABSRB GRN L27MM GS-22 1/2 CIR VLOCL2145

## (undated) DEVICE — CANNULA SEAL